# Patient Record
Sex: FEMALE | Race: WHITE | NOT HISPANIC OR LATINO | Employment: OTHER | URBAN - METROPOLITAN AREA
[De-identification: names, ages, dates, MRNs, and addresses within clinical notes are randomized per-mention and may not be internally consistent; named-entity substitution may affect disease eponyms.]

---

## 2017-01-16 ENCOUNTER — HOSPITAL ENCOUNTER (OUTPATIENT)
Dept: MAMMOGRAPHY | Facility: HOSPITAL | Age: 57
Discharge: HOME | End: 2017-01-16
Attending: SURGERY | Admitting: SURGERY

## 2017-01-16 DIAGNOSIS — Z12.31 VISIT FOR SCREENING MAMMOGRAM: ICD-10-CM

## 2017-01-16 PROCEDURE — G0202 SCR MAMMO BI INCL CAD: HCPCS

## 2017-01-16 PROCEDURE — 77067 SCR MAMMO BI INCL CAD: CPT | Performed by: RADIOLOGY

## 2017-01-16 PROCEDURE — 77063 BREAST TOMOSYNTHESIS BI: CPT

## 2017-01-16 PROCEDURE — 77063 BREAST TOMOSYNTHESIS BI: CPT | Performed by: RADIOLOGY

## 2017-11-25 ENCOUNTER — APPOINTMENT (OUTPATIENT)
Dept: CT IMAGING | Facility: HOSPITAL | Age: 57
End: 2017-11-25

## 2017-11-25 ENCOUNTER — HOSPITAL ENCOUNTER (EMERGENCY)
Facility: HOSPITAL | Age: 57
Discharge: HOME | End: 2017-11-25
Attending: EMERGENCY MEDICINE | Admitting: EMERGENCY MEDICINE

## 2017-11-25 VITALS
HEIGHT: 62 IN | OXYGEN SATURATION: 95 % | BODY MASS INDEX: 39.56 KG/M2 | TEMPERATURE: 98.8 F | HEART RATE: 88 BPM | SYSTOLIC BLOOD PRESSURE: 140 MMHG | RESPIRATION RATE: 18 BRPM | DIASTOLIC BLOOD PRESSURE: 89 MMHG | WEIGHT: 215 LBS

## 2017-11-25 DIAGNOSIS — A08.4 VIRAL GASTROENTERITIS: ICD-10-CM

## 2017-11-25 DIAGNOSIS — E86.0 DEHYDRATION: Primary | ICD-10-CM

## 2017-11-25 DIAGNOSIS — R10.84 DIFFUSE ABDOMINAL PAIN: ICD-10-CM

## 2017-11-25 LAB
ALBUMIN SERPL-MCNC: 5.7 G/DL (ref 3.2–4.8)
ALBUMIN/GLOB SERPL: 1.5 G/DL (ref 1.5–2.5)
ALP SERPL-CCNC: 129 U/L (ref 25–100)
ALT SERPL W P-5'-P-CCNC: 191 U/L (ref 7–40)
ANION GAP SERPL CALCULATED.3IONS-SCNC: 12 MMOL/L (ref 3–11)
AST SERPL-CCNC: 185 U/L (ref 0–33)
BACTERIA UR QL AUTO: ABNORMAL /HPF
BASOPHILS # BLD AUTO: 0.01 10*3/MM3 (ref 0–0.2)
BASOPHILS NFR BLD AUTO: 0.1 % (ref 0–1)
BILIRUB SERPL-MCNC: 0.6 MG/DL (ref 0.3–1.2)
BILIRUB UR QL STRIP: ABNORMAL
BUN BLD-MCNC: 31 MG/DL (ref 9–23)
BUN/CREAT SERPL: 25.8 (ref 7–25)
CALCIUM SPEC-SCNC: 11.2 MG/DL (ref 8.7–10.4)
CHLORIDE SERPL-SCNC: 103 MMOL/L (ref 99–109)
CLARITY UR: ABNORMAL
CO2 SERPL-SCNC: 24 MMOL/L (ref 20–31)
COD CRY URNS QL: ABNORMAL /HPF
COLOR UR: ABNORMAL
CREAT BLD-MCNC: 1.2 MG/DL (ref 0.6–1.3)
D-LACTATE SERPL-SCNC: 2.5 MMOL/L (ref 0.5–2)
DEPRECATED RDW RBC AUTO: 40.1 FL (ref 37–54)
EOSINOPHIL # BLD AUTO: 0.1 10*3/MM3 (ref 0–0.3)
EOSINOPHIL NFR BLD AUTO: 0.9 % (ref 0–3)
ERYTHROCYTE [DISTWIDTH] IN BLOOD BY AUTOMATED COUNT: 12.6 % (ref 11.3–14.5)
GFR SERPL CREATININE-BSD FRML MDRD: 47 ML/MIN/1.73
GLOBULIN UR ELPH-MCNC: 3.7 GM/DL
GLUCOSE BLD-MCNC: 96 MG/DL (ref 70–100)
GLUCOSE UR STRIP-MCNC: NEGATIVE MG/DL
HCT VFR BLD AUTO: 47.9 % (ref 34.5–44)
HGB BLD-MCNC: 16.2 G/DL (ref 11.5–15.5)
HGB UR QL STRIP.AUTO: NEGATIVE
HOLD SPECIMEN: NORMAL
HYALINE CASTS UR QL AUTO: ABNORMAL /LPF
IMM GRANULOCYTES # BLD: 0.02 10*3/MM3 (ref 0–0.03)
IMM GRANULOCYTES NFR BLD: 0.2 % (ref 0–0.6)
KETONES UR QL STRIP: ABNORMAL
LEUKOCYTE ESTERASE UR QL STRIP.AUTO: ABNORMAL
LIPASE SERPL-CCNC: 47 U/L (ref 6–51)
LYMPHOCYTES # BLD AUTO: 4.37 10*3/MM3 (ref 0.6–4.8)
LYMPHOCYTES NFR BLD AUTO: 38.1 % (ref 24–44)
MCH RBC QN AUTO: 29.9 PG (ref 27–31)
MCHC RBC AUTO-ENTMCNC: 33.8 G/DL (ref 32–36)
MCV RBC AUTO: 88.4 FL (ref 80–99)
MONOCYTES # BLD AUTO: 0.94 10*3/MM3 (ref 0–1)
MONOCYTES NFR BLD AUTO: 8.2 % (ref 0–12)
MUCOUS THREADS URNS QL MICRO: ABNORMAL /HPF
NEUTROPHILS # BLD AUTO: 6.04 10*3/MM3 (ref 1.5–8.3)
NEUTROPHILS NFR BLD AUTO: 52.5 % (ref 41–71)
NITRITE UR QL STRIP: NEGATIVE
PH UR STRIP.AUTO: 5.5 [PH] (ref 5–8)
PLATELET # BLD AUTO: 295 10*3/MM3 (ref 150–450)
PMV BLD AUTO: 10.1 FL (ref 6–12)
POTASSIUM BLD-SCNC: 3.7 MMOL/L (ref 3.5–5.5)
PROT SERPL-MCNC: 9.4 G/DL (ref 5.7–8.2)
PROT UR QL STRIP: ABNORMAL
RBC # BLD AUTO: 5.42 10*6/MM3 (ref 3.89–5.14)
RBC # UR: ABNORMAL /HPF
REF LAB TEST METHOD: ABNORMAL
SODIUM BLD-SCNC: 139 MMOL/L (ref 132–146)
SP GR UR STRIP: 1.03 (ref 1–1.03)
SQUAMOUS #/AREA URNS HPF: ABNORMAL /HPF
UROBILINOGEN UR QL STRIP: ABNORMAL
WBC NRBC COR # BLD: 11.48 10*3/MM3 (ref 3.5–10.8)
WBC UR QL AUTO: ABNORMAL /HPF
WHOLE BLOOD HOLD SPECIMEN: NORMAL
WHOLE BLOOD HOLD SPECIMEN: NORMAL

## 2017-11-25 PROCEDURE — 83605 ASSAY OF LACTIC ACID: CPT | Performed by: EMERGENCY MEDICINE

## 2017-11-25 PROCEDURE — 80053 COMPREHEN METABOLIC PANEL: CPT | Performed by: EMERGENCY MEDICINE

## 2017-11-25 PROCEDURE — 96361 HYDRATE IV INFUSION ADD-ON: CPT

## 2017-11-25 PROCEDURE — 87086 URINE CULTURE/COLONY COUNT: CPT | Performed by: EMERGENCY MEDICINE

## 2017-11-25 PROCEDURE — 85025 COMPLETE CBC W/AUTO DIFF WBC: CPT | Performed by: EMERGENCY MEDICINE

## 2017-11-25 PROCEDURE — 99283 EMERGENCY DEPT VISIT LOW MDM: CPT

## 2017-11-25 PROCEDURE — 96374 THER/PROPH/DIAG INJ IV PUSH: CPT

## 2017-11-25 PROCEDURE — 25010000002 PROMETHAZINE PER 50 MG: Performed by: EMERGENCY MEDICINE

## 2017-11-25 PROCEDURE — 74176 CT ABD & PELVIS W/O CONTRAST: CPT

## 2017-11-25 PROCEDURE — 81001 URINALYSIS AUTO W/SCOPE: CPT | Performed by: EMERGENCY MEDICINE

## 2017-11-25 PROCEDURE — 83690 ASSAY OF LIPASE: CPT | Performed by: EMERGENCY MEDICINE

## 2017-11-25 RX ORDER — SODIUM CHLORIDE 0.9 % (FLUSH) 0.9 %
10 SYRINGE (ML) INJECTION AS NEEDED
Status: DISCONTINUED | OUTPATIENT
Start: 2017-11-25 | End: 2017-11-25 | Stop reason: HOSPADM

## 2017-11-25 RX ORDER — ONDANSETRON 4 MG/1
4 TABLET, ORALLY DISINTEGRATING ORAL EVERY 6 HOURS PRN
Qty: 15 TABLET | Refills: 0 | Status: SHIPPED | OUTPATIENT
Start: 2017-11-25 | End: 2018-03-20

## 2017-11-25 RX ORDER — DIPHENOXYLATE HYDROCHLORIDE AND ATROPINE SULFATE 2.5; .025 MG/1; MG/1
1 TABLET ORAL 4 TIMES DAILY PRN
Qty: 15 TABLET | Refills: 0 | Status: SHIPPED | OUTPATIENT
Start: 2017-11-25 | End: 2018-03-20

## 2017-11-25 RX ORDER — PROMETHAZINE HYDROCHLORIDE 25 MG/ML
12.5 INJECTION, SOLUTION INTRAMUSCULAR; INTRAVENOUS ONCE
Status: COMPLETED | OUTPATIENT
Start: 2017-11-25 | End: 2017-11-25

## 2017-11-25 RX ADMIN — SODIUM CHLORIDE 1000 ML: 9 INJECTION, SOLUTION INTRAVENOUS at 17:08

## 2017-11-25 RX ADMIN — PROMETHAZINE HYDROCHLORIDE 12.5 MG: 25 INJECTION INTRAMUSCULAR; INTRAVENOUS at 17:08

## 2017-11-25 NOTE — ED PROVIDER NOTES
Subjective   HPI Comments: Yamila Keller is a 56 y.o.female who presents to the ED with c/o abdominal pain. Two days ago the pt developed diarrhea and abdominal cramping. Since that time she has had intermittent episodes of both, which has caused her to stay awake at night. She has taken Imodium, although it has not relieved her symptoms. Her cramping has progressively worsened, prompting her to present to the ED for evaluation. At the ED she denies recent antibiotic use, trips outside of the country or suspicious food intake. She does not feel like eating, but she denies fever, vomiting, blood in her stool or any other acute sx at this time.    She has had no medication changes in the past couple of months.      Patient is a 56 y.o. female presenting with abdominal pain.   History provided by:  Patient  Abdominal Pain   Pain location:  Generalized  Pain quality: cramping    Pain radiates to:  Does not radiate  Onset quality:  Gradual  Duration:  2 days  Timing:  Constant  Progression:  Worsening  Relieved by:  Nothing  Worsened by:  Nothing  Ineffective treatments: Imodium.  Associated symptoms: diarrhea    Associated symptoms: no chills, no fever, no hematochezia, no nausea and no vomiting        Review of Systems   Constitutional: Positive for appetite change. Negative for chills and fever.   Gastrointestinal: Positive for abdominal pain and diarrhea. Negative for blood in stool, hematochezia, nausea and vomiting.   All other systems reviewed and are negative.      Past Medical History:   Diagnosis Date   • Arthritis    • Depression        No Known Allergies    Past Surgical History:   Procedure Laterality Date   • BREAST CYST EXCISION Right     BENIGN, INTRADUCTAL PAPILLOMA (PER PT)   •  SECTION      x 3   • CHOLECYSTECTOMY     • HYSTERECTOMY      complete   • JOINT REPLACEMENT Left    • TUBAL ABDOMINAL LIGATION         Family History   Problem Relation Age of Onset   • Diabetes Mother    •  Lymphoma Mother    • Diabetes Paternal Grandmother    • Heart disease Paternal Grandmother    • Hypertension Brother    • Hypertension Brother    • Breast cancer Neg Hx    • Ovarian cancer Neg Hx        Social History     Social History   • Marital status:      Spouse name: N/A   • Number of children: N/A   • Years of education: N/A     Occupational History   • RN      Social History Main Topics   • Smoking status: Never Smoker   • Smokeless tobacco: Never Used   • Alcohol use No   • Drug use: No   • Sexual activity: Yes     Partners: Male      Comment:      Other Topics Concern   • None     Social History Narrative         Objective   Physical Exam   Constitutional: She is oriented to person, place, and time. She appears well-developed and well-nourished. No distress.   HENT:   Head: Normocephalic and atraumatic.   Mouth/Throat: No oropharyngeal exudate.   Eyes: Conjunctivae are normal. No scleral icterus.   Neck: Normal range of motion. Neck supple. No JVD present.   Cardiovascular: Normal rate, regular rhythm and normal heart sounds.  Exam reveals no gallop and no friction rub.    No murmur heard.  Pulmonary/Chest: Effort normal and breath sounds normal. No respiratory distress. She has no wheezes. She has no rales.   Abdominal: Soft. She exhibits no distension. There is tenderness. There is no rebound and no guarding.   Diminished bowel sounds. Diffuse abdominal TTP.   Musculoskeletal: Normal range of motion. She exhibits no edema.   Neurological: She is alert and oriented to person, place, and time.   Skin: Skin is warm and dry. She is not diaphoretic.   Psychiatric: She has a normal mood and affect. Her behavior is normal.   Nursing note and vitals reviewed.      Procedures         ED Course  ED Course     Pt feels much better after meds and fluids.  Labs and CT are benign other than dehydration.  Patient stable on serial rechecks.  Discussed findings, concerns, plan of care, expected course,  reasons to return and followup.                  MDM  Number of Diagnoses or Management Options  Dehydration:   Diffuse abdominal pain:   Viral gastroenteritis:      Amount and/or Complexity of Data Reviewed  Clinical lab tests: reviewed and ordered  Tests in the radiology section of CPT®: reviewed and ordered  Decide to obtain previous medical records or to obtain history from someone other than the patient: yes  Independent visualization of images, tracings, or specimens: yes        Final diagnoses:   Dehydration   Viral gastroenteritis   Diffuse abdominal pain       Documentation assistance provided by sofia Cooney.  Information recorded by the scribe was done at my direction and has been verified and validated by me.     Charlie Cooney  11/25/17 1708       Fred Turner MD  11/26/17 0004

## 2017-11-27 LAB
BACTERIA SPEC AEROBE CULT: NORMAL
BACTERIA SPEC AEROBE CULT: NORMAL

## 2018-01-10 ENCOUNTER — TRANSCRIBE ORDERS (OUTPATIENT)
Dept: ADMINISTRATIVE | Facility: HOSPITAL | Age: 58
End: 2018-01-10

## 2018-01-10 DIAGNOSIS — Z12.31 VISIT FOR SCREENING MAMMOGRAM: Primary | ICD-10-CM

## 2018-02-02 ENCOUNTER — TRANSCRIBE ORDERS (OUTPATIENT)
Dept: ADMINISTRATIVE | Facility: HOSPITAL | Age: 58
End: 2018-02-02

## 2018-02-02 DIAGNOSIS — R79.89 ELEVATED LFTS: Primary | ICD-10-CM

## 2018-02-02 DIAGNOSIS — B25.9 CYTOMEGALOVIRUS INFECTION, UNSPECIFIED CYTOMEGALOVIRAL INFECTION TYPE (HCC): ICD-10-CM

## 2018-02-08 ENCOUNTER — HOSPITAL ENCOUNTER (OUTPATIENT)
Dept: CT IMAGING | Facility: HOSPITAL | Age: 58
Discharge: HOME | End: 2018-02-08
Admitting: NURSE PRACTITIONER

## 2018-02-08 VITALS
HEIGHT: 63 IN | SYSTOLIC BLOOD PRESSURE: 125 MMHG | OXYGEN SATURATION: 91 % | TEMPERATURE: 98.7 F | BODY MASS INDEX: 38.91 KG/M2 | DIASTOLIC BLOOD PRESSURE: 71 MMHG | WEIGHT: 219.6 LBS | RESPIRATION RATE: 18 BRPM | HEART RATE: 86 BPM

## 2018-02-08 DIAGNOSIS — R79.89 ELEVATED LFTS: ICD-10-CM

## 2018-02-08 DIAGNOSIS — B25.9 CYTOMEGALOVIRUS INFECTION, UNSPECIFIED CYTOMEGALOVIRAL INFECTION TYPE (HCC): ICD-10-CM

## 2018-02-08 LAB
APTT PPP: 26.8 SECONDS (ref 24–31)
INR PPP: 0.98
PLATELET # BLD AUTO: 217 10*3/MM3 (ref 150–450)
PROTHROMBIN TIME: 10.7 SECONDS (ref 9.6–11.5)

## 2018-02-08 PROCEDURE — 25010000002 FENTANYL CITRATE (PF) 100 MCG/2ML SOLUTION

## 2018-02-08 PROCEDURE — 85730 THROMBOPLASTIN TIME PARTIAL: CPT | Performed by: RADIOLOGY

## 2018-02-08 PROCEDURE — 88313 SPECIAL STAINS GROUP 2: CPT | Performed by: NURSE PRACTITIONER

## 2018-02-08 PROCEDURE — 88307 TISSUE EXAM BY PATHOLOGIST: CPT | Performed by: NURSE PRACTITIONER

## 2018-02-08 PROCEDURE — 77012 CT SCAN FOR NEEDLE BIOPSY: CPT

## 2018-02-08 PROCEDURE — 85610 PROTHROMBIN TIME: CPT | Performed by: RADIOLOGY

## 2018-02-08 PROCEDURE — 85049 AUTOMATED PLATELET COUNT: CPT | Performed by: RADIOLOGY

## 2018-02-08 RX ORDER — FENTANYL CITRATE 50 UG/ML
100 INJECTION, SOLUTION INTRAMUSCULAR; INTRAVENOUS ONCE
Status: DISCONTINUED | OUTPATIENT
Start: 2018-02-08 | End: 2018-02-09 | Stop reason: HOSPADM

## 2018-02-08 RX ORDER — ALPRAZOLAM 0.5 MG/1
0.5 TABLET ORAL
Status: COMPLETED | OUTPATIENT
Start: 2018-02-08 | End: 2018-02-08

## 2018-02-08 RX ORDER — FENTANYL CITRATE 50 UG/ML
INJECTION, SOLUTION INTRAMUSCULAR; INTRAVENOUS
Status: COMPLETED
Start: 2018-02-08 | End: 2018-02-08

## 2018-02-08 RX ORDER — LIDOCAINE HYDROCHLORIDE 10 MG/ML
10 INJECTION, SOLUTION EPIDURAL; INFILTRATION; INTRACAUDAL; PERINEURAL ONCE
Status: COMPLETED | OUTPATIENT
Start: 2018-02-08 | End: 2018-02-08

## 2018-02-08 RX ORDER — SODIUM CHLORIDE 0.9 % (FLUSH) 0.9 %
1-10 SYRINGE (ML) INJECTION AS NEEDED
Status: DISCONTINUED | OUTPATIENT
Start: 2018-02-08 | End: 2018-02-09 | Stop reason: HOSPADM

## 2018-02-08 RX ADMIN — FENTANYL CITRATE 50 MCG: 50 INJECTION, SOLUTION INTRAMUSCULAR; INTRAVENOUS at 16:11

## 2018-02-08 RX ADMIN — LIDOCAINE HYDROCHLORIDE 10 ML: 10 INJECTION, SOLUTION INFILTRATION; PERINEURAL at 16:09

## 2018-02-08 RX ADMIN — ALPRAZOLAM 0.5 MG: 0.5 TABLET ORAL at 11:13

## 2018-02-08 NOTE — PROCEDURES
Radiology Procedure    Pre-procedure: liver for random liver biopsy     Procedure Performed: CT-guided random liver biopsy     IV Sedation and/or Anesthesia:  Yes, describe: 50 ucg Fentanyl IV    Complications: None    Preliminary Findings: Liver without focal lesion    Specimen Removed: Core biopsy x 3    Estimated Blood Loss:  0ml    Post-Procedure Diagnosis: Random core liver biopsy    Post-Procedure Plan: Await pathological results final. Resume activities and plan per ordering physician.     Standard Discharge Instructions Given:yes     Trevin Del Toro DO  02/08/18  4:48 PM

## 2018-02-08 NOTE — PLAN OF CARE
Problem: Patient Care Overview (Adult)  Goal: Adult Individualization and Mutuality  Outcome: Ongoing (interventions implemented as appropriate)   02/08/18 0887   Individualization   Patient Specific Goals complete liver biopsy

## 2018-02-08 NOTE — PLAN OF CARE
Problem: Liver Biopsy (Adult,Pediatric)  Goal: Signs and Symptoms of Listed Potential Problems Will be Absent or Manageable (Liver Biopsy)  Outcome: Ongoing (interventions implemented as appropriate)   02/08/18 0930   Liver Biopsy   Problems Assessed (Liver Biopsy) all

## 2018-02-08 NOTE — PLAN OF CARE
Problem: Patient Care Overview (Adult)  Goal: Discharge Needs Assessment  Outcome: Ongoing (interventions implemented as appropriate)   02/08/18 0941   Discharge Needs Assessment   Concerns To Be Addressed no discharge needs identified

## 2018-02-08 NOTE — PLAN OF CARE
Problem: Patient Care Overview (Adult)  Goal: Plan of Care Review  Outcome: Ongoing (interventions implemented as appropriate)   02/08/18 1623   Coping/Psychosocial Response Interventions   Plan Of Care Reviewed With patient;spouse

## 2018-02-09 ENCOUNTER — TELEPHONE (OUTPATIENT)
Dept: INTERVENTIONAL RADIOLOGY/VASCULAR | Facility: HOSPITAL | Age: 58
End: 2018-02-09

## 2018-02-09 NOTE — NURSING NOTE
Pt discharged to home s/p liver biopsy.  Pt tolerated procedure without complications.  Discharge instructions reviewed with patient and spouse.  Both verbalized understanding.  Transported pt to exit via wheelchair per RN.

## 2018-02-09 NOTE — TELEPHONE ENCOUNTER
@[id number])@    Other Comments:

## 2018-02-20 ENCOUNTER — HOSPITAL ENCOUNTER (OUTPATIENT)
Dept: MAMMOGRAPHY | Facility: HOSPITAL | Age: 58
Discharge: HOME | End: 2018-02-20
Attending: SURGERY | Admitting: SURGERY

## 2018-02-20 DIAGNOSIS — Z12.31 VISIT FOR SCREENING MAMMOGRAM: ICD-10-CM

## 2018-02-20 PROCEDURE — 77067 SCR MAMMO BI INCL CAD: CPT | Performed by: RADIOLOGY

## 2018-02-20 PROCEDURE — 77063 BREAST TOMOSYNTHESIS BI: CPT

## 2018-02-20 PROCEDURE — 77067 SCR MAMMO BI INCL CAD: CPT

## 2018-02-20 PROCEDURE — 77063 BREAST TOMOSYNTHESIS BI: CPT | Performed by: RADIOLOGY

## 2018-02-26 LAB
CYTO UR: NORMAL
LAB AP CASE REPORT: NORMAL
LAB AP CLINICAL INFORMATION: NORMAL
LAB AP DIAGNOSIS COMMENT: NORMAL
LAB AP SPECIAL STAINS: NORMAL
Lab: NORMAL
PATH REPORT.ADDENDUM SPEC: NORMAL
PATH REPORT.FINAL DX SPEC: NORMAL
PATH REPORT.GROSS SPEC: NORMAL

## 2018-03-06 ENCOUNTER — TRANSCRIBE ORDERS (OUTPATIENT)
Dept: NUTRITION | Facility: HOSPITAL | Age: 58
End: 2018-03-06

## 2018-03-06 DIAGNOSIS — K75.81 NASH (NONALCOHOLIC STEATOHEPATITIS): Primary | ICD-10-CM

## 2018-03-20 ENCOUNTER — OFFICE VISIT (OUTPATIENT)
Dept: SLEEP MEDICINE | Facility: HOSPITAL | Age: 58
End: 2018-03-20

## 2018-03-20 VITALS
HEIGHT: 63 IN | OXYGEN SATURATION: 96 % | HEART RATE: 73 BPM | WEIGHT: 208 LBS | BODY MASS INDEX: 36.86 KG/M2 | SYSTOLIC BLOOD PRESSURE: 119 MMHG | DIASTOLIC BLOOD PRESSURE: 66 MMHG

## 2018-03-20 DIAGNOSIS — E66.9 OBESITY (BMI 30-39.9): ICD-10-CM

## 2018-03-20 DIAGNOSIS — G47.33 SEVERE OBSTRUCTIVE SLEEP APNEA: Primary | ICD-10-CM

## 2018-03-20 PROCEDURE — 99213 OFFICE O/P EST LOW 20 MIN: CPT | Performed by: INTERNAL MEDICINE

## 2018-03-20 NOTE — PROGRESS NOTES
Subjective:     Chief Complaint:   Chief Complaint   Patient presents with   • Follow-up       HPI:    Yamila Keller is a 57 y.o. female here for follow-up of obstructive sleep apnea.    She remains on auto CPAP therapy at a range of 8-18.  She is utilizing a nasal pillow mask.  Her AHI is normal and her mask leak is acceptable.  Compliance is good.    Further details are as follows:    Since last visit sleep problem has: remained the same  Currently using PAP: yes Hours of usage during the night: 7    Amount of sleep per night : 7 hours  Average length of time it takes to fall asleep : 5 minutes  Number of awakenings per night : 1     She feels fatigue (tiredness, exhaustion, lethargy) in the daytime even when not sleepy? Infrequently  She feels sleepy (or struggles to stay awake) in the daytime? No    Swengel Scale scored as 6/24.    Type of mask: nasal pillow    She (since starting PAP or since last visit) has problems with the following:   Pressure from the mask 0 - No Problem  Skin irritation from the mask 0 - No Problem  Mask coming off face 0 - No Problem  Air leaks from the mask 0 - No Problem  Dry mouth or throat 0 - No Problem  Nasal congestion 2 - Mild Problems    I reviewed her PAP download:  Average pressure: 9  Average AHI:  3  Average minutes in large leak per night: 0      Current medications are:   Current Outpatient Prescriptions:   •  clotrimazole-betamethasone (LOTRISONE) 1-0.05 % cream, Apply topically three times daily as needed to affected areas., Disp: 15 g, Rfl: 2  •  exenatide er (BYDUREON) 2 MG pen-injector injection, Inject 0.65 ml subcutaneously every 7 days in the abdomen, thighs, or outer area of upper arm rotating injection sites, Disp: 4 each, Rfl: 0  •  exenatide er (BYDUREON) 2 MG/0.85ML auto-injector injection, Inject 0.85 mL under the skin in the abdomen, thighs, or outer area of upper arm rotating injection sites Every 7 (Seven) Days, Disp: 3.4 mL, Rfl: 1  •  metFORMIN ER  (GLUCOPHAGE-XR) 500 MG 24 hr tablet, Take 2 tablets by mouth 2 (Two) Times a Day. (Patient taking differently: Take 1,000 mg by mouth 2 (Two) Times a Day.), Disp: 360 tablet, Rfl: 3  •  metoprolol succinate XL (TOPROL-XL) 50 MG 24 hr tablet, Take 1 tablet by mouth Daily., Disp: 90 tablet, Rfl: 1  •  metoprolol succinate XL (TOPROL-XL) 50 MG 24 hr tablet, Take 1 tablet by mouth Daily., Disp: 90 tablet, Rfl: 2  •  pioglitazone (ACTOS) 30 MG tablet, Take 1 tablet by mouth Daily., Disp: 90 tablet, Rfl: 3  •  Probiotic Product (PROBIOTIC DAILY PO), Take  by mouth Daily., Disp: , Rfl:   •  venlafaxine XR (EFFEXOR-XR) 75 MG 24 hr capsule, Take 3 capsules by mouth Every Morning at the same time each day with food (Patient taking differently: Take 225 mg by mouth Daily.), Disp: 270 capsule, Rfl: 3.      The patient's relevant past medical, surgical, family and social history were reviewed and updated in Epic as appropriate.     ROS:    Review of Systems   Constitutional: Negative for fatigue.   Respiratory: Positive for apnea.    Psychiatric/Behavioral: Negative for sleep disturbance.         Objective:    Physical Exam   Constitutional: She is oriented to person, place, and time. She appears well-developed and well-nourished.   HENT:   Head: Normocephalic and atraumatic.   Mouth/Throat: Oropharynx is clear and moist.   Class II airway   Neck: Neck supple. No thyromegaly present.   Cardiovascular: Normal rate and regular rhythm.  Exam reveals no gallop and no friction rub.    No murmur heard.  Pulmonary/Chest: Effort normal. No respiratory distress. She has no wheezes. She has no rales.   Musculoskeletal: She exhibits no edema.   Neurological: She is alert and oriented to person, place, and time.   Skin: Skin is warm and dry.   Psychiatric: She has a normal mood and affect. Her behavior is normal.   Vitals reviewed.      Data:    Patient's PAP download was personally reviewed including raw data and  results.    Assessment:    Problem List Items Addressed This Visit        Pulmonary Problems    Severe obstructive sleep apnea - Primary    Overview     Auto CPAP 8-18         Relevant Orders    PAP Therapy       Other    Obesity (BMI 30-39.9)      Other Visit Diagnoses    None.         Adequate treatment of her obstructive sleep apnea with auto CPAP utilizing a nasal pillow mask.  Download is acceptable as described above.    Plan:     1. No change in auto CPAP settings  2. I renewed her supply prescription for the next year  3. No change in mask  4. Continued long-term weight loss  5. Routine follow-up      Discussed in detail with the patient.  She will call prior to her follow up visit for any new problems.    Signed by  Vidal Sherman MD

## 2018-03-30 ENCOUNTER — HOSPITAL ENCOUNTER (OUTPATIENT)
Dept: NUTRITION | Facility: HOSPITAL | Age: 58
Setting detail: RECURRING SERIES
Discharge: HOME | End: 2018-03-30

## 2018-03-30 VITALS — WEIGHT: 205.2 LBS | HEIGHT: 63 IN | BODY MASS INDEX: 36.36 KG/M2

## 2018-03-30 PROCEDURE — 97802 MEDICAL NUTRITION INDIV IN: CPT | Performed by: DIETITIAN, REGISTERED

## 2018-05-15 ENCOUNTER — TELEPHONE (OUTPATIENT)
Dept: NUTRITION | Facility: HOSPITAL | Age: 58
End: 2018-05-15

## 2018-05-15 NOTE — PROGRESS NOTES
Patient canceled nutrition follow up appointment scheduled for 5/16/2018, per .  Follow up completed over the phone with .  Goal completion is as follows:    -Walk [phone] steps per day, 3x/week: 75%  -Continue tracking food intake and healthy eating habits: 100%  -Lose 0.5-1.0# of body weight per week: 100%    Patient is having a hard time reaching the [phone] steps per day goal.  However, she did join a gym to help with meeting her exercise goals.  Continues to track her food intake daily.  States that her current weight is 86.3 kg (190 lbs), which is a 6.8 kg (15 lb) weight loss from the initial nutrition visit and a 13.6 kg (30 lb) weight loss total.  Patient declined rescheduling the follow up visit at this time.  Thank you again for this referral.

## 2018-07-02 RX ORDER — PRAVASTATIN SODIUM 20 MG
20 TABLET ORAL DAILY
Qty: 90 TABLET | Refills: 3 | OUTPATIENT
Start: 2018-07-02

## 2018-08-10 ENCOUNTER — OFFICE VISIT (OUTPATIENT)
Dept: FAMILY MEDICINE CLINIC | Facility: CLINIC | Age: 58
End: 2018-08-10

## 2018-08-10 VITALS
SYSTOLIC BLOOD PRESSURE: 110 MMHG | OXYGEN SATURATION: 99 % | BODY MASS INDEX: 30.65 KG/M2 | HEIGHT: 63 IN | HEART RATE: 56 BPM | DIASTOLIC BLOOD PRESSURE: 72 MMHG | WEIGHT: 173 LBS

## 2018-08-10 DIAGNOSIS — G47.33 SEVERE OBSTRUCTIVE SLEEP APNEA: ICD-10-CM

## 2018-08-10 DIAGNOSIS — K76.0 HEPATIC STEATOSIS: ICD-10-CM

## 2018-08-10 DIAGNOSIS — F32.A DEPRESSION, UNSPECIFIED DEPRESSION TYPE: ICD-10-CM

## 2018-08-10 DIAGNOSIS — Z00.00 PHYSICAL EXAM, ANNUAL: Primary | ICD-10-CM

## 2018-08-10 DIAGNOSIS — K92.1 BLOOD IN STOOL: ICD-10-CM

## 2018-08-10 DIAGNOSIS — R10.13 EPIGASTRIC PAIN: ICD-10-CM

## 2018-08-10 DIAGNOSIS — I10 ESSENTIAL HYPERTENSION: ICD-10-CM

## 2018-08-10 DIAGNOSIS — Z90.5 SINGLE KIDNEY: ICD-10-CM

## 2018-08-10 PROCEDURE — 99396 PREV VISIT EST AGE 40-64: CPT | Performed by: PHYSICIAN ASSISTANT

## 2018-08-10 RX ORDER — MELATONIN
2000 DAILY
COMMUNITY

## 2018-08-10 RX ORDER — CYANOCOBALAMIN (VITAMIN B-12) 500 MCG
800 LOZENGE ORAL DAILY
COMMUNITY
End: 2023-02-28

## 2018-08-10 RX ORDER — RANITIDINE 150 MG/1
150 TABLET ORAL NIGHTLY
Qty: 90 TABLET | Refills: 1 | Status: SHIPPED | OUTPATIENT
Start: 2018-08-10 | End: 2018-09-14

## 2018-08-10 RX ORDER — PRAVASTATIN SODIUM 20 MG
20 TABLET ORAL DAILY
Qty: 90 TABLET | Refills: 2 | Status: SHIPPED | OUTPATIENT
Start: 2018-08-10 | End: 2018-10-08 | Stop reason: SDUPTHER

## 2018-08-10 RX ORDER — ESTRADIOL 1.53 MG/1
SPRAY TRANSDERMAL
Refills: 11 | COMMUNITY
Start: 2018-06-15 | End: 2020-08-12

## 2018-08-10 NOTE — PROGRESS NOTES
Patient Care Team:  Daly Archibald PA-C as PCP - General (Physician Assistant)     Chief complaint: Patient is in today for a physical     Patient is a 57 y.o. female who presents for her yearly physical exam.     Patient is a pleasant 57-year-old white female who presents for her annual physical exam.  She was previously seen by Dr. Bauman and myself at [facility].  Her past medical history is significant for hepatic steatosis with a stage 3-4 fibrosis, hypertension, obesity, hyperlipidemia, sleep apnea, single kidney and depression with anxiety.  She was diagnosed with hepatic steatosis in February 2018 by liver biopsy.  Since then she has radically changed her diet and began exercising.  She has lost a total of 48 pounds.  Her blood pressure is 110/72 today and she reports similar or lower readings recently.  She is currently on metoprolol 25 mg daily.  She is compliant on her pravastatin for hyperlipidemia.  She reports recent lipid panel completed in May, pending documentation from [facility].  She wears a CPAP nightly and reports significant relief and restless sleep and daytime somnolence.  She is compliant on her venlafaxine and her mood is stable.  She was recently seen by ophthalmology in December and follows up with them yearly.  She is compliant on mammogram and had a colonoscopy about 5 years ago by Dr. Red.    Patient reports today that she has had epigastric pain, nausea with 1 episode of vomiting, episodic bloody stools with constipation and has generally not felt well.  She did run out of her trulicity has been on this for 3 weeks.  She is not currently taking anything for GERD and she denies any symptoms of reflux.         Review of Systems   Constitutional: Positive for fatigue. Negative for activity change, appetite change, chills, diaphoresis, fever, unexpected weight gain and unexpected weight loss.   HENT: Negative for congestion, dental problem, ear pain, hearing loss, nosebleeds, sinus  pressure, sore throat and trouble swallowing.    Eyes: Positive for itching. Negative for blurred vision, pain, redness and visual disturbance.   Respiratory: Negative for apnea, cough, chest tightness, shortness of breath and wheezing.    Cardiovascular: Positive for leg swelling. Negative for chest pain and palpitations.   Gastrointestinal: Positive for abdominal pain, blood in stool, constipation and vomiting. Negative for abdominal distention, anal bleeding, diarrhea, nausea, GERD and indigestion.   Endocrine: Negative for cold intolerance, heat intolerance, polydipsia, polyphagia and polyuria.   Genitourinary: Negative for urinary incontinence, decreased urine volume, difficulty urinating, dysuria, frequency, hematuria and urgency.   Musculoskeletal: Positive for arthralgias. Negative for gait problem, joint swelling and bursitis.   Skin: Negative for dry skin, rash, skin lesions and bruise.   Neurological: Negative for dizziness, tremors, seizures, syncope, speech difficulty, weakness, light-headedness, headache, memory problem and confusion.   Hematological: Bruises/bleeds easily.   Psychiatric/Behavioral: Positive for agitation and depressed mood. Negative for behavioral problems, decreased concentration, hallucinations, sleep disturbance, suicidal ideas and stress. The patient is nervous/anxious.       History  Past Medical History:   Diagnosis Date   • Arthritis    • Depression    • Elevated liver enzymes    • Fatty liver    • Kidney disorder     one kidney   • Polycystic ovaries    • PVC (premature ventricular contraction)     occasional pvc's   • Sleep apnea     cpap at home      Past Surgical History:   Procedure Laterality Date   • BREAST BIOPSY     • BREAST CYST EXCISION Right     BENIGN, INTRADUCTAL PAPILLOMA (PER PT)   •  SECTION      x 3   • CHOLECYSTECTOMY     • HYSTERECTOMY      complete   • JOINT REPLACEMENT Left     left knee   • OOPHORECTOMY     • TUBAL ABDOMINAL LIGATION         No Known Allergies   Family History   Problem Relation Age of Onset   • Diabetes Mother    • Lymphoma Mother    • Diabetes Paternal Grandmother    • Heart disease Paternal Grandmother    • Hypertension Brother    • Hypertension Brother    • Breast cancer Neg Hx    • Ovarian cancer Neg Hx       Social History     Social History   • Marital status:      Spouse name: N/A   • Number of children: N/A   • Years of education: N/A     Occupational History   • RN      Social History Main Topics   • Smoking status: Never Smoker   • Smokeless tobacco: Never Used   • Alcohol use No   • Drug use: No   • Sexual activity: Yes     Partners: Male      Comment:      Other Topics Concern   • Not on file     Social History Narrative   • No narrative on file        Current Outpatient Prescriptions:   •  cholecalciferol (VITAMIN D3) 1000 units tablet, Take 2,000 Units by mouth Daily., Disp: , Rfl:   •  Dulaglutide 0.75 MG/0.5ML solution pen-injector, Inject 0.5 mL under the skin 1 (One) Time Per Week., Disp: 2 mL, Rfl: 1  •  EVAMIST 1.53 MG/SPRAY transdermal spray, APPLY 1 PUMP UTD BID, Disp: , Rfl: 11  •  metFORMIN ER (GLUCOPHAGE-XR) 500 MG 24 hr tablet, Take 2 tablets by mouth 2 (Two) Times a Day. (Patient taking differently: Take 1,000 mg by mouth 2 (Two) Times a Day.), Disp: 360 tablet, Rfl: 3  •  pravastatin (PRAVACHOL) 20 MG tablet, Take 1 tablet by mouth Daily., Disp: 90 tablet, Rfl: 2  •  Probiotic Product (PROBIOTIC DAILY PO), Take  by mouth Daily., Disp: , Rfl:   •  venlafaxine XR (EFFEXOR-XR) 75 MG 24 hr capsule, Take 3 capsules by mouth Every Morning at the same time each day with food (Patient taking differently: Take 225 mg by mouth Daily.), Disp: 270 capsule, Rfl: 3  •  Vitamin E 400 units tablet, Take 800 Units by mouth Daily., Disp: , Rfl:   •  clotrimazole-betamethasone (LOTRISONE) 1-0.05 % cream, Apply topically three times daily as needed to affected areas., Disp: 15 g, Rfl: 2  •  metoprolol tartrate  (LOPRESSOR) 25 MG tablet, Take 1 tablet by mouth Daily., Disp: 90 tablet, Rfl: 0  •  raNITIdine (ZANTAC) 150 MG tablet, Take 1 tablet by mouth Every Night., Disp: 90 tablet, Rfl: 1    Immunizations   N/A   Prescribed/Refused   Date     Td/Tdap  (Booster Q 10 yrs)   []           Prescribed    []     Refused        []      2013     Flu  (Yearly)   []        Prescribed    []     Refused        []      Not available yet     Pneumovax  (1 yr after Prevnar)   [x]        Prescribed    []     Refused        []           Prevnar 13  (1 yr after Pneumo)   [x]        Prescribed    []     Refused        []           Hep B     []        Prescribed    []     Refused        []      Has completed series     Zostavax  (Age 60 and older)   []        Prescribed    []     Refused        []      Out of stock at pharmacy     There is no immunization history on file for this patient.  Health Maintenance   Topic Date Due   • TDAP/TD VACCINES (1 - Tdap) 12/22/1980   • ZOSTER VACCINE (1 of 2) 12/22/2011   • INFLUENZA VACCINE  08/01/2018   • ANNUAL PHYSICAL  08/11/2019   • MAMMOGRAM  02/20/2020   • COLONOSCOPY  07/04/2023   • HEPATITIS C SCREENING  Completed        Diabetes  [] Yes  [x] No   N/A      Date     Eye Exam     []            []   Complete         Date:  Where:       Foot Exam     []         []   Complete          Obesity Counseling     []       []   Complete     No results found for: HGBA1C, MICROALBUR    Additional Testing      Date     Colorectal Screening       []   N/A   [x]   Complete         Date:    Where:       Pap      [x]   N/A   []   Complete   Date:    Where:       Mammogram        []   N/A   [x]   Complete Date:    Where:     PSA  (Over age 50)    [x]   N/A   []   Complete   Date:    Where:     US Aorta  (For male smokers, age 65)     [x]   N/A   []   Complete   Date:    Where:     CT for Smoker  (Age 55-75, 30 pk yr)    [x]   N/A   []   Complete   Date:    Where:     Bone Density/DEXA      [x]   N/A   []   Complete    Date:    Follow-up:     Hep. C  ( 1158-5384)      []   N/A   [x]   Complete   Date:      Where:     Patient's Body mass index is 30.65 kg/m². BMI is above normal parameters. Recommendations include: exercise counseling and nutrition counseling.      Results for orders placed or performed during the hospital encounter of 18   aPTT   Result Value Ref Range    PTT 26.8 24.0 - 31.0 seconds   Protime-INR   Result Value Ref Range    Protime 10.7 9.6 - 11.5 Seconds    INR 0.98    Platelet Count   Result Value Ref Range    Platelets 217 150 - 450 10*3/mm3   Tissue Pathology Exam - Tissue, Liver   Result Value Ref Range    Addendum       See scanned report.       Case Report       Surgical Pathology Report                         Case: [id number]                                  Authorizing Provider:  Adela Ross MD          Collected:           2018 04:41 PM          Ordering Location:     [facility]   Received:            2018 07:02 AM                                 CT                                                                           Pathologist:           Steve Ocasio MD                                                            Specimen:    Liver                                                                                      Clinical Information       The working history is random liver biopsy and liver without focal lesion.       Final Diagnosis        LIVER, NEEDLE CORE BIOPSY:  Severe fatty change, steatohepatitis, and stage 3-4 fibrosis.  (See comment).      DGD/dlb      Comment       This diagnosis was rendered by Dr. Brock at [facility]. See scanned report for full consultative opinion.          Gross Description       Received in formalin labeled as liver biopsy are three needle cores of tan soft tissue averaging 1.0 cm in length by 0.1 cm in diameter. Submitted entirely in one cassette. LED/klb       Special Stains       Trichrome stain shows stage 3 to 4  "fibrosis and the iron stain is negative for stainable iron.       Microscopic Description       The slides are reviewed and demonstrate histopathologic features supporting the above rendered diagnosis.         Embedded Images              Vitals:    08/10/18 0822   BP: 110/72   BP Location: Right arm   Patient Position: Sitting   Cuff Size: Adult   Pulse: 56   SpO2: 99%   Weight: 78.5 kg (173 lb)   Height: 160 cm (62.99\")         Physical Exam   Constitutional: She is oriented to person, place, and time. Vital signs are normal. She appears well-developed and well-nourished. She is active and cooperative. She appears overweight.   HENT:   Head: Normocephalic and atraumatic.   Right Ear: Hearing, tympanic membrane, external ear and ear canal normal.   Left Ear: Hearing, tympanic membrane, external ear and ear canal normal.   Nose: Mucosal edema and rhinorrhea present. Right sinus exhibits no maxillary sinus tenderness and no frontal sinus tenderness. Left sinus exhibits no maxillary sinus tenderness and no frontal sinus tenderness.   Mouth/Throat: Uvula is midline, oropharynx is clear and moist and mucous membranes are normal.   Eyes: Pupils are equal, round, and reactive to light. Conjunctivae, EOM and lids are normal.   Wearing glasses.   Neck: Trachea normal, normal range of motion and phonation normal. No thyroid mass and no thyromegaly present.   Cardiovascular: Normal rate, regular rhythm and normal heart sounds.    Pulmonary/Chest: Effort normal and breath sounds normal.   Abdominal: Soft. Normal appearance and bowel sounds are normal. There is tenderness in the epigastric area. There is no rigidity, no rebound, no guarding and no CVA tenderness.   Musculoskeletal: Normal range of motion.   Lymphadenopathy:     She has no cervical adenopathy.   Neurological: She is alert and oriented to person, place, and time. She has normal strength and normal reflexes. Coordination and gait normal.   CN grossly intact "   Skin: Skin is warm. No rash noted. No cyanosis or erythema. Nails show no clubbing.   Psychiatric: She has a normal mood and affect. Her speech is normal and behavior is normal. Judgment and thought content normal. She is not actively hallucinating. Cognition and memory are normal. She is attentive.   Vitals reviewed.          Counseling provided on diet and nutrition, exercise, weight management and flu prevention.    Diagnoses and all orders for this visit:    Physical exam, annual    Severe obstructive sleep apnea  -wears CPAP nightly    Depression, unspecified depression type  -on venlafaxine  -mood is stable     Hepatic steatosis  -liver biopsy confirming stage 3-4 fibrosis  -patient has changed diet and began exercising, has lost a total of 48 lbs since February 2018    Single kidney  -right kidney only    Essential hypertension  -110/72, was lower yesterday, probably overtreated  -has lost 48 lbs in the last several months  -will decrease metoprolol to 25 mg QD  -monitor at home    Blood in stool  -reports several episodes of bright blood   -     Ambulatory Referral For Screening Colonoscopy    Epigastric pain  -TTP along epigastric region  -most likely due to dyspepsia  -will trial ranitidine 150 mg QD  -fu in 1 month     Daly Archibald PA-C   8/10/2018   12:57 PM

## 2018-08-10 NOTE — PATIENT INSTRUCTIONS
"-monitor blood pressure at home  -notify me if stomach pain persists or worsens  -recommend magnesium 250 mg 1-2 tablets a day    General Health Maintenance:  -Annual dermatology exam  -Annual eye exam (if within the last year, please obtain records for our office and bring in to next visit or mail in)  -Staying current on your required colonoscopy, starts at age 50 unless there are other indications prior  -Self breast and/or testicular exams at least every month (easy to do in the shower)  -Yearly mammogram    Vaccines:  -Talk to pharmacist about shingrix shot  -Obtain flu shot when available  -Health department is recommending Hepatitis A vaccine    The largest impact you can have on your own health is getting the proper nutrition, exercise and maintaining an overall healthy body weight.  Proper nutrition involves eating lots of vegetables, fruit, minimal lean meat and avoiding processed foods and limiting refined sugars, carbohydrates and starches (\"the white stuff\").  Drinking at least 8 cups of water daily.  Walking at least 30 minutes a day and if possible, increasing this to a more cardiovascular aerobic exercise.  Maintaining a healthy body weight.      If you smoke or use tobacco products, quitting is extremely important and I am happy to discuss options with you regarding how to do this and what's available to assist you.    If you consume alcohol, limit your alcohol intake to 2 drinks a day for men and 1 drink a day for woman.    It is also important to make sure to keep regular appointments and have all general health maintenance items completed in a timely manner.      "

## 2018-08-21 RX ORDER — SODIUM, POTASSIUM,MAG SULFATES 17.5-3.13G
1 SOLUTION, RECONSTITUTED, ORAL ORAL TAKE AS DIRECTED
Qty: 2 BOTTLE | Refills: 0 | Status: SHIPPED | OUTPATIENT
Start: 2018-08-21 | End: 2018-09-14

## 2018-08-28 ENCOUNTER — OUTSIDE FACILITY SERVICE (OUTPATIENT)
Dept: GASTROENTEROLOGY | Facility: CLINIC | Age: 58
End: 2018-08-28

## 2018-08-28 PROCEDURE — 45378 DIAGNOSTIC COLONOSCOPY: CPT | Performed by: INTERNAL MEDICINE

## 2018-09-14 ENCOUNTER — OFFICE VISIT (OUTPATIENT)
Dept: FAMILY MEDICINE CLINIC | Facility: CLINIC | Age: 58
End: 2018-09-14

## 2018-09-14 VITALS
HEART RATE: 56 BPM | BODY MASS INDEX: 30.48 KG/M2 | SYSTOLIC BLOOD PRESSURE: 106 MMHG | WEIGHT: 172 LBS | DIASTOLIC BLOOD PRESSURE: 72 MMHG | OXYGEN SATURATION: 99 % | HEIGHT: 63 IN | TEMPERATURE: 98.4 F | RESPIRATION RATE: 16 BRPM

## 2018-09-14 DIAGNOSIS — R10.13 EPIGASTRIC ABDOMINAL PAIN: ICD-10-CM

## 2018-09-14 DIAGNOSIS — I10 ESSENTIAL HYPERTENSION: ICD-10-CM

## 2018-09-14 DIAGNOSIS — K76.0 HEPATIC STEATOSIS: ICD-10-CM

## 2018-09-14 DIAGNOSIS — G43.119 INTRACTABLE MIGRAINE WITH AURA WITHOUT STATUS MIGRAINOSUS: Primary | ICD-10-CM

## 2018-09-14 DIAGNOSIS — G47.33 SEVERE OBSTRUCTIVE SLEEP APNEA: ICD-10-CM

## 2018-09-14 DIAGNOSIS — E66.9 OBESITY (BMI 30-39.9): ICD-10-CM

## 2018-09-14 PROCEDURE — 99214 OFFICE O/P EST MOD 30 MIN: CPT | Performed by: PHYSICIAN ASSISTANT

## 2018-09-14 RX ORDER — MULTIVITAMIN WITH IRON
1 TABLET ORAL DAILY
COMMUNITY

## 2018-09-14 RX ORDER — PROPRANOLOL HYDROCHLORIDE 40 MG/1
40 TABLET ORAL 2 TIMES DAILY
Qty: 180 TABLET | Refills: 1 | Status: SHIPPED | OUTPATIENT
Start: 2018-09-14 | End: 2018-10-15

## 2018-09-14 NOTE — PROGRESS NOTES
Chief Complaint   Patient presents with   • Results     Results from Colonoscopy. Pt is fasting for labs.       HPI     Yamila Keller is a pleasant 57 y.o. female who is here for routine follow-up of previous visit on 08/10/18 for rectal bleeding, epigastric pain, and hypertension.  Patient decreased her metoprolol to 25 mg QD and her blood pressure remains low.  She reports increased migraines with auro since decreasing this medication.  She had colonoscopy which was remarkable for diverticulosis and hemorrhoids.  She reports resolution of epigastric pain and never had to start the ranitidine.  Overall patient is doing well and has no complaints.    Past Medical History:   Diagnosis Date   • Arthritis    • Carpal tunnel syndrome 2012   • Degenerative joint disease    • Depression    • Depression    • Diabetes mellitus, type 2 (CMS/HCC)    • Elevated liver enzymes    • Fatty liver    • Kidney disorder     one kidney   • Palpitations    • Polycystic ovaries    • Psoriasis    • PVC (premature ventricular contraction)     occasional pvc's   • Sleep apnea     cpap at home       Past Surgical History:   Procedure Laterality Date   • BREAST BIOPSY     • BREAST CYST EXCISION Right 2013    BENIGN, INTRADUCTAL PAPILLOMA (PER PT)   •  SECTION      x 3   • CHOLECYSTECTOMY  2013   • COLONOSCOPY     • HYSTERECTOMY  2006    complete   • JOINT REPLACEMENT Left     left knee   • OOPHORECTOMY     • TUBAL ABDOMINAL LIGATION         Family History   Problem Relation Age of Onset   • Diabetes Mother    • Lymphoma Mother    • Diabetes Paternal Grandmother    • Heart disease Paternal Grandmother    • Hypertension Brother    • Hypertension Brother    • Breast cancer Neg Hx    • Ovarian cancer Neg Hx        Social History     Social History   • Marital status:      Spouse name: N/A   • Number of children: N/A   • Years of education: N/A     Occupational History   • RN      Social History Main Topics   • Smoking  "status: Never Smoker   • Smokeless tobacco: Never Used   • Alcohol use No   • Drug use: No   • Sexual activity: Yes     Partners: Male      Comment:      Other Topics Concern   • Not on file     Social History Narrative   • No narrative on file       Allergies   Allergen Reactions   • Celexa [Citalopram] Other (See Comments)     Jittery        ROS    Review of Systems   Constitutional: Negative for chills, diaphoresis and fatigue.   Respiratory: Negative for cough, shortness of breath and wheezing.    Cardiovascular: Negative for chest pain.   Gastrointestinal: Negative for abdominal pain, blood in stool, constipation, diarrhea, nausea and indigestion.   Neurological: Positive for headache. Negative for dizziness and light-headedness.   Psychiatric/Behavioral: Negative for agitation, sleep disturbance, depressed mood and stress. The patient is not nervous/anxious.        Vitals:    09/14/18 0904   BP: 106/72   Pulse: 56   Resp: 16   Temp: 98.4 °F (36.9 °C)   TempSrc: Temporal Artery    SpO2: 99%   Weight: 78 kg (172 lb)   Height: 160 cm (63\")         Current Outpatient Prescriptions:   •  cholecalciferol (VITAMIN D3) 1000 units tablet, Take 2,000 Units by mouth Daily., Disp: , Rfl:   •  clotrimazole-betamethasone (LOTRISONE) 1-0.05 % cream, Apply topically three times daily as needed to affected areas., Disp: 15 g, Rfl: 2  •  Dulaglutide 0.75 MG/0.5ML solution pen-injector, Inject 0.5 mL under the skin 1 (One) Time Per Week., Disp: 2 mL, Rfl: 2  •  EVAMIST 1.53 MG/SPRAY transdermal spray, APPLY 1 PUMP UTD BID, Disp: , Rfl: 11  •  Magnesium 250 MG tablet, Take 1 tablet by mouth Daily., Disp: , Rfl:   •  metFORMIN ER (GLUCOPHAGE-XR) 500 MG 24 hr tablet, Take 2 tablets by mouth 2 (Two) Times a Day. (Patient taking differently: Take 1,000 mg by mouth 2 (Two) Times a Day.), Disp: 360 tablet, Rfl: 3  •  pravastatin (PRAVACHOL) 20 MG tablet, Take 1 tablet by mouth Daily., Disp: 90 tablet, Rfl: 2  •  Probiotic " Product (PROBIOTIC DAILY PO), Take  by mouth Daily., Disp: , Rfl:   •  propranolol (INDERAL) 40 MG tablet, Take 1 tablet by mouth 2 (Two) Times a Day., Disp: 180 tablet, Rfl: 1  •  venlafaxine XR (EFFEXOR-XR) 75 MG 24 hr capsule, Take 3 capsules by mouth Every Morning at the same time each day with food (Patient taking differently: Take 225 mg by mouth Daily.), Disp: 270 capsule, Rfl: 3  •  Vitamin E 400 units tablet, Take 800 Units by mouth Daily., Disp: , Rfl:     PE    Physical Exam   Constitutional: She is oriented to person, place, and time. She appears well-developed and well-nourished. No distress.   HENT:   Head: Normocephalic.   Eyes: Conjunctivae are normal.   Neck: Normal range of motion.   Cardiovascular: Normal rate, regular rhythm and normal heart sounds.    Pulmonary/Chest: Effort normal and breath sounds normal.   Musculoskeletal: Normal range of motion. She exhibits no edema.   Neurological: She is alert and oriented to person, place, and time.   Skin: Skin is warm. No rash noted. She is not diaphoretic. No erythema.   Psychiatric: She has a normal mood and affect. Her behavior is normal. Judgment and thought content normal.   Vitals reviewed.      A/P    Yamila was seen today for results.    Diagnoses and all orders for this visit:    Intractable migraine with aura without status migrainosus  -started with reduction in metoprolol  -history of migraines  -will dc metoprolol and start propranolol BID  -     propranolol (INDERAL) 40 MG tablet; Take 1 tablet by mouth 2 (Two) Times a Day.    Essential hypertension  -BP is 106/72  -on metoprolol 25 mg, which was decreased from 50 mg QD at last visit  -will discontinue medication as her hypertension is over-treated, start propranolol for migraine prophylaxis  -with weight loss, patient's hypertension has resolved    Epigastric abdominal pain  -pain resolved with time and patient never had to start ranitidine    Hepatic steatosis  -staged 3-4 fibrosis with  biopsy  -recent liver enzymes were normal  -continue to monitor    Severe obstructive sleep apnea  -previously diagnosed with sleep apnea  -has CPAP and wears nightly  -lost 48 lbs in the last few months, repeat sleep study  -     Home Sleep Study; Future    Obesity (BMI 30-39.9)  -     Dulaglutide 0.75 MG/0.5ML solution pen-injector; Inject 0.5 mL under the skin 1 (One) Time Per Week.         Plan of care reviewed with patient at the conclusion of today's visit. Education was provided regarding diagnosis, management and any prescribed or recommended OTC medications.  Patient verbalizes understanding of and agreement with management plan.    Return in about 5 months (around 2/14/2019) for Recheck.     Daly Archibald PA-C

## 2018-09-14 NOTE — PROGRESS NOTES
I have reviewed the notes, assessments, and/or procedures performed by SIOMARA El, I concur with her/his documentation of Yamila Keller.

## 2018-09-20 ENCOUNTER — OFFICE VISIT (OUTPATIENT)
Dept: SLEEP MEDICINE | Facility: HOSPITAL | Age: 58
End: 2018-09-20

## 2018-09-20 VITALS
DIASTOLIC BLOOD PRESSURE: 78 MMHG | BODY MASS INDEX: 30.41 KG/M2 | HEIGHT: 63 IN | OXYGEN SATURATION: 98 % | HEART RATE: 64 BPM | WEIGHT: 171.6 LBS | SYSTOLIC BLOOD PRESSURE: 117 MMHG

## 2018-09-20 DIAGNOSIS — G47.33 OSA (OBSTRUCTIVE SLEEP APNEA): Primary | ICD-10-CM

## 2018-09-20 PROCEDURE — 99213 OFFICE O/P EST LOW 20 MIN: CPT | Performed by: NURSE PRACTITIONER

## 2018-09-20 NOTE — PROGRESS NOTES
Subjective: Follow-up        Chief Complaint:   Chief Complaint   Patient presents with   • Follow-up       HPI:    Yamila Keller is a 57 y.o. female here for follow-up of durga.  Patient was last seen 3/20/18 her primary care provider is Daly Archibald PA-C.  Patient was originally diagnosed with severe obstructive sleep apnea, however, she has recently lost 50 pounds and does not know if she still requires her CPAP machine.  Patient strongly wants to continue with her CPAP therapy, stating she is feeling very good she is feeling very rested, and she does not snore.  She does wake up feeling refreshed and does not need to nap.  She sleeping approximately 6-7 hours nightly and her [place] score is 17/24.  If it anytime patient does wish to be retested she will call me.    Current medications are:   Current Outpatient Prescriptions:   •  cholecalciferol (VITAMIN D3) 1000 units tablet, Take 2,000 Units by mouth Daily., Disp: , Rfl:   •  clotrimazole-betamethasone (LOTRISONE) 1-0.05 % cream, Apply topically three times daily as needed to affected areas., Disp: 15 g, Rfl: 2  •  Dulaglutide 0.75 MG/0.5ML solution pen-injector, Inject 0.5 mL under the skin 1 (One) Time Per Week., Disp: 2 mL, Rfl: 2  •  EVAMIST 1.53 MG/SPRAY transdermal spray, APPLY 1 PUMP UTD BID, Disp: , Rfl: 11  •  Magnesium 250 MG tablet, Take 1 tablet by mouth Daily., Disp: , Rfl:   •  metFORMIN ER (GLUCOPHAGE-XR) 500 MG 24 hr tablet, Take 2 tablets by mouth 2 (Two) Times a Day. (Patient taking differently: Take 1,000 mg by mouth 2 (Two) Times a Day.), Disp: 360 tablet, Rfl: 3  •  pravastatin (PRAVACHOL) 20 MG tablet, Take 1 tablet by mouth Daily., Disp: 90 tablet, Rfl: 2  •  Probiotic Product (PROBIOTIC DAILY PO), Take  by mouth Daily., Disp: , Rfl:   •  propranolol (INDERAL) 40 MG tablet, Take 1 tablet by mouth 2 (Two) Times a Day., Disp: 180 tablet, Rfl: 1  •  venlafaxine XR (EFFEXOR-XR) 75 MG 24 hr capsule, Take 3 capsules by mouth Every  Morning at the same time each day with food (Patient taking differently: Take 225 mg by mouth Daily.), Disp: 270 capsule, Rfl: 3  •  Vitamin E 400 units tablet, Take 800 Units by mouth Daily., Disp: , Rfl: .      The patient's relevant past medical, surgical, family and social history were reviewed and updated in Epic as appropriate.       Review of Systems   Constitutional: Negative for diaphoresis and fatigue.   HENT: Positive for congestion.    Eyes: Positive for visual disturbance.   Respiratory: Positive for apnea.    Musculoskeletal: Positive for arthralgias.   Allergic/Immunologic: Positive for environmental allergies.   Psychiatric/Behavioral: Positive for dysphoric mood and sleep disturbance.   All other systems reviewed and are negative.        Objective:    Physical Exam   Constitutional: She is oriented to person, place, and time. She appears well-developed and well-nourished.   HENT:   Mouth/Throat: Oropharynx is clear and moist.   Mallampati 2 airway   Eyes: Conjunctivae are normal.   Neck: Neck supple. No thyromegaly present.   Cardiovascular: Normal rate and regular rhythm.    Pulmonary/Chest: Effort normal and breath sounds normal.   Lymphadenopathy:     She has no cervical adenopathy.   Neurological: She is alert and oriented to person, place, and time.   Skin: Skin is warm and dry.   Psychiatric: She has a normal mood and affect. Her behavior is normal. Judgment and thought content normal.   Nursing note and vitals reviewed.    Download and compliance has been reviewed with patient.  Her greater than forward usage is 93.3.  Her AHI is 3.9.  Her 90% pressure is 10.0 cm H2O.    ASSESSMENT/PLAN    Yamila was seen today for follow-up.    Diagnoses and all orders for this visit:    NGOC (obstructive sleep apnea)  -     CPAP Therapy            1. Counseled patient regarding multimodal approach with healthy nutrition, healthy sleep, regular physical activity, social activities, counseling, and medications.  Encouraged to practice lateral sleep position. Avoid alcohol and sedatives close to bedtime.  2. Refill supplies ×1 year  3. Return to clinic one year or sooner as symptoms warrant.    I have reviewed the results of my evaluation and impression and discussed my recommendations in detail with the patient.      Signed by  KAREN Nielsen    September 20, 2018      CC: Daly Archibald PA-C          No ref. provider found

## 2018-09-20 NOTE — PATIENT INSTRUCTIONS

## 2018-10-08 ENCOUNTER — TELEPHONE (OUTPATIENT)
Dept: FAMILY MEDICINE CLINIC | Facility: CLINIC | Age: 58
End: 2018-10-08

## 2018-10-08 RX ORDER — VENLAFAXINE HYDROCHLORIDE 75 MG/1
225 CAPSULE, EXTENDED RELEASE ORAL EVERY MORNING
Qty: 270 CAPSULE | Refills: 3 | Status: SHIPPED | OUTPATIENT
Start: 2018-10-08 | End: 2019-02-22

## 2018-10-08 RX ORDER — METFORMIN HYDROCHLORIDE 500 MG/1
1000 TABLET, EXTENDED RELEASE ORAL 2 TIMES DAILY
Qty: 360 TABLET | Refills: 1 | Status: SHIPPED | OUTPATIENT
Start: 2018-10-08 | End: 2019-04-18

## 2018-10-08 RX ORDER — PRAVASTATIN SODIUM 20 MG
20 TABLET ORAL DAILY
Qty: 90 TABLET | Refills: 2 | Status: SHIPPED | OUTPATIENT
Start: 2018-10-08 | End: 2019-08-14 | Stop reason: SDUPTHER

## 2018-10-08 NOTE — TELEPHONE ENCOUNTER
PT CALLED AND WOULD LIKE REFILLS OF VENLAFAXINE XR 75 MG, METFORMIN  MG, AND PRAVASTATIN 20 MG TABLET

## 2018-10-15 ENCOUNTER — TELEPHONE (OUTPATIENT)
Dept: FAMILY MEDICINE CLINIC | Facility: CLINIC | Age: 58
End: 2018-10-15

## 2018-10-15 DIAGNOSIS — G43.019 INTRACTABLE MIGRAINE WITHOUT AURA AND WITHOUT STATUS MIGRAINOSUS: Primary | ICD-10-CM

## 2018-10-15 RX ORDER — TOPIRAMATE 25 MG/1
TABLET ORAL
Qty: 60 TABLET | Refills: 11 | Status: SHIPPED | OUTPATIENT
Start: 2018-10-15 | End: 2019-02-22

## 2018-10-15 NOTE — TELEPHONE ENCOUNTER
PER PT CALLED, STATED INDERAL IS NOT HELPING HER MIGRAINES. PLEASE CALL PT BACK TO ADVISE ON WHAT SHE SHOULD DO.

## 2018-10-15 NOTE — TELEPHONE ENCOUNTER
Have patient decrease inderal to 1 tablet daily x7 days then discontinue.      I will trial topamax 25 mg twice a day.  She can start one tablet daily for the first week and then increase to two tablets.  Call if this is not helping.    Have patient check blood pressure at home as well and  Call if it is elevated above 140/90.

## 2018-10-18 NOTE — TELEPHONE ENCOUNTER
Called and informed pt of recommendations. She verbalized understanding and had no further questions.

## 2018-12-26 DIAGNOSIS — E66.9 OBESITY (BMI 30-39.9): ICD-10-CM

## 2019-01-25 ENCOUNTER — OFFICE VISIT (OUTPATIENT)
Dept: ORTHOPEDIC SURGERY | Facility: CLINIC | Age: 59
End: 2019-01-25

## 2019-01-25 VITALS — HEART RATE: 98 BPM | BODY MASS INDEX: 29.77 KG/M2 | OXYGEN SATURATION: 98 % | HEIGHT: 63 IN | WEIGHT: 168 LBS

## 2019-01-25 DIAGNOSIS — M20.21 HALLUX RIGIDUS OF RIGHT FOOT: Primary | ICD-10-CM

## 2019-01-25 PROCEDURE — 99213 OFFICE O/P EST LOW 20 MIN: CPT | Performed by: PHYSICIAN ASSISTANT

## 2019-01-25 NOTE — PROGRESS NOTES
[facility] Note    Subjective     Patient: Yamila Keller  : 1960    Primary Care Provider: Daly Archibald PA-C    Requesting Provider: As above    Pain of the Right Foot      History    Chief Complaint: Right great toe pain    History of Present Illness: This is a very pleasant 58-year-old female presenting today discuss her long-standing right first MTP joint pain.  She complains of pain with walking and weightbearing, no rest pain or night pain.  She rates pain 4-5/10 and aching and sharp in nature.  She denies any radiating pain.  She has seen Dr. pau Badillo in the past and uses custom orthotics with turf toe plate as well as Voltaren gel.  She is here to establish care with Dr. Phoenix should she need any further treatment.    Current Outpatient Medications on File Prior to Visit   Medication Sig Dispense Refill   • cholecalciferol (VITAMIN D3) 1000 units tablet Take 2,000 Units by mouth Daily.     • clobetasol (TEMOVATE) 0.05 % external solution Apply twice daily to itchy, scaly areas on scalp. 25 mL 2   • clotrimazole-betamethasone (LOTRISONE) 1-0.05 % cream Apply topically three times daily as needed to affected areas. 15 g 2   • Dulaglutide 0.75 MG/0.5ML solution pen-injector Inject 0.5 mL under the skin 1 (One) Time Per Week. 2 mL 2   • EVAMIST 1.53 MG/SPRAY transdermal spray APPLY 1 PUMP UTD BID  11   • hydrocortisone 2.5 % ointment Apply daily to area on buttocks twice a day until resolved. 28 g 3   • ketoconazole (NIZORAL) 2 % shampoo Use as a shampoo to scalp and face 3 times a week. Leave in for 5 minutes and rinse. 120 mL 3   • Magnesium 250 MG tablet Take 1 tablet by mouth Daily.     • metFORMIN ER (GLUCOPHAGE-XR) 500 MG 24 hr tablet Take 2 tablets by mouth 2 (Two) Times a Day. 360 tablet 1   • pravastatin (PRAVACHOL) 20 MG tablet Take 1 tablet by mouth Daily. 90 tablet 2   • Probiotic Product (PROBIOTIC DAILY PO) Take  by mouth Daily.     •  topiramate (TOPAMAX) 25 MG tablet Take 1 tab daily for 7 days, then increase to 2 tablets once daily 60 tablet 11   • venlafaxine XR (EFFEXOR-XR) 75 MG 24 hr capsule Take 3 capsules by mouth Every Morning at the same time each day with food 270 capsule 3   • Vitamin E 400 units tablet Take 800 Units by mouth Daily.       No current facility-administered medications on file prior to visit.       Allergies   Allergen Reactions   • Celexa [Citalopram] Other (See Comments)     Jittery       Past Medical History:   Diagnosis Date   • Arthritis    • Carpal tunnel syndrome 2012   • Degenerative joint disease    • Depression    • Depression    • Diabetes mellitus, type 2 (CMS/HCC)    • Elevated liver enzymes    • Fatty liver    • Kidney disorder     one kidney   • Palpitations    • Polycystic ovaries    • Psoriasis    • PVC (premature ventricular contraction)     occasional pvc's   • Sleep apnea     cpap at home     Past Surgical History:   Procedure Laterality Date   • BREAST BIOPSY     • BREAST CYST EXCISION Right     BENIGN, INTRADUCTAL PAPILLOMA (PER PT)   •  SECTION      x 3   • CHOLECYSTECTOMY     • COLONOSCOPY     • HYSTERECTOMY      complete   • JOINT REPLACEMENT Left     left knee   • OOPHORECTOMY     • TUBAL ABDOMINAL LIGATION       Family History   Problem Relation Age of Onset   • Diabetes Mother    • Lymphoma Mother    • Diabetes Paternal Grandmother    • Heart disease Paternal Grandmother    • Hypertension Brother    • Hypertension Brother    • Breast cancer Neg Hx    • Ovarian cancer Neg Hx       Social History     Socioeconomic History   • Marital status:      Spouse name: Not on file   • Number of children: Not on file   • Years of education: Not on file   • Highest education level: Not on file   Social Needs   • Financial resource strain: Not on file   • Food insecurity - worry: Not on file   • Food insecurity - inability: Not on file   • Transportation needs - medical:  "Not on file   • Transportation needs - non-medical: Not on file   Occupational History   • Occupation: RN   Tobacco Use   • Smoking status: Never Smoker   • Smokeless tobacco: Never Used   Substance and Sexual Activity   • Alcohol use: No   • Drug use: No   • Sexual activity: Yes     Partners: Male     Comment:    Other Topics Concern   • Not on file   Social History Narrative   • Not on file        Review of Systems   Constitutional: Positive for activity change.   HENT: Negative.    Eyes: Negative.    Respiratory: Positive for apnea.    Cardiovascular: Negative.    Gastrointestinal: Negative.    Endocrine: Negative.    Genitourinary: Negative.    Musculoskeletal: Positive for gait problem.   Skin: Negative.    Allergic/Immunologic: Negative.    Hematological: Negative.        The following portions of the patient's history were reviewed and updated as appropriate: allergies, current medications, past family history, past medical history, past social history, past surgical history and problem list.      Objective      Physical Exam  Pulse 98   Ht 160 cm (63\")   Wt 76.2 kg (168 lb)   SpO2 98%   BMI 29.76 kg/m²     Body mass index is 29.76 kg/m².    GENERAL: Body habitus: normal weight for height    Lower extremity edema: Left: none; Right: none    Varicose veins:  Left: none; Right: none    Gait: normal     Mental Status:  awake and alert; oriented to person, place, and time    Voice:  clear  SKIN:  Normal    Hair Growth:  Right:normal; Left:  normal  HEENT: Head: Normocephalic, atraumatic,  without obvious abnormality.  eye: normal external eye, no icterus   PULM:  Repiratory effort normal    Ortho Exam  V:  Dorsalis Pedis:  Right: 2+; Left:2+    Posterior Tibial: Right:2+; Left:2+    Capillary Refill:  Brisk  MSK:  Hand:right handed      Tibia:  Right:  non tender; Left:  non tender      Ankle:  Right: non tender, ROM  normal and motor function  normal; Left:  non tender, ROM  normal and motor function  " "normal      Foot:  Right:  tender over the 1st MTPJ with stiffness and motor function  normal; Left:  non tender, ROM  normal and motor function  normal      NEURO: Lower extremity sensation: intact     Calf Atrophy:none    Motor Function: all 5/5          Medical Decision Making    Data Review:   ordered and reviewed x-rays today    Assessment:  1. Hallux rigidus of right foot        Plan:  The patient has right hallux rigidus.   X-rays today show severe hallux rigidus with osteophytes.  I explained this is arthritis of the big toe.  It is commonly genetic, but may also occur after trauma (such as a turf toe injury).  It is often bilateral.  I explained that arthritis by definition is a loss of cartilage.  We do not make cartilage in our bodies as adults, once it begins to wear out it will  continue to wear out.  As this happens, the body puts extra bone around the joint.  We call this osteophyte formation- the common term is \"spur\".  However, this extra bone is not like a thorn sticking into the tissue causing pain, it is merely a marker that indicates the joint has lost cartilage.      I explained that treatment is stepwise.  We do not have any way to put cartilage back into joints.      Conservative treatment: 1. Turf toe orthotics.  2. NSAID (OTC Aleve, Advil, etc) when painful.  3. Glucosamine and chondroitin might help. 4. Cortisone injection- the injection can be done every 6 months if it helps.     Surgery is reserved for failure of conservative treatment.  There are 2 surgeries that I  generally use.  They are both \"salvage\" procedures, as we cannot make new cartilage. For patients who have some cartilage remaining I do a cheilectomy and osteotomy.  This only helps pain about 75% of the time.  For a failure of the cheilectomy and osteotomy or for a patient with very little cartilage in the joint I do a fusion of the 1st MTPJ or the new Cartiva procedure.      Patient has orthotics with a turf toe plate and " I've given her prescription for new ones.  She reports it is not painful enough to warrant injection or surgery at this time.  She'll return to see us as needed.      Mary Ruby PA-C  01/25/19  12:51 PM

## 2019-01-27 ENCOUNTER — HOSPITAL ENCOUNTER (EMERGENCY)
Facility: HOSPITAL | Age: 59
Discharge: HOME | End: 2019-01-27
Attending: EMERGENCY MEDICINE | Admitting: EMERGENCY MEDICINE

## 2019-01-27 ENCOUNTER — APPOINTMENT (OUTPATIENT)
Dept: GENERAL RADIOLOGY | Facility: HOSPITAL | Age: 59
End: 2019-01-27

## 2019-01-27 VITALS
DIASTOLIC BLOOD PRESSURE: 80 MMHG | HEART RATE: 80 BPM | TEMPERATURE: 99 F | WEIGHT: 171 LBS | RESPIRATION RATE: 16 BRPM | BODY MASS INDEX: 30.3 KG/M2 | HEIGHT: 63 IN | SYSTOLIC BLOOD PRESSURE: 120 MMHG | OXYGEN SATURATION: 100 %

## 2019-01-27 DIAGNOSIS — S49.91XA INJURY OF RIGHT SHOULDER, INITIAL ENCOUNTER: Primary | ICD-10-CM

## 2019-01-27 DIAGNOSIS — M79.601 RIGHT ARM PAIN: ICD-10-CM

## 2019-01-27 LAB
ALBUMIN SERPL-MCNC: 4.73 G/DL (ref 3.2–4.8)
ALBUMIN/GLOB SERPL: 1.9 G/DL (ref 1.5–2.5)
ALP SERPL-CCNC: 65 U/L (ref 25–100)
ALT SERPL W P-5'-P-CCNC: 47 U/L (ref 7–40)
ANION GAP SERPL CALCULATED.3IONS-SCNC: 6 MMOL/L (ref 3–11)
AST SERPL-CCNC: 30 U/L (ref 0–33)
BASOPHILS # BLD AUTO: 0.02 10*3/MM3 (ref 0–0.2)
BASOPHILS NFR BLD AUTO: 0.3 % (ref 0–1)
BILIRUB SERPL-MCNC: 0.3 MG/DL (ref 0.3–1.2)
BNP SERPL-MCNC: <2 PG/ML (ref 0–100)
BUN BLD-MCNC: 26 MG/DL (ref 9–23)
BUN/CREAT SERPL: 27.1 (ref 7–25)
CALCIUM SPEC-SCNC: 9.8 MG/DL (ref 8.7–10.4)
CHLORIDE SERPL-SCNC: 102 MMOL/L (ref 99–109)
CO2 SERPL-SCNC: 29 MMOL/L (ref 20–31)
CREAT BLD-MCNC: 0.96 MG/DL (ref 0.6–1.3)
DEPRECATED RDW RBC AUTO: 40 FL (ref 37–54)
EOSINOPHIL # BLD AUTO: 0.21 10*3/MM3 (ref 0–0.3)
EOSINOPHIL NFR BLD AUTO: 3.1 % (ref 0–3)
ERYTHROCYTE [DISTWIDTH] IN BLOOD BY AUTOMATED COUNT: 12.6 % (ref 11.3–14.5)
GFR SERPL CREATININE-BSD FRML MDRD: 60 ML/MIN/1.73
GLOBULIN UR ELPH-MCNC: 2.5 GM/DL
GLUCOSE BLD-MCNC: 87 MG/DL (ref 70–100)
HCT VFR BLD AUTO: 41.2 % (ref 34.5–44)
HGB BLD-MCNC: 13.6 G/DL (ref 11.5–15.5)
HOLD SPECIMEN: NORMAL
HOLD SPECIMEN: NORMAL
IMM GRANULOCYTES # BLD AUTO: 0.02 10*3/MM3 (ref 0–0.03)
IMM GRANULOCYTES NFR BLD AUTO: 0.3 % (ref 0–0.6)
LIPASE SERPL-CCNC: 69 U/L (ref 6–51)
LYMPHOCYTES # BLD AUTO: 2.79 10*3/MM3 (ref 0.6–4.8)
LYMPHOCYTES NFR BLD AUTO: 40.8 % (ref 24–44)
MCH RBC QN AUTO: 28.5 PG (ref 27–31)
MCHC RBC AUTO-ENTMCNC: 33 G/DL (ref 32–36)
MCV RBC AUTO: 86.4 FL (ref 80–99)
MONOCYTES # BLD AUTO: 0.48 10*3/MM3 (ref 0–1)
MONOCYTES NFR BLD AUTO: 7 % (ref 0–12)
NEUTROPHILS # BLD AUTO: 3.33 10*3/MM3 (ref 1.5–8.3)
NEUTROPHILS NFR BLD AUTO: 48.8 % (ref 41–71)
PLATELET # BLD AUTO: 290 10*3/MM3 (ref 150–450)
PMV BLD AUTO: 9.4 FL (ref 6–12)
POTASSIUM BLD-SCNC: 3.7 MMOL/L (ref 3.5–5.5)
PROT SERPL-MCNC: 7.2 G/DL (ref 5.7–8.2)
RBC # BLD AUTO: 4.77 10*6/MM3 (ref 3.89–5.14)
SODIUM BLD-SCNC: 137 MMOL/L (ref 132–146)
TROPONIN I SERPL-MCNC: 0 NG/ML (ref 0–0.07)
TROPONIN I SERPL-MCNC: 0 NG/ML (ref 0–0.07)
WBC NRBC COR # BLD: 6.83 10*3/MM3 (ref 3.5–10.8)
WHOLE BLOOD HOLD SPECIMEN: NORMAL
WHOLE BLOOD HOLD SPECIMEN: NORMAL

## 2019-01-27 PROCEDURE — 84484 ASSAY OF TROPONIN QUANT: CPT

## 2019-01-27 PROCEDURE — 85025 COMPLETE CBC W/AUTO DIFF WBC: CPT

## 2019-01-27 PROCEDURE — 83690 ASSAY OF LIPASE: CPT

## 2019-01-27 PROCEDURE — 93005 ELECTROCARDIOGRAM TRACING: CPT

## 2019-01-27 PROCEDURE — 93005 ELECTROCARDIOGRAM TRACING: CPT | Performed by: EMERGENCY MEDICINE

## 2019-01-27 PROCEDURE — 80053 COMPREHEN METABOLIC PANEL: CPT

## 2019-01-27 PROCEDURE — 71045 X-RAY EXAM CHEST 1 VIEW: CPT

## 2019-01-27 PROCEDURE — 83880 ASSAY OF NATRIURETIC PEPTIDE: CPT

## 2019-01-27 PROCEDURE — 99284 EMERGENCY DEPT VISIT MOD MDM: CPT

## 2019-01-27 RX ORDER — ASPIRIN 81 MG/1
324 TABLET, CHEWABLE ORAL ONCE
Status: DISCONTINUED | OUTPATIENT
Start: 2019-01-27 | End: 2019-01-27 | Stop reason: HOSPADM

## 2019-01-27 RX ORDER — SODIUM CHLORIDE 0.9 % (FLUSH) 0.9 %
10 SYRINGE (ML) INJECTION AS NEEDED
Status: DISCONTINUED | OUTPATIENT
Start: 2019-01-27 | End: 2019-01-27 | Stop reason: HOSPADM

## 2019-02-22 ENCOUNTER — OFFICE VISIT (OUTPATIENT)
Dept: FAMILY MEDICINE CLINIC | Facility: CLINIC | Age: 59
End: 2019-02-22

## 2019-02-22 VITALS
HEART RATE: 83 BPM | SYSTOLIC BLOOD PRESSURE: 116 MMHG | DIASTOLIC BLOOD PRESSURE: 72 MMHG | HEIGHT: 63 IN | WEIGHT: 178.2 LBS | OXYGEN SATURATION: 97 % | BODY MASS INDEX: 31.57 KG/M2

## 2019-02-22 DIAGNOSIS — G43.019 INTRACTABLE MIGRAINE WITHOUT AURA AND WITHOUT STATUS MIGRAINOSUS: ICD-10-CM

## 2019-02-22 DIAGNOSIS — Z90.5 SINGLE KIDNEY: ICD-10-CM

## 2019-02-22 DIAGNOSIS — K76.0 HEPATIC STEATOSIS: ICD-10-CM

## 2019-02-22 DIAGNOSIS — E66.9 OBESITY (BMI 30-39.9): ICD-10-CM

## 2019-02-22 DIAGNOSIS — G47.33 SEVERE OBSTRUCTIVE SLEEP APNEA: ICD-10-CM

## 2019-02-22 DIAGNOSIS — F32.A DEPRESSION, UNSPECIFIED DEPRESSION TYPE: Primary | ICD-10-CM

## 2019-02-22 PROCEDURE — 99214 OFFICE O/P EST MOD 30 MIN: CPT | Performed by: PHYSICIAN ASSISTANT

## 2019-02-22 RX ORDER — AMITRIPTYLINE HYDROCHLORIDE 10 MG/1
10 TABLET, FILM COATED ORAL NIGHTLY
Qty: 30 TABLET | Refills: 1 | Status: SHIPPED | OUTPATIENT
Start: 2019-02-22 | End: 2019-04-22

## 2019-02-22 RX ORDER — VENLAFAXINE HYDROCHLORIDE 75 MG/1
225 CAPSULE, EXTENDED RELEASE ORAL EVERY MORNING
Qty: 270 CAPSULE | Refills: 3 | Status: SHIPPED | OUTPATIENT
Start: 2019-02-22 | End: 2019-03-29

## 2019-02-22 NOTE — PROGRESS NOTES
Chief Complaint   Patient presents with   • Migraine     still having them, not as bad as before.   • Hypertension     was elevated a little, went to ER a few weeks ago.        HPI     Yamila Keller is a pleasant 58 y.o. female who is here for routine follow-up of hypertension, migraine, depression and POTTER, obesity, CPAP and single kidney.  Patient was recently seen at ER for right paracentral cervicothoracic pain that has resolved with time.  Labs completed at ER were reviewed.  She reports that topamax is not helping with migraines.  She has historically been on amitriptyline and this seemed to help.  She also reports issues with insomnia and depression.  She is planning on filing for divorce and this is causing situational stress.  She regularly attends Confucianist with  and they are planning on speaking with the Elders and she will pursue counseling.  She is wearing CPAP nightly.  She continues to struggle with her weight.  She is on low-dose trulicity and requests increasing this.  Tolerating it well.  Would be open to trying Saxenda.  Unable to take contrave due to current depression medication interactions.  Unable to take phentermine due to hypertension.  Hypertension is currently managed well with medication.    Past Medical History:   Diagnosis Date   • Arthritis    • Carpal tunnel syndrome 2012   • Degenerative joint disease    • Depression    • Depression    • Diabetes mellitus, type 2 (CMS/HCC)    • Elevated liver enzymes    • Fatty liver    • Kidney disorder     one kidney   • Palpitations    • Polycystic ovaries    • Psoriasis    • PVC (premature ventricular contraction)     occasional pvc's   • Sleep apnea     cpap at home       Past Surgical History:   Procedure Laterality Date   • BREAST BIOPSY     • BREAST CYST EXCISION Right     BENIGN, INTRADUCTAL PAPILLOMA (PER PT)   •  SECTION      x 3   • CHOLECYSTECTOMY  2013   • COLONOSCOPY     • HYSTERECTOMY  2006    complete   •  JOINT REPLACEMENT Left 2011    left knee   • OOPHORECTOMY     • TUBAL ABDOMINAL LIGATION         Family History   Problem Relation Age of Onset   • Diabetes Mother    • Lymphoma Mother    • Diabetes Paternal Grandmother    • Heart disease Paternal Grandmother    • Hypertension Brother    • Hypertension Brother    • Breast cancer Neg Hx    • Ovarian cancer Neg Hx        Social History     Socioeconomic History   • Marital status:      Spouse name: Not on file   • Number of children: Not on file   • Years of education: Not on file   • Highest education level: Not on file   Social Needs   • Financial resource strain: Not on file   • Food insecurity - worry: Not on file   • Food insecurity - inability: Not on file   • Transportation needs - medical: Not on file   • Transportation needs - non-medical: Not on file   Occupational History   • Occupation: RN   Tobacco Use   • Smoking status: Never Smoker   • Smokeless tobacco: Never Used   Substance and Sexual Activity   • Alcohol use: No   • Drug use: No   • Sexual activity: Yes     Partners: Male     Comment:    Other Topics Concern   • Not on file   Social History Narrative   • Not on file       Allergies   Allergen Reactions   • Celexa [Citalopram] Other (See Comments)     Jittery        ROS    Review of Systems   Constitutional: Positive for fatigue. Negative for chills, diaphoresis and fever.   Respiratory: Negative for cough, shortness of breath and wheezing.    Cardiovascular: Negative for chest pain, palpitations and leg swelling.   Gastrointestinal: Negative for abdominal pain, constipation, diarrhea, nausea and vomiting.   Musculoskeletal: Negative for arthralgias and back pain.   Neurological: Positive for headache. Negative for dizziness and memory problem.   Psychiatric/Behavioral: Positive for sleep disturbance, depressed mood and stress. Negative for suicidal ideas. The patient is not nervous/anxious.        Vitals:    02/22/19 0837   BP: 116/72  "  BP Location: Right arm   Patient Position: Sitting   Cuff Size: Adult   Pulse: 83   SpO2: 97%   Weight: 80.8 kg (178 lb 3.2 oz)   Height: 160 cm (63\")         Current Outpatient Medications:   •  cholecalciferol (VITAMIN D3) 1000 units tablet, Take 2,000 Units by mouth Daily., Disp: , Rfl:   •  clobetasol (TEMOVATE) 0.05 % external solution, Apply twice daily to itchy, scaly areas on scalp., Disp: 25 mL, Rfl: 2  •  clotrimazole-betamethasone (LOTRISONE) 1-0.05 % cream, Apply topically three times daily as needed to affected areas., Disp: 15 g, Rfl: 2  •  EVAMIST 1.53 MG/SPRAY transdermal spray, APPLY 1 PUMP UTD BID, Disp: , Rfl: 11  •  hydrocortisone 2.5 % ointment, Apply daily to area on buttocks twice a day until resolved., Disp: 28 g, Rfl: 3  •  ketoconazole (NIZORAL) 2 % shampoo, Use as a shampoo to scalp and face 3 times a week. Leave in for 5 minutes and rinse., Disp: 120 mL, Rfl: 3  •  Magnesium 250 MG tablet, Take 1 tablet by mouth Daily., Disp: , Rfl:   •  metFORMIN ER (GLUCOPHAGE-XR) 500 MG 24 hr tablet, Take 2 tablets by mouth 2 (Two) Times a Day., Disp: 360 tablet, Rfl: 1  •  pravastatin (PRAVACHOL) 20 MG tablet, Take 1 tablet by mouth Daily., Disp: 90 tablet, Rfl: 2  •  Probiotic Product (PROBIOTIC DAILY PO), Take  by mouth Daily., Disp: , Rfl:   •  venlafaxine XR (EFFEXOR-XR) 75 MG 24 hr capsule, Take 3 capsules by mouth Every Morning at the same time each day with food, Disp: 270 capsule, Rfl: 3  •  Vitamin E 400 units tablet, Take 800 Units by mouth Daily., Disp: , Rfl:   •  amitriptyline (ELAVIL) 10 MG tablet, Take 1 tablet by mouth Every Night., Disp: 30 tablet, Rfl: 1  •  Dulaglutide 1.5 MG/0.5ML solution pen-injector, Inject 1.5 mg under the skin into the appropriate area as directed 1 (One) Time Per Week for 30 days., Disp: 2 mL, Rfl: 0  •  Liraglutide -Weight Management 18 MG/3ML solution pen-injector, Inject 3 mg under the skin into the appropriate area as directed Daily for 90 days., Disp: " 5 pen, Rfl: 2  •  Zoster Vac Recomb Adjuvanted 50 MCG/0.5ML reconstituted suspension, Inject 0.5 mL into the appropriate muscle as directed by prescriber. Repeat in 2-6 months., Disp: 1 each, Rfl: 1    PE    Physical Exam   Constitutional: She appears well-developed and well-nourished. No distress.   HENT:   Head: Normocephalic and atraumatic.   Eyes: EOM are normal.   Neck: Normal range of motion.   Cardiovascular: Normal rate, regular rhythm and normal heart sounds.   Pulmonary/Chest: Effort normal and breath sounds normal.   Musculoskeletal: Normal range of motion. She exhibits no edema.   Neurological: She is alert.   Skin: Skin is warm. She is not diaphoretic. No erythema.   Psychiatric: She has a normal mood and affect. Her speech is normal and behavior is normal. Judgment and thought content normal. She is not actively hallucinating. She is attentive.       A/P    Yamila was seen today for migraine and hypertension.    Diagnoses and all orders for this visit:    Depression, unspecified depression type  -worse recently with situational stress, planning on filing for divorce  -will add amitriptyline  -has done well in past with zoloft and amitriptyline combination, may want to try this again  -denies being on venlafaxine for hot flashes, has been on this for 2+ years  -     venlafaxine XR (EFFEXOR-XR) 75 MG 24 hr capsule; Take 3 capsules by mouth Every Morning at the same time each day with food    Intractable migraine without aura and without status migrainosus  -ongoing headaches  -discontinue topamax  -trial of amitriptyline 10 mg QPM, may need to increase to 25 mg QPM  -will fu in 6 weeks  -     amitriptyline (ELAVIL) 10 MG tablet; Take 1 tablet by mouth Every Night.    Obesity (BMI 30-39.9)  -increase trulicity and try to get saxenda approved  -unable to take contrave due to current medication contraindications with depression medications  -unable to take phentermine due to hypertension  -will discontinue  trulicity once saxenda is approved  -fu in 6 weeks  -     Dulaglutide 1.5 MG/0.5ML solution pen-injector; Inject 1.5 mg under the skin into the appropriate area as directed 1 (One) Time Per Week for 30 days.  -     Liraglutide -Weight Management 18 MG/3ML solution pen-injector; Inject 3 mg under the skin into the appropriate area as directed Daily for 90 days.    Hepatic steatosis  -recent labs stable  -improvement with weight loss, will trial saxenda and discontinue trulicity  -fu in 6 weeks  -     Liraglutide -Weight Management 18 MG/3ML solution pen-injector; Inject 3 mg under the skin into the appropriate area as directed Daily for 90 days.    Single kidney  -recent labs wnl    Severe obstructive sleep apnea  -wearing CPAP nightly       Plan of care reviewed with patient at the conclusion of today's visit. Education was provided regarding diagnosis, management and any prescribed or recommended OTC medications.  Patient verbalizes understanding of and agreement with management plan.    Return in about 6 weeks (around 4/5/2019) for Recheck, depression.     Daly Archibald PA-C

## 2019-03-20 ENCOUNTER — TELEPHONE (OUTPATIENT)
Dept: FAMILY MEDICINE CLINIC | Facility: CLINIC | Age: 59
End: 2019-03-20

## 2019-03-20 DIAGNOSIS — E66.9 OBESITY (BMI 30-39.9): ICD-10-CM

## 2019-03-20 NOTE — TELEPHONE ENCOUNTER
Patient called stating her saxenda was denied by her insurance and is going to require a PA. Please call back at [phone]

## 2019-03-21 NOTE — TELEPHONE ENCOUNTER
Please let patient know that unfortunately insurance has denied saxenda even with a PA completed.  She should continue trulicity at higher dose.  Keep upcoming appointment.

## 2019-03-25 NOTE — TELEPHONE ENCOUNTER
Pt returned call, gave message below. Pt stated that she would continue Trulicity but that she needed a refill. Sent refill into patient pharmacy. Pt has appt coming up in April, will discuss weight loss at that time. Stated that she didn't feel a difference with the increase in Trulicity.

## 2019-03-28 ENCOUNTER — TELEPHONE (OUTPATIENT)
Dept: FAMILY MEDICINE CLINIC | Facility: CLINIC | Age: 59
End: 2019-03-28

## 2019-03-28 NOTE — TELEPHONE ENCOUNTER
Tried calling patient.  Saw that she was feeling unwell and reported a high blood pressure.  Wanted to know what her blood pressure has been running?  I don't see where she is on any BP medications - confirm that she is not taking anything?

## 2019-03-29 ENCOUNTER — OFFICE VISIT (OUTPATIENT)
Dept: FAMILY MEDICINE CLINIC | Facility: CLINIC | Age: 59
End: 2019-03-29

## 2019-03-29 VITALS
WEIGHT: 186.2 LBS | DIASTOLIC BLOOD PRESSURE: 74 MMHG | HEART RATE: 74 BPM | HEIGHT: 63 IN | OXYGEN SATURATION: 97 % | BODY MASS INDEX: 32.99 KG/M2 | SYSTOLIC BLOOD PRESSURE: 112 MMHG

## 2019-03-29 DIAGNOSIS — F32.1 CURRENT MODERATE EPISODE OF MAJOR DEPRESSIVE DISORDER, UNSPECIFIED WHETHER RECURRENT (HCC): Primary | ICD-10-CM

## 2019-03-29 DIAGNOSIS — G47.09 OTHER INSOMNIA: ICD-10-CM

## 2019-03-29 PROCEDURE — 99213 OFFICE O/P EST LOW 20 MIN: CPT | Performed by: PHYSICIAN ASSISTANT

## 2019-03-29 RX ORDER — DESVENLAFAXINE 100 MG/1
100 TABLET, EXTENDED RELEASE ORAL DAILY
Qty: 30 TABLET | Refills: 2 | Status: SHIPPED | OUTPATIENT
Start: 2019-03-29 | End: 2019-06-25 | Stop reason: SDUPTHER

## 2019-03-29 NOTE — PROGRESS NOTES
"Chief Complaint   Patient presents with   • Elevated Blood Pressure     started feeling foggy since 3/27; concerned about it being from b/p, anxiety or blood sugars       HPI      Yamila Keller is a 58 y.o. female who presents for Elevated Blood Pressure (started feeling foggy since 3/27; concerned about it being from b/p, anxiety or blood sugars)    Patient presents with concern regarding \"feeling foggy\" and \"weird\" for the last few days.  She has monitored her blood pressure and blood sugar which are both within the normal range.  She had one episode of elevated blood pressure, 148/89, which improved when she checked it a few hours later.      She reports significant anxiety and episodes of \"crying\" for the last few days worse than normal.  She has 3 daughters:  Oldest has marital issues, middle is pregnant/type 1 diabetic (due April 8) and youngest has financial issues.  She is also planning on filing for divorce.  Lives amicably with  but they don't talk and mostly avoid each other.  He has a female best friend he confides in.  She denies thoughts of suicide or plan.  She has difficulty wanting to get out of bed but still works full-time and hasn't missed work.  Currently taking venlafaxine 225 mg QD, has been on this for awhile.  Tolerated wellbutrin/zoloft combination in past.  Tolerated prozac historically.  Did not tolerate celexa.  Has not tried pristiq.  Taking amitriptyline 10 mg QPM which helps with insomnia.  Not currently seeing a therapist, but is willing to go to one and will schedule appointment.      Past Medical History:   Diagnosis Date   • Arthritis    • Carpal tunnel syndrome 04/09/2012   • Degenerative joint disease    • Depression    • Depression    • Diabetes mellitus, type 2 (CMS/HCC)    • Elevated liver enzymes    • Fatty liver    • Kidney disorder     one kidney   • Palpitations    • Polycystic ovaries    • Psoriasis    • PVC (premature ventricular contraction)     occasional " "pvc's   • Sleep apnea     cpap at home       Past Surgical History:   Procedure Laterality Date   • BREAST BIOPSY     • BREAST CYST EXCISION Right 2013    BENIGN, INTRADUCTAL PAPILLOMA (PER PT)   •  SECTION      x 3   • CHOLECYSTECTOMY     • COLONOSCOPY     • HYSTERECTOMY      complete   • JOINT REPLACEMENT Left     left knee   • OOPHORECTOMY     • TUBAL ABDOMINAL LIGATION         Family History   Problem Relation Age of Onset   • Diabetes Mother    • Lymphoma Mother    • Diabetes Paternal Grandmother    • Heart disease Paternal Grandmother    • Hypertension Brother    • Hypertension Brother    • Breast cancer Neg Hx    • Ovarian cancer Neg Hx        Social History     Socioeconomic History   • Marital status:      Spouse name: Not on file   • Number of children: Not on file   • Years of education: Not on file   • Highest education level: Not on file   Occupational History   • Occupation: RN   Tobacco Use   • Smoking status: Never Smoker   • Smokeless tobacco: Never Used   Substance and Sexual Activity   • Alcohol use: No   • Drug use: No   • Sexual activity: Yes     Partners: Male     Comment:        Allergies   Allergen Reactions   • Celexa [Citalopram] Other (See Comments)     Jittery        ROS    Review of Systems   Constitutional: Positive for fatigue. Negative for chills, diaphoresis and fever.   Neurological: Negative for dizziness, light-headedness, headache and memory problem.   Psychiatric/Behavioral: Positive for depressed mood and stress. Negative for self-injury, sleep disturbance and suicidal ideas. The patient is nervous/anxious.        Vitals:    19 1257   BP: 112/74   BP Location: Right arm   Patient Position: Sitting   Cuff Size: Large Adult   Pulse: 74   SpO2: 97%   Weight: 84.5 kg (186 lb 3.2 oz)   Height: 160 cm (63\")   PainSc: 0-No pain       Current Outpatient Medications on File Prior to Visit   Medication Sig Dispense Refill   • amitriptyline (ELAVIL) " 10 MG tablet Take 1 tablet by mouth Every Night. 30 tablet 1   • cholecalciferol (VITAMIN D3) 1000 units tablet Take 2,000 Units by mouth Daily.     • clobetasol (TEMOVATE) 0.05 % external solution Apply twice daily to itchy, scaly areas on scalp. 25 mL 2   • clotrimazole-betamethasone (LOTRISONE) 1-0.05 % cream Apply topically three times daily as needed to affected areas. 15 g 2   • Dulaglutide 1.5 MG/0.5ML solution pen-injector Inject 1.5 mg under the skin into the appropriate area as directed 1 (One) Time Per Week. 2 mL 0   • EVAMIST 1.53 MG/SPRAY transdermal spray APPLY 1 PUMP UTD BID  11   • ketoconazole (NIZORAL) 2 % shampoo Use as a shampoo to scalp and face 3 times a week. Leave in for 5 minutes and rinse. 120 mL 3   • Magnesium 250 MG tablet Take 1 tablet by mouth Daily.     • metFORMIN ER (GLUCOPHAGE-XR) 500 MG 24 hr tablet Take 2 tablets by mouth 2 (Two) Times a Day. 360 tablet 1   • pravastatin (PRAVACHOL) 20 MG tablet Take 1 tablet by mouth Daily. 90 tablet 2   • Probiotic Product (PROBIOTIC DAILY PO) Take  by mouth Daily.     • Vitamin E 400 units tablet Take 800 Units by mouth Daily.     • [DISCONTINUED] venlafaxine XR (EFFEXOR-XR) 75 MG 24 hr capsule Take 3 capsules by mouth Every Morning at the same time each day with food 270 capsule 3   • hydrocortisone 2.5 % ointment Apply daily to area on buttocks twice a day until resolved. 28 g 3   • [DISCONTINUED] Zoster Vac Recomb Adjuvanted 50 MCG/0.5ML reconstituted suspension Inject 0.5 mL into the appropriate muscle as directed by prescriber. Repeat in 2-6 months. 1 each 1     No current facility-administered medications on file prior to visit.        Results for orders placed or performed during the hospital encounter of 01/27/19   Comprehensive Metabolic Panel   Result Value Ref Range    Glucose 87 70 - 100 mg/dL    BUN 26 (H) 9 - 23 mg/dL    Creatinine 0.96 0.60 - 1.30 mg/dL    Sodium 137 132 - 146 mmol/L    Potassium 3.7 3.5 - 5.5 mmol/L    Chloride  102 99 - 109 mmol/L    CO2 29.0 20.0 - 31.0 mmol/L    Calcium 9.8 8.7 - 10.4 mg/dL    Total Protein 7.2 5.7 - 8.2 g/dL    Albumin 4.73 3.20 - 4.80 g/dL    ALT (SGPT) 47 (H) 7 - 40 U/L    AST (SGOT) 30 0 - 33 U/L    Alkaline Phosphatase 65 25 - 100 U/L    Total Bilirubin 0.3 0.3 - 1.2 mg/dL    eGFR Non African Amer 60 (L) >60 mL/min/1.73    Globulin 2.5 gm/dL    A/G Ratio 1.9 1.5 - 2.5 g/dL    BUN/Creatinine Ratio 27.1 (H) 7.0 - 25.0    Anion Gap 6.0 3.0 - 11.0 mmol/L   Lipase   Result Value Ref Range    Lipase 69 (H) 6 - 51 U/L   BNP   Result Value Ref Range    BNP <2.0 0.0 - 100.0 pg/mL   CBC Auto Differential   Result Value Ref Range    WBC 6.83 3.50 - 10.80 10*3/mm3    RBC 4.77 3.89 - 5.14 10*6/mm3    Hemoglobin 13.6 11.5 - 15.5 g/dL    Hematocrit 41.2 34.5 - 44.0 %    MCV 86.4 80.0 - 99.0 fL    MCH 28.5 27.0 - 31.0 pg    MCHC 33.0 32.0 - 36.0 g/dL    RDW 12.6 11.3 - 14.5 %    RDW-SD 40.0 37.0 - 54.0 fl    MPV 9.4 6.0 - 12.0 fL    Platelets 290 150 - 450 10*3/mm3    Neutrophil % 48.8 41.0 - 71.0 %    Lymphocyte % 40.8 24.0 - 44.0 %    Monocyte % 7.0 0.0 - 12.0 %    Eosinophil % 3.1 (H) 0.0 - 3.0 %    Basophil % 0.3 0.0 - 1.0 %    Immature Grans % 0.3 0.0 - 0.6 %    Neutrophils, Absolute 3.33 1.50 - 8.30 10*3/mm3    Lymphocytes, Absolute 2.79 0.60 - 4.80 10*3/mm3    Monocytes, Absolute 0.48 0.00 - 1.00 10*3/mm3    Eosinophils, Absolute 0.21 0.00 - 0.30 10*3/mm3    Basophils, Absolute 0.02 0.00 - 0.20 10*3/mm3    Immature Grans, Absolute 0.02 0.00 - 0.03 10*3/mm3   POC Troponin, Rapid   Result Value Ref Range    Troponin I 0.00 0.00 - 0.07 ng/mL   POC Troponin, Rapid   Result Value Ref Range    Troponin I 0.00 0.00 - 0.07 ng/mL   Light Blue Top   Result Value Ref Range    Extra Tube hold for add-on    Green Top (Gel)   Result Value Ref Range    Extra Tube Hold for add-ons.    Lavender Top   Result Value Ref Range    Extra Tube hold for add-on    Gold Top - SST   Result Value Ref Range    Extra Tube Hold for  add-ons.        PE    Physical Exam   Constitutional: She appears well-developed and well-nourished. No distress.   HENT:   Head: Normocephalic and atraumatic.   Eyes: EOM are normal.   Neck: Normal range of motion.   Musculoskeletal: Normal range of motion.   Neurological: She is alert.   Skin: Skin is warm. She is not diaphoretic. No erythema.   Psychiatric: She has a normal mood and affect. Her speech is normal and behavior is normal. Judgment and thought content normal. She is not actively hallucinating. Cognition and memory are normal.   Tearful in appointment when talking about family situation. She is attentive.        A/P    Yamila was seen today for elevated blood pressure.    Diagnoses and all orders for this visit:    Current moderate episode of major depressive disorder, unspecified whether recurrent (CMS/HCC)  -with anxiety  -lots of family stress  -denies any thoughts of suicide or plan  -discontinue effexor 225 mg QD  -start pristiq 100 mg QD  -discussed risks of switching anti-depressants and side affects to watch out for  -she will let her mother and daughters know she is making the switch  -will call if she has any concerns prior to appointment in 2 weeks  -gave her number for therapist and patient will schedule appointment  -     desvenlafaxine (PRISTIQ) 100 MG 24 hr tablet; Take 1 tablet by mouth Daily.    Other insomnia  -taking amitriptyline 10 mg QPM which helps significantly with sleep       Plan of care reviewed with patient at the conclusion of today's visit. Education was provided regarding diagnosis, management and any prescribed or recommended OTC medications.  Patient verbalizes understanding of and agreement with management plan.    Return in about 2 weeks (around 4/12/2019) for Recheck, anxiety.     Daly Archibald PA-C

## 2019-03-29 NOTE — PATIENT INSTRUCTIONS
Insomnia  Insomnia is a sleep disorder that makes it difficult to fall asleep or to stay asleep. Insomnia can cause tiredness (fatigue), low energy, difficulty concentrating, mood swings, and poor performance at work or school.  There are three different ways to classify insomnia:  · Difficulty falling asleep.  · Difficulty staying asleep.  · Waking up too early in the morning.    Any type of insomnia can be long-term (chronic) or short-term (acute). Both are common. Short-term insomnia usually lasts for three months or less. Chronic insomnia occurs at least three times a week for longer than three months.  What are the causes?  Insomnia may be caused by another condition, situation, or substance, such as:  · Anxiety.  · Certain medicines.  · Gastroesophageal reflux disease (GERD) or other gastrointestinal conditions.  · Asthma or other breathing conditions.  · Restless legs syndrome, sleep apnea, or other sleep disorders.  · Chronic pain.  · Menopause. This may include hot flashes.  · Stroke.  · Abuse of alcohol, tobacco, or illegal drugs.  · Depression.  · Caffeine.  · Neurological disorders, such as Alzheimer disease.  · An overactive thyroid (hyperthyroidism).    The cause of insomnia may not be known.  What increases the risk?  Risk factors for insomnia include:  · Gender. Women are more commonly affected than men.  · Age. Insomnia is more common as you get older.  · Stress. This may involve your professional or personal life.  · Income. Insomnia is more common in people with lower income.  · Lack of exercise.  · Irregular work schedule or night shifts.  · Traveling between different time zones.    What are the signs or symptoms?  If you have insomnia, trouble falling asleep or trouble staying asleep is the main symptom. This may lead to other symptoms, such as:  · Feeling fatigued.  · Feeling nervous about going to sleep.  · Not feeling rested in the morning.  · Having trouble concentrating.  · Feeling  irritable, anxious, or depressed.    How is this treated?  Treatment for insomnia depends on the cause. If your insomnia is caused by an underlying condition, treatment will focus on addressing the condition. Treatment may also include:  · Medicines to help you sleep.  · Counseling or therapy.  · Lifestyle adjustments.    Follow these instructions at home:  · Take medicines only as directed by your health care provider.  · Keep regular sleeping and waking hours. Avoid naps.  · Keep a sleep diary to help you and your health care provider figure out what could be causing your insomnia. Include:  ? When you sleep.  ? When you wake up during the night.  ? How well you sleep.  ? How rested you feel the next day.  ? Any side effects of medicines you are taking.  ? What you eat and drink.  · Make your bedroom a comfortable place where it is easy to fall asleep:  ? Put up shades or special blackout curtains to block light from outside.  ? Use a white noise machine to block noise.  ? Keep the temperature cool.  · Exercise regularly as directed by your health care provider. Avoid exercising right before bedtime.  · Use relaxation techniques to manage stress. Ask your health care provider to suggest some techniques that may work well for you. These may include:  ? Breathing exercises.  ? Routines to release muscle tension.  ? Visualizing peaceful scenes.  · Cut back on alcohol, caffeinated beverages, and cigarettes, especially close to bedtime. These can disrupt your sleep.  · Do not overeat or eat spicy foods right before bedtime. This can lead to digestive discomfort that can make it hard for you to sleep.  · Limit screen use before bedtime. This includes:  ? Watching TV.  ? Using your smartphone, tablet, and computer.  · Stick to a routine. This can help you fall asleep faster. Try to do a quiet activity, brush your teeth, and go to bed at the same time each night.  · Get out of bed if you are still awake after 15 minutes  of trying to sleep. Keep the lights down, but try reading or doing a quiet activity. When you feel sleepy, go back to bed.  · Make sure that you drive carefully. Avoid driving if you feel very sleepy.  · Keep all follow-up appointments as directed by your health care provider. This is important.  Contact a health care provider if:  · You are tired throughout the day or have trouble in your daily routine due to sleepiness.  · You continue to have sleep problems or your sleep problems get worse.  Get help right away if:  · You have serious thoughts about hurting yourself or someone else.  This information is not intended to replace advice given to you by your health care provider. Make sure you discuss any questions you have with your health care provider.  Document Released: 12/15/2001 Document Revised: 05/19/2017 Document Reviewed: 09/18/2015  [employer] Interactive Patient Education © 2018 [employer]    Living With Depression  Everyone experiences occasional disappointment, sadness, and loss in their lives. When you are feeling down, blue, or sad for at least 2 weeks in a row, it may mean that you have depression. Depression can affect your thoughts and feelings, relationships, daily activities, and physical health. It is caused by changes in the way your brain functions. If you receive a diagnosis of depression, your health care provider will tell you which type of depression you have and what treatment options are available to you.  If you are living with depression, there are ways to help you recover from it and also ways to prevent it from coming back.  How to cope with lifestyle changes  Coping with stress  Stress is your body’s reaction to life changes and events, both good and bad. Stressful situations may include:  · Getting .  · The death of a spouse.  · Losing a job.  · Retiring.  · Having a baby.    Stress can last just a few hours or it can be ongoing. Stress can play a major role in depression,  so it is important to learn both how to cope with stress and how to think about it differently.  Talk with your health care provider or a counselor if you would like to learn more about stress reduction. He or she may suggest some stress reduction techniques, such as:  · Music therapy. This can include creating music or listening to music. Choose music that you enjoy and that inspires you.  · Mindfulness-based meditation. This kind of meditation can be done while sitting or walking. It involves being aware of your normal breaths, rather than trying to control your breathing.  · Centering prayer. This is a kind of meditation that involves focusing on a spiritual word or phrase. Choose a word, phrase, or sacred image that is meaningful to you and that brings you peace.  · Deep breathing. To do this, expand your stomach and inhale slowly through your nose. Hold your breath for 3-5 seconds, then exhale slowly, allowing your stomach muscles to relax.  · Muscle relaxation. This involves intentionally tensing muscles then relaxing them.    Choose a stress reduction technique that fits your lifestyle and personality. Stress reduction techniques take time and practice to develop. Set aside 5-15 minutes a day to do them. Therapists can offer training in these techniques. The training may be covered by some insurance plans. Other things you can do to manage stress include:  · Keeping a stress diary. This can help you learn what triggers your stress and ways to control your response.  · Understanding what your limits are and saying no to requests or events that lead to a schedule that is too full.  · Thinking about how you respond to certain situations. You may not be able to control everything, but you can control how you react.  · Adding humor to your life by watching funny films or TV shows.  · Making time for activities that help you relax and not feeling guilty about spending your time this way.    Medicines  Your health  care provider may suggest certain medicines if he or she feels that they will help improve your condition. Avoid using alcohol and other substances that may prevent your medicines from working properly (may interact). It is also important to:  · Talk with your pharmacist or health care provider about all the medicines that you take, their possible side effects, and what medicines are safe to take together.  · Make it your goal to take part in all treatment decisions (shared decision-making). This includes giving input on the side effects of medicines. It is best if shared decision-making with your health care provider is part of your total treatment plan.    If your health care provider prescribes a medicine, you may not notice the full benefits of it for 4-8 weeks. Most people who are treated for depression need to be on medicine for at least 6-12 months after they feel better. If you are taking medicines as part of your treatment, do not stop taking medicines without first talking to your health care provider. You may need to have the medicine slowly decreased (tapered) over time to decrease the risk of harmful side effects.  Relationships  Your health care provider may suggest family therapy along with individual therapy and drug therapy. While there may not be family problems that are causing you to feel depressed, it is still important to make sure your family learns as much as they can about your mental health. Having your family’s support can help make your treatment successful.  How to recognize changes in your condition  Everyone has a different response to treatment for depression. Recovery from major depression happens when you have not had signs of major depression for two months. This may mean that you will start to:  · Have more interest in doing activities.  · Feel less hopeless than you did 2 months ago.  · Have more energy.  · Overeat less often, or have better or improving appetite.  · Have better  concentration.    Your health care provider will work with you to decide the next steps in your recovery. It is also important to recognize when your condition is getting worse. Watch for these signs:  · Having fatigue or low energy.  · Eating too much or too little.  · Sleeping too much or too little.  · Feeling restless, agitated, or hopeless.  · Having trouble concentrating or making decisions.  · Having unexplained physical complaints.  · Feeling irritable, angry, or aggressive.    Get help as soon as you or your family members notice these symptoms coming back.  How to get support and help from others  How to talk with friends and family members about your condition  Talking to friends and family members about your condition can provide you with one way to get support and guidance. Reach out to trusted friends or family members, explain your symptoms to them, and let them know that you are working with a health care provider to treat your depression.  Financial resources  Not all insurance plans cover mental health care, so it is important to check with your insurance carrier. If paying for co-pays or counseling services is a problem, search for a local or [facility] mental health care center. They may be able to offer public mental health care services at low or no cost when you are not able to see a private health care provider.  If you are taking medicine for depression, you may be able to get the generic form, which may be less expensive. Some makers of prescription medicines also offer help to patients who cannot afford the medicines they need.  Follow these instructions at home:  · Get the right amount and quality of sleep.  · Cut down on using caffeine, tobacco, alcohol, and other potentially harmful substances.  · Try to exercise, such as walking or lifting small weights.  · Take over-the-counter and prescription medicines only as told by your health care provider.  · Eat a healthy diet that includes plenty  of vegetables, fruits, whole grains, low-fat dairy products, and lean protein. Do not eat a lot of foods that are high in solid fats, added sugars, or salt.  · Keep all follow-up visits as told by your health care provider. This is important.  Contact a health care provider if:  · You stop taking your antidepressant medicines, and you have any of these symptoms:  ? Nausea.  ? Headache.  ? Feeling lightheaded.  ? Chills and body aches.  ? Not being able to sleep (insomnia).  · You or your friends and family think your depression is getting worse.  Get help right away if:  · You have thoughts of hurting yourself or others.  If you ever feel like you may hurt yourself or others, or have thoughts about taking your own life, get help right away. You can go to your nearest emergency department or call:  · Your local emergency services (911 in the [place]  · A suicide crisis helpline, such as the National Suicide Prevention Lifeline at 1-880.364.6474. This is open 24-hours a day.    Summary  · If you are living with depression, there are ways to help you recover from it and also ways to prevent it from coming back.  · Work with your health care team to create a management plan that includes counseling, stress management techniques, and healthy lifestyle habits.  This information is not intended to replace advice given to you by your health care provider. Make sure you discuss any questions you have with your health care provider.  Document Released: 11/20/2017 Document Revised: 11/20/2017 Document Reviewed: 11/20/2017  [employer] Interactive Patient Education © 2019 [employer]

## 2019-04-04 ENCOUNTER — TRANSCRIBE ORDERS (OUTPATIENT)
Dept: ADMINISTRATIVE | Facility: HOSPITAL | Age: 59
End: 2019-04-04

## 2019-04-04 DIAGNOSIS — Z12.31 VISIT FOR SCREENING MAMMOGRAM: Primary | ICD-10-CM

## 2019-04-12 ENCOUNTER — OFFICE VISIT (OUTPATIENT)
Dept: FAMILY MEDICINE CLINIC | Facility: CLINIC | Age: 59
End: 2019-04-12

## 2019-04-12 VITALS
WEIGHT: 188 LBS | BODY MASS INDEX: 33.31 KG/M2 | DIASTOLIC BLOOD PRESSURE: 72 MMHG | HEIGHT: 63 IN | SYSTOLIC BLOOD PRESSURE: 124 MMHG | OXYGEN SATURATION: 99 % | HEART RATE: 70 BPM

## 2019-04-12 DIAGNOSIS — F41.8 DEPRESSION WITH ANXIETY: Primary | ICD-10-CM

## 2019-04-12 DIAGNOSIS — E66.9 OBESITY (BMI 30-39.9): ICD-10-CM

## 2019-04-12 DIAGNOSIS — G47.00 INSOMNIA, UNSPECIFIED TYPE: ICD-10-CM

## 2019-04-12 PROCEDURE — 99214 OFFICE O/P EST MOD 30 MIN: CPT | Performed by: PHYSICIAN ASSISTANT

## 2019-04-12 NOTE — PROGRESS NOTES
Chief Complaint   Patient presents with   • Follow-up   • Anxiety       HPI     Yamila Keller is a pleasant 58 y.o. female who is here for routine follow-up of anxiety.  Patient recently switched from Effexor 225 mg QD to Pristiq 100 mg QD.  She tolerated the switch well with no adverse effects.  She has noticed slightly improvement in depression and anxiety.  Not having anymore crying spells.  Slightly more jittery on this medication.  Family situation has improved, daughter gave birth to healthy child.  She plans on working out more now that the weather is better and her other daughter has recovered from a tummy tuck.  Sleeping well at night with amitriptyline 10 mg QD.  Overall doing better.    Past Medical History:   Diagnosis Date   • Arthritis    • Carpal tunnel syndrome 2012   • Degenerative joint disease    • Depression    • Depression    • Diabetes mellitus, type 2 (CMS/HCC)    • Elevated liver enzymes    • Fatty liver    • Kidney disorder     one kidney   • Palpitations    • Polycystic ovaries    • Psoriasis    • PVC (premature ventricular contraction)     occasional pvc's   • Sleep apnea     cpap at home       Past Surgical History:   Procedure Laterality Date   • BREAST BIOPSY     • BREAST CYST EXCISION Right     BENIGN, INTRADUCTAL PAPILLOMA (PER PT)   •  SECTION      x 3   • CHOLECYSTECTOMY  2013   • COLONOSCOPY     • HYSTERECTOMY      complete   • JOINT REPLACEMENT Left     left knee   • OOPHORECTOMY     • TUBAL ABDOMINAL LIGATION         Family History   Problem Relation Age of Onset   • Diabetes Mother    • Lymphoma Mother    • Diabetes Paternal Grandmother    • Heart disease Paternal Grandmother    • Hypertension Brother    • Hypertension Brother    • Breast cancer Neg Hx    • Ovarian cancer Neg Hx        Social History     Socioeconomic History   • Marital status:      Spouse name: Not on file   • Number of children: Not on file   • Years of education: Not on  "file   • Highest education level: Not on file   Occupational History   • Occupation: RN   Tobacco Use   • Smoking status: Never Smoker   • Smokeless tobacco: Never Used   Substance and Sexual Activity   • Alcohol use: No   • Drug use: No   • Sexual activity: Yes     Partners: Male     Comment:        Allergies   Allergen Reactions   • Celexa [Citalopram] Other (See Comments)     Jittery        ROS    Review of Systems   Constitutional: Negative for chills, diaphoresis, fatigue and fever.   HENT: Negative for congestion, postnasal drip and rhinorrhea.    Respiratory: Negative for cough.    Cardiovascular: Negative for chest pain and palpitations.   Neurological: Negative for dizziness, tremors, light-headedness and headache.   Psychiatric/Behavioral: Positive for depressed mood and stress. Negative for sleep disturbance and suicidal ideas. The patient is nervous/anxious.        Vitals:    04/12/19 0740   BP: 124/72   BP Location: Right arm   Patient Position: Sitting   Cuff Size: Adult   Pulse: 70   SpO2: 99%   Weight: 85.3 kg (188 lb)   Height: 160 cm (63\")       Current Outpatient Medications on File Prior to Visit   Medication Sig Dispense Refill   • amitriptyline (ELAVIL) 10 MG tablet Take 1 tablet by mouth Every Night. 30 tablet 1   • cholecalciferol (VITAMIN D3) 1000 units tablet Take 2,000 Units by mouth Daily.     • clobetasol (TEMOVATE) 0.05 % external solution Apply twice daily to itchy, scaly areas on scalp. 25 mL 2   • clotrimazole-betamethasone (LOTRISONE) 1-0.05 % cream Apply topically three times daily as needed to affected areas. 15 g 2   • desvenlafaxine (PRISTIQ) 100 MG 24 hr tablet Take 1 tablet by mouth Daily. 30 tablet 2   • Dulaglutide 1.5 MG/0.5ML solution pen-injector Inject 1.5 mg under the skin into the appropriate area as directed 1 (One) Time Per Week. 2 mL 0   • EVAMIST 1.53 MG/SPRAY transdermal spray APPLY 1 PUMP UTD BID  11   • hydrocortisone 2.5 % ointment Apply daily to area on " buttocks twice a day until resolved. 28 g 3   • ketoconazole (NIZORAL) 2 % shampoo Use as a shampoo to scalp and face 3 times a week. Leave in for 5 minutes and rinse. 120 mL 3   • Magnesium 250 MG tablet Take 1 tablet by mouth Daily.     • metFORMIN ER (GLUCOPHAGE-XR) 500 MG 24 hr tablet Take 2 tablets by mouth 2 (Two) Times a Day. 360 tablet 1   • pravastatin (PRAVACHOL) 20 MG tablet Take 1 tablet by mouth Daily. 90 tablet 2   • Probiotic Product (PROBIOTIC DAILY PO) Take  by mouth Daily.     • Vitamin E 400 units tablet Take 800 Units by mouth Daily.       No current facility-administered medications on file prior to visit.        Results for orders placed or performed during the hospital encounter of 01/27/19   Comprehensive Metabolic Panel   Result Value Ref Range    Glucose 87 70 - 100 mg/dL    BUN 26 (H) 9 - 23 mg/dL    Creatinine 0.96 0.60 - 1.30 mg/dL    Sodium 137 132 - 146 mmol/L    Potassium 3.7 3.5 - 5.5 mmol/L    Chloride 102 99 - 109 mmol/L    CO2 29.0 20.0 - 31.0 mmol/L    Calcium 9.8 8.7 - 10.4 mg/dL    Total Protein 7.2 5.7 - 8.2 g/dL    Albumin 4.73 3.20 - 4.80 g/dL    ALT (SGPT) 47 (H) 7 - 40 U/L    AST (SGOT) 30 0 - 33 U/L    Alkaline Phosphatase 65 25 - 100 U/L    Total Bilirubin 0.3 0.3 - 1.2 mg/dL    eGFR Non African Amer 60 (L) >60 mL/min/1.73    Globulin 2.5 gm/dL    A/G Ratio 1.9 1.5 - 2.5 g/dL    BUN/Creatinine Ratio 27.1 (H) 7.0 - 25.0    Anion Gap 6.0 3.0 - 11.0 mmol/L   Lipase   Result Value Ref Range    Lipase 69 (H) 6 - 51 U/L   BNP   Result Value Ref Range    BNP <2.0 0.0 - 100.0 pg/mL   CBC Auto Differential   Result Value Ref Range    WBC 6.83 3.50 - 10.80 10*3/mm3    RBC 4.77 3.89 - 5.14 10*6/mm3    Hemoglobin 13.6 11.5 - 15.5 g/dL    Hematocrit 41.2 34.5 - 44.0 %    MCV 86.4 80.0 - 99.0 fL    MCH 28.5 27.0 - 31.0 pg    MCHC 33.0 32.0 - 36.0 g/dL    RDW 12.6 11.3 - 14.5 %    RDW-SD 40.0 37.0 - 54.0 fl    MPV 9.4 6.0 - 12.0 fL    Platelets 290 150 - 450 10*3/mm3    Neutrophil %  48.8 41.0 - 71.0 %    Lymphocyte % 40.8 24.0 - 44.0 %    Monocyte % 7.0 0.0 - 12.0 %    Eosinophil % 3.1 (H) 0.0 - 3.0 %    Basophil % 0.3 0.0 - 1.0 %    Immature Grans % 0.3 0.0 - 0.6 %    Neutrophils, Absolute 3.33 1.50 - 8.30 10*3/mm3    Lymphocytes, Absolute 2.79 0.60 - 4.80 10*3/mm3    Monocytes, Absolute 0.48 0.00 - 1.00 10*3/mm3    Eosinophils, Absolute 0.21 0.00 - 0.30 10*3/mm3    Basophils, Absolute 0.02 0.00 - 0.20 10*3/mm3    Immature Grans, Absolute 0.02 0.00 - 0.03 10*3/mm3   POC Troponin, Rapid   Result Value Ref Range    Troponin I 0.00 0.00 - 0.07 ng/mL   POC Troponin, Rapid   Result Value Ref Range    Troponin I 0.00 0.00 - 0.07 ng/mL   Light Blue Top   Result Value Ref Range    Extra Tube hold for add-on    Green Top (Gel)   Result Value Ref Range    Extra Tube Hold for add-ons.    Lavender Top   Result Value Ref Range    Extra Tube hold for add-on    Gold Top - SST   Result Value Ref Range    Extra Tube Hold for add-ons.        PE    Physical Exam   Constitutional: She appears well-developed and well-nourished. No distress.   HENT:   Head: Normocephalic and atraumatic.   Eyes: EOM are normal.   Neck: Normal range of motion.   Cardiovascular: Normal rate, regular rhythm and normal heart sounds.   Pulmonary/Chest: Effort normal and breath sounds normal.   Musculoskeletal: Normal range of motion. She exhibits no edema.   Neurological: She is alert.   Skin: Skin is warm. She is not diaphoretic. No erythema.   Psychiatric: She has a normal mood and affect. Her speech is normal and behavior is normal. Judgment and thought content normal. She is not actively hallucinating. She is attentive.       A/P    Yamila was seen today for follow-up and anxiety.    Diagnoses and all orders for this visit:    Depression with anxiety  -tolerated switch from effexor to pristiq well  -situational family stress has improved  -not having any crying episodes  -tolerating pristiq well, feels it is helping  -she feels  "slightly more \"jittery\" at times, no other complaints  -fu in 4 months for APSAEID, call sooner if she has concerns or issues    Insomnia  -sleeping well with amitriptyline 10 mg QPM    Obesity (BMI 30-39.9)  -has gained a few pounds  -has not noticed weight loss with increase in trulicity  -discussed diet and exercise, patient will work on this       Plan of care reviewed with patient at the conclusion of today's visit. Education was provided regarding diagnosis, management and any prescribed or recommended OTC medications.  Patient verbalizes understanding of and agreement with management plan.    Return in about 4 months (around 8/12/2019) for Annual physical.     Daly Archibald PA-C  "

## 2019-04-18 RX ORDER — METFORMIN HYDROCHLORIDE 500 MG/1
1000 TABLET, EXTENDED RELEASE ORAL 2 TIMES DAILY
Qty: 360 TABLET | Refills: 1 | Status: SHIPPED | OUTPATIENT
Start: 2019-04-18 | End: 2019-10-27 | Stop reason: SDUPTHER

## 2019-04-22 DIAGNOSIS — G43.019 INTRACTABLE MIGRAINE WITHOUT AURA AND WITHOUT STATUS MIGRAINOSUS: ICD-10-CM

## 2019-04-22 RX ORDER — AMITRIPTYLINE HYDROCHLORIDE 10 MG/1
10 TABLET, FILM COATED ORAL NIGHTLY
Qty: 30 TABLET | Refills: 1 | Status: SHIPPED | OUTPATIENT
Start: 2019-04-22 | End: 2019-06-25 | Stop reason: SDUPTHER

## 2019-05-16 ENCOUNTER — HOSPITAL ENCOUNTER (OUTPATIENT)
Dept: MAMMOGRAPHY | Facility: HOSPITAL | Age: 59
Discharge: HOME | End: 2019-05-16
Admitting: SURGERY

## 2019-05-16 DIAGNOSIS — Z12.31 VISIT FOR SCREENING MAMMOGRAM: ICD-10-CM

## 2019-05-16 PROCEDURE — 77067 SCR MAMMO BI INCL CAD: CPT

## 2019-05-16 PROCEDURE — 77063 BREAST TOMOSYNTHESIS BI: CPT | Performed by: RADIOLOGY

## 2019-05-16 PROCEDURE — 77063 BREAST TOMOSYNTHESIS BI: CPT

## 2019-05-16 PROCEDURE — 77067 SCR MAMMO BI INCL CAD: CPT | Performed by: RADIOLOGY

## 2019-06-25 DIAGNOSIS — G43.019 INTRACTABLE MIGRAINE WITHOUT AURA AND WITHOUT STATUS MIGRAINOSUS: ICD-10-CM

## 2019-06-25 DIAGNOSIS — F32.1 CURRENT MODERATE EPISODE OF MAJOR DEPRESSIVE DISORDER, UNSPECIFIED WHETHER RECURRENT (HCC): ICD-10-CM

## 2019-06-25 RX ORDER — DESVENLAFAXINE 100 MG/1
100 TABLET, EXTENDED RELEASE ORAL DAILY
Qty: 90 TABLET | Refills: 0 | Status: SHIPPED | OUTPATIENT
Start: 2019-06-25 | End: 2019-09-29 | Stop reason: SDUPTHER

## 2019-06-25 RX ORDER — AMITRIPTYLINE HYDROCHLORIDE 10 MG/1
10 TABLET, FILM COATED ORAL NIGHTLY
Qty: 90 TABLET | Refills: 0 | Status: SHIPPED | OUTPATIENT
Start: 2019-06-25 | End: 2019-09-29 | Stop reason: SDUPTHER

## 2019-08-14 ENCOUNTER — TELEPHONE (OUTPATIENT)
Dept: FAMILY MEDICINE CLINIC | Facility: CLINIC | Age: 59
End: 2019-08-14

## 2019-08-14 ENCOUNTER — OFFICE VISIT (OUTPATIENT)
Dept: FAMILY MEDICINE CLINIC | Facility: CLINIC | Age: 59
End: 2019-08-14

## 2019-08-14 ENCOUNTER — LAB (OUTPATIENT)
Dept: LAB | Facility: HOSPITAL | Age: 59
End: 2019-08-14

## 2019-08-14 VITALS
HEART RATE: 88 BPM | SYSTOLIC BLOOD PRESSURE: 128 MMHG | OXYGEN SATURATION: 95 % | RESPIRATION RATE: 16 BRPM | WEIGHT: 207 LBS | BODY MASS INDEX: 38.09 KG/M2 | DIASTOLIC BLOOD PRESSURE: 78 MMHG | HEIGHT: 62 IN

## 2019-08-14 DIAGNOSIS — F51.01 PRIMARY INSOMNIA: ICD-10-CM

## 2019-08-14 DIAGNOSIS — E78.2 MIXED HYPERLIPIDEMIA: ICD-10-CM

## 2019-08-14 DIAGNOSIS — E55.9 VITAMIN D DEFICIENCY: ICD-10-CM

## 2019-08-14 DIAGNOSIS — M15.9 OSTEOARTHRITIS OF MULTIPLE JOINTS, UNSPECIFIED OSTEOARTHRITIS TYPE: ICD-10-CM

## 2019-08-14 DIAGNOSIS — F32.A DEPRESSION, UNSPECIFIED DEPRESSION TYPE: ICD-10-CM

## 2019-08-14 DIAGNOSIS — R73.02 IMPAIRED GLUCOSE TOLERANCE: ICD-10-CM

## 2019-08-14 DIAGNOSIS — Z00.00 PHYSICAL EXAM, ANNUAL: ICD-10-CM

## 2019-08-14 DIAGNOSIS — G47.33 SEVERE OBSTRUCTIVE SLEEP APNEA: ICD-10-CM

## 2019-08-14 DIAGNOSIS — K76.0 HEPATIC STEATOSIS: ICD-10-CM

## 2019-08-14 DIAGNOSIS — E61.1 IRON DEFICIENCY: ICD-10-CM

## 2019-08-14 DIAGNOSIS — F41.9 ANXIETY: ICD-10-CM

## 2019-08-14 DIAGNOSIS — Z90.5 SINGLE KIDNEY: ICD-10-CM

## 2019-08-14 DIAGNOSIS — Z00.00 PHYSICAL EXAM, ANNUAL: Primary | ICD-10-CM

## 2019-08-14 DIAGNOSIS — E66.9 OBESITY (BMI 30-39.9): ICD-10-CM

## 2019-08-14 PROBLEM — E11.9 TYPE 2 DIABETES MELLITUS WITHOUT COMPLICATION, WITHOUT LONG-TERM CURRENT USE OF INSULIN: Status: ACTIVE | Noted: 2019-08-14

## 2019-08-14 LAB
25(OH)D3 SERPL-MCNC: 53.1 NG/ML (ref 30–100)
ALBUMIN SERPL-MCNC: 5 G/DL (ref 3.5–5.2)
ALBUMIN/GLOB SERPL: 1.9 G/DL
ALP SERPL-CCNC: 60 U/L (ref 39–117)
ALT SERPL W P-5'-P-CCNC: 32 U/L (ref 1–33)
ANION GAP SERPL CALCULATED.3IONS-SCNC: 13.8 MMOL/L (ref 5–15)
AST SERPL-CCNC: 26 U/L (ref 1–32)
BASOPHILS # BLD AUTO: 0.03 10*3/MM3 (ref 0–0.2)
BASOPHILS NFR BLD AUTO: 0.6 % (ref 0–1.5)
BILIRUB SERPL-MCNC: 0.3 MG/DL (ref 0.2–1.2)
BUN BLD-MCNC: 19 MG/DL (ref 6–20)
BUN/CREAT SERPL: 20.7 (ref 7–25)
CALCIUM SPEC-SCNC: 10 MG/DL (ref 8.6–10.5)
CHLORIDE SERPL-SCNC: 101 MMOL/L (ref 98–107)
CHOLEST SERPL-MCNC: 221 MG/DL (ref 0–200)
CO2 SERPL-SCNC: 26.2 MMOL/L (ref 22–29)
CREAT BLD-MCNC: 0.92 MG/DL (ref 0.57–1)
DEPRECATED RDW RBC AUTO: 40.3 FL (ref 37–54)
EOSINOPHIL # BLD AUTO: 0.13 10*3/MM3 (ref 0–0.4)
EOSINOPHIL NFR BLD AUTO: 2.6 % (ref 0.3–6.2)
ERYTHROCYTE [DISTWIDTH] IN BLOOD BY AUTOMATED COUNT: 12.8 % (ref 12.3–15.4)
GFR SERPL CREATININE-BSD FRML MDRD: 63 ML/MIN/1.73
GLOBULIN UR ELPH-MCNC: 2.6 GM/DL
GLUCOSE BLD-MCNC: 97 MG/DL (ref 65–99)
HBA1C MFR BLD: 5.4 % (ref 4.8–5.6)
HCT VFR BLD AUTO: 44.1 % (ref 34–46.6)
HDLC SERPL-MCNC: 57 MG/DL (ref 40–60)
HGB BLD-MCNC: 13.9 G/DL (ref 12–15.9)
IMM GRANULOCYTES # BLD AUTO: 0.01 10*3/MM3 (ref 0–0.05)
IMM GRANULOCYTES NFR BLD AUTO: 0.2 % (ref 0–0.5)
IRON 24H UR-MRATE: 82 MCG/DL (ref 37–145)
LDLC SERPL CALC-MCNC: 109 MG/DL (ref 0–100)
LDLC/HDLC SERPL: 1.92 {RATIO}
LYMPHOCYTES # BLD AUTO: 2.27 10*3/MM3 (ref 0.7–3.1)
LYMPHOCYTES NFR BLD AUTO: 44.7 % (ref 19.6–45.3)
MCH RBC QN AUTO: 27.6 PG (ref 26.6–33)
MCHC RBC AUTO-ENTMCNC: 31.5 G/DL (ref 31.5–35.7)
MCV RBC AUTO: 87.5 FL (ref 79–97)
MONOCYTES # BLD AUTO: 0.43 10*3/MM3 (ref 0.1–0.9)
MONOCYTES NFR BLD AUTO: 8.5 % (ref 5–12)
NEUTROPHILS # BLD AUTO: 2.21 10*3/MM3 (ref 1.7–7)
NEUTROPHILS NFR BLD AUTO: 43.4 % (ref 42.7–76)
NRBC BLD AUTO-RTO: 0 /100 WBC (ref 0–0.2)
PLATELET # BLD AUTO: 283 10*3/MM3 (ref 140–450)
PMV BLD AUTO: 10.6 FL (ref 6–12)
POTASSIUM BLD-SCNC: 4.5 MMOL/L (ref 3.5–5.2)
PROT SERPL-MCNC: 7.6 G/DL (ref 6–8.5)
RBC # BLD AUTO: 5.04 10*6/MM3 (ref 3.77–5.28)
SODIUM BLD-SCNC: 141 MMOL/L (ref 136–145)
TRIGL SERPL-MCNC: 273 MG/DL (ref 0–150)
TSH SERPL DL<=0.05 MIU/L-ACNC: 1.07 MIU/ML (ref 0.27–4.2)
VLDLC SERPL-MCNC: 54.6 MG/DL (ref 5–40)
WBC NRBC COR # BLD: 5.08 10*3/MM3 (ref 3.4–10.8)

## 2019-08-14 PROCEDURE — 85025 COMPLETE CBC W/AUTO DIFF WBC: CPT

## 2019-08-14 PROCEDURE — 84478 ASSAY OF TRIGLYCERIDES: CPT

## 2019-08-14 PROCEDURE — 82977 ASSAY OF GGT: CPT

## 2019-08-14 PROCEDURE — 82465 ASSAY BLD/SERUM CHOLESTEROL: CPT

## 2019-08-14 PROCEDURE — 83010 ASSAY OF HAPTOGLOBIN QUANT: CPT

## 2019-08-14 PROCEDURE — 36415 COLL VENOUS BLD VENIPUNCTURE: CPT

## 2019-08-14 PROCEDURE — 82306 VITAMIN D 25 HYDROXY: CPT

## 2019-08-14 PROCEDURE — 82172 ASSAY OF APOLIPOPROTEIN: CPT

## 2019-08-14 PROCEDURE — 80061 LIPID PANEL: CPT

## 2019-08-14 PROCEDURE — 83036 HEMOGLOBIN GLYCOSYLATED A1C: CPT

## 2019-08-14 PROCEDURE — 99396 PREV VISIT EST AGE 40-64: CPT | Performed by: PHYSICIAN ASSISTANT

## 2019-08-14 PROCEDURE — 84443 ASSAY THYROID STIM HORMONE: CPT

## 2019-08-14 PROCEDURE — 83540 ASSAY OF IRON: CPT

## 2019-08-14 PROCEDURE — 83883 ASSAY NEPHELOMETRY NOT SPEC: CPT

## 2019-08-14 PROCEDURE — 80053 COMPREHEN METABOLIC PANEL: CPT

## 2019-08-14 RX ORDER — PRAVASTATIN SODIUM 20 MG
20 TABLET ORAL DAILY
Qty: 90 TABLET | Refills: 0 | Status: SHIPPED | OUTPATIENT
Start: 2019-08-14 | End: 2019-08-26 | Stop reason: SDUPTHER

## 2019-08-14 RX ORDER — HYDROXYZINE HYDROCHLORIDE 25 MG/1
25 TABLET, FILM COATED ORAL 3 TIMES DAILY PRN
Qty: 30 TABLET | Refills: 0 | Status: SHIPPED | OUTPATIENT
Start: 2019-08-14 | End: 2020-02-19 | Stop reason: SDUPTHER

## 2019-08-14 NOTE — TELEPHONE ENCOUNTER
PT FORGOT TO MENTION AT APPT NEEDS REFILL PRAVASTATIN 20 MGS 1 DAILY    [facility]

## 2019-08-14 NOTE — PROGRESS NOTES
Patient Care Team:  Daly Archibald PA-C as PCP - General (Physician Assistant)     Chief complaint: Patient is in today for a physical     Yamilaermelinda Keller is a 58 y.o. female who presents for her yearly physical exam.     Patient is a pleasant 58-year-old female who presents for routine physical exam.  Her past medical history is significant for only having the right kidney, depression with anxiety, hepatic steatosis, sleep apnea, osteoarthritis, impaired glucose tolerance, insomnia and hyperlipidemia.  Patient has doing well today overall.  She reports stable mood.  Having difficulty sleeping even with amitriptyline and she attributes this to her anxiety.  She has never tried hydroxyzine.  She is wearing her CPAP every night.  She discontinued her Trulicity.  She has gained 19 pounds since her last visit.  She is still taking the Metformin.  She had a liver biopsy in February 2018 showing fibrosis stage III-IV.  On vitamin E and  pravastatin 20 mg.  She is up-to-date with the ophthalmologist, dentist, dermatologist, colonoscopy and mammogram.  She had a full hysterectomy but is still followed by gynecology and has coming appointment for pelvic and breast exam.         Review of Systems   Constitutional: Positive for fatigue. Negative for chills, diaphoresis and fever.   HENT: Negative for congestion, ear pain, hearing loss, postnasal drip, rhinorrhea and sore throat.    Eyes: Negative for blurred vision and pain.   Respiratory: Negative for cough, shortness of breath and wheezing.    Cardiovascular: Negative for chest pain and leg swelling.   Gastrointestinal: Negative for abdominal pain, blood in stool, constipation, diarrhea, nausea, vomiting and indigestion.   Endocrine: Negative for polyuria.   Genitourinary: Negative for dysuria, flank pain and hematuria.   Musculoskeletal: Positive for arthralgias. Negative for gait problem and myalgias.   Skin: Negative for rash and skin lesions.   Neurological:  Negative for dizziness and headache.   Psychiatric/Behavioral: Positive for sleep disturbance and stress. Negative for self-injury, suicidal ideas and depressed mood. The patient is not nervous/anxious.         History  Past Medical History:   Diagnosis Date   • Arthritis    • Carpal tunnel syndrome 2012   • Degenerative joint disease    • Depression    • Depression    • Diabetes mellitus, type 2 (CMS/HCC)    • Elevated liver enzymes    • Fatty liver    • Kidney disorder     one kidney   • Palpitations    • Polycystic ovaries    • Psoriasis    • PVC (premature ventricular contraction)     occasional pvc's   • Sleep apnea     cpap at home      Past Surgical History:   Procedure Laterality Date   • BREAST BIOPSY     • BREAST CYST EXCISION Right     BENIGN, INTRADUCTAL PAPILLOMA (PER PT)   •  SECTION      x 3   • CHOLECYSTECTOMY     • COLONOSCOPY     • HYSTERECTOMY      complete   • JOINT REPLACEMENT Left     left knee   • OOPHORECTOMY     • TUBAL ABDOMINAL LIGATION        Allergies   Allergen Reactions   • Celexa [Citalopram] Other (See Comments)     Jittery       Family History   Problem Relation Age of Onset   • Diabetes Mother    • Lymphoma Mother    • Diabetes Paternal Grandmother    • Heart disease Paternal Grandmother    • Hypertension Brother    • Hypertension Brother    • Breast cancer Neg Hx    • Ovarian cancer Neg Hx       Social History     Socioeconomic History   • Marital status:      Spouse name: Not on file   • Number of children: Not on file   • Years of education: Not on file   • Highest education level: Not on file   Occupational History   • Occupation: RN   Tobacco Use   • Smoking status: Never Smoker   • Smokeless tobacco: Never Used   Substance and Sexual Activity   • Alcohol use: No   • Drug use: No   • Sexual activity: Yes     Partners: Male     Comment:       Current Outpatient Medications on File Prior to Visit   Medication Sig Dispense Refill   •  amitriptyline (ELAVIL) 10 MG tablet Take 1 tablet by mouth Every Night. 90 tablet 0   • cholecalciferol (VITAMIN D3) 1000 units tablet Take 2,000 Units by mouth Daily.     • clobetasol (TEMOVATE) 0.05 % external solution Apply twice daily to itchy, scaly areas on scalp. 25 mL 2   • clotrimazole-betamethasone (LOTRISONE) 1-0.05 % cream Apply topically three times daily as needed to affected areas. 15 g 2   • desvenlafaxine (PRISTIQ) 100 MG 24 hr tablet Take 1 tablet by mouth Daily. 90 tablet 0   • EVAMIST 1.53 MG/SPRAY transdermal spray APPLY 1 PUMP UTD BID  11   • hydrocortisone 2.5 % ointment Apply daily to area on buttocks twice a day until resolved. 28 g 3   • ketoconazole (NIZORAL) 2 % shampoo Use as a shampoo to scalp and face 3 times a week. Leave in for 5 minutes and rinse. 120 mL 3   • Magnesium 250 MG tablet Take 1 tablet by mouth Daily.     • metFORMIN ER (GLUCOPHAGE-XR) 500 MG 24 hr tablet Take 2 tablets by mouth 2 (Two) Times a Day. 360 tablet 1   • pravastatin (PRAVACHOL) 20 MG tablet Take 1 tablet by mouth Daily. 90 tablet 2   • Probiotic Product (PROBIOTIC DAILY PO) Take  by mouth Daily.     • Vitamin E 400 units tablet Take 800 Units by mouth Daily.     • [DISCONTINUED] Dulaglutide 1.5 MG/0.5ML solution pen-injector Inject 1.5 mg under the skin into the appropriate area as directed 1 (One) Time Per Week. 2 mL 0   • [DISCONTINUED] clobetasol (TEMOVATE) 0.05 % external solution Apply twice daily to itchy, scaly areas on scalp. 25 mL 5   • [DISCONTINUED] hydrocortisone 2.5 % ointment Apply daily to areas under breasts as needed for redness/irritation. 28 g 5   • [DISCONTINUED] ketoconazole (NIZORAL) 2 % shampoo Use as a shampoo to scalp and face 3 times a week. Leave in for 5 minutes and rinse. 120 mL 5     No current facility-administered medications on file prior to visit.        Results for orders placed or performed during the hospital encounter of 01/27/19   Comprehensive Metabolic Panel   Result  Value Ref Range    Glucose 87 70 - 100 mg/dL    BUN 26 (H) 9 - 23 mg/dL    Creatinine 0.96 0.60 - 1.30 mg/dL    Sodium 137 132 - 146 mmol/L    Potassium 3.7 3.5 - 5.5 mmol/L    Chloride 102 99 - 109 mmol/L    CO2 29.0 20.0 - 31.0 mmol/L    Calcium 9.8 8.7 - 10.4 mg/dL    Total Protein 7.2 5.7 - 8.2 g/dL    Albumin 4.73 3.20 - 4.80 g/dL    ALT (SGPT) 47 (H) 7 - 40 U/L    AST (SGOT) 30 0 - 33 U/L    Alkaline Phosphatase 65 25 - 100 U/L    Total Bilirubin 0.3 0.3 - 1.2 mg/dL    eGFR Non African Amer 60 (L) >60 mL/min/1.73    Globulin 2.5 gm/dL    A/G Ratio 1.9 1.5 - 2.5 g/dL    BUN/Creatinine Ratio 27.1 (H) 7.0 - 25.0    Anion Gap 6.0 3.0 - 11.0 mmol/L   Lipase   Result Value Ref Range    Lipase 69 (H) 6 - 51 U/L   BNP   Result Value Ref Range    BNP <2.0 0.0 - 100.0 pg/mL   CBC Auto Differential   Result Value Ref Range    WBC 6.83 3.50 - 10.80 10*3/mm3    RBC 4.77 3.89 - 5.14 10*6/mm3    Hemoglobin 13.6 11.5 - 15.5 g/dL    Hematocrit 41.2 34.5 - 44.0 %    MCV 86.4 80.0 - 99.0 fL    MCH 28.5 27.0 - 31.0 pg    MCHC 33.0 32.0 - 36.0 g/dL    RDW 12.6 11.3 - 14.5 %    RDW-SD 40.0 37.0 - 54.0 fl    MPV 9.4 6.0 - 12.0 fL    Platelets 290 150 - 450 10*3/mm3    Neutrophil % 48.8 41.0 - 71.0 %    Lymphocyte % 40.8 24.0 - 44.0 %    Monocyte % 7.0 0.0 - 12.0 %    Eosinophil % 3.1 (H) 0.0 - 3.0 %    Basophil % 0.3 0.0 - 1.0 %    Immature Grans % 0.3 0.0 - 0.6 %    Neutrophils, Absolute 3.33 1.50 - 8.30 10*3/mm3    Lymphocytes, Absolute 2.79 0.60 - 4.80 10*3/mm3    Monocytes, Absolute 0.48 0.00 - 1.00 10*3/mm3    Eosinophils, Absolute 0.21 0.00 - 0.30 10*3/mm3    Basophils, Absolute 0.02 0.00 - 0.20 10*3/mm3    Immature Grans, Absolute 0.02 0.00 - 0.03 10*3/mm3   POC Troponin, Rapid   Result Value Ref Range    Troponin I 0.00 0.00 - 0.07 ng/mL   POC Troponin, Rapid   Result Value Ref Range    Troponin I 0.00 0.00 - 0.07 ng/mL   Light Blue Top   Result Value Ref Range    Extra Tube hold for add-on    Green Top (Gel)   Result Value  Ref Range    Extra Tube Hold for add-ons.    Lavender Top   Result Value Ref Range    Extra Tube hold for add-on    Gold Top - SST   Result Value Ref Range    Extra Tube Hold for add-ons.        Health Maintenance   Topic Date Due   • ZOSTER VACCINE (2 of 3) 2019   • LIPID PANEL  2019   • INFLUENZA VACCINE  2019   • ANNUAL PHYSICAL  08/15/2020   • MAMMOGRAM  2021   • TDAP/TD VACCINES (2 - Td) 10/07/2023   • COLONOSCOPY  2028   • HEPATITIS C SCREENING  Completed       Immunizations  Td/Tdap(Booster Q 10 yrs):  Completed  Flu (Yearly):  Completed  Pneumovax (1 yr after Prevnar):  N/A  Kndcrud15 (1 yr after Pneumo):  N/A  Hep B:  Completed  Shringrix:  Completed}   Immunization History   Administered Date(s) Administered   • Flu Vaccine Quad PF >36MO 10/18/2012, 10/01/2016   • Tdap 10/07/2013   • Zostavax 2019         Diabetes:  No   Eye Exam: N/A   Foot Exam:  N/A  Obesity Counseling:  N/A  No results found for: HGBA1C, MICROALBUR    Patient's Body mass index is 37.86 kg/m². BMI is above normal parameters. Recommendations include: exercise counseling and nutrition counseling.      Colorectal Screening:  Complete  Pap:  N/A  Mammogram:  Complete  PSA(Over age 50):  N/A  US Aorta (For male smokers, age 65):  N/A  CT for Smoker (Age 55-75, 30pk yr):  N/A  Bone Density/DEXA:  Complete  Hep C ( 4053-8269):  Complete      Results for orders placed or performed during the hospital encounter of 19   Comprehensive Metabolic Panel   Result Value Ref Range    Glucose 87 70 - 100 mg/dL    BUN 26 (H) 9 - 23 mg/dL    Creatinine 0.96 0.60 - 1.30 mg/dL    Sodium 137 132 - 146 mmol/L    Potassium 3.7 3.5 - 5.5 mmol/L    Chloride 102 99 - 109 mmol/L    CO2 29.0 20.0 - 31.0 mmol/L    Calcium 9.8 8.7 - 10.4 mg/dL    Total Protein 7.2 5.7 - 8.2 g/dL    Albumin 4.73 3.20 - 4.80 g/dL    ALT (SGPT) 47 (H) 7 - 40 U/L    AST (SGOT) 30 0 - 33 U/L    Alkaline Phosphatase 65 25 - 100 U/L    Total  "Bilirubin 0.3 0.3 - 1.2 mg/dL    eGFR Non African Amer 60 (L) >60 mL/min/1.73    Globulin 2.5 gm/dL    A/G Ratio 1.9 1.5 - 2.5 g/dL    BUN/Creatinine Ratio 27.1 (H) 7.0 - 25.0    Anion Gap 6.0 3.0 - 11.0 mmol/L   Lipase   Result Value Ref Range    Lipase 69 (H) 6 - 51 U/L   BNP   Result Value Ref Range    BNP <2.0 0.0 - 100.0 pg/mL   CBC Auto Differential   Result Value Ref Range    WBC 6.83 3.50 - 10.80 10*3/mm3    RBC 4.77 3.89 - 5.14 10*6/mm3    Hemoglobin 13.6 11.5 - 15.5 g/dL    Hematocrit 41.2 34.5 - 44.0 %    MCV 86.4 80.0 - 99.0 fL    MCH 28.5 27.0 - 31.0 pg    MCHC 33.0 32.0 - 36.0 g/dL    RDW 12.6 11.3 - 14.5 %    RDW-SD 40.0 37.0 - 54.0 fl    MPV 9.4 6.0 - 12.0 fL    Platelets 290 150 - 450 10*3/mm3    Neutrophil % 48.8 41.0 - 71.0 %    Lymphocyte % 40.8 24.0 - 44.0 %    Monocyte % 7.0 0.0 - 12.0 %    Eosinophil % 3.1 (H) 0.0 - 3.0 %    Basophil % 0.3 0.0 - 1.0 %    Immature Grans % 0.3 0.0 - 0.6 %    Neutrophils, Absolute 3.33 1.50 - 8.30 10*3/mm3    Lymphocytes, Absolute 2.79 0.60 - 4.80 10*3/mm3    Monocytes, Absolute 0.48 0.00 - 1.00 10*3/mm3    Eosinophils, Absolute 0.21 0.00 - 0.30 10*3/mm3    Basophils, Absolute 0.02 0.00 - 0.20 10*3/mm3    Immature Grans, Absolute 0.02 0.00 - 0.03 10*3/mm3   POC Troponin, Rapid   Result Value Ref Range    Troponin I 0.00 0.00 - 0.07 ng/mL   POC Troponin, Rapid   Result Value Ref Range    Troponin I 0.00 0.00 - 0.07 ng/mL   Light Blue Top   Result Value Ref Range    Extra Tube hold for add-on    Green Top (Gel)   Result Value Ref Range    Extra Tube Hold for add-ons.    Lavender Top   Result Value Ref Range    Extra Tube hold for add-on    Gold Top - SST   Result Value Ref Range    Extra Tube Hold for add-ons.             Vitals:    08/14/19 0750   BP: 128/78   Pulse: 88   Resp: 16   SpO2: 95%   Weight: 93.9 kg (207 lb)   Height: 157.5 cm (62\")   PainSc: 0-No pain         Physical Exam   Constitutional: She is oriented to person, place, and time. Vital signs are " normal. She appears well-developed and well-nourished. She is active and cooperative. She does not appear ill. No distress. She is obese.  HENT:   Head: Normocephalic and atraumatic.   Right Ear: Hearing, tympanic membrane, external ear and ear canal normal.   Left Ear: Hearing, tympanic membrane, external ear and ear canal normal.   Nose: Nose normal. Right sinus exhibits no maxillary sinus tenderness and no frontal sinus tenderness. Left sinus exhibits no maxillary sinus tenderness and no frontal sinus tenderness.   Mouth/Throat: Uvula is midline, oropharynx is clear and moist and mucous membranes are normal.   Eyes: Conjunctivae, EOM and lids are normal.   Neck: Trachea normal, normal range of motion and phonation normal. No thyroid mass and no thyromegaly present.   Cardiovascular: Normal rate, regular rhythm and normal heart sounds.   Pulmonary/Chest: Effort normal and breath sounds normal.   Abdominal: Soft. Normal appearance and bowel sounds are normal. She exhibits no distension. There is no tenderness. There is no rigidity, no guarding and no CVA tenderness.   Musculoskeletal: Normal range of motion. She exhibits no edema.   Lymphadenopathy:     She has no cervical adenopathy.        Right cervical: No superficial cervical adenopathy present.       Left cervical: No superficial cervical adenopathy present.   Neurological: She is alert and oriented to person, place, and time. She has normal strength and normal reflexes. Coordination and gait normal.   CN grossly intact   Skin: Skin is warm and intact. No rash noted. She is not diaphoretic. No cyanosis or erythema. Nails show no clubbing.   Psychiatric: She has a normal mood and affect. Her speech is normal and behavior is normal. Judgment and thought content normal. She is not actively hallucinating. Cognition and memory are normal. She is attentive.   Vitals reviewed.          Counseling provided on diet and nutrition, exercise, supplements, insomnia and  hepatic steatosis.    Yamila was seen today for annual exam.    Diagnoses and all orders for this visit:    Physical exam, annual  -     CBC Auto Differential; Future  -     TSH Rfx On Abnormal To Free T4; Future    Single kidney  -right kidney  -born with only one kidney    Depression, unspecified depression type  -stable mood, doing better on pristiq    Anxiety  -episodic, mostly at night  -trial of hydroxyzine  -     hydrOXYzine (ATARAX) 25 MG tablet; Take 1 tablet by mouth 3 (Three) Times a Day As Needed for Anxiety.    Primary insomnia  -takes amitriptyline 10 mg QPM which helps with insomnia  -still has nights where she has difficulty falling asleep    Severe obstructive sleep apnea  -wearing CPAP nightly    Hepatic steatosis  -has recently gained 19 lbs  -previous CT guided biopsy showed fibrosis stage 3-4  -taking vitamin E, metformin and pravastatin 20 mg QD  -repeat labs today  -     Comprehensive Metabolic Panel; Future  -     POTTER Fibrosure; Future    Obesity (BMI 30-39.9)  -has gained 19 lbs since April  -discontinued trulicity    Vitamin D deficiency  -     Vitamin D 25 Hydroxy; Future    Mixed hyperlipidemia  -on pravastatin 20 mg QPM  -     Lipid Panel; Future    Iron deficiency  -     Iron; Future    Osteoarthritis  -worsening pain in back, hands  -unable to take tylenol or ibuprofen, trial of tumeric supplement  -may be due to elevated sugars?    Impaired glucose tolerance  -     Hemoglobin A1c; Future         Daly Archibald PA-C   8/14/2019   8:43 AM

## 2019-08-14 NOTE — PATIENT INSTRUCTIONS
"-ask for shingrix vaccine, second dose    General Health Maintenance:  -Yearly dermatology exam  -Yearly eye exam if you are diabetic, otherwise every 2 years for patients without diabetes  -Dental exams/cleanings at least twice a year  -Staying current on your required colonoscopy, starts at age 50 unless there are other indications prior  -Regular pap smears (at least every 1-3 years until age 65)  -Yearly pelvic and breast exams  -Yearly mammograms starting at age 40    Vaccines:  -Talk to pharmacist about shingrix shot  -Obtain flu shot when available  -Health department is recommending Hepatitis A vaccine    The largest impact you can have on your own health is getting the proper nutrition, exercise and maintaining an overall healthy body weight.  Proper nutrition involves eating lots of vegetables, fruit, minimal lean meat and avoiding processed foods and limiting refined sugars, carbohydrates and starches (\"the white stuff\").  Drinking at least 8 cups of water daily.  Walking at least 30 minutes a day and if possible, increasing this to a more cardiovascular aerobic exercise.  Maintaining a healthy body weight.      If you smoke or use tobacco products, quitting is extremely important and I am happy to discuss options with you regarding how to do this and what's available to assist you.    If you consume alcohol, limit your alcohol intake to 2 drinks a day for men and 1 drink a day for woman.    It is also important to make sure to keep regular appointments and have all general health maintenance items completed in a timely manner.      Thank you for entrusting me with your care.  It is a pleasure and honor to participate in your health.    "

## 2019-08-17 LAB
A2 MACROGLOB SERPL-MCNC: 149 MG/DL (ref 110–276)
ALT SERPL W P-5'-P-CCNC: 37 IU/L (ref 0–40)
APO A-I SERPL-MCNC: 193 MG/DL (ref 116–209)
AST SERPL W P-5'-P-CCNC: 28 IU/L (ref 0–40)
BILIRUB SERPL-MCNC: 0.2 MG/DL (ref 0–1.2)
CHOLEST SERPL-MCNC: 225 MG/DL (ref 100–199)
FIBROSIS SCORING:: ABNORMAL
FIBROSIS STAGE SERPL QL: ABNORMAL
GGT SERPL-CCNC: 45 IU/L (ref 0–60)
GLUCOSE SERPL-MCNC: 96 MG/DL (ref 65–99)
HAPTOGLOB SERPL-MCNC: 105 MG/DL (ref 34–200)
INTERPRETATIONS: (REFERENCE): ABNORMAL
LABORATORY COMMENT REPORT: ABNORMAL
LIMITATIONS: (REFERENCE): ABNORMAL
LIVER FIBR SCORE SERPL CALC.FIBROSURE: 0.05 (ref 0–0.21)
NASH GRADE (REFERENCE): ABNORMAL
NASH SCORE (REFERENCE): 0.75
NASH SCORING (REFERENCE): ABNORMAL
STEATOSIS GRADE (REFERENCE): ABNORMAL
STEATOSIS GRADING (REFERENCE): ABNORMAL
STEATOSIS SCORE (REFERENCE): 0.7 (ref 0–0.3)
TRIGL SERPL-MCNC: 299 MG/DL (ref 0–149)
WEIGHT: (REFERENCE): 207 LBS

## 2019-08-26 DIAGNOSIS — E78.2 MIXED HYPERLIPIDEMIA: Primary | ICD-10-CM

## 2019-08-26 RX ORDER — PRAVASTATIN SODIUM 40 MG
40 TABLET ORAL NIGHTLY
Qty: 90 TABLET | Refills: 1 | Status: SHIPPED | OUTPATIENT
Start: 2019-08-26 | End: 2020-02-19 | Stop reason: SDUPTHER

## 2019-09-20 ENCOUNTER — OFFICE VISIT (OUTPATIENT)
Dept: SLEEP MEDICINE | Facility: HOSPITAL | Age: 59
End: 2019-09-20

## 2019-09-20 VITALS
HEART RATE: 96 BPM | BODY MASS INDEX: 38.94 KG/M2 | WEIGHT: 211.6 LBS | HEIGHT: 62 IN | SYSTOLIC BLOOD PRESSURE: 131 MMHG | OXYGEN SATURATION: 94 % | DIASTOLIC BLOOD PRESSURE: 74 MMHG

## 2019-09-20 DIAGNOSIS — G47.33 OSA (OBSTRUCTIVE SLEEP APNEA): Primary | ICD-10-CM

## 2019-09-20 PROCEDURE — 99212 OFFICE O/P EST SF 10 MIN: CPT | Performed by: NURSE PRACTITIONER

## 2019-09-20 NOTE — PROGRESS NOTES
Chief Complaint:   Chief Complaint   Patient presents with   • Follow-up       HPI:    Yamila Keller is a 58 y.o. female here for follow-up of sleep apnea.  Patient was last seen 9/20/2018.  Patient states she is doing well with CPAP therapy.  Patient sleeps 6 to 8 hours nightly and is rested upon awakening.  Patient is sometimes sleepy during the day but does work full-time and also cares full-time for her granddaughter.  Patient has an Cranston score of 10/24.  Patient is happy with CPAP and wishes to continue.        Current medications are:   Current Outpatient Medications:   •  amitriptyline (ELAVIL) 10 MG tablet, Take 1 tablet by mouth Every Night., Disp: 90 tablet, Rfl: 0  •  cholecalciferol (VITAMIN D3) 1000 units tablet, Take 2,000 Units by mouth Daily., Disp: , Rfl:   •  clobetasol (TEMOVATE) 0.05 % external solution, Apply twice daily to itchy, scaly areas on scalp., Disp: 25 mL, Rfl: 2  •  clotrimazole-betamethasone (LOTRISONE) 1-0.05 % cream, Apply topically three times daily as needed to affected areas., Disp: 15 g, Rfl: 2  •  desvenlafaxine (PRISTIQ) 100 MG 24 hr tablet, Take 1 tablet by mouth Daily., Disp: 90 tablet, Rfl: 0  •  estradiol (EVAMIST) 1.53 MG/SPRAY transdermal spray, Place 1 spray on the skin as directed by provider 2 (Two) Times a Day as directed., Disp: 8.1 mL, Rfl: 12  •  EVAMIST 1.53 MG/SPRAY transdermal spray, APPLY 1 PUMP UTD BID, Disp: , Rfl: 11  •  hydrocortisone 2.5 % ointment, Apply daily to area on buttocks twice a day until resolved., Disp: 28 g, Rfl: 3  •  hydrOXYzine (ATARAX) 25 MG tablet, Take 1 tablet by mouth 3 (Three) Times a Day As Needed for Anxiety., Disp: 30 tablet, Rfl: 0  •  ketoconazole (NIZORAL) 2 % shampoo, Use as a shampoo to scalp and face 3 times a week. Leave in for 5 minutes and rinse., Disp: 120 mL, Rfl: 3  •  Magnesium 250 MG tablet, Take 1 tablet by mouth Daily., Disp: , Rfl:   •  metFORMIN ER (GLUCOPHAGE-XR) 500 MG 24 hr tablet, Take 2 tablets by  mouth 2 (Two) Times a Day., Disp: 360 tablet, Rfl: 1  •  pravastatin (PRAVACHOL) 40 MG tablet, Take 1 tablet by mouth Every Night., Disp: 90 tablet, Rfl: 1  •  Probiotic Product (PROBIOTIC DAILY PO), Take  by mouth Daily., Disp: , Rfl:   •  Vitamin E 400 units tablet, Take 800 Units by mouth Daily., Disp: , Rfl: .      The patient's relevant past medical, surgical, family and social history were reviewed and updated in Epic as appropriate.       Review of Systems   Eyes: Positive for visual disturbance.   Respiratory: Positive for apnea.    Musculoskeletal: Positive for arthralgias.   Allergic/Immunologic: Positive for environmental allergies.   Neurological: Positive for headaches.   Psychiatric/Behavioral: Positive for dysphoric mood and sleep disturbance.   All other systems reviewed and are negative.        Objective:    Physical Exam   Constitutional: She is oriented to person, place, and time. She appears well-developed and well-nourished.   HENT:   Head: Normocephalic and atraumatic.   Mouth/Throat: Oropharynx is clear and moist.   Class 2 airway   Eyes: Conjunctivae are normal.   Neck: Neck supple.   Cardiovascular: Normal rate and regular rhythm.   Pulmonary/Chest: Effort normal and breath sounds normal.   Neurological: She is alert and oriented to person, place, and time.   Skin: Skin is warm and dry.   Psychiatric: She has a normal mood and affect. Her behavior is normal. Judgment and thought content normal.   Nursing note and vitals reviewed.  178/1 80 days of use.  Greater than 4-hour use 95%.  9% pressure 10.  AHI 2.7.  Download reviewed with patient.      ASSESSMENT/PLAN    Yamila was seen today for follow-up.    Diagnoses and all orders for this visit:    NGOC (obstructive sleep apnea)  -     CPAP Therapy            1. Counseled patient regarding multimodal approach with healthy nutrition, healthy sleep, regular physical activity, social activities, counseling, and medications. Encouraged to practice  lateral sleep position. Avoid alcohol and sedatives close to bedtime.  2. Refill supplies x1 year.  Return to clinic 1 year sooner symptoms warrant.    I have reviewed the results of my evaluation and impression and discussed my recommendations in detail with the patient.      Signed by  KAREN Nielsen    September 20, 2019      CC: Daly Archibald PA-C          No ref. provider found

## 2019-09-29 DIAGNOSIS — F32.1 CURRENT MODERATE EPISODE OF MAJOR DEPRESSIVE DISORDER, UNSPECIFIED WHETHER RECURRENT (HCC): ICD-10-CM

## 2019-09-29 DIAGNOSIS — G43.019 INTRACTABLE MIGRAINE WITHOUT AURA AND WITHOUT STATUS MIGRAINOSUS: ICD-10-CM

## 2019-09-30 RX ORDER — DESVENLAFAXINE 100 MG/1
100 TABLET, EXTENDED RELEASE ORAL DAILY
Qty: 90 TABLET | Refills: 1 | Status: SHIPPED | OUTPATIENT
Start: 2019-09-30 | End: 2020-03-30 | Stop reason: SDUPTHER

## 2019-09-30 RX ORDER — AMITRIPTYLINE HYDROCHLORIDE 10 MG/1
10 TABLET, FILM COATED ORAL NIGHTLY
Qty: 90 TABLET | Refills: 1 | Status: SHIPPED | OUTPATIENT
Start: 2019-09-30 | End: 2020-02-19 | Stop reason: SDUPTHER

## 2019-10-28 RX ORDER — METFORMIN HYDROCHLORIDE 500 MG/1
1000 TABLET, EXTENDED RELEASE ORAL 2 TIMES DAILY
Qty: 360 TABLET | Refills: 1 | Status: SHIPPED | OUTPATIENT
Start: 2019-10-28 | End: 2020-02-19 | Stop reason: SDUPTHER

## 2020-02-19 ENCOUNTER — OFFICE VISIT (OUTPATIENT)
Dept: FAMILY MEDICINE CLINIC | Facility: CLINIC | Age: 60
End: 2020-02-19

## 2020-02-19 VITALS
WEIGHT: 220 LBS | OXYGEN SATURATION: 99 % | DIASTOLIC BLOOD PRESSURE: 82 MMHG | SYSTOLIC BLOOD PRESSURE: 118 MMHG | HEIGHT: 62 IN | BODY MASS INDEX: 40.48 KG/M2 | HEART RATE: 80 BPM

## 2020-02-19 DIAGNOSIS — Z90.5 SINGLE KIDNEY: ICD-10-CM

## 2020-02-19 DIAGNOSIS — F41.9 ANXIETY: ICD-10-CM

## 2020-02-19 DIAGNOSIS — G47.33 SEVERE OBSTRUCTIVE SLEEP APNEA: ICD-10-CM

## 2020-02-19 DIAGNOSIS — G43.019 INTRACTABLE MIGRAINE WITHOUT AURA AND WITHOUT STATUS MIGRAINOSUS: ICD-10-CM

## 2020-02-19 DIAGNOSIS — E78.2 MIXED HYPERLIPIDEMIA: ICD-10-CM

## 2020-02-19 DIAGNOSIS — F33.42 RECURRENT MAJOR DEPRESSIVE DISORDER, IN FULL REMISSION (HCC): ICD-10-CM

## 2020-02-19 DIAGNOSIS — E66.01 CLASS 3 SEVERE OBESITY DUE TO EXCESS CALORIES WITH SERIOUS COMORBIDITY AND BODY MASS INDEX (BMI) OF 40.0 TO 44.9 IN ADULT (HCC): Primary | ICD-10-CM

## 2020-02-19 DIAGNOSIS — K76.0 HEPATIC STEATOSIS: ICD-10-CM

## 2020-02-19 DIAGNOSIS — F51.01 PRIMARY INSOMNIA: ICD-10-CM

## 2020-02-19 DIAGNOSIS — R73.02 IMPAIRED GLUCOSE TOLERANCE: ICD-10-CM

## 2020-02-19 DIAGNOSIS — M25.552 LEFT HIP PAIN: ICD-10-CM

## 2020-02-19 PROBLEM — E66.813 CLASS 3 SEVERE OBESITY DUE TO EXCESS CALORIES WITH SERIOUS COMORBIDITY AND BODY MASS INDEX (BMI) OF 40.0 TO 44.9 IN ADULT: Status: ACTIVE | Noted: 2020-02-19

## 2020-02-19 PROCEDURE — 99214 OFFICE O/P EST MOD 30 MIN: CPT | Performed by: PHYSICIAN ASSISTANT

## 2020-02-19 RX ORDER — PROPRANOLOL HYDROCHLORIDE 40 MG/1
40 TABLET ORAL DAILY
Qty: 30 TABLET | Refills: 3 | Status: SHIPPED | OUTPATIENT
Start: 2020-02-19 | End: 2020-06-28 | Stop reason: SDUPTHER

## 2020-02-19 RX ORDER — PRAVASTATIN SODIUM 40 MG
40 TABLET ORAL NIGHTLY
Qty: 90 TABLET | Refills: 1 | Status: SHIPPED | OUTPATIENT
Start: 2020-02-19 | End: 2020-10-18 | Stop reason: SDUPTHER

## 2020-02-19 RX ORDER — METFORMIN HYDROCHLORIDE 500 MG/1
1000 TABLET, EXTENDED RELEASE ORAL 2 TIMES DAILY
Qty: 360 TABLET | Refills: 1 | Status: SHIPPED | OUTPATIENT
Start: 2020-02-19 | End: 2020-09-01 | Stop reason: SDUPTHER

## 2020-02-19 RX ORDER — HYDROXYZINE HYDROCHLORIDE 25 MG/1
25 TABLET, FILM COATED ORAL 3 TIMES DAILY PRN
Qty: 30 TABLET | Refills: 0 | Status: SHIPPED | OUTPATIENT
Start: 2020-02-19 | End: 2021-02-25

## 2020-02-19 RX ORDER — AMITRIPTYLINE HYDROCHLORIDE 10 MG/1
10 TABLET, FILM COATED ORAL NIGHTLY
Qty: 90 TABLET | Refills: 1 | Status: SHIPPED | OUTPATIENT
Start: 2020-02-19 | End: 2020-09-01 | Stop reason: SDUPTHER

## 2020-03-30 DIAGNOSIS — F32.1 CURRENT MODERATE EPISODE OF MAJOR DEPRESSIVE DISORDER, UNSPECIFIED WHETHER RECURRENT (HCC): ICD-10-CM

## 2020-03-30 RX ORDER — DESVENLAFAXINE 100 MG/1
100 TABLET, EXTENDED RELEASE ORAL DAILY
Qty: 90 TABLET | Refills: 1 | Status: SHIPPED | OUTPATIENT
Start: 2020-03-30 | End: 2020-09-01 | Stop reason: SDUPTHER

## 2020-05-28 ENCOUNTER — LAB (OUTPATIENT)
Dept: LAB | Facility: HOSPITAL | Age: 60
End: 2020-05-28

## 2020-05-28 DIAGNOSIS — E78.2 MIXED HYPERLIPIDEMIA: ICD-10-CM

## 2020-05-28 DIAGNOSIS — R73.02 IMPAIRED GLUCOSE TOLERANCE: ICD-10-CM

## 2020-05-28 DIAGNOSIS — K76.0 HEPATIC STEATOSIS: ICD-10-CM

## 2020-05-28 LAB — HBA1C MFR BLD: 5.78 % (ref 4.8–5.6)

## 2020-05-28 PROCEDURE — 83036 HEMOGLOBIN GLYCOSYLATED A1C: CPT

## 2020-05-28 PROCEDURE — 80053 COMPREHEN METABOLIC PANEL: CPT

## 2020-05-29 LAB
ALBUMIN SERPL-MCNC: 4.8 G/DL (ref 3.5–5.2)
ALBUMIN/GLOB SERPL: 1.9 G/DL
ALP SERPL-CCNC: 61 U/L (ref 39–117)
ALT SERPL W P-5'-P-CCNC: 51 U/L (ref 1–33)
ANION GAP SERPL CALCULATED.3IONS-SCNC: 10.9 MMOL/L (ref 5–15)
AST SERPL-CCNC: 42 U/L (ref 1–32)
BILIRUB SERPL-MCNC: 0.3 MG/DL (ref 0.2–1.2)
BUN BLD-MCNC: 23 MG/DL (ref 6–20)
BUN/CREAT SERPL: 23 (ref 7–25)
CALCIUM SPEC-SCNC: 9.8 MG/DL (ref 8.6–10.5)
CHLORIDE SERPL-SCNC: 101 MMOL/L (ref 98–107)
CO2 SERPL-SCNC: 26.1 MMOL/L (ref 22–29)
CREAT BLD-MCNC: 1 MG/DL (ref 0.57–1)
GFR SERPL CREATININE-BSD FRML MDRD: 57 ML/MIN/1.73
GLOBULIN UR ELPH-MCNC: 2.5 GM/DL
GLUCOSE BLD-MCNC: 113 MG/DL (ref 65–99)
POTASSIUM BLD-SCNC: 4.5 MMOL/L (ref 3.5–5.2)
PROT SERPL-MCNC: 7.3 G/DL (ref 6–8.5)
SODIUM BLD-SCNC: 138 MMOL/L (ref 136–145)

## 2020-06-01 ENCOUNTER — TRANSCRIBE ORDERS (OUTPATIENT)
Dept: ADMINISTRATIVE | Facility: HOSPITAL | Age: 60
End: 2020-06-01

## 2020-06-01 DIAGNOSIS — Z12.31 VISIT FOR SCREENING MAMMOGRAM: Primary | ICD-10-CM

## 2020-06-28 DIAGNOSIS — G43.019 INTRACTABLE MIGRAINE WITHOUT AURA AND WITHOUT STATUS MIGRAINOSUS: ICD-10-CM

## 2020-06-29 RX ORDER — PROPRANOLOL HYDROCHLORIDE 40 MG/1
40 TABLET ORAL DAILY
Qty: 30 TABLET | Refills: 2 | Status: SHIPPED | OUTPATIENT
Start: 2020-06-29 | End: 2020-09-01

## 2020-07-14 ENCOUNTER — HOSPITAL ENCOUNTER (OUTPATIENT)
Dept: MAMMOGRAPHY | Facility: HOSPITAL | Age: 60
Discharge: HOME | End: 2020-07-14
Admitting: SURGERY

## 2020-07-14 DIAGNOSIS — Z12.31 VISIT FOR SCREENING MAMMOGRAM: ICD-10-CM

## 2020-07-14 PROCEDURE — 77067 SCR MAMMO BI INCL CAD: CPT | Performed by: RADIOLOGY

## 2020-07-14 PROCEDURE — 77063 BREAST TOMOSYNTHESIS BI: CPT

## 2020-07-14 PROCEDURE — 77063 BREAST TOMOSYNTHESIS BI: CPT | Performed by: RADIOLOGY

## 2020-07-14 PROCEDURE — 77067 SCR MAMMO BI INCL CAD: CPT

## 2020-08-12 ENCOUNTER — OFFICE VISIT (OUTPATIENT)
Dept: ORTHOPEDIC SURGERY | Facility: CLINIC | Age: 60
End: 2020-08-12

## 2020-08-12 VITALS — OXYGEN SATURATION: 97 % | WEIGHT: 213 LBS | HEART RATE: 82 BPM | BODY MASS INDEX: 38.96 KG/M2

## 2020-08-12 DIAGNOSIS — I87.8 VENOUS STASIS: ICD-10-CM

## 2020-08-12 DIAGNOSIS — M20.21 HALLUX RIGIDUS OF RIGHT FOOT: ICD-10-CM

## 2020-08-12 DIAGNOSIS — M79.672 LEFT FOOT PAIN: Primary | ICD-10-CM

## 2020-08-12 DIAGNOSIS — M20.22 ACQUIRED HALLUX RIGIDUS OF LEFT FOOT: ICD-10-CM

## 2020-08-12 PROCEDURE — 99213 OFFICE O/P EST LOW 20 MIN: CPT | Performed by: ORTHOPAEDIC SURGERY

## 2020-08-12 NOTE — PROGRESS NOTES
NEW PATIENT    Patient: Yamila Keller  : 1960    Primary Care Provider: Daly Archibald PA-C    Requesting Provider: As above    Pain of the Left Foot      History    Chief Complaint: Left foot pain    History of Present Illness: Ms. Bennett returns with a new problem.  She has a 2 to 3-month history of left midfoot pain.  It started spontaneously.  It has waxed and waned.  She has not tried any anti-inflammatory medicine because of her single kidney, but she does have some compounded cream that a hand surgeon recently gave her for her thumbs, I advised her to try it on her foot.  She reports the pain is currently much improved.  It occasionally is sharp, it is over the first TMT joint.  She notes some stiffness in the first metatarsal phalangeal joint similar to her right hallux rigidus but does not have pain in the left first MTPJ.  She has a transverse healed scar on the plantar left foot just proximal to the first metatarsal head, from a lawnmower injury as a child.    Current Outpatient Medications on File Prior to Visit   Medication Sig Dispense Refill   • amitriptyline (ELAVIL) 10 MG tablet Take 1 tablet by mouth Every Night. 90 tablet 1   • cholecalciferol (VITAMIN D3) 1000 units tablet Take 2,000 Units by mouth Daily.     • clobetasol (TEMOVATE) 0.05 % external solution Apply twice daily to itchy, scaly areas on scalp. 50 mL 11   • clotrimazole-betamethasone (LOTRISONE) 1-0.05 % cream Apply topically three times daily as needed to affected areas. 15 g 2   • desvenlafaxine (Pristiq) 100 MG 24 hr tablet Take 1 tablet by mouth Daily. 90 tablet 1   • estradiol (EVAMIST) 1.53 MG/SPRAY transdermal spray Place 1 spray on the skin as directed by provider 2 (Two) Times a Day as directed. 8.1 mL 12   • hydrocortisone 2.5 % cream Apply every evening to affected areas on abdomen until resolved. 28.35 g 11   • hydrOXYzine (ATARAX) 25 MG tablet Take 1 tablet by mouth 3 (Three) Times a Day As Needed  for Anxiety. 30 tablet 0   • ketoconazole (NIZORAL) 2 % cream Apply every morning to underarms until resolved. 30 g 11   • ketoconazole (NIZORAL) 2 % shampoo Use as a shampoo to scalp and face 3 times a week. Leave in for 5 minutes and rinse. 120 mL 11   • Magnesium 250 MG tablet Take 1 tablet by mouth Daily.     • metFORMIN ER (GLUCOPHAGE-XR) 500 MG 24 hr tablet Take 2 tablets by mouth 2 (Two) Times a Day. 360 tablet 1   • pravastatin (PRAVACHOL) 40 MG tablet Take 1 tablet by mouth Every Night. 90 tablet 1   • Probiotic Product (PROBIOTIC DAILY PO) Take  by mouth Daily.     • propranolol (INDERAL) 40 MG tablet Take 1 tablet by mouth Daily. 30 tablet 2   • Vitamin E 400 units tablet Take 800 Units by mouth Daily.     • [DISCONTINUED] clobetasol (TEMOVATE) 0.05 % external solution Apply twice daily to itchy, scaly areas on scalp. 25 mL 2   • [DISCONTINUED] EVAMIST 1.53 MG/SPRAY transdermal spray APPLY 1 PUMP UTD BID  11   • [DISCONTINUED] hydrocortisone 2.5 % ointment Apply daily to area on buttocks twice a day until resolved. 28 g 3   • [DISCONTINUED] ketoconazole (NIZORAL) 2 % shampoo Use as a shampoo to scalp and face 3 times a week. Leave in for 5 minutes and rinse. 120 mL 3     No current facility-administered medications on file prior to visit.       Allergies   Allergen Reactions   • Celexa [Citalopram] Other (See Comments)     Jittery       Past Medical History:   Diagnosis Date   • Arthritis    • Carpal tunnel syndrome 04/09/2012   • Degenerative joint disease    • Depression    • Depression    • Diabetes mellitus, type 2 (CMS/HCC)    • Elevated liver enzymes    • Fatty liver    • Kidney disorder     one kidney   • Palpitations    • Polycystic ovaries    • Psoriasis    • PVC (premature ventricular contraction)     occasional pvc's   • Sleep apnea     cpap at home     Past Surgical History:   Procedure Laterality Date   • BREAST BIOPSY     • BREAST CYST EXCISION Right 2013    BENIGN, INTRADUCTAL PAPILLOMA  (PER PT)   •  SECTION      x 3   • CHOLECYSTECTOMY  2013   • COLONOSCOPY     • HYSTERECTOMY  2006    complete   • JOINT REPLACEMENT Left 2011    left knee   • OOPHORECTOMY     • TUBAL ABDOMINAL LIGATION       Family History   Problem Relation Age of Onset   • Diabetes Mother    • Lymphoma Mother    • Diabetes Paternal Grandmother    • Heart disease Paternal Grandmother    • Hypertension Brother    • Hypertension Brother    • Breast cancer Neg Hx    • Ovarian cancer Neg Hx       Social History     Socioeconomic History   • Marital status:      Spouse name: Not on file   • Number of children: Not on file   • Years of education: Not on file   • Highest education level: Not on file   Occupational History   • Occupation: RN   Tobacco Use   • Smoking status: Never Smoker   • Smokeless tobacco: Never Used   Substance and Sexual Activity   • Alcohol use: No   • Drug use: No   • Sexual activity: Yes     Partners: Male     Comment:         Review of Systems   Constitutional: Negative.    HENT: Negative.    Eyes: Negative.    Respiratory: Negative.    Cardiovascular: Negative.    Gastrointestinal: Negative.    Endocrine: Negative.    Genitourinary: Negative.    Musculoskeletal: Positive for arthralgias.   Skin: Negative.    Allergic/Immunologic: Negative.    Neurological: Negative.    Hematological: Negative.    Psychiatric/Behavioral: Negative.        The following portions of the patient's history were reviewed and updated as appropriate: allergies, current medications, past family history, past medical history, past social history, past surgical history and problem list.    Physical Exam:   Pulse 82   Wt 96.6 kg (213 lb)   SpO2 97%   BMI 38.96 kg/m²   GENERAL: Body habitus: morbidly obese    Lower extremity edema: Right: 1+ pitting; Left: 1+ pitting    Varicose veins:  Right: mild; Left: mild    Gait: normal     Mental Status:  awake and alert; oriented to person, place, and time    Voice:   clear  SKIN:  Lower extremity: Normal and warm and dry    Hair Growth(lower extremity):  Right:normal; Left:  normal  NAILS: Toenails: normal  HEENT: Head: Normocephalic, atraumatic,  without obvious abnormality.  eye: normal external eye, no icterus  ears:normal external ears  PULM:  Repiratory effort normal  CV:  Dorsalis Pedis:  Right: 2+; Left:2+    Posterior Tibial: Right:2+; Left:2+    Capillary Refill:  Brisk  MSK:  Hand:sensation intact      Tibia:   Left:  non tender      Ankle:   Left:  non tender      Foot:   Left:  Not really tender to palpation today, moderately stiff in the first MTPJ but nonpainful, she reports that where the symptoms were were over the medial first MTPJ, slight pain here with squeeze of the forefoot      NEURO:      [place] 5.07 monofilament test: normal    Lower extremity sensation: intact       Motor Function: all 5/5         Medical Decision Making    Data Review:   ordered and reviewed x-rays today    Assessment and Plan/ Diagnosis/Treatment options:   1. Left foot pain  I think the pain is some arthritis in the first TMT, is intermittent and that is consistent.  She has good orthotics.  We discussed using topical Voltaren gel which I think is reasonably safe.  There is a tiny old ossicle medial to the cuneiform, and when she had a lawnmower injury she might of had an injury to that joint.  It is slightly narrowed, compared with the x-ray of her right foot.  She has similar hallux rigidus x-ray on the left, but it is not symptomatic.  I will be happy to see her anytime  - XR Foot 2 View Left    2. Hallux rigidus of right foot  As above    3. Acquired hallux rigidus of left foot  As above    4. Venous stasis  I really think symptoms of stiffness and aching in her legs would be benefited by support stockings.  I explained edema and venous stasis to the patient, and the often hereditary nature of the problem, and the contribution of weight, trauma, etc. I explained how they  cause edema (due to gravity, etc) I explained how they can lead to ulceration.  I explained how they can cause pain, stiffness, aching, cramping, etc. I explained that it is a very very common problem, so common that even [employer] markets compression stockings.  I recommend wearing support stockings (compression stockings) daily, we discussed what type and where to find them                  Radiology Ordered []  Radiology Reports Reviewed []      Radiology Images Reviewed []   Labs Reviewed []    Labs Ordered []   PCP Records Reviewed []    Provider Records Reviewed []    ER Records Reviewed []    Hospital Records Reviewed []    History Obtained From Family []    Phone conversation with Provider []    Records Requested []

## 2020-09-01 ENCOUNTER — OFFICE VISIT (OUTPATIENT)
Dept: FAMILY MEDICINE CLINIC | Facility: CLINIC | Age: 60
End: 2020-09-01

## 2020-09-01 ENCOUNTER — LAB (OUTPATIENT)
Dept: LAB | Facility: HOSPITAL | Age: 60
End: 2020-09-01

## 2020-09-01 VITALS
HEIGHT: 62 IN | OXYGEN SATURATION: 98 % | HEART RATE: 98 BPM | SYSTOLIC BLOOD PRESSURE: 124 MMHG | DIASTOLIC BLOOD PRESSURE: 80 MMHG | BODY MASS INDEX: 40.89 KG/M2 | WEIGHT: 222.2 LBS

## 2020-09-01 DIAGNOSIS — G47.33 SEVERE OBSTRUCTIVE SLEEP APNEA: ICD-10-CM

## 2020-09-01 DIAGNOSIS — F41.9 ANXIETY: ICD-10-CM

## 2020-09-01 DIAGNOSIS — Z00.00 PHYSICAL EXAM, ANNUAL: Primary | ICD-10-CM

## 2020-09-01 DIAGNOSIS — Z13.0 SCREENING FOR DEFICIENCY ANEMIA: ICD-10-CM

## 2020-09-01 DIAGNOSIS — F51.01 PRIMARY INSOMNIA: ICD-10-CM

## 2020-09-01 DIAGNOSIS — Z90.5 SINGLE KIDNEY: ICD-10-CM

## 2020-09-01 DIAGNOSIS — M15.9 PRIMARY OSTEOARTHRITIS INVOLVING MULTIPLE JOINTS: ICD-10-CM

## 2020-09-01 DIAGNOSIS — K76.0 HEPATIC STEATOSIS: ICD-10-CM

## 2020-09-01 DIAGNOSIS — R73.02 IMPAIRED GLUCOSE TOLERANCE: ICD-10-CM

## 2020-09-01 DIAGNOSIS — G43.019 INTRACTABLE MIGRAINE WITHOUT AURA AND WITHOUT STATUS MIGRAINOSUS: ICD-10-CM

## 2020-09-01 DIAGNOSIS — E78.2 MIXED HYPERLIPIDEMIA: ICD-10-CM

## 2020-09-01 DIAGNOSIS — E78.1 HYPERTRIGLYCERIDEMIA: Primary | ICD-10-CM

## 2020-09-01 DIAGNOSIS — E66.01 CLASS 3 SEVERE OBESITY DUE TO EXCESS CALORIES WITH SERIOUS COMORBIDITY AND BODY MASS INDEX (BMI) OF 40.0 TO 44.9 IN ADULT (HCC): ICD-10-CM

## 2020-09-01 DIAGNOSIS — Z23 NEED FOR PROPHYLACTIC VACCINATION AGAINST STREPTOCOCCUS PNEUMONIAE (PNEUMOCOCCUS): ICD-10-CM

## 2020-09-01 DIAGNOSIS — F33.42 RECURRENT MAJOR DEPRESSIVE DISORDER, IN FULL REMISSION (HCC): ICD-10-CM

## 2020-09-01 PROBLEM — M15.0 PRIMARY OSTEOARTHRITIS INVOLVING MULTIPLE JOINTS: Status: ACTIVE | Noted: 2020-09-01

## 2020-09-01 LAB
ALBUMIN SERPL-MCNC: 4.8 G/DL (ref 3.5–5.2)
ALBUMIN UR-MCNC: <1.2 MG/DL
ALBUMIN/GLOB SERPL: 2 G/DL
ALP SERPL-CCNC: 61 U/L (ref 39–117)
ALT SERPL W P-5'-P-CCNC: 48 U/L (ref 1–33)
ANION GAP SERPL CALCULATED.3IONS-SCNC: 13.3 MMOL/L (ref 5–15)
AST SERPL-CCNC: 35 U/L (ref 1–32)
BASOPHILS # BLD AUTO: 0.05 10*3/MM3 (ref 0–0.2)
BASOPHILS NFR BLD AUTO: 0.8 % (ref 0–1.5)
BILIRUB SERPL-MCNC: 0.3 MG/DL (ref 0–1.2)
BUN SERPL-MCNC: 20 MG/DL (ref 6–20)
BUN/CREAT SERPL: 20.8 (ref 7–25)
CALCIUM SPEC-SCNC: 10.1 MG/DL (ref 8.6–10.5)
CHLORIDE SERPL-SCNC: 103 MMOL/L (ref 98–107)
CHOLEST SERPL-MCNC: 187 MG/DL (ref 0–200)
CO2 SERPL-SCNC: 23.7 MMOL/L (ref 22–29)
CREAT SERPL-MCNC: 0.96 MG/DL (ref 0.57–1)
CREAT UR-MCNC: 95.4 MG/DL
DEPRECATED RDW RBC AUTO: 38.7 FL (ref 37–54)
EOSINOPHIL # BLD AUTO: 0.11 10*3/MM3 (ref 0–0.4)
EOSINOPHIL NFR BLD AUTO: 1.7 % (ref 0.3–6.2)
ERYTHROCYTE [DISTWIDTH] IN BLOOD BY AUTOMATED COUNT: 12.5 % (ref 12.3–15.4)
GFR SERPL CREATININE-BSD FRML MDRD: 60 ML/MIN/1.73
GLOBULIN UR ELPH-MCNC: 2.4 GM/DL
GLUCOSE SERPL-MCNC: 103 MG/DL (ref 65–99)
HBA1C MFR BLD: 5.68 % (ref 4.8–5.6)
HCT VFR BLD AUTO: 39.3 % (ref 34–46.6)
HDLC SERPL-MCNC: 52 MG/DL (ref 40–60)
HGB BLD-MCNC: 13.3 G/DL (ref 12–15.9)
IMM GRANULOCYTES # BLD AUTO: 0.01 10*3/MM3 (ref 0–0.05)
IMM GRANULOCYTES NFR BLD AUTO: 0.2 % (ref 0–0.5)
LDLC SERPL CALC-MCNC: 76 MG/DL (ref 0–100)
LDLC/HDLC SERPL: 1.46 {RATIO}
LYMPHOCYTES # BLD AUTO: 2.29 10*3/MM3 (ref 0.7–3.1)
LYMPHOCYTES NFR BLD AUTO: 35.9 % (ref 19.6–45.3)
MCH RBC QN AUTO: 29.1 PG (ref 26.6–33)
MCHC RBC AUTO-ENTMCNC: 33.8 G/DL (ref 31.5–35.7)
MCV RBC AUTO: 86 FL (ref 79–97)
MICROALBUMIN/CREAT UR: NORMAL MG/G{CREAT}
MONOCYTES # BLD AUTO: 0.57 10*3/MM3 (ref 0.1–0.9)
MONOCYTES NFR BLD AUTO: 8.9 % (ref 5–12)
NEUTROPHILS NFR BLD AUTO: 3.35 10*3/MM3 (ref 1.7–7)
NEUTROPHILS NFR BLD AUTO: 52.5 % (ref 42.7–76)
NRBC BLD AUTO-RTO: 0 /100 WBC (ref 0–0.2)
PLATELET # BLD AUTO: 299 10*3/MM3 (ref 140–450)
PMV BLD AUTO: 10.1 FL (ref 6–12)
POTASSIUM SERPL-SCNC: 4.3 MMOL/L (ref 3.5–5.2)
PROT SERPL-MCNC: 7.2 G/DL (ref 6–8.5)
RBC # BLD AUTO: 4.57 10*6/MM3 (ref 3.77–5.28)
SODIUM SERPL-SCNC: 140 MMOL/L (ref 136–145)
TRIGL SERPL-MCNC: 295 MG/DL (ref 0–150)
TSH SERPL DL<=0.05 MIU/L-ACNC: 1.34 UIU/ML (ref 0.27–4.2)
VLDLC SERPL-MCNC: 59 MG/DL (ref 5–40)
WBC # BLD AUTO: 6.38 10*3/MM3 (ref 3.4–10.8)

## 2020-09-01 PROCEDURE — 90471 IMMUNIZATION ADMIN: CPT | Performed by: PHYSICIAN ASSISTANT

## 2020-09-01 PROCEDURE — 85025 COMPLETE CBC W/AUTO DIFF WBC: CPT | Performed by: PHYSICIAN ASSISTANT

## 2020-09-01 PROCEDURE — 82043 UR ALBUMIN QUANTITATIVE: CPT | Performed by: PHYSICIAN ASSISTANT

## 2020-09-01 PROCEDURE — 80053 COMPREHEN METABOLIC PANEL: CPT | Performed by: PHYSICIAN ASSISTANT

## 2020-09-01 PROCEDURE — 80061 LIPID PANEL: CPT | Performed by: PHYSICIAN ASSISTANT

## 2020-09-01 PROCEDURE — 99396 PREV VISIT EST AGE 40-64: CPT | Performed by: PHYSICIAN ASSISTANT

## 2020-09-01 PROCEDURE — 90732 PPSV23 VACC 2 YRS+ SUBQ/IM: CPT | Performed by: PHYSICIAN ASSISTANT

## 2020-09-01 PROCEDURE — 82570 ASSAY OF URINE CREATININE: CPT | Performed by: PHYSICIAN ASSISTANT

## 2020-09-01 PROCEDURE — 83036 HEMOGLOBIN GLYCOSYLATED A1C: CPT | Performed by: PHYSICIAN ASSISTANT

## 2020-09-01 PROCEDURE — 84443 ASSAY THYROID STIM HORMONE: CPT | Performed by: PHYSICIAN ASSISTANT

## 2020-09-01 RX ORDER — DESVENLAFAXINE 100 MG/1
100 TABLET, EXTENDED RELEASE ORAL DAILY
Qty: 90 TABLET | Refills: 1 | Status: SHIPPED | OUTPATIENT
Start: 2020-09-01 | End: 2021-03-28 | Stop reason: SDUPTHER

## 2020-09-01 RX ORDER — METFORMIN HYDROCHLORIDE 500 MG/1
1000 TABLET, EXTENDED RELEASE ORAL 2 TIMES DAILY
Qty: 360 TABLET | Refills: 1 | Status: SHIPPED | OUTPATIENT
Start: 2020-09-01 | End: 2021-06-04 | Stop reason: SDUPTHER

## 2020-09-01 RX ORDER — BUSPIRONE HYDROCHLORIDE 5 MG/1
5 TABLET ORAL 3 TIMES DAILY
Qty: 90 TABLET | Refills: 1 | Status: SHIPPED | OUTPATIENT
Start: 2020-09-01 | End: 2020-11-29 | Stop reason: SDUPTHER

## 2020-09-01 RX ORDER — OMEGA-3-ACID ETHYL ESTERS 1 G/1
2 CAPSULE, LIQUID FILLED ORAL 2 TIMES DAILY
Qty: 360 CAPSULE | Refills: 1 | Status: SHIPPED | OUTPATIENT
Start: 2020-09-01 | End: 2021-03-28 | Stop reason: SDUPTHER

## 2020-09-01 RX ORDER — PROPRANOLOL HCL 60 MG
60 CAPSULE, EXTENDED RELEASE 24HR ORAL DAILY
Qty: 90 CAPSULE | Refills: 1 | Status: SHIPPED | OUTPATIENT
Start: 2020-09-01 | End: 2021-02-28 | Stop reason: SDUPTHER

## 2020-09-01 RX ORDER — AMITRIPTYLINE HYDROCHLORIDE 10 MG/1
10 TABLET, FILM COATED ORAL NIGHTLY
Qty: 90 TABLET | Refills: 1 | Status: SHIPPED | OUTPATIENT
Start: 2020-09-01 | End: 2021-04-18 | Stop reason: SDUPTHER

## 2020-09-01 NOTE — PROGRESS NOTES
Patient Care Team:  Daly Archibald PA-C as PCP - General (Physician Assistant)     Chief complaint: Patient is in today for a physical     Yamilaermelinda Keller is a 59 y.o. female who presents for her yearly physical exam.     Patient presents for annual physical exam and preventative care.  She presents for management of obesity,   hyperlipidemia, POTTER, impaired glucose tolerance/diabetes, single kidney, NGOC, depression, anxiety, insomnia, and migraine.  Patient is compliant on all medications.  Aware she needs to work on weight loss and diet.  She wears her CPAP nightly.  Reports stable mood and anxiety with current medication regiment.  No issues with headaches currently.  Mammogram and colonoscopy up-to-date.  Not followed by gynecology, complete hysterectomy.  Due for pneumonia and flu vaccine.  No skin lesions or moles of concern.       Review of Systems   Constitutional: Negative for chills, diaphoresis, fatigue and fever.   HENT: Negative for congestion, ear pain, hearing loss, postnasal drip, rhinorrhea and sore throat.    Eyes: Negative for blurred vision and pain.   Respiratory: Negative for cough, shortness of breath and wheezing.    Cardiovascular: Negative for chest pain and leg swelling.   Gastrointestinal: Negative for abdominal pain, blood in stool, constipation, diarrhea, nausea, vomiting and indigestion.   Endocrine: Negative for polyuria.   Genitourinary: Negative for dysuria, flank pain and hematuria.   Musculoskeletal: Negative for arthralgias, gait problem and myalgias.   Skin: Negative for rash and skin lesions.   Neurological: Negative for dizziness and headache.   Psychiatric/Behavioral: Positive for stress. Negative for self-injury, sleep disturbance, suicidal ideas and depressed mood. The patient is not nervous/anxious.         History  Past Medical History:   Diagnosis Date   • Arthritis    • Carpal tunnel syndrome 04/09/2012   • Degenerative joint disease    • Depression    •  Depression    • Diabetes mellitus, type 2 (CMS/HCC)    • Elevated liver enzymes    • Fatty liver    • Kidney disorder     one kidney   • Palpitations    • Polycystic ovaries    • Psoriasis    • PVC (premature ventricular contraction)     occasional pvc's   • Sleep apnea     cpap at home      Past Surgical History:   Procedure Laterality Date   • BREAST BIOPSY     • BREAST CYST EXCISION Right 2013    BENIGN, INTRADUCTAL PAPILLOMA (PER PT)   •  SECTION      x 3   • CHOLECYSTECTOMY  2013   • COLONOSCOPY     • HYSTERECTOMY      complete   • JOINT REPLACEMENT Left     left knee   • OOPHORECTOMY     • TUBAL ABDOMINAL LIGATION        Allergies   Allergen Reactions   • Celexa [Citalopram] Other (See Comments)     Jittery       Family History   Problem Relation Age of Onset   • Diabetes Mother    • Lymphoma Mother    • Diabetes Paternal Grandmother    • Heart disease Paternal Grandmother    • Hypertension Brother    • Hypertension Brother    • Breast cancer Neg Hx    • Ovarian cancer Neg Hx       Social History     Socioeconomic History   • Marital status:      Spouse name: Not on file   • Number of children: Not on file   • Years of education: Not on file   • Highest education level: Not on file   Occupational History   • Occupation: RN   Tobacco Use   • Smoking status: Never Smoker   • Smokeless tobacco: Never Used   Substance and Sexual Activity   • Alcohol use: No   • Drug use: No   • Sexual activity: Yes     Partners: Male     Comment:       Current Outpatient Medications on File Prior to Visit   Medication Sig Dispense Refill   • cholecalciferol (VITAMIN D3) 1000 units tablet Take 2,000 Units by mouth Daily.     • clobetasol (TEMOVATE) 0.05 % external solution Apply twice daily to itchy, scaly areas on scalp. 50 mL 11   • clotrimazole-betamethasone (LOTRISONE) 1-0.05 % cream Apply topically three times daily as needed to affected areas. 15 g 2   • estradiol (EVAMIST) 1.53 MG/SPRAY  transdermal spray Place 1 spray on the skin as directed by provider 2 (Two) Times a Day as directed. 8.1 mL 12   • hydrocortisone 2.5 % cream Apply every evening to affected areas on abdomen until resolved. 28.35 g 11   • hydrOXYzine (ATARAX) 25 MG tablet Take 1 tablet by mouth 3 (Three) Times a Day As Needed for Anxiety. 30 tablet 0   • ketoconazole (NIZORAL) 2 % cream Apply every morning to underarms until resolved. 30 g 11   • ketoconazole (NIZORAL) 2 % shampoo Use as a shampoo to scalp and face 3 times a week. Leave in for 5 minutes and rinse. 120 mL 11   • Magnesium 250 MG tablet Take 1 tablet by mouth Daily.     • pravastatin (PRAVACHOL) 40 MG tablet Take 1 tablet by mouth Every Night. 90 tablet 1   • Probiotic Product (PROBIOTIC DAILY PO) Take  by mouth Daily.     • Vitamin E 400 units tablet Take 800 Units by mouth Daily.       No current facility-administered medications on file prior to visit.        Results for orders placed or performed in visit on 09/01/20   CBC Auto Differential   Result Value Ref Range    WBC 6.38 3.40 - 10.80 10*3/mm3    RBC 4.57 3.77 - 5.28 10*6/mm3    Hemoglobin 13.3 12.0 - 15.9 g/dL    Hematocrit 39.3 34.0 - 46.6 %    MCV 86.0 79.0 - 97.0 fL    MCH 29.1 26.6 - 33.0 pg    MCHC 33.8 31.5 - 35.7 g/dL    RDW 12.5 12.3 - 15.4 %    RDW-SD 38.7 37.0 - 54.0 fl    MPV 10.1 6.0 - 12.0 fL    Platelets 299 140 - 450 10*3/mm3    Neutrophil % 52.5 42.7 - 76.0 %    Lymphocyte % 35.9 19.6 - 45.3 %    Monocyte % 8.9 5.0 - 12.0 %    Eosinophil % 1.7 0.3 - 6.2 %    Basophil % 0.8 0.0 - 1.5 %    Immature Grans % 0.2 0.0 - 0.5 %    Neutrophils, Absolute 3.35 1.70 - 7.00 10*3/mm3    Lymphocytes, Absolute 2.29 0.70 - 3.10 10*3/mm3    Monocytes, Absolute 0.57 0.10 - 0.90 10*3/mm3    Eosinophils, Absolute 0.11 0.00 - 0.40 10*3/mm3    Basophils, Absolute 0.05 0.00 - 0.20 10*3/mm3    Immature Grans, Absolute 0.01 0.00 - 0.05 10*3/mm3    nRBC 0.0 0.0 - 0.2 /100 WBC   Comprehensive Metabolic Panel   Result  Value Ref Range    Glucose 103 (H) 65 - 99 mg/dL    BUN 20 6 - 20 mg/dL    Creatinine 0.96 0.57 - 1.00 mg/dL    Sodium 140 136 - 145 mmol/L    Potassium 4.3 3.5 - 5.2 mmol/L    Chloride 103 98 - 107 mmol/L    CO2 23.7 22.0 - 29.0 mmol/L    Calcium 10.1 8.6 - 10.5 mg/dL    Total Protein 7.2 6.0 - 8.5 g/dL    Albumin 4.80 3.50 - 5.20 g/dL    ALT (SGPT) 48 (H) 1 - 33 U/L    AST (SGOT) 35 (H) 1 - 32 U/L    Alkaline Phosphatase 61 39 - 117 U/L    Total Bilirubin 0.3 0.0 - 1.2 mg/dL    eGFR Non African Amer 60 (L) >60 mL/min/1.73    Globulin 2.4 gm/dL    A/G Ratio 2.0 g/dL    BUN/Creatinine Ratio 20.8 7.0 - 25.0    Anion Gap 13.3 5.0 - 15.0 mmol/L   TSH Rfx On Abnormal To Free T4   Result Value Ref Range    TSH 1.340 0.270 - 4.200 uIU/mL   Lipid Panel   Result Value Ref Range    Total Cholesterol 187 0 - 200 mg/dL    Triglycerides 295 (H) 0 - 150 mg/dL    HDL Cholesterol 52 40 - 60 mg/dL    LDL Cholesterol  76 0 - 100 mg/dL    VLDL Cholesterol 59 (H) 5 - 40 mg/dL    LDL/HDL Ratio 1.46    Hemoglobin A1c   Result Value Ref Range    Hemoglobin A1C 5.68 (H) 4.80 - 5.60 %   Microalbumin / Creatinine Urine Ratio - Urine, Clean Catch   Result Value Ref Range    Microalbumin/Creatinine Ratio      Creatinine, Urine 95.4 mg/dL    Microalbumin, Urine <1.2 mg/dL       Health Maintenance   Topic Date Due   • DIABETIC FOOT EXAM  03/20/2018   • INFLUENZA VACCINE  08/01/2020   • DXA SCAN  09/01/2020   • DIABETIC EYE EXAM  12/19/2020   • HEMOGLOBIN A1C  03/01/2021   • LIPID PANEL  09/01/2021   • URINE MICROALBUMIN  09/01/2021   • ANNUAL PHYSICAL  09/02/2021   • MAMMOGRAM  07/14/2022   • TDAP/TD VACCINES (2 - Td) 10/07/2023   • COLONOSCOPY  08/28/2028   • PNEUMOCOCCAL VACCINE (19-64 MEDIUM RISK)  Completed   • HEPATITIS C SCREENING  Completed   • ZOSTER VACCINE  Discontinued       Immunizations  Td/Tdap(Booster Q 10 yrs):  Completed  Flu (Yearly):  encouraged when available  Pneumovax (1 yr after Prevnar):  Prescribed  Owfrexm25 (1 yr  after Pneumo):  N/A  Hep B:  Completed  Shringrix:  Discussed Today}   Immunization History   Administered Date(s) Administered   • Flu Vaccine Quad PF >36MO 10/18/2012, 10/01/2016   • Pneumococcal Polysaccharide (PPSV23) 2020   • Shingrix 2019, 2019   • Tdap 10/07/2013   • Zostavax 2019         Diabetes:  Yes   Eye Exam: Complete   Foot Exam:  N/A  Obesity Counseling:  Completed Today  Hemoglobin A1C   Date Value Ref Range Status   2020 5.68 (H) 4.80 - 5.60 % Final     Microalbumin, Urine   Date Value Ref Range Status   2020 <1.2 mg/dL Final       Patient's Body mass index is 40.64 kg/m². BMI is above normal parameters. Recommendations include: exercise counseling and nutrition counseling.      Colorectal Screening:  Complete  Pap:  N/A  Mammogram:  Complete  PSA(Over age 50):  N/A  US Aorta (For male smokers, age 65):  N/A  CT for Smoker (Age 55-75, 30pk yr):  N/A  Bone Density/DEXA:  Complete  Hep C ( 1328-8795):  Complete      Results for orders placed or performed in visit on 20   CBC Auto Differential   Result Value Ref Range    WBC 6.38 3.40 - 10.80 10*3/mm3    RBC 4.57 3.77 - 5.28 10*6/mm3    Hemoglobin 13.3 12.0 - 15.9 g/dL    Hematocrit 39.3 34.0 - 46.6 %    MCV 86.0 79.0 - 97.0 fL    MCH 29.1 26.6 - 33.0 pg    MCHC 33.8 31.5 - 35.7 g/dL    RDW 12.5 12.3 - 15.4 %    RDW-SD 38.7 37.0 - 54.0 fl    MPV 10.1 6.0 - 12.0 fL    Platelets 299 140 - 450 10*3/mm3    Neutrophil % 52.5 42.7 - 76.0 %    Lymphocyte % 35.9 19.6 - 45.3 %    Monocyte % 8.9 5.0 - 12.0 %    Eosinophil % 1.7 0.3 - 6.2 %    Basophil % 0.8 0.0 - 1.5 %    Immature Grans % 0.2 0.0 - 0.5 %    Neutrophils, Absolute 3.35 1.70 - 7.00 10*3/mm3    Lymphocytes, Absolute 2.29 0.70 - 3.10 10*3/mm3    Monocytes, Absolute 0.57 0.10 - 0.90 10*3/mm3    Eosinophils, Absolute 0.11 0.00 - 0.40 10*3/mm3    Basophils, Absolute 0.05 0.00 - 0.20 10*3/mm3    Immature Grans, Absolute 0.01 0.00 - 0.05 10*3/mm3    nRBC 0.0  "0.0 - 0.2 /100 WBC   Comprehensive Metabolic Panel   Result Value Ref Range    Glucose 103 (H) 65 - 99 mg/dL    BUN 20 6 - 20 mg/dL    Creatinine 0.96 0.57 - 1.00 mg/dL    Sodium 140 136 - 145 mmol/L    Potassium 4.3 3.5 - 5.2 mmol/L    Chloride 103 98 - 107 mmol/L    CO2 23.7 22.0 - 29.0 mmol/L    Calcium 10.1 8.6 - 10.5 mg/dL    Total Protein 7.2 6.0 - 8.5 g/dL    Albumin 4.80 3.50 - 5.20 g/dL    ALT (SGPT) 48 (H) 1 - 33 U/L    AST (SGOT) 35 (H) 1 - 32 U/L    Alkaline Phosphatase 61 39 - 117 U/L    Total Bilirubin 0.3 0.0 - 1.2 mg/dL    eGFR Non African Amer 60 (L) >60 mL/min/1.73    Globulin 2.4 gm/dL    A/G Ratio 2.0 g/dL    BUN/Creatinine Ratio 20.8 7.0 - 25.0    Anion Gap 13.3 5.0 - 15.0 mmol/L   TSH Rfx On Abnormal To Free T4   Result Value Ref Range    TSH 1.340 0.270 - 4.200 uIU/mL   Lipid Panel   Result Value Ref Range    Total Cholesterol 187 0 - 200 mg/dL    Triglycerides 295 (H) 0 - 150 mg/dL    HDL Cholesterol 52 40 - 60 mg/dL    LDL Cholesterol  76 0 - 100 mg/dL    VLDL Cholesterol 59 (H) 5 - 40 mg/dL    LDL/HDL Ratio 1.46    Hemoglobin A1c   Result Value Ref Range    Hemoglobin A1C 5.68 (H) 4.80 - 5.60 %   Microalbumin / Creatinine Urine Ratio - Urine, Clean Catch   Result Value Ref Range    Microalbumin/Creatinine Ratio      Creatinine, Urine 95.4 mg/dL    Microalbumin, Urine <1.2 mg/dL            Vitals:    09/01/20 0800   BP: 124/80   Pulse: 98   SpO2: 98%   Weight: 101 kg (222 lb 3.2 oz)   Height: 157.5 cm (62\")       Body mass index is 40.64 kg/m².    Physical Exam   Constitutional: She is oriented to person, place, and time. Vital signs are normal. She appears well-developed and well-nourished. She is active and cooperative. She does not appear ill. No distress. She is morbidly obese.  HENT:   Head: Normocephalic and atraumatic.   Right Ear: Hearing, tympanic membrane, external ear and ear canal normal.   Left Ear: Hearing, tympanic membrane, external ear and ear canal normal.   Nose: Nose " normal. Right sinus exhibits no maxillary sinus tenderness and no frontal sinus tenderness. Left sinus exhibits no maxillary sinus tenderness and no frontal sinus tenderness.   Mouth/Throat: Uvula is midline, oropharynx is clear and moist and mucous membranes are normal.   Eyes: Conjunctivae, EOM and lids are normal.   Neck: Trachea normal, normal range of motion and phonation normal. No thyroid mass and no thyromegaly present.   Cardiovascular: Normal rate, regular rhythm and normal heart sounds.   Pulmonary/Chest: Effort normal and breath sounds normal.   Abdominal: Soft. Normal appearance and bowel sounds are normal. She exhibits no distension. There is no tenderness. There is no rigidity, no guarding and no CVA tenderness.   Musculoskeletal: Normal range of motion. She exhibits no edema.   Lymphadenopathy:     She has no cervical adenopathy.        Right cervical: No superficial cervical adenopathy present.       Left cervical: No superficial cervical adenopathy present.   Neurological: She is alert and oriented to person, place, and time. She has normal strength and normal reflexes. Coordination and gait normal.   CN grossly intact   Skin: Skin is warm and intact. No rash noted. She is not diaphoretic. No cyanosis or erythema. Nails show no clubbing.   Psychiatric: She has a normal mood and affect. Her speech is normal and behavior is normal. Judgment and thought content normal. She is not actively hallucinating. Cognition and memory are normal. She is attentive.   Vitals reviewed.          Counseling provided on diet and nutrition, exercise, weight management, supplements and flu prevention.    Yamila was seen today for annual exam.    Diagnoses and all orders for this visit:    Physical exam, annual  PE is unremarkable  Preventative labs ordered  Colonoscopy is up-to-date  Mammogram is up-to-date  Dentist - goes regularly  Ophthalmologist - goes regularly  Dermatologist - no skin lesions or moles  Flu vaccine  encouraged, pneumonia 23 today    Class 3 severe obesity due to excess calories with serious comorbidity and body mass index (BMI) of 40.0 to 44.9 in adult (CMS/[facility])  Discussed need for weight loss with diet and exercise.  Encouraged weight loss for overall health.    Mixed hyperlipidemia  -     Comprehensive Metabolic Panel  -     Lipid Panel  On pravastatin 40 mg daily.    Hepatic steatosis  -     Comprehensive Metabolic Panel  On metformin, statin and vitamin E.  Consider adding Lovaza.  Avoids alcohol and tylenol.    Impaired glucose tolerance  -     metFORMIN ER (GLUCOPHAGE-XR) 500 MG 24 hr tablet; Take 2 tablets by mouth 2 (Two) Times a Day.  -     Hemoglobin A1c  -     Microalbumin / Creatinine Urine Ratio - Urine, Clean Catch    Severe obstructive sleep apnea  Wearing CPAP nightly.    Primary insomnia  -     amitriptyline (ELAVIL) 10 MG tablet; Take 1 tablet by mouth Every Night.  Sleeping well.    Recurrent major depressive disorder, in full remission (CMS/[facility])  -     desvenlafaxine (Pristiq) 100 MG 24 hr tablet; Take 1 tablet by mouth Daily.  Denies any issues with depression, stable mood with current medication.    Anxiety  -     busPIRone (BUSPAR) 5 MG tablet; Take 1 tablet by mouth 3 (Three) Times a Day.  -     TSH Rfx On Abnormal To Free T4    Single kidney    Intractable migraine without aura and without status migrainosus  -     amitriptyline (ELAVIL) 10 MG tablet; Take 1 tablet by mouth Every Night.  -     propranolol LA (INDERAL LA) 60 MG 24 hr capsule; Take 1 capsule by mouth Daily.    Primary osteoarthritis involving multiple joints    Screening for deficiency anemia  -     CBC Auto Differential    Need for prophylactic vaccination against Streptococcus pneumoniae (pneumococcus)  -     Pneumococcal Polysaccharide Vaccine 23-Valent Greater Than or Equal To 1yo Subcutaneous / IM       Daly Archibald PA-C   9/2/2020   11:57

## 2020-09-14 ENCOUNTER — OFFICE VISIT (OUTPATIENT)
Dept: ORTHOPEDIC SURGERY | Facility: CLINIC | Age: 60
End: 2020-09-14

## 2020-09-14 VITALS — HEART RATE: 96 BPM | WEIGHT: 222.66 LBS | BODY MASS INDEX: 40.98 KG/M2 | OXYGEN SATURATION: 97 % | HEIGHT: 62 IN

## 2020-09-14 DIAGNOSIS — M17.11 PRIMARY OSTEOARTHRITIS OF RIGHT KNEE: ICD-10-CM

## 2020-09-14 DIAGNOSIS — M25.561 RIGHT KNEE PAIN, UNSPECIFIED CHRONICITY: Primary | ICD-10-CM

## 2020-09-14 PROCEDURE — 99214 OFFICE O/P EST MOD 30 MIN: CPT | Performed by: PHYSICIAN ASSISTANT

## 2020-09-14 PROCEDURE — 20610 DRAIN/INJ JOINT/BURSA W/O US: CPT | Performed by: PHYSICIAN ASSISTANT

## 2020-09-14 RX ADMIN — TRIAMCINOLONE ACETONIDE 40 MG: 40 INJECTION, SUSPENSION INTRA-ARTICULAR; INTRAMUSCULAR at 16:06

## 2020-09-14 RX ADMIN — LIDOCAINE HYDROCHLORIDE 4 ML: 10 INJECTION, SOLUTION EPIDURAL; INFILTRATION; INTRACAUDAL; PERINEURAL at 16:06

## 2020-09-14 NOTE — PROGRESS NOTES
Procedure   Large Joint Arthrocentesis: R knee  Date/Time: 9/14/2020 4:06 PM  Consent given by: patient  Site marked: site marked  Timeout: Immediately prior to procedure a time out was called to verify the correct patient, procedure, equipment, support staff and site/side marked as required   Supporting Documentation  Indications: pain   Procedure Details  Location: knee - R knee  Preparation: Patient was prepped and draped in the usual sterile fashion  Needle size: 22 G  Approach: anterolateral  Medications administered: 4 mL lidocaine PF 1% 1 %; 40 mg triamcinolone acetonide 40 MG/ML  Patient tolerance: patient tolerated the procedure well with no immediate complications

## 2020-09-14 NOTE — PROGRESS NOTES
[facility]opa[facility] Clinic Note    Subjective     Chief Complaint   Patient presents with   • Right Knee - Pain        HPI  Yamila Keller is a 59 y.o. female.  Patient presents for evaluation of her right knee.  5 days ago she said she stood to walk pivoted and felt pain especially to the medial aspect of her right knee.  She tried a compound/cream to the medial aspect of the knee which provided no benefit.  Due to other medical issues she is unable to take Tylenol or anti-inflammatories.  Patient is currently using turmeric.    Currently she endorses a pain scale of 7/10.  Severity the pain moderate.  Quality pain stabbing.  Associated symptoms swelling, stiffness, giving way.  Symptoms are worse with walking, standing, stair climbing.  No reported numbness or tingling into the extremity.  She denies any mechanical symptoms such as locking or catching.    History of left knee TKA  Dr. Clements.    Patient currently denies any fever, chills, night sweats or other constitutional symptoms.    Past Medical History:   Diagnosis Date   • Arthritis    • Carpal tunnel syndrome 2012   • Degenerative joint disease    • Depression    • Depression    • Diabetes mellitus, type 2 (CMS/HCC)    • Elevated liver enzymes    • Fatty liver    • Kidney disorder     one kidney   • Palpitations    • Polycystic ovaries    • Psoriasis    • PVC (premature ventricular contraction)     occasional pvc's   • Sleep apnea     cpap at home      Past Surgical History:   Procedure Laterality Date   • BREAST BIOPSY     • BREAST CYST EXCISION Right     BENIGN, INTRADUCTAL PAPILLOMA (PER PT)   •  SECTION      x 3   • CHOLECYSTECTOMY  2013   • COLONOSCOPY     • HYSTERECTOMY  2006    complete   • JOINT REPLACEMENT Left     left knee   • OOPHORECTOMY     • TUBAL ABDOMINAL LIGATION        Family History   Problem Relation Age of Onset   • Diabetes Mother    • Lymphoma Mother    • Diabetes Paternal Grandmother    • Heart  disease Paternal Grandmother    • Hypertension Brother    • Hypertension Brother    • Breast cancer Neg Hx    • Ovarian cancer Neg Hx      Social History     Socioeconomic History   • Marital status:      Spouse name: Not on file   • Number of children: Not on file   • Years of education: Not on file   • Highest education level: Not on file   Occupational History   • Occupation: RN   Tobacco Use   • Smoking status: Never Smoker   • Smokeless tobacco: Never Used   Substance and Sexual Activity   • Alcohol use: No   • Drug use: No   • Sexual activity: Yes     Partners: Male     Comment:       Current Outpatient Medications on File Prior to Visit   Medication Sig Dispense Refill   • amitriptyline (ELAVIL) 10 MG tablet Take 1 tablet by mouth Every Night. 90 tablet 1   • busPIRone (BUSPAR) 5 MG tablet Take 1 tablet by mouth 3 (Three) Times a Day. 90 tablet 1   • cholecalciferol (VITAMIN D3) 1000 units tablet Take 2,000 Units by mouth Daily.     • clobetasol (TEMOVATE) 0.05 % external solution Apply twice daily to itchy, scaly areas on scalp. 50 mL 11   • clotrimazole-betamethasone (LOTRISONE) 1-0.05 % cream Apply topically three times daily as needed to affected areas. 15 g 2   • desvenlafaxine (Pristiq) 100 MG 24 hr tablet Take 1 tablet by mouth Daily. 90 tablet 1   • estradiol (EVAMIST) 1.53 MG/SPRAY transdermal spray Place 1 spray on the skin as directed by provider 2 (Two) Times a Day as directed. 8.1 mL 12   • hydrocortisone 2.5 % cream Apply every evening to affected areas on abdomen until resolved. 28.35 g 11   • hydrOXYzine (ATARAX) 25 MG tablet Take 1 tablet by mouth 3 (Three) Times a Day As Needed for Anxiety. 30 tablet 0   • ketoconazole (NIZORAL) 2 % cream Apply every morning to underarms until resolved. 30 g 11   • ketoconazole (NIZORAL) 2 % shampoo Use as a shampoo to scalp and face 3 times a week. Leave in for 5 minutes and rinse. 120 mL 11   • Magnesium 250 MG tablet Take 1 tablet by mouth  "Daily.     • metFORMIN ER (GLUCOPHAGE-XR) 500 MG 24 hr tablet Take 2 tablets by mouth 2 (Two) Times a Day. 360 tablet 1   • omega-3 acid ethyl esters (LOVAZA) 1 g capsule Take 2 capsules by mouth 2 (Two) Times a Day. 360 capsule 1   • pravastatin (PRAVACHOL) 40 MG tablet Take 1 tablet by mouth Every Night. 90 tablet 1   • Probiotic Product (PROBIOTIC DAILY PO) Take  by mouth Daily.     • propranolol LA (INDERAL LA) 60 MG 24 hr capsule Take 1 capsule by mouth Daily. 90 capsule 1   • Vitamin E 400 units tablet Take 800 Units by mouth Daily.       No current facility-administered medications on file prior to visit.       Allergies   Allergen Reactions   • Celexa [Citalopram] Other (See Comments)     Jitttj         The following portions of the patient's history were reviewed and updated as appropriate: allergies, current medications, past family history, past medical history, past social history, past surgical history and problem list.    Review of Systems   Constitutional: Negative.    HENT: Negative.    Eyes: Negative.    Respiratory: Negative.    Cardiovascular: Negative.    Gastrointestinal: Negative.    Endocrine: Negative.    Genitourinary: Negative.    Musculoskeletal: Positive for arthralgias and joint swelling.   Skin: Negative.    Allergic/Immunologic: Negative.    Neurological: Negative.    Hematological: Negative.    Psychiatric/Behavioral: Negative.         Objective      Physical Exam  Pulse 96   Ht 157.5 cm (62.01\")   Wt 101 kg (222 lb 10.6 oz)   SpO2 97%   BMI 40.72 kg/m²     Body mass index is 40.72 kg/m².    GENERAL APPEARANCE: awake, alert & oriented x 3, in no acute distress and well developed, well nourished  PSYCH: normal mood and affect  LUNGS:  breathing nonlabored, no wheezing  EYES: sclera anicteric, pupils equal  CARDIOVASCULAR: palpable pulses. Capillary refill less than 2 seconds  INTEGUMENTARY: skin intact, no clubbing, cyanosis  NEUROLOGIC:  Normal Sensation        Ortho " Exam  Peripheral Vascular:    Upper Extremity:   Inspection:  Left--no cyanotic nail beds Right--no cyanotic nail beds   Bilateral:  Pink nail beds with brisk capillary refill   Palpation:  Bilateral radial pulse normal    Musculoskeletal:  Global Assessment:  Overall assessment of Lower Extremity Muscle Strength and Tone:  Right quadriceps--5/5   Right hamstrings--5/5       Right tibialis anterior--5/5  Right gastroc-soleus--5/5  Right EHL --5/5    Lower Extremity:  Knee/Patella:  No digital clubbing or cyanosis.    Examination of right knee reveals:  Normal deep tendon reflexes, coordination, strength, tone, sensation.  No known fractures or deformities.    Inspection and Palpation:  Right knee:  Tenderness:  Over the medial joint line and moderate severity  Effusion:  1+  Crepitus:  Positive  Pulses:  2+  Ecchymosis:  None  Warmth:  None     ROM:  Right:  Extension: 0    Flexion:120    Instability:    Right:  Lachman Test:  Negative, Varus stress test negative, Valgus stress test negative    Deformities/Malalignments/Discrepancies:    Left:  No deformities   Right:  Genu Varum    Functional Testing:  Katerin's test:  Negative  Patella grind test:  Positive      Imaging/Studies  Ordered right knee plain films.  Imaging read by Dr. Keith.    Imaging Results (Last 7 Days)     Procedure Component Value Units Date/Time    XR Knee 4+ View Right [[id number]] Resulted: 09/16/20 1703     Updated: 09/16/20 1704    Narrative:      Right Knee Radiographs  Indication: right knee pain  Views: Standing AP's and skiers of both knees, with lateral and sunrise   views of the right knee    Comparison: no prior studies available    Findings:   Medial joint space narrowing, subchondral sclerosis on the medial tibial   plateau, tricompartmental osteophytes, no acute bony abnormalities, with   no unusual bony features.          Assessment/Plan        ICD-10-CM ICD-9-CM   1. Right knee pain, unspecified chronicity  M25.561 719.46   2.  Primary osteoarthritis of right knee  M17.11 715.16       Orders Placed This Encounter   Procedures   • Large Joint Arthrocentesis: R knee   • XR Knee 4+ View Right        -Right knee pain due to primary osteoarthritis.  -Offered and accepted corticosteroid injection to the right knee.  Injections given today.  -Patient is unable to take oral anti-inflammatories or Tylenol.  Prescribed Voltaren gel.  -Provided hinged knee sleeve.  -Follow-up in 4 weeks for repeat evaluation if no improvement following topical anti-inflammatory, corticosteroid injection, bracing then consider MRI for further evaluation.  -Questions and concerns answered.    After discussing the risks, benefits, indications of injection, the patient gave consent to proceed.  Her right knee was confirmed as the correct joint to be injected with a timeout.  It was then prepped using Hibiclens and injected with a mixture of 4 cc of 1% plain lidocaine and 1 cc of Kenalog (40 mg per mL), without any resistance through the anterior lateral approach, patient in seated position.  Area was cleaned, hemostasis was achieved and a Band-Aid was applied over the injection site.  The patient tolerated procedure well.  I instructed the patient on signs and symptoms of infection.  They should report to the emergency department or return to clinic if any of these develop, for further evaluation and treatment.  Recommended modifying activity for the next 48 hours to include rest, ice, elevation and oral pain medication as needed.        Medical Decision Making  Management Options : prescription/IM medicine  Data/Risk: radiology tests       Roxanne Beck PA-C  09/17/20  08:06 EDT         EMR Dragon/Transcription disclaimer:  Much of this encounter note is an electronic transcription of spoken language to printed text. Electronic transcription of spoken language may permit erroneous, or at times, nonsensical words or phrases to be inadvertently transcribed.  Although I have reviewed the note for such errors, some may still exist.

## 2020-09-17 RX ORDER — LIDOCAINE HYDROCHLORIDE 10 MG/ML
4 INJECTION, SOLUTION EPIDURAL; INFILTRATION; INTRACAUDAL; PERINEURAL
Status: COMPLETED | OUTPATIENT
Start: 2020-09-14 | End: 2020-09-14

## 2020-09-17 RX ORDER — TRIAMCINOLONE ACETONIDE 40 MG/ML
40 INJECTION, SUSPENSION INTRA-ARTICULAR; INTRAMUSCULAR
Status: COMPLETED | OUTPATIENT
Start: 2020-09-14 | End: 2020-09-14

## 2020-09-21 ENCOUNTER — TELEMEDICINE (OUTPATIENT)
Dept: SLEEP MEDICINE | Facility: HOSPITAL | Age: 60
End: 2020-09-21

## 2020-09-21 VITALS — WEIGHT: 220 LBS | BODY MASS INDEX: 40.48 KG/M2 | HEIGHT: 62 IN

## 2020-09-21 DIAGNOSIS — G47.33 OSA (OBSTRUCTIVE SLEEP APNEA): Primary | ICD-10-CM

## 2020-09-21 PROCEDURE — 99212 OFFICE O/P EST SF 10 MIN: CPT | Performed by: NURSE PRACTITIONER

## 2020-09-21 NOTE — PROGRESS NOTES
Chief Complaint:   Chief Complaint   Patient presents with   • Follow-up       HPI:    Yamila Keller is a 59 y.o. female here for follow-up of sleep apnea.  Patient was last seen 9/20/2019.  Patient states she is doing very well with CPAP therapy.  Patient is sleeping 7 hours nightly and does feel rested upon awakening.  Patient will go to sleep within 10 minutes and does not get up during the night.  Patient has an Rickman score of 8/24.  Patient has no concerns or complaints regarding CPAP and does wish to continue.        Current medications are:   Current Outpatient Medications:   •  amitriptyline (ELAVIL) 10 MG tablet, Take 1 tablet by mouth Every Night., Disp: 90 tablet, Rfl: 1  •  busPIRone (BUSPAR) 5 MG tablet, Take 1 tablet by mouth 3 (Three) Times a Day., Disp: 90 tablet, Rfl: 1  •  cholecalciferol (VITAMIN D3) 1000 units tablet, Take 2,000 Units by mouth Daily., Disp: , Rfl:   •  clobetasol (TEMOVATE) 0.05 % external solution, Apply twice daily to itchy, scaly areas on scalp., Disp: 50 mL, Rfl: 11  •  clotrimazole-betamethasone (LOTRISONE) 1-0.05 % cream, Apply topically three times daily as needed to affected areas., Disp: 15 g, Rfl: 2  •  desvenlafaxine (Pristiq) 100 MG 24 hr tablet, Take 1 tablet by mouth Daily., Disp: 90 tablet, Rfl: 1  •  diclofenac (VOLTAREN) 1 % gel gel, Apply 4 g topically to the appropriate area as directed 2 (Two) Times a Day., Disp: 100 g, Rfl: 5  •  estradiol (EVAMIST) 1.53 MG/SPRAY transdermal spray, Place 1 spray on the skin as directed by provider 2 (Two) Times a Day as directed., Disp: 8.1 mL, Rfl: 12  •  hydrocortisone 2.5 % cream, Apply every evening to affected areas on abdomen until resolved., Disp: 28.35 g, Rfl: 11  •  hydrOXYzine (ATARAX) 25 MG tablet, Take 1 tablet by mouth 3 (Three) Times a Day As Needed for Anxiety., Disp: 30 tablet, Rfl: 0  •  ketoconazole (NIZORAL) 2 % cream, Apply every morning to underarms until resolved., Disp: 30 g, Rfl: 11  •   ketoconazole (NIZORAL) 2 % shampoo, Use as a shampoo to scalp and face 3 times a week. Leave in for 5 minutes and rinse., Disp: 120 mL, Rfl: 11  •  Magnesium 250 MG tablet, Take 1 tablet by mouth Daily., Disp: , Rfl:   •  metFORMIN ER (GLUCOPHAGE-XR) 500 MG 24 hr tablet, Take 2 tablets by mouth 2 (Two) Times a Day., Disp: 360 tablet, Rfl: 1  •  omega-3 acid ethyl esters (LOVAZA) 1 g capsule, Take 2 capsules by mouth 2 (Two) Times a Day., Disp: 360 capsule, Rfl: 1  •  pravastatin (PRAVACHOL) 40 MG tablet, Take 1 tablet by mouth Every Night., Disp: 90 tablet, Rfl: 1  •  Probiotic Product (PROBIOTIC DAILY PO), Take  by mouth Daily., Disp: , Rfl:   •  propranolol LA (INDERAL LA) 60 MG 24 hr capsule, Take 1 capsule by mouth Daily., Disp: 90 capsule, Rfl: 1  •  Vitamin E 400 units tablet, Take 800 Units by mouth Daily., Disp: , Rfl: .      The patient's relevant past medical, surgical, family and social history were reviewed and updated in Epic as appropriate.       Review of Systems   Eyes: Positive for visual disturbance.   Respiratory: Positive for apnea.    Musculoskeletal: Positive for arthralgias.   Allergic/Immunologic: Positive for environmental allergies.   Neurological: Positive for headaches.   Psychiatric/Behavioral: Positive for dysphoric mood and sleep disturbance.   All other systems reviewed and are negative.        Objective:    Physical Exam  Constitutional:       Appearance: Normal appearance.   HENT:      Head: Normocephalic and atraumatic.      Mouth/Throat:      Comments: Class 2 airway  Pulmonary:      Effort: Pulmonary effort is normal.   Skin:     Coloration: Skin is not jaundiced or pale.      Findings: No erythema.   Neurological:      Mental Status: She is alert.   Psychiatric:         Mood and Affect: Mood normal.         Behavior: Behavior normal.         Thought Content: Thought content normal.         Judgment: Judgment normal.     90/90 days of use.  Greater than 4-hour use 97.8  90%  pressure 10.4  AHI of 3.3      ASSESSMENT/PLAN    Yamila was seen today for follow-up.    Diagnoses and all orders for this visit:    NGOC (obstructive sleep apnea)  -     CPAP Therapy            1. Counseled patient regarding multimodal approach with healthy nutrition, healthy sleep, regular physical activity, social activities, counseling, and medications. Encouraged to practice lateral  sleep position. Avoid alcohol and sedatives close to bedtime.  2. Refill supplies x1 year.  Return to clinic 1 year or sooner symptoms warrant.  Patient gave verbal consent for video visit.    I have reviewed the results of my evaluation and impression and discussed my recommendations in detail with the patient.      Signed by  Halima Anna, KAREN    September 21, 2020      CC: Daly Archibald PA-C          No ref. provider found

## 2020-10-14 ENCOUNTER — OFFICE VISIT (OUTPATIENT)
Dept: ORTHOPEDIC SURGERY | Facility: CLINIC | Age: 60
End: 2020-10-14

## 2020-10-14 VITALS — HEART RATE: 81 BPM | HEIGHT: 62 IN | BODY MASS INDEX: 40.74 KG/M2 | WEIGHT: 221.4 LBS | OXYGEN SATURATION: 96 %

## 2020-10-14 DIAGNOSIS — M17.11 PRIMARY OSTEOARTHRITIS OF RIGHT KNEE: Primary | ICD-10-CM

## 2020-10-14 PROCEDURE — 99213 OFFICE O/P EST LOW 20 MIN: CPT | Performed by: ORTHOPAEDIC SURGERY

## 2020-10-14 NOTE — PROGRESS NOTES
[facility] Note    Subjective     Chief Complaint   Patient presents with   • Follow-up     4 week f/u; Primary osteoarthritis of right knee (cortisone injection 20)        HPI    It has been 4  week(s) since Ms. Keller's last visit. She returns to clinic today for follow-up of right knee pain. She rates her pain a 7/10 on the pain scale. Previous/current treatments: bracing and steroid injection (last injection 20). Current symptoms: same as prior visit. The pain is worse with walking, standing, climbing stairs, working and leisure; resting improve the pain. Overall, she is doing the same.  Steroid injection did not help.    I have reviewed the following portions of the patient's history and agree with: History of Present Illness and Review of Systems    Patient Active Problem List   Diagnosis   • Severe obstructive sleep apnea   • Degenerative joint disease   • Depression   • Psoriasis   • Single kidney   • Hepatic steatosis   • Intractable migraine without aura and without status migrainosus   • Mixed hyperlipidemia   • Anxiety   • Primary insomnia   • Impaired glucose tolerance   • Iron deficiency   • Class 3 severe obesity due to excess calories with serious comorbidity and body mass index (BMI) of 40.0 to 44.9 in adult (CMS/HCC)   • Primary osteoarthritis involving multiple joints     Past Medical History:   Diagnosis Date   • Arthritis    • Carpal tunnel syndrome 2012   • Degenerative joint disease    • Depression    • Depression    • Diabetes mellitus, type 2 (CMS/HCC)    • Elevated liver enzymes    • Fatty liver    • Kidney disorder     one kidney   • Palpitations    • Polycystic ovaries    • Psoriasis    • PVC (premature ventricular contraction)     occasional pvc's   • Sleep apnea     cpap at home      Past Surgical History:   Procedure Laterality Date   • BREAST BIOPSY     • BREAST CYST EXCISION Right     BENIGN, INTRADUCTAL PAPILLOMA (PER PT)   •   SECTION      x 3   • CHOLECYSTECTOMY  2013   • COLONOSCOPY     • HYSTERECTOMY  2006    complete   • JOINT REPLACEMENT Left 2011    left knee   • OOPHORECTOMY     • TUBAL ABDOMINAL LIGATION        Family History   Problem Relation Age of Onset   • Diabetes Mother    • Lymphoma Mother    • Diabetes Paternal Grandmother    • Heart disease Paternal Grandmother    • Hypertension Brother    • Hypertension Brother    • Breast cancer Neg Hx    • Ovarian cancer Neg Hx      Social History     Socioeconomic History   • Marital status:      Spouse name: Not on file   • Number of children: Not on file   • Years of education: Not on file   • Highest education level: Not on file   Occupational History   • Occupation: RN   Tobacco Use   • Smoking status: Never Smoker   • Smokeless tobacco: Never Used   Substance and Sexual Activity   • Alcohol use: No   • Drug use: No   • Sexual activity: Yes     Partners: Male     Comment:       Current Outpatient Medications on File Prior to Visit   Medication Sig Dispense Refill   • amitriptyline (ELAVIL) 10 MG tablet Take 1 tablet by mouth Every Night. 90 tablet 1   • busPIRone (BUSPAR) 5 MG tablet Take 1 tablet by mouth 3 (Three) Times a Day. 90 tablet 1   • cholecalciferol (VITAMIN D3) 1000 units tablet Take 2,000 Units by mouth Daily.     • clobetasol (TEMOVATE) 0.05 % external solution Apply twice daily to itchy, scaly areas on scalp. 50 mL 11   • clotrimazole-betamethasone (LOTRISONE) 1-0.05 % cream Apply topically three times daily as needed to affected areas. 15 g 2   • desvenlafaxine (Pristiq) 100 MG 24 hr tablet Take 1 tablet by mouth Daily. 90 tablet 1   • diclofenac (VOLTAREN) 1 % gel gel Apply 4 g topically to the appropriate area as directed 2 (Two) Times a Day. 100 g 5   • hydrocortisone 2.5 % cream Apply every evening to affected areas on abdomen until resolved. 28.35 g 11   • hydrOXYzine (ATARAX) 25 MG tablet Take 1 tablet by mouth 3 (Three) Times a Day As Needed  for Anxiety. 30 tablet 0   • ketoconazole (NIZORAL) 2 % cream Apply every morning to underarms until resolved. 30 g 11   • ketoconazole (NIZORAL) 2 % shampoo Use as a shampoo to scalp and face 3 times a week. Leave in for 5 minutes and rinse. 120 mL 11   • Magnesium 250 MG tablet Take 1 tablet by mouth Daily.     • meloxicam (MOBIC) 7.5 MG tablet Take 1 tablet by mouth daily with food regardless of pain level for 1 week, then take 1 tablet by mouth daily only as needed for pain relief thereafter. 14 tablet 0   • metFORMIN ER (GLUCOPHAGE-XR) 500 MG 24 hr tablet Take 2 tablets by mouth 2 (Two) Times a Day. 360 tablet 1   • omega-3 acid ethyl esters (LOVAZA) 1 g capsule Take 2 capsules by mouth 2 (Two) Times a Day. 360 capsule 1   • pravastatin (PRAVACHOL) 40 MG tablet Take 1 tablet by mouth Every Night. 90 tablet 1   • Probiotic Product (PROBIOTIC DAILY PO) Take  by mouth Daily.     • propranolol LA (INDERAL LA) 60 MG 24 hr capsule Take 1 capsule by mouth Daily. 90 capsule 1   • traMADol (ULTRAM) 50 MG tablet Take 1 tablet by mouth Every 4-6 Hours As Needed for uncontrolled pain. 10 tablet 0   • Vitamin E 400 units tablet Take 800 Units by mouth Daily.     • gabapentin (NEURONTIN) 100 MG capsule Take 1 capsule by mouth every night at bedtime for 7 days. 7 capsule 0   • [DISCONTINUED] estradiol (EVAMIST) 1.53 MG/SPRAY transdermal spray Place 1 spray on the skin as directed by provider 2 (Two) Times a Day as directed. 8.1 mL 12     No current facility-administered medications on file prior to visit.       Allergies   Allergen Reactions   • Celexa [Citalopram] Other (See Comments)     Jittery         Review of Systems   Constitutional: Negative for activity change, appetite change, chills, diaphoresis, fatigue, fever and unexpected weight change.   HENT: Negative for congestion, dental problem, drooling, ear discharge, ear pain, facial swelling, hearing loss, mouth sores, nosebleeds, postnasal drip, rhinorrhea, sinus  "pressure, sneezing, sore throat, tinnitus, trouble swallowing and voice change.    Eyes: Negative for photophobia, pain, discharge, redness, itching and visual disturbance.   Respiratory: Negative for apnea, cough, choking, chest tightness, shortness of breath, wheezing and stridor.    Cardiovascular: Negative for chest pain, palpitations and leg swelling.   Gastrointestinal: Negative for abdominal distention, abdominal pain, anal bleeding, blood in stool, constipation, diarrhea, nausea, rectal pain and vomiting.   Endocrine: Negative for cold intolerance, heat intolerance, polydipsia, polyphagia and polyuria.   Genitourinary: Negative for decreased urine volume, difficulty urinating, dysuria, enuresis, flank pain, frequency, genital sores, hematuria and urgency.   Musculoskeletal: Positive for arthralgias. Negative for back pain, gait problem, joint swelling, myalgias, neck pain and neck stiffness.   Skin: Negative for color change, pallor, rash and wound.   Allergic/Immunologic: Negative for environmental allergies, food allergies and immunocompromised state.   Neurological: Negative for dizziness, tremors, seizures, syncope, facial asymmetry, speech difficulty, weakness, light-headedness, numbness and headaches.   Hematological: Negative for adenopathy. Does not bruise/bleed easily.   Psychiatric/Behavioral: Negative for agitation, behavioral problems, confusion, decreased concentration, dysphoric mood, hallucinations, self-injury, sleep disturbance and suicidal ideas. The patient is not nervous/anxious and is not hyperactive.         Objective      Physical Exam  Pulse 81   Ht 157.5 cm (62.01\")   Wt 100 kg (221 lb 6.4 oz)   SpO2 96%   BMI 40.48 kg/m²     Body mass index is 40.48 kg/m².    General:   Mental Status:  Alert   Appearance: Cooperative, in no acute distress   Build and Nutrition: Obese female   Orientation: Alert and oriented to person, place and time   Posture: Normal   Gait: Mild limp on the " right    Integument:   Right knee: No skin lesions, no rash, no ecchymosis    Lower Extremities:   Right Knee:    Tenderness:  None    Effusion:  None    Swelling: None    Crepitus:  Positive    Range of motion:  Extension: 0°       Flexion: 120°  Instability:  No varus laxity, no valgus laxity, negative anterior drawer  Deformities:  None      Assessment and Plan     Diagnoses and all orders for this visit:    1. Primary osteoarthritis of right knee (Primary)  -     Large Joint Arthrocentesis        1. Primary osteoarthritis of right knee        I reviewed my findings with patient today.  Right knee continues to bother her.  She is a candidate for Visco supplementation injections and we will submit for approval.  I will see her back once they are ready for administration.    Return for After approval of the visco injection.    Medical Decision Making  Management Options : prescription/IM medicine      Mateo Keith MD  10/14/20  16:13 EDT    Dragon disclaimer:  Much of this encounter note is an electronic transcription/translation of spoken language to printed text. The electronic translation of spoken language may permit erroneous, or at times, nonsensical words or phrases to be inadvertently transcribed; Although I have reviewed the note for such errors, some may still exist.

## 2020-10-18 DIAGNOSIS — E78.2 MIXED HYPERLIPIDEMIA: ICD-10-CM

## 2020-10-19 RX ORDER — PRAVASTATIN SODIUM 40 MG
40 TABLET ORAL NIGHTLY
Qty: 90 TABLET | Refills: 1 | Status: SHIPPED | OUTPATIENT
Start: 2020-10-19 | End: 2021-04-18 | Stop reason: SDUPTHER

## 2020-10-26 ENCOUNTER — CLINICAL SUPPORT (OUTPATIENT)
Dept: ORTHOPEDIC SURGERY | Facility: CLINIC | Age: 60
End: 2020-10-26

## 2020-10-26 DIAGNOSIS — M17.11 PRIMARY OSTEOARTHRITIS OF RIGHT KNEE: Primary | ICD-10-CM

## 2020-10-26 PROCEDURE — 20610 DRAIN/INJ JOINT/BURSA W/O US: CPT | Performed by: ORTHOPAEDIC SURGERY

## 2020-10-26 NOTE — PROGRESS NOTES
Procedure   Large Joint Arthrocentesis: R knee  Date/Time: 10/26/2020 1:35 PM  Consent given by: patient  Site marked: site marked  Timeout: Immediately prior to procedure a time out was called to verify the correct patient, procedure, equipment, support staff and site/side marked as required   Supporting Documentation  Indications: pain   Procedure Details  Location: knee - R knee  Preparation: Patient was prepped and draped in the usual sterile fashion  Needle size: 22 G  Approach: anterolateral  Medications administered: 30 mg Hyaluronan 30 MG/2ML  Patient tolerance: patient tolerated the procedure well with no immediate complications

## 2020-10-26 NOTE — PROGRESS NOTES
[facility]    Subjective     Chief Complaint   Patient presents with   • Injections     Right Knee Orthovisc injection #1        HPI    Yamila Keller is a 59 y.o. female who presents for the first Orthovisc injection into the right knee.    Patient Active Problem List   Diagnosis   • Severe obstructive sleep apnea   • Degenerative joint disease   • Depression   • Psoriasis   • Single kidney   • Hepatic steatosis   • Intractable migraine without aura and without status migrainosus   • Mixed hyperlipidemia   • Anxiety   • Primary insomnia   • Impaired glucose tolerance   • Iron deficiency   • Class 3 severe obesity due to excess calories with serious comorbidity and body mass index (BMI) of 40.0 to 44.9 in adult (CMS/HCC)   • Primary osteoarthritis involving multiple joints     Past Medical History:   Diagnosis Date   • Arthritis    • Carpal tunnel syndrome 2012   • Degenerative joint disease    • Depression    • Depression    • Diabetes mellitus, type 2 (CMS/HCC)    • Elevated liver enzymes    • Fatty liver    • Kidney disorder     one kidney   • Palpitations    • Polycystic ovaries    • Psoriasis    • PVC (premature ventricular contraction)     occasional pvc's   • Sleep apnea     cpap at home      Past Surgical History:   Procedure Laterality Date   • BREAST BIOPSY     • BREAST CYST EXCISION Right     BENIGN, INTRADUCTAL PAPILLOMA (PER PT)   •  SECTION      x 3   • CHOLECYSTECTOMY  2013   • COLONOSCOPY     • HYSTERECTOMY  2006    complete   • JOINT REPLACEMENT Left     left knee   • OOPHORECTOMY     • TUBAL ABDOMINAL LIGATION        Family History   Problem Relation Age of Onset   • Diabetes Mother    • Lymphoma Mother    • Diabetes Paternal Grandmother    • Heart disease Paternal Grandmother    • Hypertension Brother    • Hypertension Brother    • Breast cancer Neg Hx    • Ovarian cancer Neg Hx      Social History     Socioeconomic History   • Marital status:       Spouse name: Not on file   • Number of children: Not on file   • Years of education: Not on file   • Highest education level: Not on file   Occupational History   • Occupation: RN   Tobacco Use   • Smoking status: Never Smoker   • Smokeless tobacco: Never Used   Substance and Sexual Activity   • Alcohol use: No   • Drug use: No   • Sexual activity: Yes     Partners: Male     Comment:       Current Outpatient Medications on File Prior to Visit   Medication Sig Dispense Refill   • amitriptyline (ELAVIL) 10 MG tablet Take 1 tablet by mouth Every Night. 90 tablet 1   • busPIRone (BUSPAR) 5 MG tablet Take 1 tablet by mouth 3 (Three) Times a Day. 90 tablet 1   • cholecalciferol (VITAMIN D3) 1000 units tablet Take 2,000 Units by mouth Daily.     • clobetasol (TEMOVATE) 0.05 % external solution Apply twice daily to itchy, scaly areas on scalp. 50 mL 11   • clotrimazole-betamethasone (LOTRISONE) 1-0.05 % cream Apply topically three times daily as needed to affected areas. 15 g 2   • desvenlafaxine (Pristiq) 100 MG 24 hr tablet Take 1 tablet by mouth Daily. 90 tablet 1   • diclofenac (VOLTAREN) 1 % gel gel Apply 4 g topically to the appropriate area as directed 2 (Two) Times a Day. 100 g 5   • estradiol (Evamist) 1.53 MG/SPRAY transdermal spray Place 1 spray topically to the arm as directed by provider 2 (two) times a day. 24.3 mL 3   • hydrocortisone 2.5 % cream Apply every evening to affected areas on abdomen until resolved. 28.35 g 11   • hydrOXYzine (ATARAX) 25 MG tablet Take 1 tablet by mouth 3 (Three) Times a Day As Needed for Anxiety. 30 tablet 0   • ketoconazole (NIZORAL) 2 % cream Apply every morning to underarms until resolved. 30 g 11   • ketoconazole (NIZORAL) 2 % shampoo Use as a shampoo to scalp and face 3 times a week. Leave in for 5 minutes and rinse. 120 mL 11   • Magnesium 250 MG tablet Take 1 tablet by mouth Daily.     • meloxicam (MOBIC) 7.5 MG tablet Take 1 tablet by mouth daily with  food regardless of pain level for 1 week, then take 1 tablet by mouth daily only as needed for pain relief thereafter. 14 tablet 0   • metFORMIN ER (GLUCOPHAGE-XR) 500 MG 24 hr tablet Take 2 tablets by mouth 2 (Two) Times a Day. 360 tablet 1   • nystatin-triamcinolone (MYCOLOG II) 068968-1.1 UNIT/GM-% cream Apply 1 application topically to the appropriate area sparingly as directed 3 (Three) Times a Day As Needed. 15 g 5   • omega-3 acid ethyl esters (LOVAZA) 1 g capsule Take 2 capsules by mouth 2 (Two) Times a Day. 360 capsule 1   • pravastatin (PRAVACHOL) 40 MG tablet Take 1 tablet by mouth Every Night. 90 tablet 1   • Probiotic Product (PROBIOTIC DAILY PO) Take  by mouth Daily.     • propranolol LA (INDERAL LA) 60 MG 24 hr capsule Take 1 capsule by mouth Daily. 90 capsule 1   • traMADol (ULTRAM) 50 MG tablet Take 1 tablet by mouth Every 4-6 Hours As Needed for uncontrolled pain. 10 tablet 0   • Vitamin E 400 units tablet Take 800 Units by mouth Daily.     • gabapentin (NEURONTIN) 100 MG capsule Take 1 capsule by mouth every night at bedtime for 7 days. 7 capsule 0     No current facility-administered medications on file prior to visit.       Allergies   Allergen Reactions   • Celexa [Citalopram] Other (See Comments)     Jittery         Review of Systems     Objective      Physical Exam  There were no vitals taken for this visit.    There is no height or weight on file to calculate BMI.    General:   Mental Status:  Alert   Appearance: Cooperative, in no acute distress   Posture: Normal    Assessment and Plan     Diagnoses and all orders for this visit:    1. Primary osteoarthritis of right knee (Primary)    Other orders  -     Cancel: Large Joint Arthrocentesis        1. Primary osteoarthritis of right knee        Procedure Note:  The potential benefits of performing a therapeutic knee joint visco supplementation injection, as well as potential risks (including, but not limited to infection, swelling, pain,  bleeding, bruising, nerve/blood vessel damage, and pseudoseptic reaction) have been discussed with the patient.  After informed consent, timeout procedure was performed, and the skin on the right knee was prepped with chlorhexidine soap and alcohol, after which ethyl chloride was applied to the skin at the injection site. Via the anterolateral approach, Orthovisc was injected into the knee joint.  The patient tolerated the procedure well. There were no complications.  Band-Aid was applied to the injection site. Post-procedural instructions discussed with the patient and/or their caregiver.    Return in about 1 week (around 11/2/2020) for Injection.    Mateo Keith MD  10/26/20  13:50 EDT    Dragon disclaimer:  Much of this encounter note is an electronic transcription/translation of spoken language to printed text. The electronic translation of spoken language may permit erroneous, or at times, nonsensical words or phrases to be inadvertently transcribed; Although I have reviewed the note for such errors, some may still exist.

## 2020-10-28 ENCOUNTER — TELEPHONE (OUTPATIENT)
Dept: ORTHOPEDIC SURGERY | Facility: CLINIC | Age: 60
End: 2020-10-28

## 2020-10-28 NOTE — TELEPHONE ENCOUNTER
Dr. 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 She would also like to know if she can have a handicap parking pass? Please advise thank you!  Isa

## 2020-10-28 NOTE — TELEPHONE ENCOUNTER
PATIENT CALLED AND WOULD LIKE TO KNOW IF SHE CAN HAVE A INTERMITTENT LEAVE FROM WORK? ALSO SHE  WANTS TO KNOW IF SHE CAN GET A HANDICAP TAG? PATIENT CAN BE REACHED @ [phone]

## 2020-10-28 NOTE — TELEPHONE ENCOUNTER
Yes, she can take off intermittently as needed for knee pain, and we can provide a handicap parking pass.  Please put one in my box, and I can sign it for her.

## 2020-10-29 NOTE — TELEPHONE ENCOUNTER
I called her to let her know what Dr. Keith said. I have placed a handicap parking pass in Dr. Colin box to sign and I will send to the patient when he returns it to me.  Isa

## 2020-11-02 ENCOUNTER — CLINICAL SUPPORT (OUTPATIENT)
Dept: ORTHOPEDIC SURGERY | Facility: CLINIC | Age: 60
End: 2020-11-02

## 2020-11-02 DIAGNOSIS — M17.11 PRIMARY OSTEOARTHRITIS OF RIGHT KNEE: Primary | ICD-10-CM

## 2020-11-02 PROCEDURE — 20610 DRAIN/INJ JOINT/BURSA W/O US: CPT | Performed by: ORTHOPAEDIC SURGERY

## 2020-11-02 NOTE — PROGRESS NOTES
Procedure   Large Joint Arthrocentesis: R knee  Date/Time: 11/2/2020 1:59 PM  Consent given by: patient  Site marked: site marked  Timeout: Immediately prior to procedure a time out was called to verify the correct patient, procedure, equipment, support staff and site/side marked as required   Supporting Documentation  Indications: pain   Procedure Details  Location: knee - R knee  Preparation: Patient was prepped and draped in the usual sterile fashion  Needle size: 22 G  Approach: anterolateral  Medications administered: 30 mg Hyaluronan 30 MG/2ML  Patient tolerance: patient tolerated the procedure well with no immediate complications

## 2020-11-02 NOTE — PROGRESS NOTES
[facility] Note    Subjective     Chief Complaint   Patient presents with   • Injections     Right knee Orthovisc injection # 2        HPI    Yamila PAREDES is a 59 y.o. female who presents for the second Orthovisc injection into the right knee.  Of note, she has seen slight relief to this point.    Patient Active Problem List   Diagnosis   • Severe obstructive sleep apnea   • Degenerative joint disease   • Depression   • Psoriasis   • Single kidney   • Hepatic steatosis   • Intractable migraine without aura and without status migrainosus   • Mixed hyperlipidemia   • Anxiety   • Primary insomnia   • Impaired glucose tolerance   • Iron deficiency   • Class 3 severe obesity due to excess calories with serious comorbidity and body mass index (BMI) of 40.0 to 44.9 in adult (CMS/HCC)   • Primary osteoarthritis involving multiple joints     Past Medical History:   Diagnosis Date   • Arthritis    • Carpal tunnel syndrome 2012   • Degenerative joint disease    • Depression    • Depression    • Diabetes mellitus, type 2 (CMS/HCC)    • Elevated liver enzymes    • Fatty liver    • Kidney disorder     one kidney   • Palpitations    • Polycystic ovaries    • Psoriasis    • PVC (premature ventricular contraction)     occasional pvc's   • Sleep apnea     cpap at home      Past Surgical History:   Procedure Laterality Date   • BREAST BIOPSY     • BREAST CYST EXCISION Right 2013    BENIGN, INTRADUCTAL PAPILLOMA (PER PT)   •  SECTION      x 3   • CHOLECYSTECTOMY  2013   • COLONOSCOPY     • HYSTERECTOMY  2006    complete   • JOINT REPLACEMENT Left     left knee   • OOPHORECTOMY     • TUBAL ABDOMINAL LIGATION        Family History   Problem Relation Age of Onset   • Diabetes Mother    • Lymphoma Mother    • Diabetes Paternal Grandmother    • Heart disease Paternal Grandmother    • Hypertension Brother    • Hypertension Brother    • Breast cancer Neg Hx    • Ovarian cancer Neg Hx           Social History     Socioeconomic History   • Marital status:      Spouse name: Not on file   • Number of children: Not on file   • Years of education: Not on file   • Highest education level: Not on file   Occupational History   • Occupation: RN   Tobacco Use   • Smoking status: Never Smoker   • Smokeless tobacco: Never Used   Substance and Sexual Activity   • Alcohol use: No   • Drug use: No   • Sexual activity: Yes     Partners: Male     Comment:       Current Outpatient Medications on File Prior to Visit   Medication Sig Dispense Refill   • amitriptyline (ELAVIL) 10 MG tablet Take 1 tablet by mouth Every Night. 90 tablet 1   • busPIRone (BUSPAR) 5 MG tablet Take 1 tablet by mouth 3 (Three) Times a Day. 90 tablet 1   • cholecalciferol (VITAMIN D3) 1000 units tablet Take 2,000 Units by mouth Daily.     • clobetasol (TEMOVATE) 0.05 % external solution Apply twice daily to itchy, scaly areas on scalp. 50 mL 11   • clotrimazole-betamethasone (LOTRISONE) 1-0.05 % cream Apply topically three times daily as needed to affected areas. 15 g 2   • desvenlafaxine (Pristiq) 100 MG 24 hr tablet Take 1 tablet by mouth Daily. 90 tablet 1   • diclofenac (VOLTAREN) 1 % gel gel Apply 4 g topically to the appropriate area as directed 2 (Two) Times a Day. 100 g 5   • estradiol (Evamist) 1.53 MG/SPRAY transdermal spray Place 1 spray topically to the arm as directed by provider 2 (two) times a day. 24.3 mL 3   • hydrocortisone 2.5 % cream Apply every evening to affected areas on abdomen until resolved. 28.35 g 11   • hydrOXYzine (ATARAX) 25 MG tablet Take 1 tablet by mouth 3 (Three) Times a Day As Needed for Anxiety. 30 tablet 0   • ketoconazole (NIZORAL) 2 % cream Apply every morning to underarms until resolved. 30 g 11   • ketoconazole (NIZORAL) 2 % shampoo Use as a shampoo to scalp and face 3 times a week. Leave in for 5 minutes and rinse. 120 mL 11   • Magnesium 250 MG tablet Take 1 tablet by mouth Daily.     •  meloxicam (MOBIC) 7.5 MG tablet Take 1 tablet by mouth daily with food regardless of pain level for 1 week, then take 1 tablet by mouth daily only as needed for pain relief thereafter. 14 tablet 0   • metFORMIN ER (GLUCOPHAGE-XR) 500 MG 24 hr tablet Take 2 tablets by mouth 2 (Two) Times a Day. 360 tablet 1   • nystatin-triamcinolone (MYCOLOG II) 605226-7.1 UNIT/GM-% cream Apply 1 application topically to the appropriate area sparingly as directed 3 (Three) Times a Day As Needed. 15 g 5   • omega-3 acid ethyl esters (LOVAZA) 1 g capsule Take 2 capsules by mouth 2 (Two) Times a Day. 360 capsule 1   • pravastatin (PRAVACHOL) 40 MG tablet Take 1 tablet by mouth Every Night. 90 tablet 1   • Probiotic Product (PROBIOTIC DAILY PO) Take  by mouth Daily.     • propranolol LA (INDERAL LA) 60 MG 24 hr capsule Take 1 capsule by mouth Daily. 90 capsule 1   • traMADol (ULTRAM) 50 MG tablet Take 1 tablet by mouth Every 4-6 Hours As Needed for uncontrolled pain. 10 tablet 0   • Vitamin E 400 units tablet Take 800 Units by mouth Daily.     • gabapentin (NEURONTIN) 100 MG capsule Take 1 capsule by mouth every night at bedtime for 7 days. 7 capsule 0     No current facility-administered medications on file prior to visit.       Allergies   Allergen Reactions   • Celexa [Citalopram] Other (See Comments)     Jittery         Review of Systems   Constitutional: Negative for activity change, appetite change, chills, diaphoresis, fatigue, fever and unexpected weight change.   HENT: Negative for congestion, dental problem, drooling, ear discharge, ear pain, facial swelling, hearing loss, mouth sores, nosebleeds, postnasal drip, rhinorrhea, sinus pressure, sneezing, sore throat, tinnitus, trouble swallowing and voice change.    Eyes: Negative for photophobia, pain, discharge, redness, itching and visual disturbance.   Respiratory: Negative for apnea, cough, choking, chest tightness, shortness of breath, wheezing and stridor.       Cardiovascular: Negative for chest pain, palpitations and leg swelling.   Gastrointestinal: Negative for abdominal distention, abdominal pain, anal bleeding, blood in stool, constipation, diarrhea, nausea, rectal pain and vomiting.   Endocrine: Negative for cold intolerance, heat intolerance, polydipsia, polyphagia and polyuria.   Genitourinary: Negative for decreased urine volume, difficulty urinating, dysuria, enuresis, flank pain, frequency, genital sores, hematuria and urgency.   Musculoskeletal: Positive for arthralgias. Negative for back pain, gait problem, joint swelling, myalgias, neck pain and neck stiffness.   Skin: Negative for color change, pallor, rash and wound.   Allergic/Immunologic: Negative for environmental allergies, food allergies and immunocompromised state.   Neurological: Negative for dizziness, tremors, seizures, syncope, facial asymmetry, speech difficulty, weakness, light-headedness, numbness and headaches.   Hematological: Negative for adenopathy. Does not bruise/bleed easily.   Psychiatric/Behavioral: Negative for agitation, behavioral problems, confusion, decreased concentration, dysphoric mood, hallucinations, self-injury, sleep disturbance and suicidal ideas. The patient is not nervous/anxious and is not hyperactive.         Objective      Physical Exam  There were no vitals taken for this visit.    There is no height or weight on file to calculate BMI.    General:   Mental Status:  Alert   Appearance: Cooperative, in no acute distress   Posture: Normal    Assessment and Plan     Diagnoses and all orders for this visit:    1. Primary osteoarthritis of right knee (Primary)  -     Large Joint Arthrocentesis: R knee        1. Primary osteoarthritis of right knee        Procedure Note:  The potential benefits of performing a therapeutic knee joint visco supplementation injection, as well as potential risks (including, but not limited to infection, swelling, pain, bleeding, bruising,  nerve/blood vessel damage, and pseudoseptic reaction) have been discussed with the patient.  After informed consent, timeout procedure was performed, and the skin on the right knee was prepped with chlorhexidine soap and alcohol, after which ethyl chloride was applied to the skin at the injection site. Via the anterolateral approach, Orthovisc was injected into the knee joint.  The patient tolerated the procedure well. There were no complications.  Band-Aid was applied to the injection site. Post-procedural instructions discussed with the patient and/or their caregiver.    Return in about 1 week (around 11/9/2020) for Injection.    Then follow-up with me in 6 months.    Mateo Keith MD  11/02/20  14:46 ANAND Camargo disclaimer:  Much of this encounter note is an electronic transcription/translation of spoken language to printed text. The electronic translation of spoken language may permit erroneous, or at times, nonsensical words or phrases to be inadvertently transcribed; Although I have reviewed the note for such errors, some may still exist.

## 2020-11-06 ENCOUNTER — TELEPHONE (OUTPATIENT)
Dept: ORTHOPEDIC SURGERY | Facility: CLINIC | Age: 60
End: 2020-11-06

## 2020-11-09 ENCOUNTER — CLINICAL SUPPORT (OUTPATIENT)
Dept: ORTHOPEDIC SURGERY | Facility: CLINIC | Age: 60
End: 2020-11-09

## 2020-11-09 DIAGNOSIS — M17.11 PRIMARY OSTEOARTHRITIS OF RIGHT KNEE: Primary | ICD-10-CM

## 2020-11-09 PROCEDURE — 20610 DRAIN/INJ JOINT/BURSA W/O US: CPT | Performed by: PHYSICIAN ASSISTANT

## 2020-11-09 NOTE — PROGRESS NOTES
CC: Follow-up right knee osteoarthritis, 3/3 Orthovisc injection today    History of present illness: Patient presents for her third Orthovisc injection to the right knee today.  At this time she denies any numbness or tingling into the distal extremity.  No fever, chills, night sweats or other constitutional symptoms.  Patient states she feels some improvement with the Visco injections.    See chart for PMH, PSH, Meds, All - reviewed.    Ortho exam:  Right knee  Skin is intact without redness, warmth or swelling/effusion.  No lesions or evidence of infection noted.  Motor/sensory: Grossly intact L2-S1.    Assessment/plan:  Right knee osteoarthritis    Proceed today with 3/3 of Orthovisc injection.  Patient will follow-up in 3 months.      After discussing risks of injection the patient gave consent to proceed.  Right knee was confirmed as the correct joint to be injected with a timeout.  The knee was then prepped with Hibiclens and injected with a prefilled syringe of Orthovisc without any resistance using anterior lateral approach, patient in seated position.  The patient tolerated procedure well.  Hemostasis was achieved and a Band-Aid was applied over the injection site.  I instructed the patient on signs and symptoms of infection.  They should report to the ED if any of these develop.  Recommended modifying activity to include rest, ice, elevation and/or heat along with oral pain medication as needed.  Patient observed ambulating normally after the injection.

## 2020-11-09 NOTE — PROGRESS NOTES
Procedure   Large Joint Arthrocentesis: R knee  Date/Time: 11/9/2020 8:28 AM  Consent given by: patient  Site marked: site marked  Timeout: Immediately prior to procedure a time out was called to verify the correct patient, procedure, equipment, support staff and site/side marked as required   Supporting Documentation  Indications: pain   Procedure Details  Location: knee - R knee  Preparation: Patient was prepped and draped in the usual sterile fashion  Needle size: 22 G  Approach: anterolateral  Medications administered: 30 mg Hyaluronan 30 MG/2ML  Patient tolerance: patient tolerated the procedure well with no immediate complications

## 2020-11-29 DIAGNOSIS — F41.9 ANXIETY: ICD-10-CM

## 2020-11-30 RX ORDER — BUSPIRONE HYDROCHLORIDE 5 MG/1
5 TABLET ORAL 3 TIMES DAILY
Qty: 270 TABLET | Refills: 3 | Status: SHIPPED | OUTPATIENT
Start: 2020-11-30 | End: 2021-02-25

## 2020-12-01 ENCOUNTER — TELEPHONE (OUTPATIENT)
Dept: FAMILY MEDICINE CLINIC | Facility: CLINIC | Age: 60
End: 2020-12-01

## 2020-12-01 DIAGNOSIS — K57.92 DIVERTICULITIS: Primary | ICD-10-CM

## 2020-12-01 RX ORDER — AMOXICILLIN AND CLAVULANATE POTASSIUM 875; 125 MG/1; MG/1
1 TABLET, FILM COATED ORAL 2 TIMES DAILY
Qty: 20 TABLET | Refills: 0 | Status: SHIPPED | OUTPATIENT
Start: 2020-12-01 | End: 2020-12-11

## 2020-12-01 NOTE — TELEPHONE ENCOUNTER
PT CALLED STATING SHE IS HAVING A DIVERTICULITIS FLARE UP    PT STATED SHE IS EXPERIENCING NAUSEA AND DIARRHEA    PT IS REQUESTING AN ANTIBIOTIC BE CALLED IN FOR HER    CALL BACK NUMBER [phone]     [id number] - [address] AT [facility] - [phone] [facility] [phone] FX

## 2020-12-01 NOTE — TELEPHONE ENCOUNTER
Pt states that she is having cramping pain and it was so bad this morning that she almost cried. Pt states she is also having the sulfur burps with it and that's how she knows it is diverticulitis

## 2020-12-17 ENCOUNTER — IMMUNIZATION (OUTPATIENT)
Dept: VACCINE CLINIC | Facility: HOSPITAL | Age: 60
End: 2020-12-17

## 2020-12-17 PROCEDURE — 0001A: CPT | Performed by: INTERNAL MEDICINE

## 2020-12-17 PROCEDURE — 91300 HC SARSCOV02 VAC 30MCG/0.3ML IM: CPT | Performed by: INTERNAL MEDICINE

## 2021-01-07 ENCOUNTER — IMMUNIZATION (OUTPATIENT)
Dept: VACCINE CLINIC | Facility: HOSPITAL | Age: 61
End: 2021-01-07

## 2021-01-07 PROCEDURE — 0002A: CPT

## 2021-01-07 PROCEDURE — 91300 HC SARSCOV02 VAC 30MCG/0.3ML IM: CPT

## 2021-01-07 PROCEDURE — 0001A: CPT

## 2021-01-15 DIAGNOSIS — I10 ESSENTIAL HYPERTENSION: Primary | ICD-10-CM

## 2021-01-15 RX ORDER — LISINOPRIL 10 MG/1
10 TABLET ORAL DAILY
Qty: 30 TABLET | Refills: 3 | Status: SHIPPED | OUTPATIENT
Start: 2021-01-15 | End: 2021-03-03

## 2021-02-10 ENCOUNTER — OFFICE VISIT (OUTPATIENT)
Dept: ORTHOPEDIC SURGERY | Facility: CLINIC | Age: 61
End: 2021-02-10

## 2021-02-10 VITALS — HEART RATE: 67 BPM | OXYGEN SATURATION: 98 % | WEIGHT: 230 LBS | BODY MASS INDEX: 42.06 KG/M2

## 2021-02-10 DIAGNOSIS — M17.11 PRIMARY OSTEOARTHRITIS OF RIGHT KNEE: Primary | ICD-10-CM

## 2021-02-10 PROCEDURE — 99214 OFFICE O/P EST MOD 30 MIN: CPT | Performed by: ORTHOPAEDIC SURGERY

## 2021-02-10 RX ORDER — ACETAMINOPHEN 325 MG/1
1000 TABLET ORAL ONCE
Status: CANCELLED | OUTPATIENT
Start: 2021-02-10 | End: 2021-02-10

## 2021-02-10 RX ORDER — MELOXICAM 7.5 MG/1
15 TABLET ORAL ONCE
Status: CANCELLED | OUTPATIENT
Start: 2021-02-10 | End: 2021-02-10

## 2021-02-10 RX ORDER — PREGABALIN 150 MG/1
150 CAPSULE ORAL ONCE
Status: CANCELLED | OUTPATIENT
Start: 2021-02-10 | End: 2021-02-10

## 2021-02-10 NOTE — PROGRESS NOTES
[facility] Note    Subjective     Chief Complaint   Patient presents with   • Right Knee - Follow-up     3 month f/u,Primary osteoarthritis of right knee, last Orthovisc injection 11/09/20        HPI    Ms. Yamila Neff is a 60-year-old female who presents for follow-up of her right knee pain. She rates her current pain as a 4 to 5 out of 10 on the pain scale. She affirms that this is her average level of pain; however, it can become significantly worse. At her most severe instances of pain, she rates her pain as reaching an 8 out of 10. She has tried bracing, anti-inflammatories, steroid injections, and viscosupplementation injections. Her pain is worse with ambulation, standing, sitting, climbing stairs, and working. Yamila describes swelling, stiffness and giving-way of the knee. She states that her pain improves with rest. She would like to discuss knee arthroplasty at this time.     The patient has no history of clotting problems, and she is not on any anticoagulants. Her body mass index is currently 42. She reports that she has been trying to lose weight; however, she has also been struggling with depression this winter. She affirms that she is willing to try to lose weight prior to surgery. 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    Of note, she underwent a left total knee arthroplasty in 2011 by Dr. Clements.    I have reviewed the following portions of the patient's history and agree with: History of Present Illness and Review of Systems    Patient Active Problem List   Diagnosis   • Severe obstructive sleep apnea   • Degenerative joint disease   • Depression   • Psoriasis   • Single kidney   • Hepatic steatosis   • Intractable migraine without aura and without status migrainosus   • Mixed hyperlipidemia   • Anxiety   • Primary insomnia   • Impaired glucose tolerance   • Iron deficiency   • Class 3 severe obesity due to excess calories with serious comorbidity and  body mass index (BMI) of 40.0 to 44.9 in adult (CMS/[facility])   • Primary osteoarthritis involving multiple joints     Past Medical History:   Diagnosis Date   • Arthritis    • Carpal tunnel syndrome 2012   • Degenerative joint disease    • Depression    • Depression    • Diabetes mellitus, type 2 (CMS/HCC)    • Elevated liver enzymes    • Fatty liver    • Hypertension    • Kidney disorder     one kidney   • Palpitations    • Polycystic ovaries    • Psoriasis    • PVC (premature ventricular contraction)     occasional pvc's   • Sleep apnea     cpap at home      Past Surgical History:   Procedure Laterality Date   • BREAST BIOPSY     • BREAST CYST EXCISION Right 2013    BENIGN, INTRADUCTAL PAPILLOMA (PER PT)   •  SECTION      x 3   • CHOLECYSTECTOMY  2013   • COLONOSCOPY     • HYSTERECTOMY      complete   • JOINT REPLACEMENT Left     left knee   • OOPHORECTOMY     • TUBAL ABDOMINAL LIGATION        Family History   Problem Relation Age of Onset   • Diabetes Mother    • Lymphoma Mother    • Diabetes Paternal Grandmother    • Heart disease Paternal Grandmother    • Hypertension Brother    • Hypertension Brother    • Breast cancer Neg Hx    • Ovarian cancer Neg Hx      Social History     Socioeconomic History   • Marital status:      Spouse name: Not on file   • Number of children: Not on file   • Years of education: Not on file   • Highest education level: Not on file   Occupational History   • Occupation: RN   Tobacco Use   • Smoking status: Never Smoker   • Smokeless tobacco: Never Used   Substance and Sexual Activity   • Alcohol use: No   • Drug use: No   • Sexual activity: Yes     Partners: Male     Comment:       Current Outpatient Medications on File Prior to Visit   Medication Sig Dispense Refill   • amitriptyline (ELAVIL) 10 MG tablet Take 1 tablet by mouth Every Night. 90 tablet 1   • busPIRone (BUSPAR) 5 MG tablet Take 1 tablet by mouth 3 (Three) Times a Day. 270 tablet 3   •  cholecalciferol (VITAMIN D3) 1000 units tablet Take 2,000 Units by mouth Daily.     • clobetasol (TEMOVATE) 0.05 % external solution Apply twice daily to itchy, scaly areas on scalp. 50 mL 11   • clotrimazole-betamethasone (LOTRISONE) 1-0.05 % cream Apply topically three times daily as needed to affected areas. 15 g 2   • desvenlafaxine (Pristiq) 100 MG 24 hr tablet Take 1 tablet by mouth Daily. 90 tablet 1   • diclofenac (VOLTAREN) 1 % gel gel Apply 4 g topically to the appropriate area as directed 2 (Two) Times a Day. 100 g 5   • estradiol (Evamist) 1.53 MG/SPRAY transdermal spray Place 1 spray topically to the arm as directed by provider 2 (two) times a day. 24.3 mL 3   • hydrocortisone 2.5 % cream Apply every evening to affected areas on abdomen until resolved. 28.35 g 11   • hydrOXYzine (ATARAX) 25 MG tablet Take 1 tablet by mouth 3 (Three) Times a Day As Needed for Anxiety. 30 tablet 0   • ketoconazole (NIZORAL) 2 % cream Apply every morning to underarms until resolved. 30 g 11   • ketoconazole (NIZORAL) 2 % shampoo Use as a shampoo to scalp and face 3 times a week. Leave in for 5 minutes and rinse. 120 mL 11   • lisinopril (PRINIVIL,ZESTRIL) 10 MG tablet Take 1 tablet by mouth Daily. 30 tablet 3   • Magnesium 250 MG tablet Take 1 tablet by mouth Daily.     • meloxicam (MOBIC) 7.5 MG tablet Take 1 tablet by mouth daily with food regardless of pain level for 1 week, then take 1 tablet by mouth daily only as needed for pain relief thereafter. 14 tablet 0   • metFORMIN ER (GLUCOPHAGE-XR) 500 MG 24 hr tablet Take 2 tablets by mouth 2 (Two) Times a Day. 360 tablet 1   • nystatin-triamcinolone (MYCOLOG II) 570386-5.1 UNIT/GM-% cream Apply 1 application topically to the appropriate area sparingly as directed 3 (Three) Times a Day As Needed. 15 g 5   • omega-3 acid ethyl esters (LOVAZA) 1 g capsule Take 2 capsules by mouth 2 (Two) Times a Day. 360 capsule 1   • pravastatin (PRAVACHOL) 40 MG tablet Take 1 tablet by  mouth Every Night. 90 tablet 1   • Probiotic Product (PROBIOTIC DAILY PO) Take  by mouth Daily.     • propranolol LA (INDERAL LA) 60 MG 24 hr capsule Take 1 capsule by mouth Daily. 90 capsule 1   • traMADol (ULTRAM) 50 MG tablet Take 1 tablet by mouth Every 4-6 Hours As Needed for uncontrolled pain. 10 tablet 0   • Vitamin E 400 units tablet Take 800 Units by mouth Daily.     • gabapentin (NEURONTIN) 100 MG capsule Take 1 capsule by mouth every night at bedtime for 7 days. 7 capsule 0     No current facility-administered medications on file prior to visit.       Allergies   Allergen Reactions   • Celexa [Citalopram] Other (See Comments)     Jittery         Review of Systems   Constitutional: Negative.  Negative for activity change, appetite change, chills, diaphoresis, fatigue, fever and unexpected weight change.   HENT: Negative.  Negative for congestion, dental problem, drooling, ear discharge, ear pain, facial swelling, hearing loss, mouth sores, nosebleeds, postnasal drip, rhinorrhea, sinus pressure, sneezing, sore throat, tinnitus, trouble swallowing and voice change.    Eyes: Negative.  Negative for photophobia, pain, discharge, redness, itching and visual disturbance.   Respiratory: Negative.  Negative for apnea, cough, choking, chest tightness, shortness of breath, wheezing and stridor.    Cardiovascular: Negative.  Negative for chest pain, palpitations and leg swelling.   Gastrointestinal: Negative.  Negative for abdominal distention, abdominal pain, anal bleeding, blood in stool, constipation, diarrhea, nausea, rectal pain and vomiting.   Endocrine: Negative.  Negative for cold intolerance, heat intolerance, polydipsia, polyphagia and polyuria.   Genitourinary: Negative.  Negative for decreased urine volume, difficulty urinating, dysuria, enuresis, flank pain, frequency, genital sores, hematuria and urgency.   Musculoskeletal: Positive for arthralgias. Negative for back pain, gait problem, joint swelling,  myalgias, neck pain and neck stiffness.   Skin: Negative.  Negative for color change, pallor, rash and wound.   Allergic/Immunologic: Negative.  Negative for environmental allergies, food allergies and immunocompromised state.   Neurological: Negative.  Negative for dizziness, tremors, seizures, syncope, facial asymmetry, speech difficulty, weakness, light-headedness, numbness and headaches.   Hematological: Negative.  Negative for adenopathy. Does not bruise/bleed easily.   Psychiatric/Behavioral: Negative.  Negative for agitation, behavioral problems, confusion, decreased concentration, dysphoric mood, hallucinations, self-injury, sleep disturbance and suicidal ideas. The patient is not nervous/anxious and is not hyperactive.         Objective      Physical Exam  Pulse 67   Wt 104 kg (230 lb)   SpO2 98%   BMI 42.06 kg/m²     Body mass index is 42.06 kg/m².      General:   • Mental Status:  Alert   • Appearance: Cooperative, in no acute distress   • Build and Nutrition: Well-nourished and well-developed female  • Orientation: Alert and oriented to person, place and time   • Posture: Normal   • Gait: Antalgic on the right.      Integument  • Right knee: No skin lesions, rash, or ecchymosis.    Lower Extremities  • Right Knee:       • Tenderness: Medial joint line tenderness.      • Swelling: None       • Effusion: 1+      • Crepitus: Positive      • Atrophy: None      • Range of motion:        • Extension: 0°        • Flexion: 120°       • Instability: No varus or valgus laxity. Negative anterior drawer.      • Deformities: None    Imaging/Studies  Imaging Results (Last 24 Hours)     Procedure Component Value Units Date/Time    XR Knee 4+ View Right [[id number]] Resulted: 02/10/21 0917     Updated: 02/10/21 0918    Narrative:      Right Knee Radiographs  Indication: right knee pain  Views: Standing AP's and skiers of both knees, with lateral and sunrise   views of the right knee    Comparison:  9/14/2020    Findings:   Bone-on-bone contact medial compartment, tricompartmental osteophytes,   varus alignment, patellofemoral degeneration.  Worsening compared to the   previous films.  No acute bony abnormalities.        The patient's previous right knee radiographs, from 09/14/2020, were again reviewed today. These demonstrated medial joint space narrowing and patellofemoral degeneration as well as tri-compartmental osteophytes.      Assessment and Plan     Diagnoses and all orders for this visit:    1. Primary osteoarthritis of right knee (Primary)  -     XR Knee 4+ View Right  -     Case Request; Standing  -     Instructions on coughing, deep breathing, and incentive spirometry.; Future  -     CBC and Differential; Future  -     Basic metabolic panel; Future  -     Protime-INR; Future  -     APTT; Future  -     Hemoglobin A1c; Future  -     Sedimentation rate; Future  -     C-reactive protein; Future  -     Tranexamic Acid 1,000 mg in sodium chloride 0.9 % 100 mL  -     Tranexamic Acid 1,000 mg in sodium chloride 0.9 % 100 mL  -     ceFAZolin (ANCEF) 2 g in sodium chloride 0.9 % 100 mL IVPB  -     acetaminophen (TYLENOL) tablet 975 mg  -     meloxicam (MOBIC) tablet 15 mg  -     pregabalin (LYRICA) capsule 150 mg  -     mupirocin (BACTROBAN) 2 % nasal ointment 1 application  -     Case Request    Other orders  -     Outpatient In A Bed; Standing  -     Follow Anesthesia Guidelines / Standing Orders; Future  -     Obtain informed consent  -     Provide instructions to patient regarding NPO status  -     Chlorhexidine Skin Prep - Educate and Review With Patient; Future  -     Provide Patient With ERAS Hydration Instructions  -     Provide Patient With Enhanced Recovery Booklet(s) or Handout  -     Provide Instructions/Handout For Benzoyl Peroxide 5% Wash If Having Shoulder/Arm Surgery (If Prescribed)  -     Provide Instructions/Handout For Bactroban And Chlorhexidine Shower (If Prescribed)  -     Perform A  Memory Screening On All Hip/Knee Replacement Patients >Or Equal To 65 Years Or Older  -     Complete A PROMIS And HOOS Or KOOS Survey If Having Hip Or Knee Replacement  -     Follow Anesthesia Guidelines / Standing Orders; Standing  -     Nerve Block; Standing  -     Verify NPO Status; Standing  -     Verify The Time Patient Completed ERAS Hydration Drink; Standing  -     SCD (sequential compression device)- to be placed on patient in Pre-op; Standing  -     POC Glucose Once; Standing  -     Clip operative site; Standing  -     Obtain informed consent (if not collected inpatient or PAT); Standing  -     Notify Physician - Standard; Standing  -     Provide Patient With Carbo Loading Instructions  -     Provide Patient With ERAS Booklet(s)/Handout  -     mupirocin (Bactroban Nasal) 2 % nasal ointment; into the nostril(s) as directed by provider 2 (Two) Times a Day.  Dispense: 22 g; Refill: 0  -     chlorhexidine (HIBICLENS) 4 % external liquid; Apply  topically to the appropriate area as directed Daily. Shower with hibiclens solution as directed for 5 days prior to surgery  Dispense: 236 mL; Refill: 0        1. Primary osteoarthritis of right knee        The patient has expressed her interest in proceeding with a right knee arthroplasty. Her current body mass index is 42. We discussed the significantly increased risks associated with surgery on patients who have a BMI over 40. I have advised her that her goal weight to achieve before surgery would be approximately 220 pounds. I advised her that it will probably be 6-8 weeks before she can return to working as a nurse. I discussed with her that it would be particularly helpful if she were to lose some weight prior to the surgery. The patient asked about pain control, and I advised her that she would go home with a pain catheter, which would last for 2-3 days. She would also be sent home with pain medication.    Surgical Counseling     I have informed the patient of the  diagnosis and the prognosis.  Exhaustive conservative treatment modalities have not resulted in long term pain relief.  The symptoms have progressed to the point of daily pain and inability to perform activities of daily living without significant pain. The patient has reached the point of desiring to proceed with total knee arthroplasty after discussing the risks, benefits and alternatives to the procedure.  The surgical procedure itself was discussed in detail. Risks of the procedure were discussed, which included but are not limited to, bleeding, infection, damage to blood vessels and nerves, incomplete pain relief, loosening of the prosthesis (early or late), deep infection (early or late), need for further surgery, loss of limb, deep venous thrombosis, pulmonary embolus, death, heart attack, stroke, kidney failure, liver failure, and anesthetic complications.  In addition, the potential for deep infection developing in the future was discussed, which could require further surgery.  The knee would have to be re-opened, debrided, and potentially remove the prosthesis, which may or may not be replaced in the future.  Also, the possibility for loosening of the prosthesis has been mentioned.  If the prosthesis loosened, a revision arthroplasty could be performed, with results that are not as predictable compared to the original procedure.  The typical rehabilitative course has also been discussed, and full recovery may take up to a year to see the maximum benefit.  The importance of patient cooperation in the rehabilitative efforts has also been discussed.  No guarantees were given.  The patient understands the potential risks versus the benefits and desires to proceed with total knee arthroplasty at a mutually convenient time.    Return for surgery.    Medical Decision Making  Management Options : major surgery with risk factors  Data/Risk: radiology tests and independent visualization of imaging, lab tests, or  EMG/NCV     Management options: 1 chronic illnesses with exacerbation and progression, with decision regarding elective major surgery    Scribed for Mateo Keith MD by MELVI PHELPS.  2/10/2021  11:12 EST    I Mateo Keith MD have personally performed the services described in this document as scribed by the above individual, and it is both accurate and complete.     Mateo Keith MD  2/10/2021  12:10 EST

## 2021-02-15 PROBLEM — M17.11 PRIMARY OSTEOARTHRITIS OF RIGHT KNEE: Status: ACTIVE | Noted: 2021-02-15

## 2021-02-25 ENCOUNTER — APPOINTMENT (OUTPATIENT)
Dept: PREADMISSION TESTING | Facility: HOSPITAL | Age: 61
End: 2021-02-25

## 2021-02-25 VITALS — WEIGHT: 220 LBS | BODY MASS INDEX: 38.98 KG/M2 | HEIGHT: 63 IN

## 2021-02-25 DIAGNOSIS — M17.11 PRIMARY OSTEOARTHRITIS OF RIGHT KNEE: ICD-10-CM

## 2021-02-25 LAB
ANION GAP SERPL CALCULATED.3IONS-SCNC: 13 MMOL/L (ref 5–15)
APTT PPP: 31.1 SECONDS (ref 24–37)
BASOPHILS # BLD AUTO: 0.05 10*3/MM3 (ref 0–0.2)
BASOPHILS NFR BLD AUTO: 0.8 % (ref 0–1.5)
BUN SERPL-MCNC: 20 MG/DL (ref 6–20)
BUN/CREAT SERPL: 21.1 (ref 7–25)
CALCIUM SPEC-SCNC: 10.2 MG/DL (ref 8.6–10.5)
CHLORIDE SERPL-SCNC: 100 MMOL/L (ref 98–107)
CO2 SERPL-SCNC: 25 MMOL/L (ref 22–29)
CREAT SERPL-MCNC: 0.95 MG/DL (ref 0.57–1)
CRP SERPL-MCNC: <0.3 MG/DL (ref 0–0.5)
DEPRECATED RDW RBC AUTO: 38.5 FL (ref 37–54)
EOSINOPHIL # BLD AUTO: 0.1 10*3/MM3 (ref 0–0.4)
EOSINOPHIL NFR BLD AUTO: 1.6 % (ref 0.3–6.2)
ERYTHROCYTE [DISTWIDTH] IN BLOOD BY AUTOMATED COUNT: 11.9 % (ref 12.3–15.4)
ERYTHROCYTE [SEDIMENTATION RATE] IN BLOOD: 3 MM/HR (ref 0–30)
GFR SERPL CREATININE-BSD FRML MDRD: 60 ML/MIN/1.73
GLUCOSE SERPL-MCNC: 85 MG/DL (ref 65–99)
HBA1C MFR BLD: 5.8 % (ref 4.8–5.6)
HCT VFR BLD AUTO: 41.3 % (ref 34–46.6)
HGB BLD-MCNC: 13.7 G/DL (ref 12–15.9)
IMM GRANULOCYTES # BLD AUTO: 0.01 10*3/MM3 (ref 0–0.05)
IMM GRANULOCYTES NFR BLD AUTO: 0.2 % (ref 0–0.5)
INR PPP: 1 (ref 0.85–1.16)
LYMPHOCYTES # BLD AUTO: 2.69 10*3/MM3 (ref 0.7–3.1)
LYMPHOCYTES NFR BLD AUTO: 44.2 % (ref 19.6–45.3)
MCH RBC QN AUTO: 29.1 PG (ref 26.6–33)
MCHC RBC AUTO-ENTMCNC: 33.2 G/DL (ref 31.5–35.7)
MCV RBC AUTO: 87.9 FL (ref 79–97)
MONOCYTES # BLD AUTO: 0.5 10*3/MM3 (ref 0.1–0.9)
MONOCYTES NFR BLD AUTO: 8.2 % (ref 5–12)
NEUTROPHILS NFR BLD AUTO: 2.74 10*3/MM3 (ref 1.7–7)
NEUTROPHILS NFR BLD AUTO: 45 % (ref 42.7–76)
NRBC BLD AUTO-RTO: 0 /100 WBC (ref 0–0.2)
PLATELET # BLD AUTO: 306 10*3/MM3 (ref 140–450)
PMV BLD AUTO: 9.7 FL (ref 6–12)
POTASSIUM SERPL-SCNC: 4.5 MMOL/L (ref 3.5–5.2)
PROTHROMBIN TIME: 12.9 SECONDS (ref 11.5–14)
QT INTERVAL: 422 MS
QTC INTERVAL: 395 MS
RBC # BLD AUTO: 4.7 10*6/MM3 (ref 3.77–5.28)
SODIUM SERPL-SCNC: 138 MMOL/L (ref 136–145)
WBC # BLD AUTO: 6.09 10*3/MM3 (ref 3.4–10.8)

## 2021-02-25 PROCEDURE — 93010 ELECTROCARDIOGRAM REPORT: CPT | Performed by: INTERNAL MEDICINE

## 2021-02-25 PROCEDURE — 85025 COMPLETE CBC W/AUTO DIFF WBC: CPT

## 2021-02-25 PROCEDURE — 93005 ELECTROCARDIOGRAM TRACING: CPT

## 2021-02-25 PROCEDURE — 86140 C-REACTIVE PROTEIN: CPT

## 2021-02-25 PROCEDURE — 80048 BASIC METABOLIC PNL TOTAL CA: CPT

## 2021-02-25 PROCEDURE — 85652 RBC SED RATE AUTOMATED: CPT

## 2021-02-25 PROCEDURE — 85730 THROMBOPLASTIN TIME PARTIAL: CPT

## 2021-02-25 PROCEDURE — 83036 HEMOGLOBIN GLYCOSYLATED A1C: CPT

## 2021-02-25 PROCEDURE — 36415 COLL VENOUS BLD VENIPUNCTURE: CPT

## 2021-02-25 PROCEDURE — 85610 PROTHROMBIN TIME: CPT

## 2021-02-25 ASSESSMENT — KOOS JR
KOOS JR SCORE: 22
KOOS JR SCORE: 31.307

## 2021-02-28 DIAGNOSIS — G43.019 INTRACTABLE MIGRAINE WITHOUT AURA AND WITHOUT STATUS MIGRAINOSUS: ICD-10-CM

## 2021-03-01 RX ORDER — PROPRANOLOL HCL 60 MG
60 CAPSULE, EXTENDED RELEASE 24HR ORAL DAILY
Qty: 90 CAPSULE | Refills: 1 | Status: SHIPPED | OUTPATIENT
Start: 2021-03-01 | End: 2021-09-01 | Stop reason: SDUPTHER

## 2021-03-03 ENCOUNTER — OFFICE VISIT (OUTPATIENT)
Dept: FAMILY MEDICINE CLINIC | Facility: CLINIC | Age: 61
End: 2021-03-03

## 2021-03-03 VITALS
OXYGEN SATURATION: 98 % | HEART RATE: 60 BPM | WEIGHT: 222.8 LBS | SYSTOLIC BLOOD PRESSURE: 112 MMHG | HEIGHT: 63 IN | BODY MASS INDEX: 39.48 KG/M2 | DIASTOLIC BLOOD PRESSURE: 72 MMHG

## 2021-03-03 DIAGNOSIS — I10 ESSENTIAL HYPERTENSION: Primary | ICD-10-CM

## 2021-03-03 DIAGNOSIS — F41.9 ANXIETY: ICD-10-CM

## 2021-03-03 DIAGNOSIS — E78.2 MIXED HYPERLIPIDEMIA: ICD-10-CM

## 2021-03-03 DIAGNOSIS — F33.42 RECURRENT MAJOR DEPRESSIVE DISORDER, IN FULL REMISSION (HCC): ICD-10-CM

## 2021-03-03 PROCEDURE — 99214 OFFICE O/P EST MOD 30 MIN: CPT | Performed by: PHYSICIAN ASSISTANT

## 2021-03-03 RX ORDER — LOSARTAN POTASSIUM 50 MG/1
50 TABLET ORAL DAILY
Qty: 90 TABLET | Refills: 1 | Status: SHIPPED | OUTPATIENT
Start: 2021-03-03 | End: 2021-07-27 | Stop reason: SDUPTHER

## 2021-03-07 ENCOUNTER — APPOINTMENT (OUTPATIENT)
Dept: PREADMISSION TESTING | Facility: HOSPITAL | Age: 61
End: 2021-03-07

## 2021-03-07 PROCEDURE — U0004 COV-19 TEST NON-CDC HGH THRU: HCPCS

## 2021-03-07 PROCEDURE — C9803 HOPD COVID-19 SPEC COLLECT: HCPCS

## 2021-03-08 ENCOUNTER — ANESTHESIA EVENT (OUTPATIENT)
Dept: PERIOP | Facility: HOSPITAL | Age: 61
End: 2021-03-08

## 2021-03-08 LAB — SARS-COV-2 RNA RESP QL NAA+PROBE: NOT DETECTED

## 2021-03-09 ENCOUNTER — ANESTHESIA (OUTPATIENT)
Dept: PERIOP | Facility: HOSPITAL | Age: 61
End: 2021-03-09

## 2021-03-09 ENCOUNTER — HOSPITAL ENCOUNTER (OUTPATIENT)
Facility: HOSPITAL | Age: 61
Discharge: HOME | End: 2021-03-09
Attending: ORTHOPAEDIC SURGERY | Admitting: ORTHOPAEDIC SURGERY

## 2021-03-09 ENCOUNTER — APPOINTMENT (OUTPATIENT)
Dept: GENERAL RADIOLOGY | Facility: HOSPITAL | Age: 61
End: 2021-03-09

## 2021-03-09 VITALS
TEMPERATURE: 98 F | HEART RATE: 64 BPM | RESPIRATION RATE: 16 BRPM | OXYGEN SATURATION: 94 % | SYSTOLIC BLOOD PRESSURE: 115 MMHG | DIASTOLIC BLOOD PRESSURE: 62 MMHG

## 2021-03-09 DIAGNOSIS — Z96.651 S/P TOTAL KNEE ARTHROPLASTY, RIGHT: Primary | ICD-10-CM

## 2021-03-09 DIAGNOSIS — M17.11 PRIMARY OSTEOARTHRITIS OF RIGHT KNEE: ICD-10-CM

## 2021-03-09 PROBLEM — Z99.89 OSA ON CPAP: Status: ACTIVE | Noted: 2021-03-09

## 2021-03-09 PROBLEM — G47.33 OSA ON CPAP: Status: ACTIVE | Noted: 2021-03-09

## 2021-03-09 PROBLEM — R73.03 PREDIABETES: Status: ACTIVE | Noted: 2021-03-09

## 2021-03-09 PROBLEM — E66.9 OBESITY: Status: ACTIVE | Noted: 2021-03-09

## 2021-03-09 LAB
GLUCOSE BLDC GLUCOMTR-MCNC: 131 MG/DL (ref 70–130)
GLUCOSE BLDC GLUCOMTR-MCNC: 96 MG/DL (ref 70–130)

## 2021-03-09 PROCEDURE — 25010000002 CEFAZOLIN PER 500 MG: Performed by: ORTHOPAEDIC SURGERY

## 2021-03-09 PROCEDURE — 25010000002 PROPOFOL 10 MG/ML EMULSION: Performed by: NURSE ANESTHETIST, CERTIFIED REGISTERED

## 2021-03-09 PROCEDURE — C1755 CATHETER, INTRASPINAL: HCPCS | Performed by: ORTHOPAEDIC SURGERY

## 2021-03-09 PROCEDURE — 25010000002 ONDANSETRON PER 1 MG: Performed by: NURSE ANESTHETIST, CERTIFIED REGISTERED

## 2021-03-09 PROCEDURE — 82962 GLUCOSE BLOOD TEST: CPT

## 2021-03-09 PROCEDURE — C1713 ANCHOR/SCREW BN/BN,TIS/BN: HCPCS | Performed by: ORTHOPAEDIC SURGERY

## 2021-03-09 PROCEDURE — C1889 IMPLANT/INSERT DEVICE, NOC: HCPCS | Performed by: ORTHOPAEDIC SURGERY

## 2021-03-09 PROCEDURE — 27447 TOTAL KNEE ARTHROPLASTY: CPT | Performed by: ORTHOPAEDIC SURGERY

## 2021-03-09 PROCEDURE — 25010000002 ROPIVACAINE PER 1 MG: Performed by: NURSE ANESTHETIST, CERTIFIED REGISTERED

## 2021-03-09 PROCEDURE — 25010000002 DEXAMETHASONE PER 1 MG: Performed by: NURSE ANESTHETIST, CERTIFIED REGISTERED

## 2021-03-09 PROCEDURE — 97161 PT EVAL LOW COMPLEX 20 MIN: CPT

## 2021-03-09 PROCEDURE — C1776 JOINT DEVICE (IMPLANTABLE): HCPCS | Performed by: ORTHOPAEDIC SURGERY

## 2021-03-09 PROCEDURE — 27447 TOTAL KNEE ARTHROPLASTY: CPT | Performed by: PHYSICIAN ASSISTANT

## 2021-03-09 PROCEDURE — 97116 GAIT TRAINING THERAPY: CPT

## 2021-03-09 PROCEDURE — 25010000002 ROPIVACAINE PER 1 MG: Performed by: ORTHOPAEDIC SURGERY

## 2021-03-09 PROCEDURE — 73560 X-RAY EXAM OF KNEE 1 OR 2: CPT

## 2021-03-09 PROCEDURE — 25010000003 CEFAZOLIN IN DEXTROSE 2-4 GM/100ML-% SOLUTION: Performed by: ORTHOPAEDIC SURGERY

## 2021-03-09 DEVICE — LEGION CR HIGH FLEX XLPE SZ 3-4 10MM
Type: IMPLANTABLE DEVICE | Site: KNEE | Status: FUNCTIONAL
Brand: LEGION

## 2021-03-09 DEVICE — DEV CONTRL TISS STRATAFIX SYMM PDS PLUS VIL CT-1 45CM: Type: IMPLANTABLE DEVICE | Site: KNEE | Status: FUNCTIONAL

## 2021-03-09 DEVICE — GENESIS II NON-POROUS TIBIAL                                    BASEPLATE SIZE 3 RIGHT
Type: IMPLANTABLE DEVICE | Site: KNEE | Status: FUNCTIONAL
Brand: GENESIS II

## 2021-03-09 DEVICE — GEN II 7.5MM RESUR PAT 32MM
Type: IMPLANTABLE DEVICE | Site: KNEE | Status: FUNCTIONAL
Brand: GENESIS II

## 2021-03-09 DEVICE — CMT BONE SIMPLEX/P FULL DOSE 10/PK: Type: IMPLANTABLE DEVICE | Site: KNEE | Status: FUNCTIONAL

## 2021-03-09 DEVICE — DEV CONTRL TISS STRATAFIX SPIRAL MNCRYL UD 3/0 PLS 60CM: Type: IMPLANTABLE DEVICE | Site: KNEE | Status: FUNCTIONAL

## 2021-03-09 DEVICE — IMPLANTABLE DEVICE: Type: IMPLANTABLE DEVICE | Site: KNEE | Status: FUNCTIONAL

## 2021-03-09 DEVICE — LEGION NARROW CRUCIATE RETAINING                                    OXINIUM SIZE 5N RIGHT
Type: IMPLANTABLE DEVICE | Site: KNEE | Status: FUNCTIONAL
Brand: LEGION

## 2021-03-09 RX ORDER — ONDANSETRON 2 MG/ML
INJECTION INTRAMUSCULAR; INTRAVENOUS AS NEEDED
Status: DISCONTINUED | OUTPATIENT
Start: 2021-03-09 | End: 2021-03-09 | Stop reason: SURG

## 2021-03-09 RX ORDER — MIDAZOLAM HYDROCHLORIDE 1 MG/ML
1 INJECTION INTRAMUSCULAR; INTRAVENOUS
Status: DISCONTINUED | OUTPATIENT
Start: 2021-03-09 | End: 2021-03-09 | Stop reason: HOSPADM

## 2021-03-09 RX ORDER — ACETAMINOPHEN 500 MG
1000 TABLET ORAL EVERY 8 HOURS
Qty: 42 TABLET | Refills: 0
Start: 2021-03-09 | End: 2021-03-16

## 2021-03-09 RX ORDER — EPHEDRINE SULFATE 50 MG/ML
5 INJECTION, SOLUTION INTRAVENOUS ONCE AS NEEDED
Status: DISCONTINUED | OUTPATIENT
Start: 2021-03-09 | End: 2021-03-09 | Stop reason: HOSPADM

## 2021-03-09 RX ORDER — OXYCODONE HYDROCHLORIDE 5 MG/1
5 TABLET ORAL EVERY 4 HOURS PRN
Status: DISCONTINUED | OUTPATIENT
Start: 2021-03-09 | End: 2021-03-09 | Stop reason: HOSPADM

## 2021-03-09 RX ORDER — ACETAMINOPHEN 500 MG
1000 TABLET ORAL ONCE
Status: COMPLETED | OUTPATIENT
Start: 2021-03-09 | End: 2021-03-09

## 2021-03-09 RX ORDER — MIDAZOLAM HYDROCHLORIDE 1 MG/ML
2 INJECTION INTRAMUSCULAR; INTRAVENOUS
Status: DISCONTINUED | OUTPATIENT
Start: 2021-03-09 | End: 2021-03-09 | Stop reason: HOSPADM

## 2021-03-09 RX ORDER — LABETALOL HYDROCHLORIDE 5 MG/ML
10 INJECTION, SOLUTION INTRAVENOUS EVERY 4 HOURS PRN
Status: DISCONTINUED | OUTPATIENT
Start: 2021-03-09 | End: 2021-03-09

## 2021-03-09 RX ORDER — OXYCODONE HYDROCHLORIDE 5 MG/1
5 TABLET ORAL EVERY 4 HOURS PRN
Qty: 40 TABLET | Refills: 0 | Status: SHIPPED | OUTPATIENT
Start: 2021-03-09 | End: 2021-03-14 | Stop reason: SDUPTHER

## 2021-03-09 RX ORDER — ONDANSETRON 4 MG/1
4 TABLET, FILM COATED ORAL EVERY 6 HOURS PRN
Status: DISCONTINUED | OUTPATIENT
Start: 2021-03-09 | End: 2021-03-09 | Stop reason: HOSPADM

## 2021-03-09 RX ORDER — FAMOTIDINE 10 MG/ML
20 INJECTION, SOLUTION INTRAVENOUS ONCE
Status: DISCONTINUED | OUTPATIENT
Start: 2021-03-09 | End: 2021-03-09 | Stop reason: HOSPADM

## 2021-03-09 RX ORDER — SODIUM CHLORIDE 0.9 % (FLUSH) 0.9 %
1-10 SYRINGE (ML) INJECTION AS NEEDED
Status: DISCONTINUED | OUTPATIENT
Start: 2021-03-09 | End: 2021-03-09 | Stop reason: HOSPADM

## 2021-03-09 RX ORDER — PREGABALIN 150 MG/1
150 CAPSULE ORAL ONCE
Status: COMPLETED | OUTPATIENT
Start: 2021-03-09 | End: 2021-03-09

## 2021-03-09 RX ORDER — LIDOCAINE HYDROCHLORIDE 10 MG/ML
0.5 INJECTION, SOLUTION EPIDURAL; INFILTRATION; INTRACAUDAL; PERINEURAL ONCE AS NEEDED
Status: COMPLETED | OUTPATIENT
Start: 2021-03-09 | End: 2021-03-09

## 2021-03-09 RX ORDER — MORPHINE SULFATE 4 MG/ML
4 INJECTION, SOLUTION INTRAMUSCULAR; INTRAVENOUS
Status: DISCONTINUED | OUTPATIENT
Start: 2021-03-09 | End: 2021-03-09 | Stop reason: HOSPADM

## 2021-03-09 RX ORDER — MAGNESIUM HYDROXIDE 1200 MG/15ML
LIQUID ORAL AS NEEDED
Status: DISCONTINUED | OUTPATIENT
Start: 2021-03-09 | End: 2021-03-09 | Stop reason: HOSPADM

## 2021-03-09 RX ORDER — LABETALOL HYDROCHLORIDE 5 MG/ML
10 INJECTION, SOLUTION INTRAVENOUS EVERY 4 HOURS PRN
Status: DISCONTINUED | OUTPATIENT
Start: 2021-03-09 | End: 2021-03-09 | Stop reason: HOSPADM

## 2021-03-09 RX ORDER — SODIUM CHLORIDE 9 MG/ML
120 INJECTION, SOLUTION INTRAVENOUS CONTINUOUS
Status: DISCONTINUED | OUTPATIENT
Start: 2021-03-09 | End: 2021-03-09 | Stop reason: HOSPADM

## 2021-03-09 RX ORDER — CEFAZOLIN SODIUM 2 G/100ML
2 INJECTION, SOLUTION INTRAVENOUS ONCE
Status: COMPLETED | OUTPATIENT
Start: 2021-03-09 | End: 2021-03-09

## 2021-03-09 RX ORDER — HYDROMORPHONE HYDROCHLORIDE 1 MG/ML
0.5 INJECTION, SOLUTION INTRAMUSCULAR; INTRAVENOUS; SUBCUTANEOUS
Status: DISCONTINUED | OUTPATIENT
Start: 2021-03-09 | End: 2021-03-09 | Stop reason: HOSPADM

## 2021-03-09 RX ORDER — ACETAMINOPHEN 500 MG
1000 TABLET ORAL EVERY 8 HOURS PRN
Status: DISCONTINUED | OUTPATIENT
Start: 2021-03-09 | End: 2021-03-09 | Stop reason: HOSPADM

## 2021-03-09 RX ORDER — ROPIVACAINE HYDROCHLORIDE 5 MG/ML
INJECTION, SOLUTION EPIDURAL; INFILTRATION; PERINEURAL AS NEEDED
Status: DISCONTINUED | OUTPATIENT
Start: 2021-03-09 | End: 2021-03-09 | Stop reason: HOSPADM

## 2021-03-09 RX ORDER — MELOXICAM 15 MG/1
15 TABLET ORAL ONCE
Status: COMPLETED | OUTPATIENT
Start: 2021-03-09 | End: 2021-03-09

## 2021-03-09 RX ORDER — NALOXONE HCL 0.4 MG/ML
0.1 VIAL (ML) INJECTION
Status: DISCONTINUED | OUTPATIENT
Start: 2021-03-09 | End: 2021-03-09 | Stop reason: SDUPTHER

## 2021-03-09 RX ORDER — NICOTINE POLACRILEX 4 MG
15 LOZENGE BUCCAL
Status: DISCONTINUED | OUTPATIENT
Start: 2021-03-09 | End: 2021-03-09 | Stop reason: HOSPADM

## 2021-03-09 RX ORDER — FAMOTIDINE 20 MG/1
20 TABLET, FILM COATED ORAL ONCE
Status: COMPLETED | OUTPATIENT
Start: 2021-03-09 | End: 2021-03-09

## 2021-03-09 RX ORDER — PROMETHAZINE HYDROCHLORIDE 25 MG/1
25 TABLET ORAL ONCE AS NEEDED
Status: DISCONTINUED | OUTPATIENT
Start: 2021-03-09 | End: 2021-03-09 | Stop reason: HOSPADM

## 2021-03-09 RX ORDER — DESVENLAFAXINE SUCCINATE 50 MG/1
100 TABLET, EXTENDED RELEASE ORAL NIGHTLY
Status: DISCONTINUED | OUTPATIENT
Start: 2021-03-09 | End: 2021-03-09 | Stop reason: HOSPADM

## 2021-03-09 RX ORDER — CEFAZOLIN SODIUM 2 G/100ML
2 INJECTION, SOLUTION INTRAVENOUS EVERY 8 HOURS
Status: DISCONTINUED | OUTPATIENT
Start: 2021-03-09 | End: 2021-03-09 | Stop reason: HOSPADM

## 2021-03-09 RX ORDER — MELOXICAM 7.5 MG/1
15 TABLET ORAL DAILY
Status: DISCONTINUED | OUTPATIENT
Start: 2021-03-10 | End: 2021-03-09 | Stop reason: HOSPADM

## 2021-03-09 RX ORDER — BUPIVACAINE HYDROCHLORIDE 5 MG/ML
INJECTION, SOLUTION PERINEURAL
Status: COMPLETED | OUTPATIENT
Start: 2021-03-09 | End: 2021-03-09

## 2021-03-09 RX ORDER — PROPRANOLOL HCL 60 MG
60 CAPSULE, EXTENDED RELEASE 24HR ORAL DAILY
Status: DISCONTINUED | OUTPATIENT
Start: 2021-03-09 | End: 2021-03-09

## 2021-03-09 RX ORDER — BUPIVACAINE HYDROCHLORIDE 2.5 MG/ML
INJECTION, SOLUTION EPIDURAL; INFILTRATION; INTRACAUDAL
Status: DISCONTINUED | OUTPATIENT
Start: 2021-03-09 | End: 2021-03-09 | Stop reason: SURG

## 2021-03-09 RX ORDER — PRAVASTATIN SODIUM 40 MG
40 TABLET ORAL NIGHTLY
Status: DISCONTINUED | OUTPATIENT
Start: 2021-03-09 | End: 2021-03-09 | Stop reason: HOSPADM

## 2021-03-09 RX ORDER — EPHEDRINE SULFATE 50 MG/ML
INJECTION, SOLUTION INTRAVENOUS AS NEEDED
Status: DISCONTINUED | OUTPATIENT
Start: 2021-03-09 | End: 2021-03-09 | Stop reason: SURG

## 2021-03-09 RX ORDER — MELOXICAM 15 MG/1
15 TABLET ORAL DAILY
Qty: 15 TABLET | Refills: 0 | Status: SHIPPED | OUTPATIENT
Start: 2021-03-09 | End: 2021-03-24

## 2021-03-09 RX ORDER — PROPRANOLOL HCL 60 MG
60 CAPSULE, EXTENDED RELEASE 24HR ORAL NIGHTLY
Status: DISCONTINUED | OUTPATIENT
Start: 2021-03-09 | End: 2021-03-09 | Stop reason: HOSPADM

## 2021-03-09 RX ORDER — SODIUM CHLORIDE 0.9 % (FLUSH) 0.9 %
3 SYRINGE (ML) INJECTION EVERY 12 HOURS SCHEDULED
Status: DISCONTINUED | OUTPATIENT
Start: 2021-03-09 | End: 2021-03-09 | Stop reason: HOSPADM

## 2021-03-09 RX ORDER — NALOXONE HCL 0.4 MG/ML
0.4 VIAL (ML) INJECTION
Status: DISCONTINUED | OUTPATIENT
Start: 2021-03-09 | End: 2021-03-09 | Stop reason: HOSPADM

## 2021-03-09 RX ORDER — ONDANSETRON 2 MG/ML
4 INJECTION INTRAMUSCULAR; INTRAVENOUS EVERY 6 HOURS PRN
Status: DISCONTINUED | OUTPATIENT
Start: 2021-03-09 | End: 2021-03-09 | Stop reason: HOSPADM

## 2021-03-09 RX ORDER — ASPIRIN 325 MG
325 TABLET, DELAYED RELEASE (ENTERIC COATED) ORAL DAILY
Status: DISCONTINUED | OUTPATIENT
Start: 2021-03-10 | End: 2021-03-09 | Stop reason: HOSPADM

## 2021-03-09 RX ORDER — SODIUM CHLORIDE, SODIUM LACTATE, POTASSIUM CHLORIDE, CALCIUM CHLORIDE 600; 310; 30; 20 MG/100ML; MG/100ML; MG/100ML; MG/100ML
9 INJECTION, SOLUTION INTRAVENOUS CONTINUOUS
Status: DISCONTINUED | OUTPATIENT
Start: 2021-03-09 | End: 2021-03-09

## 2021-03-09 RX ORDER — ASPIRIN 325 MG
325 TABLET, DELAYED RELEASE (ENTERIC COATED) ORAL DAILY
Qty: 30 TABLET | Refills: 0 | Status: SHIPPED | OUTPATIENT
Start: 2021-03-09 | End: 2021-04-08

## 2021-03-09 RX ORDER — FENTANYL CITRATE 50 UG/ML
50 INJECTION, SOLUTION INTRAMUSCULAR; INTRAVENOUS
Status: DISCONTINUED | OUTPATIENT
Start: 2021-03-09 | End: 2021-03-09 | Stop reason: HOSPADM

## 2021-03-09 RX ORDER — DOCUSATE SODIUM 100 MG/1
100 CAPSULE, LIQUID FILLED ORAL 2 TIMES DAILY
Qty: 30 CAPSULE | Refills: 0 | Status: SHIPPED | OUTPATIENT
Start: 2021-03-09 | End: 2021-03-24

## 2021-03-09 RX ORDER — DEXAMETHASONE SODIUM PHOSPHATE 4 MG/ML
INJECTION, SOLUTION INTRA-ARTICULAR; INTRALESIONAL; INTRAMUSCULAR; INTRAVENOUS; SOFT TISSUE AS NEEDED
Status: DISCONTINUED | OUTPATIENT
Start: 2021-03-09 | End: 2021-03-09 | Stop reason: SURG

## 2021-03-09 RX ORDER — SODIUM CHLORIDE 0.9 % (FLUSH) 0.9 %
10 SYRINGE (ML) INJECTION AS NEEDED
Status: DISCONTINUED | OUTPATIENT
Start: 2021-03-09 | End: 2021-03-09 | Stop reason: HOSPADM

## 2021-03-09 RX ORDER — SODIUM CHLORIDE 0.9 % (FLUSH) 0.9 %
10 SYRINGE (ML) INJECTION EVERY 12 HOURS SCHEDULED
Status: DISCONTINUED | OUTPATIENT
Start: 2021-03-09 | End: 2021-03-09 | Stop reason: HOSPADM

## 2021-03-09 RX ORDER — DESVENLAFAXINE SUCCINATE 50 MG/1
100 TABLET, EXTENDED RELEASE ORAL DAILY
Status: DISCONTINUED | OUTPATIENT
Start: 2021-03-09 | End: 2021-03-09

## 2021-03-09 RX ORDER — LOSARTAN POTASSIUM 50 MG/1
50 TABLET ORAL DAILY
Status: DISCONTINUED | OUTPATIENT
Start: 2021-03-09 | End: 2021-03-09 | Stop reason: HOSPADM

## 2021-03-09 RX ORDER — DEXTROSE MONOHYDRATE 25 G/50ML
25 INJECTION, SOLUTION INTRAVENOUS
Status: DISCONTINUED | OUTPATIENT
Start: 2021-03-09 | End: 2021-03-09 | Stop reason: HOSPADM

## 2021-03-09 RX ADMIN — ROPIVACAINE HYDROCHLORIDE 10 ML/HR: 5 INJECTION, SOLUTION EPIDURAL; INFILTRATION; PERINEURAL at 12:09

## 2021-03-09 RX ADMIN — ACETAMINOPHEN 1000 MG: 500 TABLET ORAL at 08:51

## 2021-03-09 RX ADMIN — ACETAMINOPHEN 1000 MG: 500 TABLET ORAL at 17:00

## 2021-03-09 RX ADMIN — FAMOTIDINE 20 MG: 20 TABLET ORAL at 08:50

## 2021-03-09 RX ADMIN — BUPIVACAINE HYDROCHLORIDE 20 ML: 2.5 INJECTION, SOLUTION EPIDURAL; INFILTRATION; INTRACAUDAL at 12:15

## 2021-03-09 RX ADMIN — Medication 1000 MG: at 11:35

## 2021-03-09 RX ADMIN — Medication 1000 MG: at 10:04

## 2021-03-09 RX ADMIN — PROPOFOL 60 MCG/KG/MIN: 10 INJECTION, EMULSION INTRAVENOUS at 10:15

## 2021-03-09 RX ADMIN — EPHEDRINE SULFATE 10 MG: 50 INJECTION, SOLUTION INTRAVENOUS at 11:03

## 2021-03-09 RX ADMIN — PREGABALIN 150 MG: 150 CAPSULE ORAL at 08:51

## 2021-03-09 RX ADMIN — CEFAZOLIN 2 G: 10 INJECTION, POWDER, FOR SOLUTION INTRAVENOUS at 17:32

## 2021-03-09 RX ADMIN — CEFAZOLIN SODIUM 2 G: 2 INJECTION, SOLUTION INTRAVENOUS at 10:04

## 2021-03-09 RX ADMIN — EPHEDRINE SULFATE 10 MG: 50 INJECTION, SOLUTION INTRAVENOUS at 10:50

## 2021-03-09 RX ADMIN — LIDOCAINE HYDROCHLORIDE 0.5 ML: 10 INJECTION, SOLUTION EPIDURAL; INFILTRATION; INTRACAUDAL; PERINEURAL at 08:50

## 2021-03-09 RX ADMIN — DEXAMETHASONE SODIUM PHOSPHATE 8 MG: 4 INJECTION, SOLUTION INTRA-ARTICULAR; INTRALESIONAL; INTRAMUSCULAR; INTRAVENOUS; SOFT TISSUE at 10:15

## 2021-03-09 RX ADMIN — BUPIVACAINE HYDROCHLORIDE 1.8 ML: 5 INJECTION, SOLUTION PERINEURAL at 10:10

## 2021-03-09 RX ADMIN — SODIUM CHLORIDE, POTASSIUM CHLORIDE, SODIUM LACTATE AND CALCIUM CHLORIDE 9 ML/HR: 600; 310; 30; 20 INJECTION, SOLUTION INTRAVENOUS at 08:50

## 2021-03-09 RX ADMIN — EPHEDRINE SULFATE 10 MG: 50 INJECTION, SOLUTION INTRAVENOUS at 10:58

## 2021-03-09 RX ADMIN — OXYCODONE 5 MG: 5 TABLET ORAL at 18:06

## 2021-03-09 RX ADMIN — ONDANSETRON 4 MG: 2 INJECTION INTRAMUSCULAR; INTRAVENOUS at 11:41

## 2021-03-09 RX ADMIN — MELOXICAM 15 MG: 15 TABLET ORAL at 08:51

## 2021-03-09 NOTE — ANESTHESIA POSTPROCEDURE EVALUATION
Patient: Yamila Neff    Procedure Summary     Date: 03/09/21 [facility]    Anesthesia Start: 1004 Anesthesia Stop: 1213    Procedure: RIGHT TOTAL KNEE ARTHROPLASTY (Right Knee) Diagnosis:       Primary osteoarthritis of right knee      (Primary osteoarthritis of right knee [M17.11])    Surgeons: Mateo Keith MD Provider: Micah Higginbotham MD    Anesthesia Type: MAC, regional, spinal ASA Status: 3          Anesthesia Type: MAC, regional, spinal    Vitals  Vitals Value Taken Time   /70 03/09/21 1210   Temp 97.8 °F (36.6 °C) 03/09/21 1209   Pulse 58 03/09/21 1212   Resp 16 03/09/21 1209   SpO2 92 % 03/09/21 1212   Vitals shown include unvalidated device data.        Post Anesthesia Care and Evaluation    Patient location during evaluation: PACU  Patient participation: complete - patient participated  Level of consciousness: awake and alert  Pain management: adequate  Airway patency: patent  Anesthetic complications: No anesthetic complications  PONV Status: none  Cardiovascular status: hemodynamically stable and acceptable  Respiratory status: nonlabored ventilation, acceptable and nasal cannula  Hydration status: acceptable

## 2021-03-09 NOTE — ANESTHESIA PREPROCEDURE EVALUATION
Anesthesia Evaluation     Patient summary reviewed and Nursing notes reviewed   no history of anesthetic complications:  NPO Solid Status: > 8 hours  NPO Liquid Status: > 2 hours           Airway   Mallampati: II  TM distance: >3 FB  Neck ROM: full  No difficulty expected  Dental - normal exam     Pulmonary - normal exam    breath sounds clear to auscultation  (+) sleep apnea on CPAP,   Cardiovascular - normal exam    ECG reviewed  Rhythm: regular  Rate: normal    (+) hypertension,     ROS comment: 2016 Echo:  · Left ventricular function is normal. Estimated EF = 50%.  · Left atrial cavity size is mildly dilated.    Neuro/Psych- negative ROS  GI/Hepatic/Renal/Endo    (+) morbid obesity,  liver disease fatty liver disease, renal disease (Congenital single kidney),     Musculoskeletal     Abdominal    Substance History      OB/GYN          Other   arthritis,                      Anesthesia Plan    ASA 3     MAC, regional and spinal   (Right adductor canal block/catheter with arrow pump for post-operative analgesia per request of Dr. Keith)  intravenous induction     Anesthetic plan, all risks, benefits, and alternatives have been provided, discussed and informed consent has been obtained with: patient.    Plan discussed with CRNA.

## 2021-03-09 NOTE — H&P
Patient Name: aYmila Neff  MRN: [id number]  : 1960  DOS: 3/9/2021    Attending: Mateo Keith MD    Primary Care Provider: Daly Archibald PA-C      Chief complaint: Right knee Pain.    Subjective   Patient is a pleasant 60 y.o. female presented for scheduled surgery by Dr. Keith.  Per his note (The patient is a 60 y.o. female with debilitating right knee pain secondary to osteoarthritis that failed to improve in spite of conservative treatment .  Options have been discussed at length with the patient and the patient has had an extended course of conservative treatment without long-term benefit. The patient has reached the point where the patient desires total knee arthroplasty surgery and understands the risks, benefits, and alternatives. Consent was obtained. Please see my office notes for details with regard to preoperative counseling and operative rationale.  (.    Patient underwent right total knee arthroplasty under spinal anesthesia, tolerated surgery well.  Adductor canal nerve block catheter was placed by acute pain service.  She is admitted for the management.    Seen in PACU and subsequently in her room she is doing very well.  Spinal anesthesia effects are subsiding.  No complains of nausea, vomiting, or shortness of breath.     He has no history of DVT or PE.    She lives alone but has 3 daughters who live in the same County.  Patient works as an RN at [employer].     Allergies   Allergen Reactions   • Celexa [Citalopram] Other (See Comments)     Jittery    • Lisinopril Cough       Meds:  Medications Prior to Admission   Medication Sig Dispense Refill Last Dose   • amitriptyline (ELAVIL) 10 MG tablet Take 1 tablet by mouth Every Night. 90 tablet 1 3/8/2021   • Chlorhexidine Gluconate 4 % solution Shower with solution as directed for 5 days prior to surgery 237 mL 0 3/9/2021   • cholecalciferol (VITAMIN D3) 1000 units tablet Take 2,000 Units by mouth Daily.    3/8/2021 at 2   • clobetasol (TEMOVATE) 0.05 % external solution Apply twice daily to itchy, scaly areas on scalp. 50 mL 11 Past Month   • desvenlafaxine (Pristiq) 100 MG 24 hr tablet Take 1 tablet by mouth Daily. 90 tablet 1 3/8/2021   • estradiol (Evamist) 1.53 MG/SPRAY transdermal spray Place 1 spray topically to the arm as directed by provider 2 (two) times a day. 24.3 mL 3 3/8/2021   • hydrocortisone 2.5 % cream Apply every evening to affected areas on abdomen until resolved. 28.35 g 11 Past Week   • ketoconazole (NIZORAL) 2 % shampoo Use as a shampoo to scalp and face 3 times a week. Leave in for 5 minutes and rinse. 120 mL 11 3/9/2021   • losartan (Cozaar) 50 MG tablet Take 1 tablet by mouth Daily. 90 tablet 1 3/8/2021   • Magnesium 250 MG tablet Take 1 tablet by mouth Daily.   3/8/2021   • metFORMIN ER (GLUCOPHAGE-XR) 500 MG 24 hr tablet Take 2 tablets by mouth 2 (Two) Times a Day. 360 tablet 1 3/9/2021   • mupirocin (BACTROBAN) 2 % ointment Apply to the inside of each nostril  as directed by provider 2 (Two) Times a Day. 22 g 0 3/9/2021   • nystatin-triamcinolone (MYCOLOG II) 195479-5.1 UNIT/GM-% cream Apply 1 application topically to the appropriate area sparingly as directed 3 (Three) Times a Day As Needed. 15 g 5 Past Month   • omega-3 acid ethyl esters (LOVAZA) 1 g capsule Take 2 capsules by mouth 2 (Two) Times a Day. 360 capsule 1 Past Month   • pravastatin (PRAVACHOL) 40 MG tablet Take 1 tablet by mouth Every Night. 90 tablet 1 3/8/2021   • Probiotic Product (PROBIOTIC DAILY PO) Take  by mouth Daily.   3/8/2021   • propranolol LA (INDERAL LA) 60 MG 24 hr capsule Take 1 capsule by mouth Daily. 90 capsule 1 3/8/2021 at 2100   • Vitamin E 400 units tablet Take 800 Units by mouth Daily.   3/8/2021   • diclofenac (VOLTAREN) 1 % gel gel Apply 4 g topically to the appropriate area as directed 2 (Two) Times a Day. 100 g 5 More than a month   • ketoconazole (NIZORAL) 2 % cream Apply every morning to underarms  until resolved. 30 g 11        Past Medical History:   Diagnosis Date   • Anemia    • Arthritis    • Carpal tunnel syndrome 2012   • Degenerative joint disease    • Depression    • Depression    • Elevated cholesterol    • Elevated liver enzymes    • Fatty liver    • Hypertension    • Kidney disorder     one kidney   • Palpitations    • Polycystic ovaries    • Psoriasis    • PVC (premature ventricular contraction)     occasional pvc's   • Sleep apnea     cpap at home   • Wears glasses      Past Surgical History:   Procedure Laterality Date   • BREAST BIOPSY     • BREAST CYST EXCISION Right 2013    BENIGN, INTRADUCTAL PAPILLOMA (PER PT)   •  SECTION      x 3   • CHOLECYSTECTOMY     • COLONOSCOPY     • HYSTERECTOMY      complete   • JOINT REPLACEMENT Left     left knee   • OOPHORECTOMY     • TUBAL ABDOMINAL LIGATION       Family History   Problem Relation Age of Onset   • Diabetes Mother    • Lymphoma Mother    • Hypertension Mother    • No Known Problems Father    • Hypertension Brother    • Hypertension Brother    • Heart disease Maternal Grandmother    • Heart disease Maternal Grandfather    • Heart attack Maternal Grandfather    • Breast cancer Neg Hx    • Ovarian cancer Neg Hx      Social History     Tobacco Use   • Smoking status: Never Smoker   • Smokeless tobacco: Never Used   Substance Use Topics   • Alcohol use: No   • Drug use: No       Review of Systems  Pertinent items are noted in HPI, all other systems reviewed and negative    Vital Signs  /70   Pulse 58   Temp 97.8 °F (36.6 °C) (Temporal)   Resp 16   SpO2 97%     Physical Exam:    General Appearance:    Alert, cooperative, in no acute distress   Head:    Normocephalic, without obvious abnormality, atraumatic   Eyes:            Lids and lashes normal, conjunctivae and sclerae normal, no   icterus, no pallor, corneas clear    Ears:    Ears appear intact with no abnormalities noted   Throat:   No oral lesions, no  thrush, oral mucosa moist   Neck:   No adenopathy, supple, trachea midline, no thyromegaly         Lungs:     Clear to auscultation,respirations regular, even and                   unlabored    Heart:    Regular rhythm and normal rate, normal S1 and S2, no       murmur, no gallop   Abdomen:     Normal bowel sounds, no masses, no organomegaly, soft        non-tender, non-distended, no guarding, no rebound                 tenderness   Genitalia:    Deferred   Extremities:  Right LE, CDI dressing on knee, PNB cath present.    Pulses:   Pulses palpable and equal bilaterally   Skin:   No bleeding, bruising or rash   Neurologic:   Cranial nerves 2 - 12 grossly intact, starting to move her lower extremities with subsiding effects of spinal anesthesia when I saw her      I reviewed the patient's new clinical results.             Invalid input(s): NEUTOPHILPCT,  EOSPCT        Invalid input(s): LABALBU, PROT  Lab Results   Component Value Date    HGBA1C 5.80 (H) 02/25/2021     Results for JESSICA PAREDES (MRN [id number]) as of 3/9/2021 12:26   Ref. Range 2/25/2021 14:07   Glucose Latest Ref Range: 65 - 99 mg/dL 85   Sodium Latest Ref Range: 136 - 145 mmol/L 138   Potassium Latest Ref Range: 3.5 - 5.2 mmol/L 4.5   CO2 Latest Ref Range: 22.0 - 29.0 mmol/L 25.0   Chloride Latest Ref Range: 98 - 107 mmol/L 100   Anion Gap Latest Ref Range: 5.0 - 15.0 mmol/L 13.0   Creatinine Latest Ref Range: 0.57 - 1.00 mg/dL 0.95   BUN Latest Ref Range: 6 - 20 mg/dL 20   BUN/Creatinine Ratio Latest Ref Range: 7.0 - 25.0  21.1   Calcium Latest Ref Range: 8.6 - 10.5 mg/dL 10.2   eGFR Non  Am Latest Ref Range: >60 mL/min/1.73 60 (L)   Hemoglobin A1C Latest Ref Range: 4.80 - 5.60 % 5.80 (H)   C-Reactive Protein Latest Ref Range: 0.00 - 0.50 mg/dL <0.30   Protime Latest Ref Range: 11.5 - 14.0 Seconds 12.9   INR Latest Ref Range: 0.85 - 1.16  1.00   PTT Latest Ref Range: 24.0 - 37.0 seconds 31.1   WBC Latest Ref Range: 3.40 - 10.80  10*3/mm3 6.09   RBC Latest Ref Range: 3.77 - 5.28 10*6/mm3 4.70   Hemoglobin Latest Ref Range: 12.0 - 15.9 g/dL 13.7   Hematocrit Latest Ref Range: 34.0 - 46.6 % 41.3   RDW Latest Ref Range: 12.3 - 15.4 % 11.9 (L)   MCV Latest Ref Range: 79.0 - 97.0 fL 87.9   MCH Latest Ref Range: 26.6 - 33.0 pg 29.1   MCHC Latest Ref Range: 31.5 - 35.7 g/dL 33.2   MPV Latest Ref Range: 6.0 - 12.0 fL 9.7   Platelets Latest Ref Range: 140 - 450 10*3/mm3 306         Assessment and Plan:       S/P total knee arthroplasty, right    Primary osteoarthritis of right knee    Severe obstructive sleep apnea    Depression    Anxiety    Obesity    Prediabetes    NGOC on CPAP      Plan  1. PT/OT,  Weight bearing as tolerated right LE  2. Pain control-prns, ACB cath with ropivacaine infusion.  3. IS-encourage  4. DVT proph- Mechanicals and aspirin  5. Bowel regimen  6. Resume home medications as appropriate.  Depending on whether patient goes home today or stays overnight we will resume medications during her stay.  7. Monitor post-op labs  8. DC planning for home    Patient is very motivated to work with physical therapy, achieve good pain control, plantar nerve block catheter, and if all goals achieved to go home today.    I reviewed with her medications for pain control and DVT prophylaxis and bowel regimen following discharge.    (Record review indicates patient ambulated 360 feet with a rolling walker and contact-guard assist.  Ascend and descend 1 step.  Was felt appropriate for home discharge.  Outpatient PT has been arranged as well.)      Dragon disclaimer:  Part of this encounter note is an electronic transcription/translation of spoken language to printed text. The electronic translation of spoken language may permit erroneous, or at times, nonsensical words or phrases to be inadvertently transcribed; Although I have reviewed the note for such errors, some may still exist.    Mel Pandya MD  03/09/21  12:26 EST

## 2021-03-09 NOTE — PLAN OF CARE
Problem: Adult Inpatient Plan of Care  Goal: Plan of Care Review  Flowsheets (Taken 3/9/2021 1520)  Progress: improving  Plan of Care Reviewed With:   patient   daughter  Outcome Summary: PT eval complete. Pt ambulated 360 feet using RW, CGA, and one person to manage equipment. Pt ascended/descended 1 step using RW, backwards technique, and CGA for safety. Gait/stair training limited by fatigue. Bed mobility performed with supervision and STS with CGA. No knee buckling noted with ambulation. Pt IND with SLR. Will assess R knee AROM POD#1 if pt does not d/c tonight. Reviewed HEP and knee precautions via handout. Educated on safe car transfers. PADD score = 9. ADLs assessed, pt does not require OT eval prior to d/c tonight. Functionally, pt safe to d/c home with assist today from a PT perspective. Recommend OPPT.   Goal Outcome Evaluation:  Plan of Care Reviewed With: patient, daughter  Progress: improving  Outcome Summary: PT eval complete. Pt ambulated 360 feet using RW, CGA, and one person to manage equipment. Pt ascended/descended 1 step using RW, backwards technique, and CGA for safety. Gait/stair training limited by fatigue. Bed mobility performed with supervision and STS with CGA. No knee buckling noted with ambulation. Pt IND with SLR. Will assess R knee AROM POD#1 if pt does not d/c tonight. Reviewed HEP and knee precautions via handout. Educated on safe car transfers. PADD score = 9. ADLs assessed, pt does not require OT eval prior to d/c tonight. Functionally, pt safe to d/c home with assist today from a PT perspective. Recommend OPPT.

## 2021-03-09 NOTE — OP NOTE
DATE OF PROCEDURE: 21    PREOPERATIVE DIAGNOSIS: right knee arthritis      POSTOPERATIVE DIAGNOSIS:  right knee arthritis    PROCEDURES PERFORMED:   right total knee arthroplasty with [employer] components (# 5 narrow cruciate retaining femur, # 3 tibia, 10 mm polyethylene, with 32 mm three peg patella).     SURGEON: Mateo Keith MD    ASSISTANT: Vane Sagastume PA-C  (Vane Sagastume PA-C was present and necessary for positioning, draping, retraction, instrumentation and closure.)    SPECIMENS: None    IMPLANTS:   Implants     Implant    Knee Replacement - Implanted      As of 2018     Status: Implanted                  Cmt Bone Simplex/P Full Dose 10/Pk - [id number] - Implanted   (Right) Knee    Inventory item: CMT BONE SIMPLEX/P FULL DOSE 10/PK Model/Cat number: [id number]    : [employer] Lot number: [id number]    As of 3/9/2021     Status: Implanted                  Sut Contrl Tiss Stratafix Symm Pds Plus Viktor Ct-1 45cm - [id number] - Implanted   (Right) Knee    Inventory item: SUT CONTRL TISS STRATAFIX SYMM PDS PLUS VIKTOR CT-1 45CM Model/Cat number: [id number]    : [employer]  DIV OF J AND J      As of 3/9/2021     Status: Implanted                  Sut Contrl Tiss Stratafix Spiral Mncryl Ud 3/0 Pls 60cm - [id number] - Implanted   (Right) Knee    Inventory item: SUT CONTRL TISS STRATAFIX SPIRAL MNCRYL UD 3/0 PLS 60CM Model/Cat number: [id number]    : [employer] ENDO SURGERY  DIV OF J AND J      As of 3/9/2021     Status: Implanted                  Base Tib/Kn Gen2 Nonpor Ti Sz3 Rt - [id number] - Implanted   (Right) Knee    Inventory item: BASE TIB/KN GEN2 NONPOR TI SZ3 RT Model/Cat number: [id number]    : [employer] Lot number: [id number]    As of 3/9/2021     Status: Implanted                  Comp Fem Legion Oxinium Cr Nrw Sz5n Rt - [id number] - Implanted   (Right) Knee    Inventory item: COMP FEM LEGION OXINIUM CR NRW SZ5N RT Model/Cat number:  [id number]    : [employer] Lot number: [id number]    As of 3/9/2021     Status: Implanted                  Patella Resrf Gen2 7.5x32mm - [id number] - Implanted   (Right) Knee    Inventory item: PATELLA RESRF GEN2 7.5X32MM Model/Cat number: [id number]    : [employer] Lot number: [id number]    As of 3/9/2021     Status: Implanted                  Insrt Art Legion Cr Hf Xlpe Sz3to4 10mm - [id number] - Implanted   (Right) Knee    Inventory item: INSRT ART LEGION CR HF XLPE SZ3TO4 10MM Model/Cat number: [id number]    : [employer] Lot number: [id number]    As of 3/9/2021     Status: Implanted                         ANESTHESIA:  Spinal    STAFF:  Circulator: Gibson Drake RN; Erin eRdding RN  Physician Assistant: Vane Sagastume PA-C  Scrub Person: Dorene Nichols; Keke Thibodeaux  Vendor Representative: Ralf Grover  Nursing Assistant: Diana Velasquez PCT; Mondragon, Arturo    TOURNIQUET TIME: 20 minutes     ESTIMATED BLOOD LOSS: 100ml     COMPLICATIONS: None    PREOPERATIVE ANTIBIOTICS: Ancef 2G    INDICATIONS: The patient is a 60 y.o. female with debilitating right knee pain secondary to osteoarthritis that failed to improve in spite of conservative treatment .  Options have been discussed at length with the patient and the patient has had an extended course of conservative treatment without long-term benefit. The patient has reached the point where the patient desires total knee arthroplasty surgery and understands the risks, benefits, and alternatives. Consent was obtained. Please see my office notes for details with regard to preoperative counseling and operative rationale.     DESCRIPTION OF PROCEDURE: The patient was positively identified in the preoperative holding area and brought to the operating suite and placed in a supine position. After adequate spinal anesthetic had been achieved, the right lower extremity was prepped and draped in the usual  sterile fashion.  After application of a tourniquet to the right upper thigh, which was used during the procedure for a total 20 minutes during the cementation process only. Landmarks of the knee were identified and timeout procedure was performed to confirm the operative site, as well as other parameters. Following the sterile prep and drape, a skin incision was made just off the medial aspect of midline for a medial parapatellar approach. Following a sharp skin incision, dissection was carried down to the level of the extensor mechanism and a medial parapatellar arthrotomy was made and the patella was tucked into the lateral gutter. Description of arthritis: Bone-on-bone contact of the apartment, tricompartmental osteophytes, advanced patellofemoral degeneration, with varus alignment.  The knee was adequately exposed and a distal femoral cut was made with an intramedullary guide. This was subsequently sized for a # 5 narrow implant and anterior, posterior chamfer cuts made. The menisci removed both medially and laterally.  The ACL was transected and the tibia was subluxed anteriorly. Proximal tibia cut was made with the external alignment guide. The cut was noted to be perpendicular to the tibial axis, with symmetric flexion and extension gaps. Therefore, final tibial preparations to accommodate a # 3 tibia were made, followed by final femoral preparations. With a trial 10 mm insert in place, full flexion and extension was noted with no instability. The patella was then prepared for a 32 mm three peg patella which had excellent tracking. All trial components were removed and final components were cemented in place with namely a # 3 tibia, # 5 narrow cruciate retaining femur, and a 32 mm three peg patella with a trial 11 mm insert for cement compression. All the excess cement was removed from the bone implant interface and allowed to harden. Tourniquet was deflated. Hemostasis was obtained with electrocautery.  There was no brisk bleeding noted in the popliteal fossa in particular. Therefore, the knee was copiously irrigated as it was between major steps, and the final 10 mm insert was placed as this was deemed appropriate for the patient's anatomy with full flexion and extension and no instability and attention was then directed towards closure. The medial parapatellar arthrotomy was closed with #1 Vicryl in an interrupted figure-of-eight fashion in 4 strategic locations followed by oversewing this from proximal to distal with a #1 StrataFix symmetric, which nicely sealed the joint, followed by closure of the deep fascial layer with #1 Vicryl in a buried interrupted fashion, followed by closure of the subcutaneous layer with 2-0 Vicryl and the skin with 3-0 Stratafix in a running subcuticular fashion.  Adhesive wound closure dressing was applied followed by a sterile dressing with 4 x 4's, abdominal pad, soft roll and Ace wrap. The patient tolerated the procedure well and was brought to the recovery room in good condition.     PLAN:  1.  The patient will begin early range of motion and weight-bearing per the post total knee arthroplasty protocol.   2.  I anticipate brief hospitalization for initial rehabilitation and pain control followed by continued rehabilitation in a home health or outpatient physical therapy setting.  Patient may be a candidate for same-day discharge if she is cleared medically, by physical therapy, and pain is controlled.  Follow-up with me in 3 weeks as planned.   3.  Postoperative medical management with Dr. Pandya.  4.  Aspirin will be utilized for DVT prophylaxis.       Mateo Keith MD  03/09/21  11:43 EST

## 2021-03-09 NOTE — ANESTHESIA PROCEDURE NOTES
Spinal Block      Patient reassessed immediately prior to procedure    Patient location during procedure: OR  Indication:at surgeon's request  Performed By  CRNA: Ezekiel Bloom CRNA  Preanesthetic Checklist  Completed: patient identified, IV checked, site marked, risks and benefits discussed, surgical consent, monitors and equipment checked, pre-op evaluation and timeout performed  Spinal Block Prep:  Patient Position:sitting  Sterile Tech:cap, gloves, sterile barriers and mask  Prep:Chloraprep  Patient Monitoring:blood pressure monitoring, continuous pulse oximetry and EKG  Spinal Block Procedure  Approach:midline  Guidance:landmark technique and palpation technique  Location:L4-L5  Needle Type:Sprotte  Needle Gauge:25 G  Placement of Spinal needle event:cerebrospinal fluid aspirated  Paresthesia: no  Fluid Appearance:clear  Medications: bupivacaine (MARCAINE) 0.5 % injection, 1.8 mL   Post Assessment  Patient Tolerance:patient tolerated the procedure well with no apparent complications  Complications no  Additional Notes  Procedure:  Pt assisted to sitting position, with legs in position of comfort over side of bed.  Pt. instructed in optimal spine presentation, the spine was prepped/ Draped and the skin at insertion site was anesthetized with 1% Lidocaine 2 ml.  The spinal needle was then advanced until CSF flow was obtained and LA was injected:

## 2021-03-09 NOTE — PROGRESS NOTES
Case Management Discharge Note      Final Note: Per Mary with [facility], there should be a [facility] PT who can see patient on Thursday, 3/11/21. Also made referral to [facility] PT in [place]. They will call patient at home to schedule the first appointment and are aware that patient will hopefully first do PT at home. Updated patient in the room. Orders for both HH and outpatient PT have been placed in [facility]. Patient will DC home today.         Selected Continued Care - Admitted Since 3/9/2021     Destination    No services have been selected for the patient.              Durable Medical Equipment    No services have been selected for the patient.              Dialysis/Infusion    No services have been selected for the patient.              Home Medical Care Coordination complete    Service Provider Selected Services Address Phone Fax Patient Preferred    [facility]  Home Health Services [address] [facility] [id number] [phone] --          Therapy Coordination complete    Service Provider Selected Services Address Phone Fax Patient Preferred    [facility] [place]  Outpatient Physical Therapy [address] [place] [id number] [phone] --          Community Resources    No services have been selected for the patient.                       Final Discharge Disposition Code: 06 - home with home health care

## 2021-03-09 NOTE — THERAPY EVALUATION
Patient Name: Yamila Neff  : 1960    MRN: [id number]                              Today's Date: 3/9/2021       Admit Date: 3/9/2021    Visit Dx:     ICD-10-CM ICD-9-CM   1. S/P total knee arthroplasty, right  Z96.651 V43.65   2. Primary osteoarthritis of right knee  M17.11 715.16     Patient Active Problem List   Diagnosis   • Severe obstructive sleep apnea   • Degenerative joint disease   • Depression   • Psoriasis   • Single kidney   • Hepatic steatosis   • Intractable migraine without aura and without status migrainosus   • Mixed hyperlipidemia   • Anxiety   • Primary insomnia   • Impaired glucose tolerance   • Iron deficiency   • Class 3 severe obesity due to excess calories with serious comorbidity and body mass index (BMI) of 40.0 to 44.9 in adult (CMS/HCC)   • Primary osteoarthritis involving multiple joints   • Primary osteoarthritis of right knee   • Obesity   • S/P total knee arthroplasty, right   • Prediabetes   • NGOC on CPAP     Past Medical History:   Diagnosis Date   • Anemia    • Arthritis    • Carpal tunnel syndrome 2012   • Degenerative joint disease    • Depression    • Depression    • Elevated cholesterol    • Elevated liver enzymes    • Fatty liver    • Hypertension    • Kidney disorder     one kidney   • Palpitations    • Polycystic ovaries    • Psoriasis    • PVC (premature ventricular contraction)     occasional pvc's   • Sleep apnea     cpap at home   • Wears glasses      Past Surgical History:   Procedure Laterality Date   • BREAST BIOPSY     • BREAST CYST EXCISION Right     BENIGN, INTRADUCTAL PAPILLOMA (PER PT)   •  SECTION      x 3   • CHOLECYSTECTOMY  2013   • COLONOSCOPY     • HYSTERECTOMY      complete   • JOINT REPLACEMENT Left     left knee   • OOPHORECTOMY     • TUBAL ABDOMINAL LIGATION       General Information     Row Name 21 1520          Physical Therapy Time and Intention    Document Type  evaluation  -JIM     Mode of Treatment   physical therapy;individual therapy  -     Row Name 03/09/21 1520          General Information    Patient Profile Reviewed  yes  -JIM     Prior Level of Function  min assist:;all household mobility;transfer;bed mobility;ADL's  -JIM     Existing Precautions/Restrictions  fall;other (see comments) R adductor canal nerve block  -     Row Name 03/09/21 1520          Living Environment    Lives With  alone;other (see comments) daughter will be with her initially  -JIM     Row Name 03/09/21 1520          Home Main Entrance    Number of Stairs, Main Entrance  one  -JIM     Stair Railings, Main Entrance  none  -JIM     Row Name 03/09/21 1520          Stairs Within Home, Primary    Stairs, Within Home, Primary  0  -JIM     Number of Stairs, Within Home, Primary  none  -JIM     Row Name 03/09/21 1520          Cognition    Orientation Status (Cognition)  oriented x 4  -JIM     Row Name 03/09/21 1520          Safety Issues, Functional Mobility    Safety Issues Affecting Function (Mobility)  safety precaution awareness;safety precautions follow-through/compliance  -     Impairments Affecting Function (Mobility)  endurance/activity tolerance;strength;range of motion (ROM);pain  -JIM       User Key  (r) = Recorded By, (t) = Taken By, (c) = Cosigned By    Initials Name Provider Type    JIM Suraj De La Garza, ARA Physical Therapist        Mobility     Row Name 03/09/21 1520          Bed Mobility    Bed Mobility  scooting/bridging;supine-sit  -JIM     Scooting/Bridging [place] (Bed Mobility)  supervision;verbal cues  -JIM     Supine-Sit [place] (Bed Mobility)  verbal cues;supervision  -JIM     Assistive Device (Bed Mobility)  bed rails;head of bed elevated  -     Comment (Bed Mobility)  Verbal cues for LE sequencing off of EOB and trunk control into sitting  -     Row Name 03/09/21 1520          Transfers    Comment (Transfers)  Verbal cues for safe hand placement during standing/sitting and moving R LE out for comfort prior to  sitting  -JIM     Row Name 03/09/21 1520          Sit-Stand Transfer    Sit-Stand [place] (Transfers)  verbal cues;contact guard  -JIM     Assistive Device (Sit-Stand Transfers)  walker, front-wheeled  -JIM     Row Name 03/09/21 1520          Gait/Stairs (Locomotion)    [place] Level (Gait)  verbal cues;contact guard;1 person to manage equipment  -JIM     Assistive Device (Gait)  walker, front-wheeled  -JIM     Distance in Feet (Gait)  360 feet  -JIM     Deviations/Abnormal Patterns (Gait)  bilateral deviations;hugh decreased;gait speed decreased;stride length decreased  -JIM     Bilateral Gait Deviations  forward flexed posture  -JIM     Right Sided Gait Deviations  heel strike decreased;weight shift ability decreased  -JIM     [place] Level (Stairs)  verbal cues;contact guard  -JIM     Assistive Device (Stairs)  walker, front-wheeled  -JIM     Handrail Location (Stairs)  none  -JIM     Number of Steps (Stairs)  1 backwards technique  -JIM     Ascending Technique (Stairs)  step-to-step  -JIM     Descending Technique (Stairs)  step-to-step  -JIM     Comment (Gait/Stairs)  Pt ambulated with step through pattern and decreased speed. Verbal cues for maintaining uprigh posture, body within walker, increase step length, and WB through LEs. Pt ascended/descended 1 step using RW, backwards technique, and CGA for safety. Verbal cues for LE sequencing. Gait/stair training limited by fatigue. No knee buckling noted.  -     Row Name 03/09/21 1520          Mobility    Extremity Weight-bearing Status  right lower extremity  -JIM     Right Lower Extremity (Weight-bearing Status)  weight-bearing as tolerated (WBAT)  -       User Key  (r) = Recorded By, (t) = Taken By, (c) = Cosigned By    Initials Name Provider Type    Suraj Salas, PT Physical Therapist        Obj/Interventions     Row Name 03/09/21 1520          Range of Motion Comprehensive    General Range of Motion  lower extremity range of motion deficits identified   -     Comment, General Range of Motion  R LE AROM impaired 25%; L LE AROM WFL; able to actively DF/PF  -     Row Name 03/09/21 1520          Strength Comprehensive (MMT)    General Manual Muscle Testing (MMT) Assessment  lower extremity strength deficits identified  -     Comment, General Manual Muscle Testing (MMT) Assessment  R LE functionally 4-/5; L LE functionally 4+/5; IND with SLR  -[facility] Name 03/09/21 1520          Motor Skills    Therapeutic Exercise  hip;knee;ankle  -[facility] Name 03/09/21 1520          Hip (Therapeutic Exercise)    Hip (Therapeutic Exercise)  isometric exercises  -     Hip Isometrics (Therapeutic Exercise)  gluteal sets;10 repetitions  -[facility] Name 03/09/21 1520          Knee (Therapeutic Exercise)    Knee (Therapeutic Exercise)  isometric exercises  -     Knee Isometrics (Therapeutic Exercise)  quad sets;10 repetitions  -[facility] Name 03/09/21 1520          Ankle (Therapeutic Exercise)    Ankle (Therapeutic Exercise)  AROM (active range of motion)  -     Ankle AROM (Therapeutic Exercise)  bilateral;dorsiflexion;plantarflexion;10 repetitions  -[facility] Name 03/09/21 1520          Sensory Assessment (Somatosensory)    Sensory Assessment (Somatosensory)  LE sensation intact  -       User Key  (r) = Recorded By, (t) = Taken By, (c) = Cosigned By    Initials Name Provider Type    Suraj Salas, PT Physical Therapist        Goals/Plan     Row Name 03/09/21 1520          Bed Mobility Goal 1 (PT)    Activity/Assistive Device (Bed Mobility Goal 1, PT)  sit to supine/supine to sit  -     [place] Level/Cues Needed (Bed Mobility Goal 1, PT)  modified independence  -     Time Frame (Bed Mobility Goal 1, PT)  long term goal (LTG);3 days  -     Row Name 03/09/21 1520          Transfer Goal 1 (PT)    Activity/Assistive Device (Transfer Goal 1, PT)  sit-to-stand/stand-to-sit;walker, rolling  -     [place] Level/Cues Needed (Transfer Goal 1, PT)  modified  independence  -JIM     Time Frame (Transfer Goal 1, PT)  long term goal (LTG);3 days  -JIM     Row Name 03/09/21 1520          Gait Training Goal 1 (PT)    Activity/Assistive Device (Gait Training Goal 1, PT)  gait (walking locomotion);walker, rolling  -JIM     [place] Level (Gait Training Goal 1, PT)  modified independence  -JIM     Distance (Gait Training Goal 1, PT)  500 feet  -JIM     Time Frame (Gait Training Goal 1, PT)  long term goal (LTG);3 days  -JIM     Row Name 03/09/21 1520          ROM Goal 1 (PT)    ROM Goal 1 (PT)  R knee AROM 0-90 degrees  -JIM     Time Frame (ROM Goal 1, PT)  long-term goal (LTG);3 days  -JIM     Row Name 03/09/21 1520          Stairs Goal 1 (PT)    Activity/Assistive Device (Stairs Goal 1, PT)  stairs, all skills;walker, rolling  -JIM     [place] Level/Cues Needed (Stairs Goal 1, PT)  modified independence  -JIM     Number of Stairs (Stairs Goal 1, PT)  1 backwards technique  -JIM     Time Frame (Stairs Goal 1, PT)  long term goal (LTG);3 days  -JIM       User Key  (r) = Recorded By, (t) = Taken By, (c) = Cosigned By    Initials Name Provider Type    JIM Suraj De La Garza, PT Physical Therapist        Clinical Impression     Row Name 03/09/21 1520          Pain    Additional Documentation  Pain Scale: Numbers Pre/Post-Treatment (Group)  -JIM     Row Name 03/09/21 1520          Pain Scale: Numbers Pre/Post-Treatment    Pretreatment Pain Rating  2/10  -JIM     Posttreatment Pain Rating  4/10  -JIM     Pain Location - Side  Right  -JIM     Pain Location - Orientation  anterior  -JIM     Pain Location  knee  -JIM     Pain Intervention(s)  Repositioned;Cold applied;Ambulation/increased activity  -JIM     Row Name 03/09/21 1520          Therapy Assessment/Plan (PT)    Patient/Family Therapy Goals Statement (PT)  To return home  -JIM     Rehab Potential (PT)  good, to achieve stated therapy goals  -JIM     Criteria for Skilled Interventions Met (PT)  yes;meets criteria;skilled treatment is necessary  -JIM      Row Name 03/09/21 1520          Positioning and Restraints    Pre-Treatment Position  in bed  -JIM     Post Treatment Position  chair  -JIM     In Chair  notified nsg;reclined;call light within reach;encouraged to call for assist;exit alarm on;with family/caregiver;legs elevated;compression device  -       User Key  (r) = Recorded By, (t) = Taken By, (c) = Cosigned By    Initials Name Provider Type    Suraj Salas PT Physical Therapist        Outcome Measures     Row Name 03/09/21 1520          How much help from another person do you currently need...    Turning from your back to your side while in flat bed without using bedrails?  4  -JIM     Moving from lying on back to sitting on the side of a flat bed without bedrails?  4  -JIM     Moving to and from a bed to a chair (including a wheelchair)?  3  -JIM     Standing up from a chair using your arms (e.g., wheelchair, bedside chair)?  3  -JIM     Climbing 3-5 steps with a railing?  3  -JIM     To walk in hospital room?  3  -JIM     AM-PAC 6 Clicks Score (PT)  20  -     Row Name 03/09/21 1520          PADD    Diagnosis  1  -JIM     Gender  1  -IJM     Age Group  2  -JIM     Gait Distance  1  -JIM     Assist Level  1  -JIM     Home Support  3  -JIM     PADD Score  9  -JIM     Patient Preference  home with outpatient rehab  -     Prediction by PADD Score  directly home (with home health or out-patient rehab)  -     Row Name 03/09/21 1520          Functional Assessment    Outcome Measure Options  AM-PAC 6 Clicks Basic Mobility (PT);PADD  -       User Key  (r) = Recorded By, (t) = Taken By, (c) = Cosigned By    Initials Name Provider Type    Suraj Salas, ARA Physical Therapist        Physical Therapy Education                 Title: PT OT SLP Therapies (Done)     Topic: Physical Therapy (Done)     Point: Mobility training (Done)     Learning Progress Summary           Patient Acceptance, E,D,H, VU by JIM at 3/9/2021 1520    Comment: Educated on safe sequencing with bed  mobility, ambulatory/car transfers, gait, and stair training. Reviewed HEP and knee precautions via handout.   Family Acceptance, E,D,H, VU by JIM at 3/9/2021 1520    Comment: Educated on safe sequencing with bed mobility, ambulatory/car transfers, gait, and stair training. Reviewed HEP and knee precautions via handout.                   Point: Home exercise program (Done)     Learning Progress Summary           Patient Acceptance, E,D,H, VU by JIM at 3/9/2021 1520    Comment: Educated on safe sequencing with bed mobility, ambulatory/car transfers, gait, and stair training. Reviewed HEP and knee precautions via handout.   Family Acceptance, E,D,H, VU by JIM at 3/9/2021 1520    Comment: Educated on safe sequencing with bed mobility, ambulatory/car transfers, gait, and stair training. Reviewed HEP and knee precautions via handout.                   Point: Body mechanics (Done)     Learning Progress Summary           Patient Acceptance, E,D,H, VU by JIM at 3/9/2021 1520    Comment: Educated on safe sequencing with bed mobility, ambulatory/car transfers, gait, and stair training. Reviewed HEP and knee precautions via handout.   Family Acceptance, E,D,H, VU by JIM at 3/9/2021 1520    Comment: Educated on safe sequencing with bed mobility, ambulatory/car transfers, gait, and stair training. Reviewed HEP and knee precautions via handout.                   Point: Precautions (Done)     Learning Progress Summary           Patient Acceptance, E,D,H, VU by JIM at 3/9/2021 1520    Comment: Educated on safe sequencing with bed mobility, ambulatory/car transfers, gait, and stair training. Reviewed HEP and knee precautions via handout.   Family Acceptance, E,D,H, VU by JIM at 3/9/2021 1520    Comment: Educated on safe sequencing with bed mobility, ambulatory/car transfers, gait, and stair training. Reviewed HEP and knee precautions via handout.                               User Key     Initials Effective Dates Name Provider Type  Discipline     09/10/19 -  Suraj De La Garza, PT Physical Therapist PT              PT Recommendation and Plan  Planned Therapy Interventions (PT): balance training, bed mobility training, gait training, home exercise program, patient/family education, transfer training, ROM (range of motion), stair training, strengthening  Plan of Care Reviewed With: patient, daughter  Progress: improving  Outcome Summary: PT eval complete. Pt ambulated 360 feet using RW, CGA, and one person to manage equipment. Pt ascended/descended 1 step using RW, backwards technique, and CGA for safety. Gait/stair training limited by fatigue. Bed mobility performed with supervision and STS with CGA. No knee buckling noted with ambulation. Pt IND with SLR. Will assess R knee AROM POD#1 if pt does not d/c tonight. Reviewed HEP and knee precautions via handout. Educated on safe car transfers. PADD score = 9. ADLs assessed, pt does not require OT eval prior to d/c tonight. Functionally, pt safe to d/c home with assist today from a PT perspective. Recommend OPPT.     Time Calculation:   PT Charges     Row Name 03/09/21 1520             Time Calculation    Start Time  1520  -      PT Received On  03/09/21  -      PT Goal Re-Cert Due Date  03/19/21  -         Time Calculation- PT    Total Timed Code Minutes- PT  10 minute(s)  -         Timed Charges    88309 - PT Therapeutic Exercise Minutes  2  -JIM      22287 - Gait Training Minutes   8  -JIM        User Key  (r) = Recorded By, (t) = Taken By, (c) = Cosigned By    Initials Name Provider Type     Suraj De La Garza, PT Physical Therapist        Therapy Charges for Today     Code Description Service Date Service Provider Modifiers Qty    59598963254 HC GAIT TRAINING EA 15 MIN 3/9/2021 Suraj De La Garza, PT GP 1    26273937574 HC PT EVAL LOW COMPLEXITY 3 3/9/2021 Suraj De La Garza, PT GP 1    03148020175 HC PT THER SUPP EA 15 MIN 3/9/2021 Suraj De La Garza, PT GP 2          PT G-Codes  Outcome Measure Options: AM-PAC 6 Clicks  Basic Mobility (PT), PADD  AM-PAC 6 Clicks Score (PT): 20    Suraj De La Garza, PT  3/9/2021

## 2021-03-09 NOTE — INTERVAL H&P NOTE
[facility] Pre-op    Full history and physical note from office is attached.    /82 (BP Location: Right arm, Patient Position: Lying)   Pulse 58   Temp 97.6 °F (36.4 °C) (Temporal)   Resp 18   SpO2 97%     Immunizations:  Influenza:  2020  Pneumococcal:  UTD  Tetanus:  UTD  Covid x2: 2021    LAB Results:  Lab Results   Component Value Date    WBC 6.09 02/25/2021    HGB 13.7 02/25/2021    HCT 41.3 02/25/2021    MCV 87.9 02/25/2021     02/25/2021    NEUTROABS 2.74 02/25/2021    GLUCOSE 85 02/25/2021    BUN 20 02/25/2021    CREATININE 0.95 02/25/2021    EGFRIFNONA 60 (L) 02/25/2021     02/25/2021    K 4.5 02/25/2021     02/25/2021    CO2 25.0 02/25/2021    MG 1.8 12/20/2016    CALCIUM 10.2 02/25/2021    ALBUMIN 4.80 09/01/2020    AST 35 (H) 09/01/2020    ALT 48 (H) 09/01/2020    BILITOT 0.3 09/01/2020    PTT 31.1 02/25/2021    INR 1.00 02/25/2021     2/10/21 Xray right knee:  Findings:   Bone-on-bone contact medial compartment, tricompartmental osteophytes, varus alignment, patellofemoral degeneration.  Worsening compared to the previous films.  No acute bony abnormalities.    Cancer Staging (if applicable)  Cancer Patient: __ yes __no __unknown__N/A; If yes, clinical stage T:__ N:__M:__, stage group or __N/A      Impression: Primary osteoarthritis right knee      Plan: RIGHT TOTAL KNEE ARTHROPLASTY      KAREN Dozier   3/9/2021   09:09 EST     Agree with above.  Plan for right total knee arthroplasty.    Mateo Keith MD  03/09/21  09:16 EST

## 2021-03-09 NOTE — PROGRESS NOTES
Discharge Planning Assessment  [facility]     Patient Name: Yamila Neff  MRN: [id number]  Today's Date: 3/9/2021    Admit Date: 3/9/2021    Discharge Needs Assessment     Row Name 03/09/21 1518       Living Environment    Lives With  alone    Current Living Arrangements  home/apartment/condo    Primary Care Provided by  self    Provides Primary Care For  no one    Family Caregiver if Needed  child(jody), adult    Quality of Family Relationships  helpful;involved;supportive    Able to Return to Prior Arrangements  yes       Resource/Environmental Concerns    Resource/Environmental Concerns  none       Transition Planning    Patient/Family Anticipates Transition to  home with family    Patient/Family Anticipated Services at Transition  outpatient care;home health care TBD    Transportation Anticipated  family or friend will provide       Discharge Needs Assessment    Readmission Within the Last 30 Days  no previous admission in last 30 days    Equipment Currently Used at Home  none Has a RW from previous knee surgery    Concerns to be Addressed  discharge planning        Discharge Plan     Row Name 03/09/21 1519       Plan    Plan  Home vs home with home health    Patient/Family in Agreement with Plan  yes    Plan Comments  Met with patient in the room to discuss the discharge plan. Patient lives alone in a single-story home in [place]. She is independent with ADLs and mobility. Patient's daughter will be able to assist patient at discharge and will provide her ride. Patient states she has a RW from a previous knee surgery. She has declined the need for a BSC. Patient would like to have [facility] for home PT. Referral called to [facility], and she will check to see if they have enough staff to accept her. If not, patient states she will just do outpatient PT at [facility] [place], operated by [facility] in [place]. Patient is a [facility] [place] by [facility] employee and wants to stay in-network d/t insurance guidelines. CM will place orders once final PT  plan is know. CM will continue to follow.    Final Discharge Disposition Code  30 - still a patient        Continued Care and Services - Admitted Since 3/9/2021    Coordination has not been started for this encounter.         Demographic Summary     Row Name 03/09/21 1512       General Information    Admission Type  same day    Referral Source  admission list    Reason for Consult  discharge planning    General Information Comments  Confirmed with patient that her PCP is Daly Archibald and that she has medical and rx coverage through [facility].        Functional Status     Row Name 03/09/21 1512       Functional Status    Usual Activity Tolerance  good       Functional Status, IADL    Medications  independent    Meal Preparation  independent    Housekeeping  independent    Laundry  independent    Shopping  independent        Psychosocial    No documentation.       Abuse/Neglect    No documentation.       Legal    No documentation.       Substance Abuse    No documentation.       Patient Forms    No documentation.           Frida Lemus RN

## 2021-03-09 NOTE — ANESTHESIA PROCEDURE NOTES
Peripheral Block      Patient reassessed immediately prior to procedure    Patient location during procedure: post-op  Start time: 3/9/2021 12:12 PM  Stop time: 3/9/2021 12:18 PM  Reason for block: at surgeon's request and post-op pain management  Performed by  CRNA: Ezekiel Bloom, AMOS  Assisted by: Gala Burr RN  Preanesthetic Checklist  Completed: patient identified, IV checked, site marked, risks and benefits discussed, surgical consent, monitors and equipment checked, pre-op evaluation and timeout performed  Prep:  Pt Position: supine  Sterile barriers:cap, gloves, mask and sterile barriers  Prep: ChloraPrep  Patient monitoring: blood pressure monitoring, continuous pulse oximetry and EKG  Procedure  Sedation:no  Performed under: spinal  Guidance:ultrasound guided  Images:still images obtained, printed/placed on chart    Laterality:right  Block Type:adductor canal block  Injection Technique:catheter  Needle Type:Tuohy and echogenic  Needle Gauge:18 G  Resistance on Injection: none  Catheter Size:20 G (20g)  Cath Depth at skin: 13 cm    Medications Used: bupivacaine PF (MARCAINE) 0.25 % injection, 20 mL  Med admintered at 3/9/2021 12:15 PM      Medications  Preservative Free Saline:5ml    Post Assessment  Injection Assessment: negative aspiration for heme, incremental injection and no paresthesia on injection  Patient Tolerance:comfortable throughout block  Complications:no  Additional Notes  Procedure:             The pt was placed in the Supine position.  The Insertion site was  prepped and Draped in sterile fashion.  The pt was anesthetized with  IV Sedation( see meds).  Skin and cutaneous tissue was infiltrated and anesthetized with 1% Lidocaine 3 mls via a 25g needle.  A BBraun 4 inch 18g echogenic needle was then  inserted approximately midline, mid-thigh and advanced In-plane with Ultrasound guidance.  Normal Saline PSF was utilized for hydrodissection of tissue.  The Vastus medialis and Sartorius  muscle where visualized and the needle tip was placed in the adductor canal,  lateral to the femoral artery.  LA injection spread was visualized, injection was incremental 1-5ml, injection pressure was normal or little, no intraneural injection, no vascular injection.  LA dose was injected thru the needle(see dose above).  A BBraun 20g wire stylet catheter was placed via the needle with ultrasound visualization and confirmation with NS fluid bolus. The catheter insertion site was sealed with exofin tissue adhesive. The labeled catheter was then coiled and secured to skin with benzoin,  steristrips and CHG transparent dressing.  Appropriate labels were applied.  Thank you.

## 2021-03-10 NOTE — PROGRESS NOTES
SESAR Quezada    Nerve Cath Post Op Call    Patient Name: Yamila Neff  :  1960  MRN:  [id number]  Date of Discharge: 3/9/2021    Nerve Cath Post Op Call:    Analgesia:Good  Pain Score:3/10  Side Effects:None  Catheter Site:clean  Patient Controlled ON Q pump infusion rate: 10ml/hr  Catheter Plan:Will continue with plan at home without changes and The patient was instructed to call ON CALL Anesthesia provider for any questions or problems  Patient/Family instructed to call ON CALL anesthesia provider for any questions or problems.  Patient Follow Up:

## 2021-03-11 ENCOUNTER — TELEMEDICINE (OUTPATIENT)
Dept: SLEEP MEDICINE | Facility: HOSPITAL | Age: 61
End: 2021-03-11

## 2021-03-11 ENCOUNTER — TELEPHONE (OUTPATIENT)
Dept: ORTHOPEDIC SURGERY | Facility: CLINIC | Age: 61
End: 2021-03-11

## 2021-03-11 VITALS — BODY MASS INDEX: 38.98 KG/M2 | HEIGHT: 63 IN | WEIGHT: 220 LBS

## 2021-03-11 DIAGNOSIS — G47.33 OSA (OBSTRUCTIVE SLEEP APNEA): Primary | ICD-10-CM

## 2021-03-11 PROCEDURE — 99212 OFFICE O/P EST SF 10 MIN: CPT | Performed by: NURSE PRACTITIONER

## 2021-03-11 NOTE — TELEPHONE ENCOUNTER
From Dr Keith:  I agree with that pain management strategy.  However, if it is not working for her, she will need to come in to have it evaluated.        I spoke with the patient and went over elevating and icing with her and stressed how important elevating is to relieve pain.  She states that her pain pump has run out.  I told her to continue the pain regimen that she has been taking.     Veronica Ruby

## 2021-03-11 NOTE — TELEPHONE ENCOUNTER
----- Message from Yamila Neff sent at 3/11/2021  6:39 AM EST -----  Regarding: Visit Follow-Up Question  Contact: [phone]  You did my total knee replacement on Tuesday. My post op pain has been manageable until 0200. I'm taking the mobic daily, the Tylenol 1000mg q 6h. And the oxycodone 5mg  which has not been enough so I have taken 2 of those but it only helps about 2 1/2 hours. The pain pump is still going but is almost empty and it is maxed out. The right side is extremely swollen and the left is flat with a bruise. What can I do?

## 2021-03-11 NOTE — PROGRESS NOTES
SESAR Quezada    Nerve Cath Post Op Call    Patient Name: Yamila Neff  :  1960  MRN:  [id number]  Date of Discharge: 3/9/2021    Nerve Cath Post Op Call:    Analgesia:Fair  Pain Score:4/10  Side Effects:None  Catheter Plan:The patient was instructed to call ON CALL Anesthesia provider for any questions or problems, Patient/Family member report nerve catheter previously discontinued, tip intact and Patient/Family member instructed to remove the catheter during telephone contact  Patient/Family instructed to call ON CALL anesthesia provider for any questions or problems.  Patient Follow Up:

## 2021-03-11 NOTE — PROGRESS NOTES
Chief Complaint:   Chief Complaint   Patient presents with   • Follow-up       HPI:    Yamila Neff is a 60 y.o. female here for follow-up of sleep apnea.  Patient was last seen 9/21/2020.  Patient states she continues to do well with CPAP therapy.  Patient is sleeping 7 hours nightly and feels rested upon awakening.  Patient will go to sleep within 10 minutes and does not get up during the night.  Patient has an [place] score of 8/24.  Patient states her machine is 5+ years old and is now making a buzzing noise that worsens upon expiration.  She is already talked to DME and they did tell her she qualifies for a new machine.  We will get that order sent to them today.        Current medications are:   Current Outpatient Medications:   •  acetaminophen (TYLENOL) 500 MG tablet, Take 2 tablets by mouth Every 8 (Eight) Hours for 7 days. Take every 8 hours  as needed after 1 week, Disp: 42 tablet, Rfl: 0  •  amitriptyline (ELAVIL) 10 MG tablet, Take 1 tablet by mouth Every Night., Disp: 90 tablet, Rfl: 1  •  aspirin EC (aspirin) 325 MG tablet, Take 1 tablet by mouth Daily for 30 days., Disp: 30 tablet, Rfl: 0  •  cholecalciferol (VITAMIN D3) 1000 units tablet, Take 2,000 Units by mouth Daily., Disp: , Rfl:   •  clobetasol (TEMOVATE) 0.05 % external solution, Apply twice daily to itchy, scaly areas on scalp., Disp: 50 mL, Rfl: 11  •  desvenlafaxine (Pristiq) 100 MG 24 hr tablet, Take 1 tablet by mouth Daily., Disp: 90 tablet, Rfl: 1  •  docusate sodium (COLACE) 100 MG capsule, Take 1 capsule by mouth 2 (Two) Times a Day for 15 days., Disp: 30 capsule, Rfl: 0  •  estradiol (Evamist) 1.53 MG/SPRAY transdermal spray, Place 1 spray topically to the arm as directed by provider 2 (two) times a day., Disp: 24.3 mL, Rfl: 3  •  hydrocortisone 2.5 % cream, Apply every evening to affected areas on abdomen until resolved., Disp: 28.35 g, Rfl: 11  •  ketoconazole (NIZORAL) 2 % cream, Apply every morning to underarms until  resolved., Disp: 30 g, Rfl: 11  •  ketoconazole (NIZORAL) 2 % shampoo, Use as a shampoo to scalp and face 3 times a week. Leave in for 5 minutes and rinse., Disp: 120 mL, Rfl: 11  •  losartan (Cozaar) 50 MG tablet, Take 1 tablet by mouth Daily., Disp: 90 tablet, Rfl: 1  •  Magnesium 250 MG tablet, Take 1 tablet by mouth Daily., Disp: , Rfl:   •  meloxicam (MOBIC) 15 MG tablet, Take 1 tablet by mouth Daily for 15 days., Disp: 15 tablet, Rfl: 0  •  metFORMIN ER (GLUCOPHAGE-XR) 500 MG 24 hr tablet, Take 2 tablets by mouth 2 (Two) Times a Day., Disp: 360 tablet, Rfl: 1  •  nystatin-triamcinolone (MYCOLOG II) 091444-0.1 UNIT/GM-% cream, Apply 1 application topically to the appropriate area sparingly as directed 3 (Three) Times a Day As Needed., Disp: 15 g, Rfl: 5  •  omega-3 acid ethyl esters (LOVAZA) 1 g capsule, Take 2 capsules by mouth 2 (Two) Times a Day., Disp: 360 capsule, Rfl: 1  •  oxyCODONE (Roxicodone) 5 MG immediate release tablet, Take 1 tablet by mouth Every 4 (Four) Hours As Needed for Moderate Pain ., Disp: 40 tablet, Rfl: 0  •  pravastatin (PRAVACHOL) 40 MG tablet, Take 1 tablet by mouth Every Night., Disp: 90 tablet, Rfl: 1  •  Probiotic Product (PROBIOTIC DAILY PO), Take  by mouth Daily., Disp: , Rfl:   •  propranolol LA (INDERAL LA) 60 MG 24 hr capsule, Take 1 capsule by mouth Daily., Disp: 90 capsule, Rfl: 1  •  Ropivacine HCl-NaCl (NAROPIN), 20 mg/hr by Peripheral Nerve route Continuous. Indications: Acute Pain, Disp: , Rfl:   •  Vitamin E 400 units tablet, Take 800 Units by mouth Daily., Disp: , Rfl: .      The patient's relevant past medical, surgical, family and social history were reviewed and updated in Epic as appropriate.       Review of Systems   Eyes: Positive for visual disturbance.   Respiratory: Positive for apnea.    Gastrointestinal: Positive for constipation.   Musculoskeletal: Positive for arthralgias and joint swelling.   Psychiatric/Behavioral: Positive for sleep disturbance.   All  other systems reviewed and are negative.        Objective:    Physical Exam  Constitutional:       Appearance: Normal appearance. She is obese.   HENT:      Head: Normocephalic and atraumatic.   Pulmonary:      Effort: Pulmonary effort is normal. No respiratory distress.   Skin:     General: Skin is dry.      Coloration: Skin is not pale.   Neurological:      Mental Status: She is alert and oriented to person, place, and time.   Psychiatric:         Mood and Affect: Mood normal.         Behavior: Behavior normal.         Thought Content: Thought content normal.         Judgment: Judgment normal.           ASSESSMENT/PLAN    Diagnoses and all orders for this visit:    1. NGOC (obstructive sleep apnea) (Primary)  -     CPAP Therapy            1. Counseled patient regarding multimodal approach with healthy nutrition, healthy sleep, regular physical activity, social activities, counseling, and medications. Encouraged to practice lateral sleep position. Avoid alcohol and sedatives close to bedtime.  2. We will send order to DME today for settings 8-18 for a new machine.  I will see patient back in 31 to 90 days.  Patient did give verbal consent today for video visit.    I have reviewed the results of my evaluation and impression and discussed my recommendations in detail with the patient.      Signed by  KAREN Nielsen    March 11, 2021      CC: Daly Archibald PA-C          No ref. provider found

## 2021-03-14 DIAGNOSIS — Z96.651 S/P TOTAL KNEE ARTHROPLASTY, RIGHT: ICD-10-CM

## 2021-03-14 RX ORDER — OXYCODONE HYDROCHLORIDE 5 MG/1
5 TABLET ORAL EVERY 4 HOURS PRN
Qty: 40 TABLET | Refills: 0 | Status: SHIPPED | OUTPATIENT
Start: 2021-03-14 | End: 2021-03-19 | Stop reason: SDUPTHER

## 2021-03-17 ENCOUNTER — OFFICE VISIT (OUTPATIENT)
Dept: ORTHOPEDIC SURGERY | Facility: CLINIC | Age: 61
End: 2021-03-17

## 2021-03-17 ENCOUNTER — TELEPHONE (OUTPATIENT)
Dept: ORTHOPEDIC SURGERY | Facility: CLINIC | Age: 61
End: 2021-03-17

## 2021-03-17 VITALS — TEMPERATURE: 96.9 F

## 2021-03-17 DIAGNOSIS — Z96.651 S/P TOTAL KNEE ARTHROPLASTY, RIGHT: Primary | ICD-10-CM

## 2021-03-17 PROCEDURE — 99024 POSTOP FOLLOW-UP VISIT: CPT | Performed by: PHYSICIAN ASSISTANT

## 2021-03-17 NOTE — TELEPHONE ENCOUNTER
From Dr. Keith  If she is concerned, she can come in this afternoon and see either me or Mary.         Patient is coming at 3:00 today.    Veronica Ruby

## 2021-03-17 NOTE — TELEPHONE ENCOUNTER
----- Message from Yamila Neff sent at 3/17/2021 11:23 AM EDT -----  Regarding: Non-Urgent Medical Question  Contact: [phone]  I am still having a lot of pain and swelling despite elevating my leg constantly higher than my heart and icing it. It's weird to me that it's still mainly swollen on the outer side of my knee. Is this normal?  I am continuing to take the daily Mobic and every 6 hrs tylenol along with oxycodone as needed. I am sending a photo for reference.  Thank you

## 2021-03-19 ENCOUNTER — TELEPHONE (OUTPATIENT)
Dept: ORTHOPEDIC SURGERY | Facility: CLINIC | Age: 61
End: 2021-03-19

## 2021-03-19 DIAGNOSIS — Z96.651 S/P TOTAL KNEE ARTHROPLASTY, RIGHT: ICD-10-CM

## 2021-03-19 RX ORDER — OXYCODONE HYDROCHLORIDE 5 MG/1
5 TABLET ORAL EVERY 8 HOURS PRN
Qty: 25 TABLET | Refills: 0 | Status: SHIPPED | OUTPATIENT
Start: 2021-03-19 | End: 2021-03-24 | Stop reason: SDUPTHER

## 2021-03-19 NOTE — TELEPHONE ENCOUNTER
Mateo Keith MD  You 3 minutes ago (10:35 AM)   MK  Prescription sent to the pharmacy.    Routing comment      I called and let her know.  Denice

## 2021-03-19 NOTE — TELEPHONE ENCOUNTER
PATIENT CALLED REQUESTING A REFILL OF oxyCODONE (Roxicodone) 5 MG immediate release tablet. PATIENT USES PHARMACY ON FILE. PATIENT CAN BE REACHED [phone].

## 2021-03-22 ENCOUNTER — TREATMENT (OUTPATIENT)
Dept: PHYSICAL THERAPY | Facility: CLINIC | Age: 61
End: 2021-03-22

## 2021-03-22 DIAGNOSIS — Z96.651 S/P TOTAL KNEE ARTHROPLASTY, RIGHT: ICD-10-CM

## 2021-03-22 PROCEDURE — 97110 THERAPEUTIC EXERCISES: CPT | Performed by: PHYSICAL THERAPIST

## 2021-03-22 PROCEDURE — 97162 PT EVAL MOD COMPLEX 30 MIN: CPT | Performed by: PHYSICAL THERAPIST

## 2021-03-22 PROCEDURE — 97140 MANUAL THERAPY 1/> REGIONS: CPT | Performed by: PHYSICAL THERAPIST

## 2021-03-22 NOTE — PROGRESS NOTES
Physical Therapy Initial Evaluation and Plan of Care    TOTAL TIME: 60 MINUTES    Subjective Evaluation    History of Present Illness  Date of surgery: 3/9/2021  Mechanism of injury: First few days were really good with pain pump; pain got worse and was concerned about swelling on lateral side; went back in to see the doctor to make sure everything was ok- it was    Had left knee done 10 years by Philippe Clements    Pain is pretty high at times    Bending is going well, trouble with extension (had the same problem with the left knee TKA 10 years ago)    Works as a nurse on [employer]    Was having difficulty working prior to the surgery due to the pain; did not have any injury, pain just started to worsen a few months ago    Did some home health, no manual therapy for ROM    Having some buttocks/sciatica type symptoms when sitting too long    Quality of life: good    Pain  Current pain ratin ('anxious')  Quality: discomfort and tight  Relieving factors: medications, ice, change in position and support (oxycodone 5 mg q 4-6 hours, Tylenol q 6-8 hours)  Aggravating factors: movement, lifting, stairs, standing, ambulation, prolonged positioning, sleeping and squatting  Progression: improved    Patient Goals  Patient goals for therapy: decreased pain, decreased edema, improved balance, increased motion, return to work, return to sport/leisure activities, independence with ADLs/IADLs and increased strength             Objective          Observations     Right Knee   Positive for edema and incision. Negative for drainage.     Additional Knee Observation Details  3 cm of edema at mid-patella (43.5 vs 46.5)    Good knee flexion, pain with knee extension, unable to tolerate heel on bolster, lacks 15-20 degrees of extension initially    Incision is clean and dry, no drainage, no covering    Tenderness     Right Knee   Tenderness in the medial joint line. No tenderness in the popliteal fossa.     Neurological Testing      Sensation     Knee   Left Knee   Intact: light touch    Right Knee   Intact: light touch     Active Range of Motion     Right Knee   Flexion: 113 degrees   Extensor la degrees     Passive Range of Motion   Left Knee   Flexion: 137 degrees     Right Knee   Flexion: 122 degrees     Strength/Myotome Testing     Left Knee   Flexion: 5  Extension: 5  Quadriceps contraction: good    Right Knee   Flexion: 4-  Extension: 4-  Quadriceps contraction: poor    Tests     Additional Tests Details  - Homans          Assessment & Plan     Assessment  Impairments: abnormal gait, abnormal muscle firing, abnormal muscle tone, abnormal or restricted ROM, activity intolerance, impaired balance, impaired physical strength, lacks appropriate home exercise program, pain with function and weight-bearing intolerance  Assessment details: Patient presents s/p right TKA on 3/9/21; she had home health PT prior to reporting to outpatient; she is having some moderate pain; ROM is very good with flexion, having more difficulty with extension; she has a hx of left TKA ten years ago and states she had some difficulty with extension compared to flexion then as well; 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 IND with HEP  2. Increase LEFS to > 40   3. Patient to display full AROM of right knee (0-135)  4. Patient to ambulate on even/uneven surfaces without pain or antalgia    6 weeks:  1. Patient to perform work simulated tasks without pain   2. Patient to score > 50 on LEFS  3. Patient to display 5/5 MMT strength of right LE    Plan  Therapy options: will be seen for skilled physical therapy services  Planned modality interventions: TENS, thermotherapy (hydrocollator  packs), ultrasound, high voltage pulsed current (pain management), electrical stimulation/Russian stimulation and cryotherapy  Planned therapy interventions: manual therapy, neuromuscular re-education, soft tissue mobilization, strengthening, spinal/joint mobilization, stretching, therapeutic activities, joint mobilization, home exercise program, gait training, functional ROM exercises, flexibility, balance/weight-bearing training and body mechanics training  Frequency: 3x week  Duration in visits: 18  Treatment plan discussed with: patient        Manual Therapy:    15     mins  91977;  Therapeutic Exercise:    30     mins  00521;     Neuromuscular [place]:        mins  30916;    Therapeutic Activity:          mins  98196;     Gait Training:           mins  07721;     Ultrasound:          mins  94698;    Electrical Stimulation:         mins  11546 ( );  Dry Needling          mins self-pay    Timed Treatment:   45   mins   Total Treatment:     60   mins    PT SIGNATURE: NGUYEN Bhardwaj, PT   DATE TREATMENT INITIATED: 3/22/2021    Initial Certification  Certification Period: 6/20/2021  I certify that the therapy services are furnished while this patient is under my care.  The services outlined above are required by this patient, and will be reviewed every 90 days.     PHYSICIAN: Mateo Keith MD      DATE:     Please sign and return via fax to  .. Thank you, [facility].

## 2021-03-24 ENCOUNTER — TELEPHONE (OUTPATIENT)
Dept: ORTHOPEDIC SURGERY | Facility: CLINIC | Age: 61
End: 2021-03-24

## 2021-03-24 ENCOUNTER — TREATMENT (OUTPATIENT)
Dept: PHYSICAL THERAPY | Facility: CLINIC | Age: 61
End: 2021-03-24

## 2021-03-24 DIAGNOSIS — Z96.651 S/P TOTAL KNEE ARTHROPLASTY, RIGHT: ICD-10-CM

## 2021-03-24 DIAGNOSIS — Z96.651 S/P TOTAL KNEE ARTHROPLASTY, RIGHT: Primary | ICD-10-CM

## 2021-03-24 PROCEDURE — 97110 THERAPEUTIC EXERCISES: CPT | Performed by: PHYSICAL THERAPIST

## 2021-03-24 PROCEDURE — 97140 MANUAL THERAPY 1/> REGIONS: CPT | Performed by: PHYSICAL THERAPIST

## 2021-03-24 PROCEDURE — 97112 NEUROMUSCULAR REEDUCATION: CPT | Performed by: PHYSICAL THERAPIST

## 2021-03-24 RX ORDER — OXYCODONE HYDROCHLORIDE 5 MG/1
5 TABLET ORAL EVERY 8 HOURS PRN
Qty: 25 TABLET | Refills: 0 | Status: SHIPPED | OUTPATIENT
Start: 2021-03-24 | End: 2021-04-29

## 2021-03-24 NOTE — TELEPHONE ENCOUNTER
Called patient and let her know that Dr Keith sent refill of oxycodone to pharmacy, and that it would be ok to use Voltaren Gel if the wound was healed. She understood.     Reina

## 2021-03-24 NOTE — TELEPHONE ENCOUNTER
----- Message from Yamila Neff sent at 3/24/2021  3:28 PM EDT -----  Regarding: Non-Urgent Medical Question  Contact: [phone]  Because the dose or frequency wasn't changed on the new Rx, the pharmacy will not fill the new Rx for 2 more days. Can you make adjustments to new Rx and or call the [facility] so that I can get the Rx today? Thank you!

## 2021-03-24 NOTE — PROGRESS NOTES
Physical Therapy Daily Progress Note    TOTAL TIME: 65 MINUTES    Yamila Neff reports: ordered cane, was able to go get hair done yesterday and walk in subdivision, was worn out at end of day; performed knee ext prone hang x 2, still a lot of pain with heel prop      Objective   See Exercise, Manual, and Modality Logs for complete treatment.     THERAPEUTIC EXERCISES/ACTIVITIES ADDED TODAY: nu step, heel prop, prone hang, supine heel slides with intermittent quad sets , nu step for functional ROM and strength    Manual: knee flexion/ extension mobilizations x 10 minutes    Knee extension to 10 deg lag, 125 deg flexion    Assessment/Plan  Much improved gait, excellent knee flexion, pain is significant with knee extension but making progress; needs continued PT to improve    Ice x 10 minutes after therapy    Progress per Plan of Care           Manual Therapy:    10     mins  07864;  Therapeutic Exercise:    30     mins  29229;     Neuromuscular [place]:    15    mins  20857;    Therapeutic Activity:          mins  40937;     Gait Training:           mins  00642;     Ultrasound:          mins  46130;    Electrical Stimulation:         mins  81895 ( );  Dry Needling          mins self-pay    Timed Treatment:   55   mins   Total Treatment:     65   mins    NGUYEN Bhardwaj, PT  Physical Therapist

## 2021-03-24 NOTE — TELEPHONE ENCOUNTER
----- Message from Reina Carter CMA sent at 3/24/2021 11:24 AM EDT -----  Regarding: FW: Non-Urgent Medical Question  Contact: [phone]  Please advise- will you refill Oxydcodone?    Reina  ----- Message -----  From: Yamila Neff  Sent: 3/24/2021  11:13 AM EDT  To: [facility]  Subject: Non-Urgent Medical Question                      I'm doing somewhat better with my pain control. Still taking tyleol 1000mg q 6h, Mobic daily and now taking oxycodone 5mg q 6-7 hours. I am down to just 3 oxycodone tablets remaining. Could I have another refill please?  Also, is it OK to use voltaren gel locally? I have some already if it's OK to use it.  Thank you!

## 2021-03-25 ENCOUNTER — TELEPHONE (OUTPATIENT)
Dept: ORTHOPEDIC SURGERY | Facility: CLINIC | Age: 61
End: 2021-03-25

## 2021-03-25 NOTE — TELEPHONE ENCOUNTER
I spoke with the patient and advised her of Dr. Keith's message below and she understood.    Janelle

## 2021-03-25 NOTE — TELEPHONE ENCOUNTER
Dr. Keith,    I have already sent a message to you in regards to the pain medication for the patient. She is currently out of the pain medication. Please advise.    Janelle

## 2021-03-25 NOTE — TELEPHONE ENCOUNTER
PATIENT CALLED STATING THAT PHARMACY COULDN'T REFILL oxyCODONE (Roxicodone) 5 MG immediate release tablet BECAUSE THE PHARMACY SAID IT WASN'T TIME TO REFILL. PATIENT WOULD LIKE A CALL BACK [phone].

## 2021-03-26 ENCOUNTER — TREATMENT (OUTPATIENT)
Dept: PHYSICAL THERAPY | Facility: CLINIC | Age: 61
End: 2021-03-26

## 2021-03-26 DIAGNOSIS — Z96.651 S/P TOTAL KNEE ARTHROPLASTY, RIGHT: Primary | ICD-10-CM

## 2021-03-26 PROCEDURE — 97112 NEUROMUSCULAR REEDUCATION: CPT | Performed by: PHYSICAL THERAPIST

## 2021-03-26 PROCEDURE — 97110 THERAPEUTIC EXERCISES: CPT | Performed by: PHYSICAL THERAPIST

## 2021-03-26 PROCEDURE — 97140 MANUAL THERAPY 1/> REGIONS: CPT | Performed by: PHYSICAL THERAPIST

## 2021-03-26 NOTE — PROGRESS NOTES
Physical Therapy Daily Progress Note    TOTAL TIME: 60 MINUTES    Yamila Neff reports: patient is slightly concerned about her knee extension; is only performing prone hangs ~ 2 x per day; has been using voltaren gel on knee and taking epsom salt baths      Objective   See Exercise, Manual, and Modality Logs for complete treatment.     Extension ROM of right knee +15 degrees upon arrival    ROM after therapy: (0,8,123)     THERAPEUTIC EXERCISES/ACTIVITIES ADDED TODAY: SLR, prone hangs with 2#, seated hamstring stretch with towel, nu step for ROM and warm-up, QS, GS     Manual: knee flexion/extension mobilizations, PFJ mobilizations x 10 minutes    Assessment/Plan  Recommended increasing dosing of heel props and prone hangs up to 6 x per day in order to improve knee extension; instructed patient on ice pack to purchase for home; she has a cane ordered and will bring to next visit for gait training ; patient has excellent knee flexion and knee extension is improving; edema still present but improving    Progress per Plan of Care and Progress strengthening /stabilization /functional activity           Manual Therapy:    10     mins  12543;  Therapeutic Exercise:    30     mins  24017;     Neuromuscular [place]:    20    mins  77482;    Therapeutic Activity:          mins  63149;     Gait Training:           mins  51358;     Ultrasound:          mins  32881;    Electrical Stimulation:        mins  02926 ( );  Dry Needling          mins self-pay    Timed Treatment:   60   mins   Total Treatment:     60   mins    NGUYEN Bhardwaj, PT  Physical Therapist

## 2021-03-28 DIAGNOSIS — F33.42 RECURRENT MAJOR DEPRESSIVE DISORDER, IN FULL REMISSION (HCC): ICD-10-CM

## 2021-03-28 DIAGNOSIS — E78.1 HYPERTRIGLYCERIDEMIA: ICD-10-CM

## 2021-03-29 RX ORDER — DESVENLAFAXINE 100 MG/1
100 TABLET, EXTENDED RELEASE ORAL DAILY
Qty: 90 TABLET | Refills: 1 | Status: SHIPPED | OUTPATIENT
Start: 2021-03-29 | End: 2021-09-24 | Stop reason: SDUPTHER

## 2021-03-29 RX ORDER — OMEGA-3-ACID ETHYL ESTERS 1 G/1
2 CAPSULE, LIQUID FILLED ORAL 2 TIMES DAILY
Qty: 360 CAPSULE | Refills: 1 | Status: SHIPPED | OUTPATIENT
Start: 2021-03-29 | End: 2021-10-10 | Stop reason: SDUPTHER

## 2021-03-30 ENCOUNTER — TREATMENT (OUTPATIENT)
Dept: PHYSICAL THERAPY | Facility: CLINIC | Age: 61
End: 2021-03-30

## 2021-03-30 DIAGNOSIS — Z96.651 S/P TOTAL KNEE ARTHROPLASTY, RIGHT: Primary | ICD-10-CM

## 2021-03-30 PROCEDURE — 97140 MANUAL THERAPY 1/> REGIONS: CPT | Performed by: PHYSICAL THERAPIST

## 2021-03-30 PROCEDURE — 97112 NEUROMUSCULAR REEDUCATION: CPT | Performed by: PHYSICAL THERAPIST

## 2021-03-30 PROCEDURE — 97110 THERAPEUTIC EXERCISES: CPT | Performed by: PHYSICAL THERAPIST

## 2021-03-30 NOTE — PROGRESS NOTES
Physical Therapy Daily Progress Note    TOTAL TIME: 70 MINUTES    Yamila Neff reports: upon arrival patient stated that her knee was feeling really good, she felt that her extension ROM was much better than last week due to increased frequency of extension exercises; she received her single point cane in the mail on Friday and has been using it some around the house; uses walker for walking through the grass      Objective   See Exercise, Manual, and Modality Logs for complete treatment.     Patient performed 6 minutes on the NU Step (recumbent elliptical) for warm-up and ROM, had no discomfort while performing; upon getting up to ambulate back to her therapy table, after ~ 30 steps, she had a sharp pain in her lateral right knee; she was able to bear weight on it, but with discomfort; she was assisted to the table; she was bearing very little weight on the cane, bearing almost all of her weight on the right knee; she was ambulating with no antalgia and was doing quite well, prior to the jolt of pain    STM to the lateral aspect of knee, knee extension mobilizations x 15 minutes     Initial ROM measurements: (0, 10, 127) ; knee extension improved to +5 by the end of therapy session     Ice x 10 minutes after therapy    THERAPEUTIC EXERCISES/ACTIVITIES ADDED TODAY: see flow sheets     Assessment/Plan  Patient is making very good progress with ROM of right knee, initially was stiff with knee extension but with increased frequency of extension exercises it has improved; her knee flexion is excellent; had a pop / pain on lateral aspect of knee prior to starting therapy today, was able to complete session with primarily ROM and light isometrics; she may need to use walker for WB assistance and gradually wean to SPC; overall she is making excellent progress     Progress per Plan of Care and Progress strengthening /stabilization /functional activity    Note sent to surgeon        Manual Therapy:    15     mins   14539;  Therapeutic Exercise:    30     mins  01820;     Neuromuscular [place]:    15    mins  37782;    Therapeutic Activity:          mins  82963;     Gait Training:           mins  00897;     Ultrasound:          mins  15173;    Electrical Stimulation:         mins  96991 ( );  Dry Needling          mins self-pay    Timed Treatment:   60   mins   Total Treatment:     70   mins    NGUYEN Bhardwaj PT  Physical Therapist

## 2021-03-31 ENCOUNTER — OFFICE VISIT (OUTPATIENT)
Dept: ORTHOPEDIC SURGERY | Facility: CLINIC | Age: 61
End: 2021-03-31

## 2021-03-31 VITALS — TEMPERATURE: 97.3 F

## 2021-03-31 DIAGNOSIS — Z96.651 S/P TOTAL KNEE ARTHROPLASTY, RIGHT: Primary | ICD-10-CM

## 2021-03-31 DIAGNOSIS — Z47.89 ORTHOPEDIC AFTERCARE: ICD-10-CM

## 2021-03-31 PROCEDURE — 99024 POSTOP FOLLOW-UP VISIT: CPT | Performed by: ORTHOPAEDIC SURGERY

## 2021-03-31 NOTE — PROGRESS NOTES
[facility] [place] [facility] Note    Subjective     Chief Complaint   Patient presents with   • Post-op     3 weeks S/P total knee arthroplasty, right DOS 03.09.2021        HPI    Yamila Neff is a 60 y.o. female who follows up for right total knee arthroplasty done on 03/09/2021. She states she has been doing a lot better, but had some problems yesterday. She was ambulating with her cane and felt something pop on the lateral aspect of her knee and then had severe pain. It is too early to say if she feels improved as compared to before the surgery.      I have reviewed the following portions of the patient's history and agree with: History of Present Illness and Review of Systems    Patient Active Problem List   Diagnosis   • Severe obstructive sleep apnea   • Degenerative joint disease   • Depression   • Psoriasis   • Single kidney   • Hepatic steatosis   • Intractable migraine without aura and without status migrainosus   • Mixed hyperlipidemia   • Anxiety   • Primary insomnia   • Impaired glucose tolerance   • Iron deficiency   • Class 3 severe obesity due to excess calories with serious comorbidity and body mass index (BMI) of 40.0 to 44.9 in adult (CMS/HCC)   • Primary osteoarthritis involving multiple joints   • Primary osteoarthritis of right knee   • Obesity   • S/P total knee arthroplasty, right   • Prediabetes   • NGOC on CPAP     Past Medical History:   Diagnosis Date   • Anemia    • Arthritis    • Carpal tunnel syndrome 04/09/2012   • Degenerative joint disease    • Depression    • Depression    • Elevated cholesterol    • Elevated liver enzymes    • Fatty liver    • Hypertension    • Kidney disorder     one kidney   • Palpitations    • Polycystic ovaries    • Psoriasis    • PVC (premature ventricular contraction)     occasional pvc's   • Sleep apnea     cpap at home   • Wears glasses       Past Surgical History:   Procedure Laterality Date   • BREAST BIOPSY     • BREAST CYST EXCISION Right  2013    BENIGN, INTRADUCTAL PAPILLOMA (PER PT)   •  SECTION      x 3   • CHOLECYSTECTOMY  2013   • COLONOSCOPY     • HYSTERECTOMY  2006    complete   • JOINT REPLACEMENT Left 2011    left knee   • OOPHORECTOMY     • TOTAL KNEE ARTHROPLASTY Right 3/9/2021    Procedure: TOTAL KNEE ARTHROPLASTY RIGHT;  Surgeon: Mateo Keith MD;  Location: [facility];  Service: Orthopedics;  Laterality: Right;   • TUBAL ABDOMINAL LIGATION        Family History   Problem Relation Age of Onset   • Diabetes Mother    • Lymphoma Mother    • Hypertension Mother    • No Known Problems Father    • Hypertension Brother    • Hypertension Brother    • Heart disease Maternal Grandmother    • Heart disease Maternal Grandfather    • Heart attack Maternal Grandfather    • Breast cancer Neg Hx    • Ovarian cancer Neg Hx      Social History     Socioeconomic History   • Marital status:      Spouse name: Not on file   • Number of children: Not on file   • Years of education: Not on file   • Highest education level: Not on file   Tobacco Use   • Smoking status: Never Smoker   • Smokeless tobacco: Never Used   Substance and Sexual Activity   • Alcohol use: No   • Drug use: No   • Sexual activity: Yes     Partners: Male     Comment:       Current Outpatient Medications on File Prior to Visit   Medication Sig Dispense Refill   • amitriptyline (ELAVIL) 10 MG tablet Take 1 tablet by mouth Every Night. 90 tablet 1   • aspirin EC (aspirin) 325 MG tablet Take 1 tablet by mouth Daily for 30 days. 30 tablet 0   • cholecalciferol (VITAMIN D3) 1000 units tablet Take 2,000 Units by mouth Daily.     • clobetasol (TEMOVATE) 0.05 % external solution Apply twice daily to itchy, scaly areas on scalp. 50 mL 11   • desvenlafaxine (Pristiq) 100 MG 24 hr tablet Take 1 tablet by mouth Daily. 90 tablet 1   • estradiol (Evamist) 1.53 MG/SPRAY transdermal spray Place 1 spray topically to the arm as directed by provider 2 (two) times a day. 24.3 mL 3   •  hydrocortisone 2.5 % cream Apply every evening to affected areas on abdomen until resolved. 28.35 g 11   • ketoconazole (NIZORAL) 2 % cream Apply every morning to underarms until resolved. 30 g 11   • ketoconazole (NIZORAL) 2 % shampoo Use as a shampoo to scalp and face 3 times a week. Leave in for 5 minutes and rinse. 120 mL 11   • losartan (Cozaar) 50 MG tablet Take 1 tablet by mouth Daily. 90 tablet 1   • Magnesium 250 MG tablet Take 1 tablet by mouth Daily.     • metFORMIN ER (GLUCOPHAGE-XR) 500 MG 24 hr tablet Take 2 tablets by mouth 2 (Two) Times a Day. 360 tablet 1   • nystatin-triamcinolone (MYCOLOG II) 368896-2.1 UNIT/GM-% cream Apply 1 application topically to the appropriate area sparingly as directed 3 (Three) Times a Day As Needed. 15 g 5   • omega-3 acid ethyl esters (LOVAZA) 1 g capsule Take 2 capsules by mouth 2 (Two) Times a Day. 360 capsule 1   • oxyCODONE (Roxicodone) 5 MG immediate release tablet Take 1 tablet by mouth Every 8 (Eight) Hours As Needed for Moderate Pain . 25 tablet 0   • pravastatin (PRAVACHOL) 40 MG tablet Take 1 tablet by mouth Every Night. 90 tablet 1   • Probiotic Product (PROBIOTIC DAILY PO) Take  by mouth Daily.     • propranolol LA (INDERAL LA) 60 MG 24 hr capsule Take 1 capsule by mouth Daily. 90 capsule 1   • Vitamin E 400 units tablet Take 800 Units by mouth Daily.     • [DISCONTINUED] Ropivacine HCl-NaCl (NAROPIN) 20 mg/hr by Peripheral Nerve route Continuous. Indications: Acute Pain       No current facility-administered medications on file prior to visit.      Allergies   Allergen Reactions   • Celexa [Citalopram] Other (See Comments)     Jittery    • Lisinopril Cough        Review of Systems   Constitutional: Negative.    HENT: Negative.    Eyes: Negative.    Respiratory: Negative.    Cardiovascular: Negative.    Gastrointestinal: Negative.    Endocrine: Negative.    Genitourinary: Negative.    Musculoskeletal: Positive for arthralgias.   Skin: Negative.     Allergic/Immunologic: Negative.    Neurological: Negative.    Hematological: Negative.    Psychiatric/Behavioral: Negative.         Objective      Physical Exam  Temp 97.3 °F (36.3 °C)     There is no height or weight on file to calculate BMI.    General:   Mental Status:  Alert   Appearance: Cooperative, in no acute distress   Build and Nutrition: Obese female   Orientation: Alert and oriented to person, place and time   Posture: Normal   Gait: With a walker, antalgic on the right    Integument       • Right knee: Wound is healing well with no signs of infection      Lower Extremities  • Right Knee:        • Tenderness: No medial or lateral joint line tenderness.       • Swelling: None        • Effusion: 1+       • Crepitus: None       • Atrophy: None       • Range of motion:             • Extension: 3°             • Flexion: 130°        • Instability: No varus or valgus laxity. Negative anterior drawer.       • Deformities: None      Imaging/Studies  Imaging Results (Last 24 Hours)     Procedure Component Value Units Date/Time    XR Knee 3+ View With [place] Right [[id number]] Resulted: 03/31/21 1037     Updated: 03/31/21 1038    Narrative:      Right Knee Radiographs  Indication: status-post right total knee arthroplasty  Views: AP, lateral, and sunrise views of the right knee    Comparison: no change compared to prior study, 3/17/2021    Findings:   The components are well aligned, with no signs of loosening or failure.            Assessment and Plan     Diagnoses and all orders for this visit:    1. S/P total knee arthroplasty, right (Primary)  -     XR Knee 3+ View With [place] Right    2. Orthopedic aftercare        1. S/P total knee arthroplasty, right    2. Orthopedic aftercare        I assured the patient that she is healing well from her right total knee arthroplasty as evidenced by her x-rays. We discussed her x-rays, which looked great with everything aligned well. The pop she felt was likely due to  some scar tissue. I advised she continue her therapy, but take it easy for 1 or 2 days.     Return in about 6 weeks (around 5/12/2021).       Scribed for Mateo Keith MD by Madelyn Stevens.  03/31/21   11:22 EDT    I have personally performed the services described in this document as scribed by the above individual, and it is both accurate and complete.  Mateo Keith MD  3/31/2021  18:43 EDT

## 2021-04-01 ENCOUNTER — TREATMENT (OUTPATIENT)
Dept: PHYSICAL THERAPY | Facility: CLINIC | Age: 61
End: 2021-04-01

## 2021-04-01 DIAGNOSIS — Z96.651 S/P TOTAL KNEE ARTHROPLASTY, RIGHT: Primary | ICD-10-CM

## 2021-04-01 PROCEDURE — 97112 NEUROMUSCULAR REEDUCATION: CPT | Performed by: PHYSICAL THERAPIST

## 2021-04-01 PROCEDURE — 97140 MANUAL THERAPY 1/> REGIONS: CPT | Performed by: PHYSICAL THERAPIST

## 2021-04-01 PROCEDURE — 97110 THERAPEUTIC EXERCISES: CPT | Performed by: PHYSICAL THERAPIST

## 2021-04-05 NOTE — PROGRESS NOTES
Physical Therapy Daily Progress Note    TOTAL TIME: 60 MINUTES    Yamila Neff reports: knee is feeling much better, still a little sore from the pop that occurred the other day; saw surgeon, he said things look great, x ray is normal; likely scar tissue that broke loose      Objective   See Exercise, Manual, and Modality Logs for complete treatment.     THERAPEUTIC EXERCISES/ACTIVITIES ADDED TODAY: ROM exercises, hold nu step today, SLR, supine heel slides on wall board, prone hang    Manual: knee flexion/extension light mobilizations x 10 minutes     Ice x 5 minutes at end of session    ROM: (0,5,128)    Assessment/Plan  Patient is doing very well s/p TKA, extension is improving nicely, very good flexion ROM; use RW for a couple of more days due to recent popping incident, progress to cane as tolerated and as quad control improves    Progress per Plan of Care and Progress strengthening /stabilization /functional activity           Manual Therapy:    10     mins  81575;  Therapeutic Exercise:    30     mins  81003;     Neuromuscular [place]:    15    mins  62711;    Therapeutic Activity:          mins  41297;     Gait Training:           mins  38175;     Ultrasound:          mins  32569;    Electrical Stimulation:         mins  24020 ( );  Dry Needling          mins self-pay    Timed Treatment:   55   mins   Total Treatment:     60   mins    NGUYEN Bhardwaj PT  Physical Therapist

## 2021-04-06 ENCOUNTER — TREATMENT (OUTPATIENT)
Dept: PHYSICAL THERAPY | Facility: CLINIC | Age: 61
End: 2021-04-06

## 2021-04-06 DIAGNOSIS — Z96.651 S/P TOTAL KNEE ARTHROPLASTY, RIGHT: Primary | ICD-10-CM

## 2021-04-06 PROCEDURE — 97110 THERAPEUTIC EXERCISES: CPT | Performed by: PHYSICAL THERAPIST

## 2021-04-06 PROCEDURE — 97140 MANUAL THERAPY 1/> REGIONS: CPT | Performed by: PHYSICAL THERAPIST

## 2021-04-06 PROCEDURE — 97112 NEUROMUSCULAR REEDUCATION: CPT | Performed by: PHYSICAL THERAPIST

## 2021-04-06 NOTE — PROGRESS NOTES
Physical Therapy Daily Progress Note    TOTAL TIME: 60 MINUTES    Yamila Neff reports: improving, brought quad cane and SPC; f/u with MD on May 12th      Objective   See Exercise, Manual, and Modality Logs for complete treatment.     THERAPEUTIC EXERCISES/ACTIVITIES ADDED TODAY: see flow sheets    Manual: knee flexion/ extension mobilizations x 10 minutes    ROM: (0,5, 128)    Assessment/Plan  Patient is working very hard in therapy and making excellent progress    Progress per Plan of Care           Manual Therapy:    10     mins  37839;  Therapeutic Exercise:    35     mins  94102;     Neuromuscular [place]:    15    mins  47476;    Therapeutic Activity:          mins  25950;     Gait Training:           mins  33423;     Ultrasound:          mins  16501;    Electrical Stimulation:         mins  24851 ( );  Dry Needling          mins self-pay    Timed Treatment:   60   mins   Total Treatment:     60   mins    NGUYEN Bhardwaj PT  Physical Therapist

## 2021-04-07 DIAGNOSIS — F41.9 ANXIETY: Primary | ICD-10-CM

## 2021-04-07 RX ORDER — HYDROXYZINE 50 MG/1
50 TABLET, FILM COATED ORAL NIGHTLY PRN
Qty: 90 TABLET | Refills: 1 | Status: SHIPPED | OUTPATIENT
Start: 2021-04-07 | End: 2021-11-11 | Stop reason: SDUPTHER

## 2021-04-08 ENCOUNTER — TELEPHONE (OUTPATIENT)
Dept: ORTHOPEDIC SURGERY | Facility: CLINIC | Age: 61
End: 2021-04-08

## 2021-04-08 ENCOUNTER — TREATMENT (OUTPATIENT)
Dept: PHYSICAL THERAPY | Facility: CLINIC | Age: 61
End: 2021-04-08

## 2021-04-08 DIAGNOSIS — Z96.651 S/P TOTAL KNEE ARTHROPLASTY, RIGHT: Primary | ICD-10-CM

## 2021-04-08 PROCEDURE — 97110 THERAPEUTIC EXERCISES: CPT | Performed by: PHYSICAL THERAPIST

## 2021-04-08 PROCEDURE — 97112 NEUROMUSCULAR REEDUCATION: CPT | Performed by: PHYSICAL THERAPIST

## 2021-04-08 PROCEDURE — 97140 MANUAL THERAPY 1/> REGIONS: CPT | Performed by: PHYSICAL THERAPIST

## 2021-04-08 NOTE — PROGRESS NOTES
Physical Therapy Daily Progress Note    TOTAL TIME: 70 MINUTES    Yamila Neff reports: getting better; ambulates into clinic today with SPC      Objective   See Exercise, Manual, and Modality Logs for complete treatment.     THERAPEUTIC EXERCISES/ACTIVITIES ADDED TODAY: terminal knee extension actively, SLR with tke, prone hang with 2 #    Manual: terminal knee extension mobilizations, PFJ mobilizations x 10 minutes    Assessment/Plan  Educated on necessity of active terminal knee extension and to focus on that with quad sets and SLR; ROM improved today with extension; c/o tightness, pain in distal hamstrings with extension stretch; making good progress; ambulates with SPC with min to no antalgia     Progress per Plan of Care           Manual Therapy:    10     mins  49719;  Therapeutic Exercise:    30     mins  11671;     Neuromuscular [place]:    20    mins  30724;    Therapeutic Activity:          mins  07332;     Gait Training:           mins  81309;     Ultrasound:          mins  04344;    Electrical Stimulation:         mins  65718 ( );  Dry Needling          mins self-pay    Timed Treatment:   60   mins   Total Treatment:     70   mins    NGUYEN Bhardwaj PT  Physical Therapist

## 2021-04-08 NOTE — TELEPHONE ENCOUNTER
Caller: JESSICA PAREDES    Relationship: SELF    Best call back number: [phone]    What form or medical record are you requesting: EXTENSION OF TIME OFF WORK- HAS BEEN APPROVED FOR 6 WEEKS- PATIENT THINKS SHE IS GOING TO NEED THE ADDITIONAL 2 WEEKS SHE DISCUSSED WITH DR MTZ- IS CURRENTLY APPROVED UNTIL 4/22/21    Who is requesting this form or medical record from you: EMPLOYER    How would you like to receive the form or medical records (pick-up, mail, fax): FSX  If fax, what is the fax number: [phone] ZEUS-DIXIE JOSHUA      Timeframe paperwork needed: ASAP- NO LATER THAN 4/22/21    Additional notes: PATIENT WANTED TO EXTEND HER TIME OFF WORK FOR 2 MORE WEEKS- AS DISCUSSED WITH DR MTZ MAY NEED 6-8 WEEKS- PATIENT JUST NEEDS DOCUMENT STATING SHE WILL NEED ADDITIONAL TIME OFF SO HER FMLA CAN BE APPROVED- PLEASE CONTACT WITH ANY QUESTION    CALL BACK ANYTIME- MAY LEAVE MESSAGE

## 2021-04-09 NOTE — TELEPHONE ENCOUNTER
I called Yamila and she wants to go back on 5/5/21. I adjusted the paperwork and faxed it back to [employer] Moss

## 2021-04-12 ENCOUNTER — TREATMENT (OUTPATIENT)
Dept: PHYSICAL THERAPY | Facility: CLINIC | Age: 61
End: 2021-04-12

## 2021-04-12 DIAGNOSIS — Z96.651 S/P TOTAL KNEE ARTHROPLASTY, RIGHT: Primary | ICD-10-CM

## 2021-04-12 PROCEDURE — 97110 THERAPEUTIC EXERCISES: CPT | Performed by: PHYSICAL THERAPIST

## 2021-04-12 PROCEDURE — 97112 NEUROMUSCULAR REEDUCATION: CPT | Performed by: PHYSICAL THERAPIST

## 2021-04-12 NOTE — PROGRESS NOTES
Physical Therapy Daily Progress Note    TOTAL TIME: 60 MINUTES    Yamila Neff reports: knee is feeling much better, able to drive here on my own today; feel like I can walk without the cane now, for the most part ; sometimes my knee feels stiff and I feel like I need to use the cane       Objective   See Exercise, Manual, and Modality Logs for complete treatment.     THERAPEUTIC EXERCISES/ACTIVITIES ADDED TODAY: body weight squats, standing weight shifts, march in place, toe raises; gait activities to encourage active walking and good push off    Assessment/Plan  Progressing very well, needs continued PT for functional strengthening; gait is safe and functional without cane at the end of therapy session once 'warmed up' ; educated to use cane prn especially when stiff early in the am    Progress per Plan of Care           Manual Therapy:         mins  13055;  Therapeutic Exercise:    35     mins  85677;     Neuromuscular [place]:    25    mins  53720;    Therapeutic Activity:          mins  74555;     Gait Training:           mins  55203;     Ultrasound:          mins  29471;    Electrical Stimulation:         mins  50639 ( );  Dry Needling          mins self-pay    Timed Treatment:   60   mins   Total Treatment:     60   mins    NGUYEN Bhardwaj, PT  Physical Therapist

## 2021-04-15 ENCOUNTER — TREATMENT (OUTPATIENT)
Dept: PHYSICAL THERAPY | Facility: CLINIC | Age: 61
End: 2021-04-15

## 2021-04-15 DIAGNOSIS — Z96.651 S/P TOTAL KNEE ARTHROPLASTY, RIGHT: Primary | ICD-10-CM

## 2021-04-15 PROCEDURE — 97112 NEUROMUSCULAR REEDUCATION: CPT | Performed by: PHYSICAL THERAPIST

## 2021-04-15 PROCEDURE — 97110 THERAPEUTIC EXERCISES: CPT | Performed by: PHYSICAL THERAPIST

## 2021-04-15 NOTE — PROGRESS NOTES
Physical Therapy Daily Progress Note    TOTAL TIME: 60 MINUTES    Yamila Neff reports: knee feeling better, driving myself now; feel like my extension ROM is improving; walking around the house mostly without my cane now      Objective   See Exercise, Manual, and Modality Logs for complete treatment.     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

## 2021-04-18 DIAGNOSIS — F51.01 PRIMARY INSOMNIA: ICD-10-CM

## 2021-04-18 DIAGNOSIS — G43.019 INTRACTABLE MIGRAINE WITHOUT AURA AND WITHOUT STATUS MIGRAINOSUS: ICD-10-CM

## 2021-04-18 DIAGNOSIS — E78.2 MIXED HYPERLIPIDEMIA: ICD-10-CM

## 2021-04-19 ENCOUNTER — TREATMENT (OUTPATIENT)
Dept: PHYSICAL THERAPY | Facility: CLINIC | Age: 61
End: 2021-04-19

## 2021-04-19 DIAGNOSIS — Z96.651 S/P TOTAL KNEE ARTHROPLASTY, RIGHT: Primary | ICD-10-CM

## 2021-04-19 PROCEDURE — 97110 THERAPEUTIC EXERCISES: CPT | Performed by: PHYSICAL THERAPIST

## 2021-04-19 PROCEDURE — 97140 MANUAL THERAPY 1/> REGIONS: CPT | Performed by: PHYSICAL THERAPIST

## 2021-04-19 PROCEDURE — 97112 NEUROMUSCULAR REEDUCATION: CPT | Performed by: PHYSICAL THERAPIST

## 2021-04-19 PROCEDURE — 97530 THERAPEUTIC ACTIVITIES: CPT | Performed by: PHYSICAL THERAPIST

## 2021-04-19 RX ORDER — AMITRIPTYLINE HYDROCHLORIDE 10 MG/1
10 TABLET, FILM COATED ORAL NIGHTLY
Qty: 90 TABLET | Refills: 0 | Status: SHIPPED | OUTPATIENT
Start: 2021-04-19 | End: 2021-07-23 | Stop reason: SDUPTHER

## 2021-04-19 RX ORDER — PRAVASTATIN SODIUM 40 MG
40 TABLET ORAL NIGHTLY
Qty: 90 TABLET | Refills: 0 | Status: SHIPPED | OUTPATIENT
Start: 2021-04-19 | End: 2021-07-23 | Stop reason: SDUPTHER

## 2021-04-19 NOTE — PROGRESS NOTES
"Physical Therapy Daily Progress Note    TOTAL TIME: 70 MINUTES    Yamila Neff reports: feeling better, stronger; was active over the weekend; walked around the block and went well      Objective   See Exercise, Manual, and Modality Logs for complete treatment.     THERAPEUTIC EXERCISES/ACTIVITIES ADDED TODAY: work simulated walk/carries with 10# weight x 10 laps of 50 feet; BW squats, 6\" step ups with focus on quadriceps eccentric lowering ; added 2# for SLR x 3, 3# for prone hangs    Ice x 10 minutes to knee after therapy    Manual knee extension mobilizations x 10 minutes     Assessment/Plan  Patient is working hard and making excellent progress with her ROM, strength and function    Progress per Plan of Care and Progress strengthening /stabilization /functional activity           Manual Therapy:    10     mins  29816;  Therapeutic Exercise:    20     mins  48739;     Neuromuscular [place]:    15    mins  06122;    Therapeutic Activity:     15     mins  37319;     Gait Training:           mins  00255;     Ultrasound:          mins  45704;    Electrical Stimulation:         mins  83126 ( );  Dry Needling          mins self-pay    Timed Treatment:   60   mins   Total Treatment:     70   mins    NGUYEN Bhardwaj, PT  Physical Therapist  "

## 2021-04-19 NOTE — TELEPHONE ENCOUNTER
LOV:03/03/2021  NOV:09/03/2021  Last fill:10/19/2020 (pravastatin)  Last fill: 09/01/2020 (amitriptyline)

## 2021-04-22 ENCOUNTER — TREATMENT (OUTPATIENT)
Dept: PHYSICAL THERAPY | Facility: CLINIC | Age: 61
End: 2021-04-22

## 2021-04-22 DIAGNOSIS — Z96.651 S/P TOTAL KNEE ARTHROPLASTY, RIGHT: Primary | ICD-10-CM

## 2021-04-22 PROCEDURE — 97140 MANUAL THERAPY 1/> REGIONS: CPT | Performed by: PHYSICAL THERAPIST

## 2021-04-22 PROCEDURE — 97112 NEUROMUSCULAR REEDUCATION: CPT | Performed by: PHYSICAL THERAPIST

## 2021-04-22 PROCEDURE — 97530 THERAPEUTIC ACTIVITIES: CPT | Performed by: PHYSICAL THERAPIST

## 2021-04-22 PROCEDURE — 97110 THERAPEUTIC EXERCISES: CPT | Performed by: PHYSICAL THERAPIST

## 2021-04-23 NOTE — PROGRESS NOTES
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     THERAPEUTIC EXERCISES/ACTIVITIES ADDED TODAY: see flow sheets; 6\" step ups, BW squats; 2# SLR x 3, supine heel slides on wall, prone hang    Manual: knee extension mobilizations x 10 minutes     Assessment/Plan  Patient is making excellent progress; her knee extension has improved and is WFL; patient is making excellent progress towards the goals below; she lacks just 1-2 degrees of full knee extension, but it has improved from a 20 degree lag at initial evaluation; she has functional ROM and has good mobility without compensation; she should be able to obtain full terminal knee extension in the next couple of weeks; her functional strength is good; she needs to be able to tolerate prolonged walking and standing in order to RTW; she will transition to increased work simulation strengthening and endurance over the next couple of weeks     Goals (from Initial Evaluation)  Plan Goals: 4 weeks:  1. IND with HEP (met)  2. Increase LEFS to > 40   3. Patient to display full AROM of right knee (0-135) (not met, very close, met for flexion)  4. Patient to ambulate on even/uneven surfaces without pain or antalgia (met)    Progress per Plan of Care and Progress strengthening /stabilization /functional activity           Manual Therapy:    10     mins  27185;  Therapeutic Exercise:    20     mins  26332;     Neuromuscular [place]:    20    mins  10921;    Therapeutic Activity:     15     mins  69995;     Gait Training:           mins  16647;     Ultrasound:          mins  33122;    Electrical Stimulation:         mins  36504 ( );  Dry " Needling          mins self-pay    Timed Treatment:   65   mins   Total Treatment:     65   mins    NGUYEN Bhardwaj, PT  Physical Therapist

## 2021-04-28 ENCOUNTER — TREATMENT (OUTPATIENT)
Dept: PHYSICAL THERAPY | Facility: CLINIC | Age: 61
End: 2021-04-28

## 2021-04-28 DIAGNOSIS — Z96.651 S/P TOTAL KNEE ARTHROPLASTY, RIGHT: Primary | ICD-10-CM

## 2021-04-28 PROCEDURE — 97530 THERAPEUTIC ACTIVITIES: CPT | Performed by: PHYSICAL THERAPIST

## 2021-04-28 PROCEDURE — 97112 NEUROMUSCULAR REEDUCATION: CPT | Performed by: PHYSICAL THERAPIST

## 2021-04-28 PROCEDURE — 97110 THERAPEUTIC EXERCISES: CPT | Performed by: PHYSICAL THERAPIST

## 2021-04-28 NOTE — PROGRESS NOTES
Physical Therapy Daily Progress Note    TOTAL TIME: 65 MINUTES    Yamila Neff reports: knee is feeling good; was able to drive a couple of hours to visit her mother; fatigued with lots of walking, but overall did very well      Objective   See Exercise, Manual, and Modality Logs for complete treatment.     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

## 2021-04-29 ENCOUNTER — TELEMEDICINE (OUTPATIENT)
Dept: SLEEP MEDICINE | Facility: HOSPITAL | Age: 61
End: 2021-04-29

## 2021-04-29 VITALS — HEIGHT: 62 IN | BODY MASS INDEX: 39.56 KG/M2 | WEIGHT: 215 LBS

## 2021-04-29 DIAGNOSIS — G47.33 OSA ON CPAP: Primary | ICD-10-CM

## 2021-04-29 DIAGNOSIS — Z99.89 OSA ON CPAP: Primary | ICD-10-CM

## 2021-04-29 PROCEDURE — 99212 OFFICE O/P EST SF 10 MIN: CPT | Performed by: NURSE PRACTITIONER

## 2021-04-29 NOTE — PROGRESS NOTES
Chief Complaint:   Chief Complaint   Patient presents with   • Follow-up       HPI:    Yamila Neff is a 60 y.o. female here for follow-up of sleep apnea.  Last saw patient 3/11/2021 and she did need a new machine.  Patient states she is doing very well with new machine sleeping 7 hours nightly.  Patient does feel refreshed upon awakening.  Patient will go to sleep within 10 minutes and does not get up during the night.  Patient has an [place] score of 8/24.  Patient has no concerns or complaints regarding CPAP and wishes to continue.    Past Medical History:   Diagnosis Date   • Anemia    • Arthritis    • Carpal tunnel syndrome 04/09/2012   • Degenerative joint disease    • Depression    • Depression    • Elevated cholesterol    • Elevated liver enzymes    • Fatty liver    • Hypertension    • Kidney disorder     one kidney   • Palpitations    • Polycystic ovaries    • Psoriasis    • PVC (premature ventricular contraction)     occasional pvc's   • Sleep apnea     cpap at home   • Wears glasses          Current medications are:   Current Outpatient Medications:   •  amitriptyline (ELAVIL) 10 MG tablet, Take 1 tablet by mouth Every Night., Disp: 90 tablet, Rfl: 0  •  cholecalciferol (VITAMIN D3) 1000 units tablet, Take 2,000 Units by mouth Daily., Disp: , Rfl:   •  clobetasol (TEMOVATE) 0.05 % external solution, Apply twice daily to itchy, scaly areas on scalp., Disp: 50 mL, Rfl: 11  •  desvenlafaxine (Pristiq) 100 MG 24 hr tablet, Take 1 tablet by mouth Daily., Disp: 90 tablet, Rfl: 1  •  estradiol (Evamist) 1.53 MG/SPRAY transdermal spray, Place 1 spray topically to the arm as directed by provider 2 (two) times a day., Disp: 24.3 mL, Rfl: 3  •  hydrocortisone 2.5 % cream, Apply every evening to affected areas on abdomen until resolved., Disp: 28.35 g, Rfl: 11  •  hydrOXYzine (ATARAX) 50 MG tablet, Take 1 tablet by mouth At Night As Needed for Anxiety., Disp: 90 tablet, Rfl: 1  •  ketoconazole (NIZORAL) 2  % cream, Apply every morning to underarms until resolved., Disp: 30 g, Rfl: 11  •  ketoconazole (NIZORAL) 2 % shampoo, Use as a shampoo to scalp and face 3 times a week. Leave in for 5 minutes and rinse., Disp: 120 mL, Rfl: 11  •  losartan (Cozaar) 50 MG tablet, Take 1 tablet by mouth Daily., Disp: 90 tablet, Rfl: 1  •  Magnesium 250 MG tablet, Take 1 tablet by mouth Daily., Disp: , Rfl:   •  metFORMIN ER (GLUCOPHAGE-XR) 500 MG 24 hr tablet, Take 2 tablets by mouth 2 (Two) Times a Day., Disp: 360 tablet, Rfl: 1  •  nystatin-triamcinolone (MYCOLOG II) 818945-3.1 UNIT/GM-% cream, Apply 1 application topically to the appropriate area sparingly as directed 3 (Three) Times a Day As Needed., Disp: 15 g, Rfl: 5  •  omega-3 acid ethyl esters (LOVAZA) 1 g capsule, Take 2 capsules by mouth 2 (Two) Times a Day., Disp: 360 capsule, Rfl: 1  •  pravastatin (PRAVACHOL) 40 MG tablet, Take 1 tablet by mouth Every Night., Disp: 90 tablet, Rfl: 0  •  Probiotic Product (PROBIOTIC DAILY PO), Take  by mouth Daily., Disp: , Rfl:   •  propranolol LA (INDERAL LA) 60 MG 24 hr capsule, Take 1 capsule by mouth Daily., Disp: 90 capsule, Rfl: 1  •  Vitamin E 400 units tablet, Take 800 Units by mouth Daily., Disp: , Rfl: .      The patient's relevant past medical, surgical, family and social history were reviewed and updated in Epic as appropriate.       Review of Systems   Eyes: Positive for visual disturbance.   Respiratory: Positive for apnea.    Gastrointestinal: Positive for constipation.   Musculoskeletal: Positive for arthralgias and joint swelling.   Psychiatric/Behavioral: Positive for sleep disturbance.   All other systems reviewed and are negative.        Objective:    Physical Exam  Constitutional:       Appearance: Normal appearance. She is obese.   HENT:      Head: Normocephalic and atraumatic.   Pulmonary:      Effort: Pulmonary effort is normal. No respiratory distress.   Skin:     General: Skin is dry.      Coloration: Skin is  not pale.   Neurological:      Mental Status: She is alert and oriented to person, place, and time.   Psychiatric:         Mood and Affect: Mood normal.         Behavior: Behavior normal.         Thought Content: Thought content normal.         Judgment: Judgment normal.     34/34 days of use  Greater than 4-hour use 97.1  90% pressure 9.5  AHI of 3.3  Settings 8-18      ASSESSMENT/PLAN    Diagnoses and all orders for this visit:    1. NGOC on CPAP (Primary)  -     CPAP Therapy            1. Counseled patient regarding multimodal approach with healthy nutrition, healthy sleep, regular physical activity, social activities, counseling, and medications. Encouraged to practice lateral sleep position. Avoid alcohol and sedatives close to bedtime.  2.   Refill supplies x1 year.  Return to clinic 1 year or sooner symptoms warrant.  I have reviewed the results of my evaluation and impression and discussed my recommendations in detail with the patient.      Signed by  KAREN Nielsen    April 29, 2021      CC: Daly Archibald PA-C          No ref. provider found

## 2021-04-30 ENCOUNTER — TREATMENT (OUTPATIENT)
Dept: PHYSICAL THERAPY | Facility: CLINIC | Age: 61
End: 2021-04-30

## 2021-04-30 DIAGNOSIS — Z96.651 S/P TOTAL KNEE ARTHROPLASTY, RIGHT: Primary | ICD-10-CM

## 2021-04-30 PROCEDURE — 97112 NEUROMUSCULAR REEDUCATION: CPT | Performed by: PHYSICAL THERAPIST

## 2021-04-30 PROCEDURE — 97530 THERAPEUTIC ACTIVITIES: CPT | Performed by: PHYSICAL THERAPIST

## 2021-04-30 PROCEDURE — 97110 THERAPEUTIC EXERCISES: CPT | Performed by: PHYSICAL THERAPIST

## 2021-04-30 NOTE — PROGRESS NOTES
"Physical Therapy Daily Progress Note    TOTAL TIME: 65 MINUTES    Yamila Neff reports: knee is feeling really good; will resume work on Wednesday/Friday next week ; ROM feels good      Objective   See Exercise, Manual, and Modality Logs for complete treatment.     THERAPEUTIC EXERCISES/ACTIVITIES ADDED TODAY: see flow sheets; BW squats, level 10 TG squats bilateral, 7\" step ups, supine / SL clams with black tband, bridges     Manual: knee extension mobilizations x 5 minutes    Assessment/Plan  Patient is making excellent progress with ROM and functional strength; should do well in her RTW but will likely take a few weeks to return to full,  pre-morbid work endurance for a 12 hour work shift     Progress per Plan of Care and Progress strengthening /stabilization /functional activity           Manual Therapy:    5     mins  95764;  Therapeutic Exercise:    30     mins  24622;     Neuromuscular [place]:    15    mins  16505;    Therapeutic Activity:     15     mins  85693;     Gait Training:           mins  69183;     Ultrasound:          mins  08816;    Electrical Stimulation:         mins  31391 ( );  Dry Needling          mins self-pay    Timed Treatment:   65   mins   Total Treatment:     65   mins    NGUYEN Bhardwaj, PT  Physical Therapist  "

## 2021-05-03 ENCOUNTER — TREATMENT (OUTPATIENT)
Dept: PHYSICAL THERAPY | Facility: CLINIC | Age: 61
End: 2021-05-03

## 2021-05-03 DIAGNOSIS — Z96.651 S/P TOTAL KNEE ARTHROPLASTY, RIGHT: Primary | ICD-10-CM

## 2021-05-03 PROCEDURE — 97110 THERAPEUTIC EXERCISES: CPT | Performed by: PHYSICAL THERAPIST

## 2021-05-03 PROCEDURE — 97530 THERAPEUTIC ACTIVITIES: CPT | Performed by: PHYSICAL THERAPIST

## 2021-05-03 PROCEDURE — 97140 MANUAL THERAPY 1/> REGIONS: CPT | Performed by: PHYSICAL THERAPIST

## 2021-05-03 PROCEDURE — 97112 NEUROMUSCULAR REEDUCATION: CPT | Performed by: PHYSICAL THERAPIST

## 2021-05-06 ENCOUNTER — TREATMENT (OUTPATIENT)
Dept: PHYSICAL THERAPY | Facility: CLINIC | Age: 61
End: 2021-05-06

## 2021-05-06 DIAGNOSIS — Z96.651 S/P TOTAL KNEE ARTHROPLASTY, RIGHT: Primary | ICD-10-CM

## 2021-05-06 PROCEDURE — 97110 THERAPEUTIC EXERCISES: CPT | Performed by: PHYSICAL THERAPIST

## 2021-05-06 PROCEDURE — 97140 MANUAL THERAPY 1/> REGIONS: CPT | Performed by: PHYSICAL THERAPIST

## 2021-05-06 PROCEDURE — 97530 THERAPEUTIC ACTIVITIES: CPT | Performed by: PHYSICAL THERAPIST

## 2021-05-06 PROCEDURE — 97112 NEUROMUSCULAR REEDUCATION: CPT | Performed by: PHYSICAL THERAPIST

## 2021-05-06 NOTE — PROGRESS NOTES
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     THERAPEUTIC EXERCISES/ACTIVITIES ADDED TODAY: air ex balance    ROM: 0-135    MMT: 5/5     Ice x 10 minutes after therapy    Assessment/Plan  Patient has worked very hard in therapy and has made wonderful progress; she is ready for discharge at this time; she should continue with hep for continued endurance; her strength should continue to improve as she continues working and building endurance with walking and standing; she was instructed to call back with questions/concerns/setbacks; all goals met    Goals (from Initial Evaluation)  Plan Goals: 4 weeks:  1. IND with HEP  2. Increase LEFS to > 40   3. Patient to display full AROM of right knee (0-135)  4. Patient to ambulate on even/uneven surfaces without pain or antalgia    6 weeks:  1. Patient to perform work simulated tasks without pain   2. Patient to score > 50 on LEFS  3. Patient to display 5/5 MMT strength of right LE     Other  Discharge          Manual Therapy:    10     mins  60506;  Therapeutic Exercise:    15     mins  14276;     Neuromuscular [place]:    15    mins  16625;    Therapeutic Activity:     15     mins  13716;     Gait Training:           mins  07852;     Ultrasound:          mins  55472;    Electrical Stimulation:         mins  08400 ( );  Dry Needling          mins self-pay    Timed Treatment:   55   mins   Total Treatment:     65   mins    NGUYEN Bhardwaj PT  Physical Therapist

## 2021-05-12 ENCOUNTER — OFFICE VISIT (OUTPATIENT)
Dept: ORTHOPEDIC SURGERY | Facility: CLINIC | Age: 61
End: 2021-05-12

## 2021-05-12 DIAGNOSIS — Z47.89 ORTHOPEDIC AFTERCARE: ICD-10-CM

## 2021-05-12 DIAGNOSIS — Z96.651 S/P TOTAL KNEE ARTHROPLASTY, RIGHT: Primary | ICD-10-CM

## 2021-05-12 PROCEDURE — 99024 POSTOP FOLLOW-UP VISIT: CPT | Performed by: ORTHOPAEDIC SURGERY

## 2021-05-12 NOTE — PROGRESS NOTES
[facility] Note    Subjective     Chief Complaint   Patient presents with   • Post-op Follow-up     6 week follow up - 9 weeks S/P total knee arthroplasty, right DOS 2021        HPI    Yamila Neff is a 60 y.o. female who follows up for her right total knee arthroplasty done on 2021. She is here today for routine follow-up.     She rates her right knee as 99 percent improved as compared to before surgery. She notes it is still just mildly tender.     I have reviewed the following portions of the patient's history and agree with: History of Present Illness and Review of Systems    Patient Active Problem List   Diagnosis   • Severe obstructive sleep apnea   • Degenerative joint disease   • Depression   • Psoriasis   • Single kidney   • Hepatic steatosis   • Intractable migraine without aura and without status migrainosus   • Mixed hyperlipidemia   • Anxiety   • Primary insomnia   • Impaired glucose tolerance   • Iron deficiency   • Class 3 severe obesity due to excess calories with serious comorbidity and body mass index (BMI) of 40.0 to 44.9 in adult (CMS/[facility])   • Primary osteoarthritis involving multiple joints   • Primary osteoarthritis of right knee   • Obesity   • S/P total knee arthroplasty, right   • Prediabetes   • NGOC on CPAP     Past Medical History:   Diagnosis Date   • Anemia    • Arthritis    • Carpal tunnel syndrome 2012   • Degenerative joint disease    • Depression    • Depression    • Elevated cholesterol    • Elevated liver enzymes    • Fatty liver    • Hypertension    • Kidney disorder     one kidney   • Palpitations    • Polycystic ovaries    • Psoriasis    • PVC (premature ventricular contraction)     occasional pvc's   • Sleep apnea     cpap at home   • Wears glasses       Past Surgical History:   Procedure Laterality Date   • BREAST BIOPSY     • BREAST CYST EXCISION Right     BENIGN, INTRADUCTAL PAPILLOMA (PER PT)   •  SECTION      x  3   • CHOLECYSTECTOMY  2013   • COLONOSCOPY     • HYSTERECTOMY  2006    complete   • JOINT REPLACEMENT Left 2011    left knee   • OOPHORECTOMY     • TOTAL KNEE ARTHROPLASTY Right 3/9/2021    Procedure: TOTAL KNEE ARTHROPLASTY RIGHT;  Surgeon: Mateo Keith MD;  Location: [facility];  Service: Orthopedics;  Laterality: Right;   • TUBAL ABDOMINAL LIGATION        Family History   Problem Relation Age of Onset   • Diabetes Mother    • Lymphoma Mother    • Hypertension Mother    • No Known Problems Father    • Hypertension Brother    • Hypertension Brother    • Heart disease Maternal Grandmother    • Heart disease Maternal Grandfather    • Heart attack Maternal Grandfather    • Breast cancer Neg Hx    • Ovarian cancer Neg Hx      Social History     Socioeconomic History   • Marital status:      Spouse name: Not on file   • Number of children: Not on file   • Years of education: Not on file   • Highest education level: Not on file   Tobacco Use   • Smoking status: Never Smoker   • Smokeless tobacco: Never Used   Substance and Sexual Activity   • Alcohol use: No   • Drug use: No   • Sexual activity: Yes     Partners: Male     Comment:       Current Outpatient Medications on File Prior to Visit   Medication Sig Dispense Refill   • amitriptyline (ELAVIL) 10 MG tablet Take 1 tablet by mouth Every Night. 90 tablet 0   • cholecalciferol (VITAMIN D3) 1000 units tablet Take 2,000 Units by mouth Daily.     • clobetasol (TEMOVATE) 0.05 % external solution Apply twice daily to itchy, scaly areas on scalp. 50 mL 11   • desvenlafaxine (Pristiq) 100 MG 24 hr tablet Take 1 tablet by mouth Daily. 90 tablet 1   • estradiol (Evamist) 1.53 MG/SPRAY transdermal spray Place 1 spray topically to the arm as directed by provider 2 (two) times a day. 24.3 mL 3   • hydrocortisone 2.5 % cream Apply every evening to affected areas on abdomen until resolved. 28.35 g 11   • hydrOXYzine (ATARAX) 50 MG tablet Take 1 tablet by mouth At  Night As Needed for Anxiety. 90 tablet 1   • ketoconazole (NIZORAL) 2 % cream Apply every morning to underarms until resolved. 30 g 11   • ketoconazole (NIZORAL) 2 % shampoo Use as a shampoo to scalp and face 3 times a week. Leave in for 5 minutes and rinse. 120 mL 11   • losartan (Cozaar) 50 MG tablet Take 1 tablet by mouth Daily. 90 tablet 1   • Magnesium 250 MG tablet Take 1 tablet by mouth Daily.     • metFORMIN ER (GLUCOPHAGE-XR) 500 MG 24 hr tablet Take 2 tablets by mouth 2 (Two) Times a Day. 360 tablet 1   • nystatin-triamcinolone (MYCOLOG II) 857317-7.1 UNIT/GM-% cream Apply 1 application topically to the appropriate area sparingly as directed 3 (Three) Times a Day As Needed. 15 g 5   • omega-3 acid ethyl esters (LOVAZA) 1 g capsule Take 2 capsules by mouth 2 (Two) Times a Day. 360 capsule 1   • pravastatin (PRAVACHOL) 40 MG tablet Take 1 tablet by mouth Every Night. 90 tablet 0   • Probiotic Product (PROBIOTIC DAILY PO) Take  by mouth Daily.     • propranolol LA (INDERAL LA) 60 MG 24 hr capsule Take 1 capsule by mouth Daily. 90 capsule 1   • Vitamin E 400 units tablet Take 800 Units by mouth Daily.       No current facility-administered medications on file prior to visit.      Allergies   Allergen Reactions   • Celexa [Citalopram] Other (See Comments)     Jittery    • Lisinopril Cough        Review of Systems     Objective      Physical Exam  There were no vitals taken for this visit.    There is no height or weight on file to calculate BMI.    General:   Mental Status:  Alert   Appearance: Cooperative, in no acute distress   Build and Nutrition: Obese by BMI female   Orientation: Alert and oriented to person, place and time   Posture: Normal   Gait: Nonantalgic    Integument       • Right knee: Wound is well-healed with no signs of infection      Lower Extremities  • Right Knee:        • Tenderness: No medial or lateral joint line tenderness.       • Swelling: None        • Effusion: None       • Crepitus:  None       • Atrophy: None       • Range of motion:             • Extension: 0°             • Flexion: 130°        • Instability: No varus or valgus laxity. Negative anterior drawer.       • Deformities: None    Imaging/Studies  No new radiographs.    Assessment and Plan     Diagnoses and all orders for this visit:    1. S/P total knee arthroplasty, right (Primary)    2. Orthopedic aftercare        1. S/P total knee arthroplasty, right    2. Orthopedic aftercare        I assured her that her right total knee arthroplasty is healing well and all questions were answered.  She is doing well, and is pleased with the results.    Return in about 10 months (around 3/12/2022).      Scribed for Mateo Keith MD by Madelyn Stevens.  05/12/21   11:22 EDT    I have personally performed the services described in this document as scribed by the above individual, and it is both accurate and complete.  Mateo Keith MD  5/12/2021  19:51 EDT

## 2021-06-04 DIAGNOSIS — R73.02 IMPAIRED GLUCOSE TOLERANCE: ICD-10-CM

## 2021-06-07 RX ORDER — METFORMIN HYDROCHLORIDE 500 MG/1
1000 TABLET, EXTENDED RELEASE ORAL 2 TIMES DAILY
Qty: 360 TABLET | Refills: 1 | Status: SHIPPED | OUTPATIENT
Start: 2021-06-07 | End: 2021-11-11 | Stop reason: SDUPTHER

## 2021-06-07 NOTE — TELEPHONE ENCOUNTER
Last seen 3/3/21 Next appointment 9/3/21    Last labs 2/25/21   [facility]  Heart Transplant  Progress Note    Patient Name: Saba Lott  MRN: [id number]  Admission Date: 1/15/2020  Hospital Length of Stay: 4 days  Attending Physician: Lian Daniel MD  Primary Care Provider: Primary Doctor No  Principal Problem:Cardiogenic shock    Subjective:     **Interval History: Net -ve > 3.7L past 24 hours on Lasix at 30 mg/hr, but CVP remains 19. Does have a little less BETO. Creatinine has trended down to 1.6. No complaints or overnight events.    Continuous Infusions:   DOBUTamine 5 mcg/kg/min (01/19/20 0705)    furosemide (LASIX) 2 mg/mL infusion (non-titrating) 30 mg/hr (01/19/20 0705)    heparin (porcine) in D5W 15.944 Units/kg/hr (01/19/20 0705)    nitric oxide gas       Scheduled Meds:   acetaminophen  1,000 mg Oral Q8H    atorvastatin  40 mg Oral Daily    cetirizine  10 mg Oral Daily    famotidine  20 mg Oral Daily    hydrALAZINE  25 mg Oral Q8H    isosorbide dinitrate  10 mg Oral Q8H    magnesium sulfate IVPB  1 g Intravenous Once    melatonin  6 mg Oral Nightly    polyethylene glycol  17 g Oral Daily    senna-docusate 8.6-50 mg  2 tablet Oral BID    triamcinolone acetonide 0.5%   Topical (Top) TID     PRN Meds:albuterol, bisacodyl, heparin (PORCINE), heparin (PORCINE), hydrOXYzine HCl, sodium chloride 0.9%, traMADol    Review of patient's allergies indicates:   Allergen Reactions    Iodine and iodide containing products      Objective:     Vital Signs (Most Recent):  Temp: 98.5 °F (36.9 °C) (01/19/20 0300)  Pulse: 95 (01/19/20 0705)  Resp: (!) 31 (01/19/20 0705)  BP: (!) 141/79 (01/18/20 2130)  SpO2: 99 % (01/19/20 0705) Vital Signs (24h Range):  Temp:  [97.8 °F (36.6 °C)-98.8 °F (37.1 °C)] 98.5 °F (36.9 °C)  Pulse:  [] 95  Resp:  [15-42] 31  SpO2:  [93 %-100 %] 99 %  BP: (111-141)/(76-82) 141/79     Patient Vitals for the past 72 hrs (Last 3 readings):   Weight   01/19/20 0300 118.3 kg (260 lb 12.9 oz)   01/18/20 0632 118.3 kg (260 lb  12.9 oz)     Body mass index is 40.85 kg/m².      Intake/Output Summary (Last 24 hours) at 1/19/2020 0748  Last data filed at 1/19/2020 0715  Gross per 24 hour   Intake 2561.81 ml   Output 6295 ml   Net -3733.19 ml       Hemodynamic Parameters:       Telemetry: ST with PVC's    Physical Exam   Constitutional: He is oriented to person, place, and time. He appears well-developed and well-nourished.   HENT:   Head: Normocephalic and atraumatic.   Eyes: Pupils are equal, round, and reactive to light. Conjunctivae and EOM are normal.   Neck: Normal range of motion. Neck supple. No thyromegaly present.   RIJ line   Cardiovascular: Normal rate and regular rhythm.   + S3   Pulmonary/Chest: Effort normal and breath sounds normal.   Abdominal: Soft. Bowel sounds are normal.   Musculoskeletal:   3+ edema from top of thighs down, but no further scrotal edema   Neurological: He is alert and oriented to person, place, and time.   Skin: Skin is warm and dry. Capillary refill takes 2 to 3 seconds.   Psychiatric: He has a normal mood and affect. His behavior is normal. Judgment and thought content normal.       Significant Labs:  CBC:  Recent Labs   Lab 01/17/20  0424 01/18/20  0334 01/19/20  0300   WBC 9.15 8.94 9.54   RBC 4.29* 4.20* 4.23*   HGB 11.7* 11.2* 11.3*   HCT 36.8* 35.9* 35.8*    164 138*   MCV 86 86 85   MCH 27.3 26.7* 26.7*   MCHC 31.8* 31.2* 31.6*     BNP:  Recent Labs   Lab 01/15/20  2001 01/16/20  1106 01/17/20  0424   BNP 3,525* 2,534* 1,851*     CMP:  Recent Labs   Lab 01/18/20  0334 01/18/20  1714 01/19/20  0300   GLU 88  88 104 92  92  92   CALCIUM 8.6*  8.6* 8.4* 8.7  8.7  8.7   ALBUMIN 2.9* 2.9* 3.1*  3.1*   PROT 6.5 6.4 6.8  6.8     139 139 140  140  140   K 3.7  3.7 3.6 3.5  3.5  3.5   CO2 32*  32* 34* 33*  33*  33*   CL 97  97 97 95  95  95   BUN 25*  25* 21* 20  20  20   CREATININE 1.9*  1.9* 1.8* 1.6*  1.6*  1.6*   ALKPHOS 127 122 121  121   ALT 16 15 15  15   AST  27 26 27  27   BILITOT 1.3* 1.1* 1.3*  1.3*      Coagulation:   Recent Labs   Lab 01/15/20  2001  01/17/20  2317 01/18/20  0334 01/19/20  0300   INR 2.0*  --   --   --   --    APTT 26.1   < > 49.0* 52.8* 50.8*    < > = values in this interval not displayed.     LDH:  No results for input(s): LDH in the last 72 hours.  Microbiology:  Microbiology Results (last 7 days)     ** No results found for the last 168 hours. **          I have reviewed all pertinent labs within the past 24 hours.    Estimated Creatinine Clearance: 64.2 mL/min (A) (based on SCr of 1.6 mg/dL (H)).    Diagnostic Results:  I have reviewed all pertinent imaging results/findings within the past 24 hours.    Assessment and Plan:     54 yo BM with history of nonischemic CMP with EF 20% with chronic heart failure s/p ICD implantation for primary prevention on 2/15/19 ([employer]tr[employer]), tobacco and alcohol abuse (quit 2018), hx of DVT right leg, HTN. Chronic MARC - Class II-III and mild LE edema.      Hospital Course (synopsis of major diagnoses, care, treatment, and services provided during the course of the hospital stay):  -2/12 admit to CDU for diuresis with IV lasix  -IV abx   -CXR with suspected small left pleural effusion with associated left basilar atelectasis versus evolving airspace disease  -2/13 single chamber ICD implant; IV lasix decreased to BID  -2/16 add dobutamine, hold BB due to hypotension, no ACE-I or ARB due to recent HPI hypotension  -2/17 Lasix changed to PO  -2/18 dobutamine discontinued    Admitted to [facility] on Thursday due to severe SOB for a week with associated orthopnea, MARC, and PND unable to lie flat and found to be in acute diastolic heart failure. States he normally weighs around 219 but up to 254lb on admission tonight. Says he hasn't been the most compliant in terms of fluid restriction and daily weights but has avoided salt. BNP on admission was 2375. BUN/CRT 32/1.4 He was started on IV diuretics but  continued to deteriorate. Creatinine continued to increase and reached 4.3 with oliguria. He was started on CRRT for a few days and tolerated well. Renal u/s was negative for obstruction and liver u/s without findings of cirrhosis. All of this was progression of cardiorenal syndrome with liver congestion from severe volume overload. On arrival vitals stable and in no acute distress. He has obvious anasarca up to the chest. He did have uop of 500 at time of arrival also.    TTE 5/28/19  · Moderate left ventricular enlargement.  · Severely decreased left ventricular systolic function. The estimated ejection fraction is 20%  · Global hypokinetic wall motion.  · Grade III (severe) left ventricular diastolic dysfunction consistent with restrictive physiology.  · Severe left atrial enlargement.  · Moderate right ventricular enlargement.  · Low normal right ventricular systolic function.  · Mild right atrial enlargement.  · Moderate mitral regurgitation.  Mild to moderate tricuspid regurgitation    * Cardiogenic shock  -NICM; Likely Etoh induced  -Last 2D Echo 1/16/20: LVEF 20%, LVEDD 6.92 cm   -Transferred from [facility] with IABP at 1:1 for further level of care  -Continued IABP and ; diuresing well on Lasix at 30 mg/hr and creatinine has trended down to 1.6  -Hypervolemic on examination today  -CVP: 19, SVO2: 73. Will continue lasix infusion at current dose and consider diuril if UOP drops.  Continue IABP,  at 5 mcg/kg/min and Radha at 10 ppm  -GDMT: Increase Isordil and Hydralazine today.  Patient was on Entresto prior to admission  -Patient is not currently being evaluated for OHTx/VAD; currently too ill given ANJELICA. Will likely need workup soon if renal recovery.   -2g Na dietary restriction, 1500 mL fluid restriction, strict I/Os      Pruritus  -triamcinolone cream, atarax, Zyrtec and Famotidine   -improving    ANJELICA (acute kidney injury)  -Upon admit here; creat was down to 3 from 4.3 at OSH. Admitted at  1.4 per records and with normal function ~ 8 mos ago. Cardiorenal etiology most likely  -Creatinine improving with diuresis.  -Will continue to monitor  - avoid nephrotoxic agents     Essential hypertension  -Currently essentially normotensive   -Continue Hyd/Isordil    Deep vein thrombosis (DVT) of right lower extremity  -Unsure of timing but was on eliquis PTA.   -Continue on heparin gtt for now      AICD (automatic cardioverter/defibrillator) present  -Medtronic placed 2019 for primary prevention    Uninterrupted Critical Care/Counseling Time (not including procedures): 30 [place] [id number]  Heart Transplant  [facility]

## 2021-06-15 ENCOUNTER — OFFICE VISIT (OUTPATIENT)
Dept: FAMILY MEDICINE CLINIC | Facility: CLINIC | Age: 61
End: 2021-06-15

## 2021-06-15 VITALS
SYSTOLIC BLOOD PRESSURE: 138 MMHG | WEIGHT: 216 LBS | HEIGHT: 62 IN | HEART RATE: 66 BPM | OXYGEN SATURATION: 98 % | DIASTOLIC BLOOD PRESSURE: 82 MMHG | TEMPERATURE: 96.4 F | BODY MASS INDEX: 39.75 KG/M2

## 2021-06-15 DIAGNOSIS — R73.03 PREDIABETES: ICD-10-CM

## 2021-06-15 DIAGNOSIS — E78.2 MIXED HYPERLIPIDEMIA: ICD-10-CM

## 2021-06-15 DIAGNOSIS — R11.14 BILIOUS VOMITING WITH NAUSEA: Primary | ICD-10-CM

## 2021-06-15 DIAGNOSIS — K76.0 HEPATIC STEATOSIS: ICD-10-CM

## 2021-06-15 DIAGNOSIS — I10 ESSENTIAL HYPERTENSION: ICD-10-CM

## 2021-06-15 LAB
BILIRUB BLD-MCNC: NEGATIVE MG/DL
CLARITY, POC: CLEAR
COLOR UR: ABNORMAL
GLUCOSE UR STRIP-MCNC: NEGATIVE MG/DL
HBA1C MFR BLD: 5.4 %
KETONES UR QL: ABNORMAL
LEUKOCYTE EST, POC: NEGATIVE
NITRITE UR-MCNC: NEGATIVE MG/ML
PH UR: 5.5 [PH] (ref 5–8)
PROT UR STRIP-MCNC: ABNORMAL MG/DL
RBC # UR STRIP: NEGATIVE /UL
SP GR UR: 1.02 (ref 1–1.03)
UROBILINOGEN UR QL: NORMAL

## 2021-06-15 PROCEDURE — 99214 OFFICE O/P EST MOD 30 MIN: CPT | Performed by: PHYSICIAN ASSISTANT

## 2021-06-15 PROCEDURE — 81003 URINALYSIS AUTO W/O SCOPE: CPT | Performed by: PHYSICIAN ASSISTANT

## 2021-06-15 PROCEDURE — 83036 HEMOGLOBIN GLYCOSYLATED A1C: CPT | Performed by: PHYSICIAN ASSISTANT

## 2021-06-15 RX ORDER — ONDANSETRON 4 MG/1
4 TABLET, ORALLY DISINTEGRATING ORAL EVERY 8 HOURS PRN
Qty: 14 TABLET | Refills: 0 | Status: SHIPPED | OUTPATIENT
Start: 2021-06-15 | End: 2022-03-10 | Stop reason: SDUPTHER

## 2021-06-15 NOTE — PROGRESS NOTES
Chief Complaint   Patient presents with   • Vomiting     Pt states she has had 3 vomitting episodes within the last 3 weeks where she vomits so much that bile comes up. She states it doesn't happen everyday but when it happens it is just all of a sudden.        HPI      Yamila Neff is a 60 y.o. female who presents for Vomiting (Pt states she has had 3 vomitting episodes within the last 3 weeks where she vomits so much that bile comes up. She states it doesn't happen everyday but when it happens it is just all of a sudden. )    Patient reports an episode of nausea, vomiting, sweating and diarrhea at then end of May after having dinner.  She went to bed and symptoms resolved when she woke up.  She had another episode at work about 2 weeks later with nausea, flushing and vomiting.  This resolved within a few hours.  She had not eaten prior to this episode.  And she had a third episode a week ago at work with nausea and flushing that resolved after a few hours.  She denies any new medications, no abdominal pain, fever/chills, dizziness, chest pain, reflux, shortness of breath, cough or fatigue.  Other than the initial diarrhea, no other bowel changes.  History of diverticulitis.  Admits to taking metformin 1000 mg BID, sometimes without food but this is unchanged for her.  Lovaza was started about 9 months ago.  No history of smoking, doesn't drink alcohol.  Has hepatic steatosis.  Blood pressure is stable and well-controlled today.    Past Medical History:   Diagnosis Date   • Anemia    • Arthritis    • Carpal tunnel syndrome 04/09/2012   • Degenerative joint disease    • Depression    • Depression    • Elevated cholesterol    • Elevated liver enzymes    • Fatty liver    • Hypertension    • Kidney disorder     one kidney   • Palpitations    • Polycystic ovaries    • Psoriasis    • PVC (premature ventricular contraction)     occasional pvc's   • Sleep apnea     cpap at home   • Wears glasses        Past Surgical  History:   Procedure Laterality Date   • BREAST BIOPSY     • BREAST CYST EXCISION Right 2013    BENIGN, INTRADUCTAL PAPILLOMA (PER PT)   •  SECTION      x 3   • CHOLECYSTECTOMY  2013   • COLONOSCOPY     • HYSTERECTOMY  2006    complete   • JOINT REPLACEMENT Left 2011    left knee   • OOPHORECTOMY     • TOTAL KNEE ARTHROPLASTY Right 3/9/2021    Procedure: TOTAL KNEE ARTHROPLASTY RIGHT;  Surgeon: Mateo Keith MD;  Location: [facility];  Service: Orthopedics;  Laterality: Right;   • TUBAL ABDOMINAL LIGATION         Family History   Problem Relation Age of Onset   • Diabetes Mother    • Lymphoma Mother    • Hypertension Mother    • No Known Problems Father    • Hypertension Brother    • Hypertension Brother    • Heart disease Maternal Grandmother    • Heart disease Maternal Grandfather    • Heart attack Maternal Grandfather    • Breast cancer Neg Hx    • Ovarian cancer Neg Hx        Social History     Socioeconomic History   • Marital status:      Spouse name: Not on file   • Number of children: Not on file   • Years of education: Not on file   • Highest education level: Not on file   Tobacco Use   • Smoking status: Never Smoker   • Smokeless tobacco: Never Used   Substance and Sexual Activity   • Alcohol use: No   • Drug use: No   • Sexual activity: Yes     Partners: Male     Comment:        Allergies   Allergen Reactions   • Celexa [Citalopram] Other (See Comments)     Jittery    • Lisinopril Cough       ROS    Review of Systems   Constitutional: Negative for chills, diaphoresis, fatigue and fever.   HENT: Negative for congestion, postnasal drip and rhinorrhea.    Respiratory: Negative for cough, shortness of breath and wheezing.    Cardiovascular: Negative for chest pain, palpitations and leg swelling.   Gastrointestinal: Positive for nausea and vomiting. Negative for abdominal pain, blood in stool, constipation, diarrhea and GERD.   Endocrine: Positive for heat intolerance. Negative for  "polydipsia, polyphagia and polyuria.   Genitourinary: Negative for dysuria, flank pain, frequency, hematuria and urgency.   Neurological: Negative for dizziness, light-headedness and headache.       Vitals:    06/15/21 1243   BP: 138/82   Pulse: 66   Temp: 96.4 °F (35.8 °C)   SpO2: 98%   Weight: 98 kg (216 lb)   Height: 157.5 cm (62\")   PainSc: 0-No pain     Body mass index is 39.51 kg/m².    Current Outpatient Medications on File Prior to Visit   Medication Sig Dispense Refill   • amitriptyline (ELAVIL) 10 MG tablet Take 1 tablet by mouth Every Night. 90 tablet 0   • cholecalciferol (VITAMIN D3) 1000 units tablet Take 2,000 Units by mouth Daily.     • clobetasol (TEMOVATE) 0.05 % external solution Apply twice daily to itchy, scaly areas on scalp. 25 mL 3   • desvenlafaxine (Pristiq) 100 MG 24 hr tablet Take 1 tablet by mouth Daily. 90 tablet 1   • estradiol (Evamist) 1.53 MG/SPRAY transdermal spray Place 1 spray topically to the arm as directed by provider 2 (two) times a day. 24.3 mL 3   • hydrocortisone 2.5 % cream Apply every evening to affected areas on abdomen until resolved. 28.35 g 11   • hydrOXYzine (ATARAX) 50 MG tablet Take 1 tablet by mouth At Night As Needed for Anxiety. 90 tablet 1   • ketoconazole (NIZORAL) 2 % cream Apply every morning to underarms until resolved. 30 g 11   • ketoconazole (NIZORAL) 2 % shampoo Use as a shampoo to scalp and face 3 times a week. Leave in for 5 minutes and rinse. 120 mL 3   • losartan (Cozaar) 50 MG tablet Take 1 tablet by mouth Daily. 90 tablet 1   • Magnesium 250 MG tablet Take 1 tablet by mouth Daily.     • metFORMIN ER (GLUCOPHAGE-XR) 500 MG 24 hr tablet Take 2 tablets by mouth 2 (Two) Times a Day. 360 tablet 1   • nystatin-triamcinolone (MYCOLOG II) 061594-7.1 UNIT/GM-% cream Apply 1 application topically to the appropriate area sparingly as directed 3 (Three) Times a Day As Needed. 15 g 5   • omega-3 acid ethyl esters (LOVAZA) 1 g capsule Take 2 capsules by mouth " 2 (Two) Times a Day. 360 capsule 1   • pravastatin (PRAVACHOL) 40 MG tablet Take 1 tablet by mouth Every Night. 90 tablet 0   • Probiotic Product (PROBIOTIC DAILY PO) Take  by mouth Daily.     • propranolol LA (INDERAL LA) 60 MG 24 hr capsule Take 1 capsule by mouth Daily. 90 capsule 1   • Vitamin E 400 units tablet Take 800 Units by mouth Daily.     • [DISCONTINUED] clobetasol (TEMOVATE) 0.05 % external solution Apply twice daily to itchy, scaly areas on scalp. 50 mL 11     No current facility-administered medications on file prior to visit.       Results for orders placed or performed in visit on 06/15/21   POC Urinalysis Dipstick, Automated    Specimen: Urine   Result Value Ref Range    Color Dark Yellow Yellow, Straw, Dark Yellow, [place]    Clarity, UA Clear Clear    Specific Gravity  1.025 1.005 - 1.030    pH, Urine 5.5 5.0 - 8.0    Leukocytes Negative Negative    Nitrite, UA Negative Negative    Protein, POC Trace (A) Negative mg/dL    Glucose, UA Negative Negative, 1000 mg/dL (3+) mg/dL    Ketones, UA Trace (A) Negative    Urobilinogen, UA Normal Normal    Bilirubin Negative Negative    Blood, UA Negative Negative   POC Glycosylated Hemoglobin (Hb A1C)    Specimen: Blood   Result Value Ref Range    Hemoglobin A1C 5.4 %       PE    Physical Exam  Vitals reviewed.   Constitutional:       General: She is not in acute distress.     Appearance: Normal appearance. She is well-developed. She is not ill-appearing or diaphoretic.   HENT:      Head: Normocephalic and atraumatic.   Eyes:      Extraocular Movements: Extraocular movements intact.      Conjunctiva/sclera: Conjunctivae normal.   Cardiovascular:      Rate and Rhythm: Normal rate and regular rhythm.      Heart sounds: Normal heart sounds.   Pulmonary:      Effort: Pulmonary effort is normal.      Breath sounds: Normal breath sounds.   Abdominal:      Palpations: Abdomen is soft.      Tenderness: There is no abdominal tenderness. There is no right CVA  tenderness, left CVA tenderness, guarding or rebound.   Musculoskeletal:         General: Normal range of motion.      Cervical back: Normal range of motion.      Right lower leg: No edema.      Left lower leg: No edema.   Skin:     General: Skin is warm.      Findings: No erythema or rash.   Neurological:      General: No focal deficit present.      Mental Status: She is alert.   Psychiatric:         Attention and Perception: Attention and perception normal. She is attentive.         Mood and Affect: Mood and affect normal.         Speech: Speech normal.         Behavior: Behavior normal. Behavior is cooperative.         Thought Content: Thought content normal.         Cognition and Memory: Cognition and memory normal.         Judgment: Judgment normal.          A/P    Diagnoses and all orders for this visit:    1. Bilious vomiting with nausea (Primary)  -     POC Urinalysis Dipstick, Automated  3 episodes of nausea with flushing and 2 episodes of vomiting.  No weight loss and no other symptoms.  Will order labs.  Possibly related to metformin on empty stomach, hypoglycemia?  Decrease metformin to 1000 mg at night with food.  Zofran prn.  Call if she has another episode.  Keep follow-up in September.    2. Prediabetes  -     POC Glycosylated Hemoglobin (Hb A1C)  Recent hemoglobin AIC was 5.8%, will repeat today.  On metformin 1000 mg BID.  Does admit she often takes this without food, has been doing this for a long time.    3. Hepatic steatosis  On metformin.    4. Essential hypertension  Stable, well-controlled.    5. Hyperlipidemia   On lovaza.  Repeat lipid panel.    Plan of care reviewed with patient at the conclusion of today's visit. Education was provided regarding diagnosis, management and any prescribed or recommended OTC medications.  Patient verbalizes understanding of and agreement with management plan.    No follow-ups on file.     Daly Archibald PA-C

## 2021-06-17 ENCOUNTER — LAB (OUTPATIENT)
Dept: LAB | Facility: HOSPITAL | Age: 61
End: 2021-06-17

## 2021-06-17 DIAGNOSIS — I10 ESSENTIAL HYPERTENSION: ICD-10-CM

## 2021-06-17 DIAGNOSIS — R11.14 BILIOUS VOMITING WITH NAUSEA: ICD-10-CM

## 2021-06-17 DIAGNOSIS — E78.2 MIXED HYPERLIPIDEMIA: ICD-10-CM

## 2021-06-17 LAB
ALBUMIN SERPL-MCNC: 4.6 G/DL (ref 3.5–5.2)
ALBUMIN/GLOB SERPL: 1.8 G/DL
ALP SERPL-CCNC: 69 U/L (ref 39–117)
ALT SERPL W P-5'-P-CCNC: 61 U/L (ref 1–33)
ANION GAP SERPL CALCULATED.3IONS-SCNC: 12.6 MMOL/L (ref 5–15)
AST SERPL-CCNC: 44 U/L (ref 1–32)
BASOPHILS # BLD AUTO: 0.06 10*3/MM3 (ref 0–0.2)
BASOPHILS NFR BLD AUTO: 1.1 % (ref 0–1.5)
BILIRUB SERPL-MCNC: 0.2 MG/DL (ref 0–1.2)
BUN SERPL-MCNC: 19 MG/DL (ref 6–20)
BUN/CREAT SERPL: 20.4 (ref 7–25)
CALCIUM SPEC-SCNC: 9.8 MG/DL (ref 8.6–10.5)
CHLORIDE SERPL-SCNC: 101 MMOL/L (ref 98–107)
CHOLEST SERPL-MCNC: 176 MG/DL (ref 0–200)
CO2 SERPL-SCNC: 23.4 MMOL/L (ref 22–29)
CREAT SERPL-MCNC: 0.93 MG/DL (ref 0.57–1)
DEPRECATED RDW RBC AUTO: 39.9 FL (ref 37–54)
EOSINOPHIL # BLD AUTO: 0.17 10*3/MM3 (ref 0–0.4)
EOSINOPHIL NFR BLD AUTO: 3 % (ref 0.3–6.2)
ERYTHROCYTE [DISTWIDTH] IN BLOOD BY AUTOMATED COUNT: 12.4 % (ref 12.3–15.4)
GFR SERPL CREATININE-BSD FRML MDRD: 62 ML/MIN/1.73
GLOBULIN UR ELPH-MCNC: 2.5 GM/DL
GLUCOSE SERPL-MCNC: 102 MG/DL (ref 65–99)
HCT VFR BLD AUTO: 39.8 % (ref 34–46.6)
HDLC SERPL-MCNC: 49 MG/DL (ref 40–60)
HGB BLD-MCNC: 13.1 G/DL (ref 12–15.9)
IMM GRANULOCYTES # BLD AUTO: 0.01 10*3/MM3 (ref 0–0.05)
IMM GRANULOCYTES NFR BLD AUTO: 0.2 % (ref 0–0.5)
LDLC SERPL CALC-MCNC: 82 MG/DL (ref 0–100)
LDLC/HDLC SERPL: 1.46 {RATIO}
LYMPHOCYTES # BLD AUTO: 2.64 10*3/MM3 (ref 0.7–3.1)
LYMPHOCYTES NFR BLD AUTO: 47.1 % (ref 19.6–45.3)
MCH RBC QN AUTO: 29 PG (ref 26.6–33)
MCHC RBC AUTO-ENTMCNC: 32.9 G/DL (ref 31.5–35.7)
MCV RBC AUTO: 88.1 FL (ref 79–97)
MONOCYTES # BLD AUTO: 0.46 10*3/MM3 (ref 0.1–0.9)
MONOCYTES NFR BLD AUTO: 8.2 % (ref 5–12)
NEUTROPHILS NFR BLD AUTO: 2.26 10*3/MM3 (ref 1.7–7)
NEUTROPHILS NFR BLD AUTO: 40.4 % (ref 42.7–76)
NRBC BLD AUTO-RTO: 0 /100 WBC (ref 0–0.2)
PLATELET # BLD AUTO: 300 10*3/MM3 (ref 140–450)
PMV BLD AUTO: 10.2 FL (ref 6–12)
POTASSIUM SERPL-SCNC: 4.2 MMOL/L (ref 3.5–5.2)
PROT SERPL-MCNC: 7.1 G/DL (ref 6–8.5)
RBC # BLD AUTO: 4.52 10*6/MM3 (ref 3.77–5.28)
SODIUM SERPL-SCNC: 137 MMOL/L (ref 136–145)
TRIGL SERPL-MCNC: 277 MG/DL (ref 0–150)
VLDLC SERPL-MCNC: 45 MG/DL (ref 5–40)
WBC # BLD AUTO: 5.6 10*3/MM3 (ref 3.4–10.8)

## 2021-06-17 PROCEDURE — 84443 ASSAY THYROID STIM HORMONE: CPT

## 2021-06-17 PROCEDURE — 80061 LIPID PANEL: CPT

## 2021-06-17 PROCEDURE — 85025 COMPLETE CBC W/AUTO DIFF WBC: CPT

## 2021-06-17 PROCEDURE — 36415 COLL VENOUS BLD VENIPUNCTURE: CPT

## 2021-06-17 PROCEDURE — 83690 ASSAY OF LIPASE: CPT

## 2021-06-17 PROCEDURE — 80053 COMPREHEN METABOLIC PANEL: CPT

## 2021-06-18 LAB
LIPASE SERPL-CCNC: 51 U/L (ref 13–60)
TSH SERPL DL<=0.05 MIU/L-ACNC: 1.59 UIU/ML (ref 0.27–4.2)

## 2021-06-20 ENCOUNTER — TELEPHONE (OUTPATIENT)
Dept: FAMILY MEDICINE CLINIC | Facility: CLINIC | Age: 61
End: 2021-06-20

## 2021-06-20 NOTE — TELEPHONE ENCOUNTER
Spoke with patient regarding labs.  She has not had any further episodes of vomiting.  Will call if she does and would order CT abdo at that time and possibly EGD.

## 2021-07-23 ENCOUNTER — TELEPHONE (OUTPATIENT)
Dept: FAMILY MEDICINE CLINIC | Facility: CLINIC | Age: 61
End: 2021-07-23

## 2021-07-23 DIAGNOSIS — E78.2 MIXED HYPERLIPIDEMIA: ICD-10-CM

## 2021-07-23 DIAGNOSIS — F51.01 PRIMARY INSOMNIA: ICD-10-CM

## 2021-07-23 DIAGNOSIS — G43.019 INTRACTABLE MIGRAINE WITHOUT AURA AND WITHOUT STATUS MIGRAINOSUS: ICD-10-CM

## 2021-07-26 RX ORDER — AMITRIPTYLINE HYDROCHLORIDE 10 MG/1
10 TABLET, FILM COATED ORAL NIGHTLY
Qty: 90 TABLET | Refills: 0 | Status: SHIPPED | OUTPATIENT
Start: 2021-07-26 | End: 2021-10-23 | Stop reason: SDUPTHER

## 2021-07-26 RX ORDER — PRAVASTATIN SODIUM 40 MG
40 TABLET ORAL NIGHTLY
Qty: 90 TABLET | Refills: 0 | Status: SHIPPED | OUTPATIENT
Start: 2021-07-26 | End: 2021-10-23 | Stop reason: SDUPTHER

## 2021-07-27 DIAGNOSIS — I10 ESSENTIAL HYPERTENSION: ICD-10-CM

## 2021-07-27 RX ORDER — LOSARTAN POTASSIUM 100 MG/1
100 TABLET ORAL DAILY
Qty: 90 TABLET | Refills: 1 | Status: SHIPPED | OUTPATIENT
Start: 2021-07-27 | End: 2021-11-11 | Stop reason: SDUPTHER

## 2021-07-28 NOTE — TELEPHONE ENCOUNTER
Attempted to call pt, received no answer. Left detailed message informing pt. A new prescription for losartan to 100 mg daily has been sent in to pts pharmacy. No other medication changes.  Monitor BP and message with readings. Office number given for any questions or concerns.

## 2021-07-28 NOTE — TELEPHONE ENCOUNTER
Patient should increase losartan to 100 mg daily.  Sent in new prescription.  No other medication changes.  Monitor BP and message with readings.

## 2021-08-05 DIAGNOSIS — I10 ESSENTIAL HYPERTENSION: Primary | ICD-10-CM

## 2021-08-05 RX ORDER — AMLODIPINE BESYLATE 5 MG/1
5 TABLET ORAL DAILY
Qty: 90 TABLET | Refills: 1 | Status: SHIPPED | OUTPATIENT
Start: 2021-08-05 | End: 2021-11-11 | Stop reason: SDUPTHER

## 2021-08-06 ENCOUNTER — TRANSCRIBE ORDERS (OUTPATIENT)
Dept: ADMINISTRATIVE | Facility: HOSPITAL | Age: 61
End: 2021-08-06

## 2021-08-06 DIAGNOSIS — Z12.31 VISIT FOR SCREENING MAMMOGRAM: Primary | ICD-10-CM

## 2021-08-20 ENCOUNTER — APPOINTMENT (OUTPATIENT)
Dept: MAMMOGRAPHY | Facility: HOSPITAL | Age: 61
End: 2021-08-20

## 2021-08-24 ENCOUNTER — HOSPITAL ENCOUNTER (OUTPATIENT)
Dept: MAMMOGRAPHY | Facility: HOSPITAL | Age: 61
Discharge: HOME | End: 2021-08-24
Admitting: SURGERY

## 2021-08-24 DIAGNOSIS — Z12.31 VISIT FOR SCREENING MAMMOGRAM: ICD-10-CM

## 2021-08-24 PROCEDURE — 77067 SCR MAMMO BI INCL CAD: CPT | Performed by: RADIOLOGY

## 2021-08-24 PROCEDURE — 77063 BREAST TOMOSYNTHESIS BI: CPT

## 2021-08-24 PROCEDURE — 77063 BREAST TOMOSYNTHESIS BI: CPT | Performed by: RADIOLOGY

## 2021-08-24 PROCEDURE — 77067 SCR MAMMO BI INCL CAD: CPT

## 2021-08-25 ENCOUNTER — OFFICE VISIT (OUTPATIENT)
Dept: ORTHOPEDIC SURGERY | Facility: CLINIC | Age: 61
End: 2021-08-25

## 2021-08-25 VITALS
WEIGHT: 216.8 LBS | SYSTOLIC BLOOD PRESSURE: 139 MMHG | HEIGHT: 62 IN | HEART RATE: 77 BPM | DIASTOLIC BLOOD PRESSURE: 79 MMHG | BODY MASS INDEX: 39.9 KG/M2

## 2021-08-25 DIAGNOSIS — M76.61 ACHILLES TENDINITIS OF RIGHT LOWER EXTREMITY: ICD-10-CM

## 2021-08-25 DIAGNOSIS — M79.671 RIGHT FOOT PAIN: Primary | ICD-10-CM

## 2021-08-25 PROCEDURE — 99213 OFFICE O/P EST LOW 20 MIN: CPT | Performed by: ORTHOPAEDIC SURGERY

## 2021-08-25 NOTE — PROGRESS NOTES
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  She has a 1 month history of right posterior heel pain.  No history of injury, no change in activity.  She notes that her shoes now rub.  She does have custom orthotics because she has known midfoot arthritis and hallux rigidus.  She has polycystic ovarian disease and some glucose intolerance, her hga1c was 5.4 on 6/15/2021.    Current Outpatient Medications on File Prior to Visit   Medication Sig Dispense Refill   • amitriptyline (ELAVIL) 10 MG tablet Take 1 tablet by mouth Every Night. 90 tablet 0   • amLODIPine (NORVASC) 5 MG tablet Take 1 tablet by mouth Daily. 90 tablet 1   • cholecalciferol (VITAMIN D3) 1000 units tablet Take 2,000 Units by mouth Daily.     • clobetasol (TEMOVATE) 0.05 % external solution Apply topically to the itchy, scaly areas on scalp as directed 2 (two) times a day. 25 mL 3   • desvenlafaxine (Pristiq) 100 MG 24 hr tablet Take 1 tablet by mouth Daily. 90 tablet 1   • estradiol (Evamist) 1.53 MG/SPRAY transdermal spray Place 1 spray topically to the arm as directed by provider 2 (two) times a day. 24.3 mL 3   • hydrocortisone 2.5 % cream Apply every evening to affected areas on abdomen until resolved. 28.35 g 11   • hydrOXYzine (ATARAX) 50 MG tablet Take 1 tablet by mouth At Night As Needed for Anxiety. 90 tablet 1   • ketoconazole (NIZORAL) 2 % cream Apply every morning to underarms until resolved. 30 g 11   • ketoconazole (NIZORAL) 2 % shampoo Use as a shampoo to scalp and face 3 times a week. Leave in for 5 minutes and rinse. 120 mL 3   • losartan (Cozaar) 100 MG tablet Take 1 tablet by mouth Daily. 90 tablet 1   • Magnesium  250 MG tablet Take 1 tablet by mouth Daily.     • metFORMIN ER (GLUCOPHAGE-XR) 500 MG 24 hr tablet Take 2 tablets by mouth 2 (Two) Times a Day. 360 tablet 1   • nystatin-triamcinolone (MYCOLOG II) 415341-4.1 UNIT/GM-% cream Apply 1 application topically to the appropriate area sparingly as directed 3 (Three) Times a Day As Needed. 15 g 5   • omega-3 acid ethyl esters (LOVAZA) 1 g capsule Take 2 capsules by mouth 2 (Two) Times a Day. 360 capsule 1   • ondansetron ODT (Zofran ODT) 4 MG disintegrating tablet Place 1 tablet on the tongue Every 8 (Eight) Hours As Needed for Nausea or Vomiting. 14 tablet 0   • pravastatin (PRAVACHOL) 40 MG tablet Take 1 tablet by mouth Every Night. 90 tablet 0   • Probiotic Product (PROBIOTIC DAILY PO) Take  by mouth Daily.     • propranolol LA (INDERAL LA) 60 MG 24 hr capsule Take 1 capsule by mouth Daily. 90 capsule 1   • Vitamin E 400 units tablet Take 800 Units by mouth Daily.     • clobetasol (TEMOVATE) 0.05 % external solution Apply twice daily to itchy, scaly areas on scalp. 25 mL 3   • hydrocortisone 2.5 % cream Apply  topically to the affected areas on abdomen as directed Every Evening until resolved. 28.35 g 3   • ketoconazole (NIZORAL) 2 % cream Apply topically to the underarms Every Morning until resolved. 30 g 3   • ketoconazole (NIZORAL) 2 % shampoo Use as a shampo to the scalp and face 3 (Three) Times a Week. Leave in for 5 minutes and rinse. 120 mL 3     No current facility-administered medications on file prior to visit.      Allergies   Allergen Reactions   • Celexa [Citalopram] Other (See Comments)     Jittery    • Lisinopril Cough      Past Medical History:   Diagnosis Date   • Anemia    • Arthritis    • Carpal tunnel syndrome 04/09/2012   • Degenerative joint disease    • Depression    • Depression    • Elevated cholesterol    • Elevated liver enzymes    • Fatty liver    • Hypertension    • Kidney disorder     one kidney   • Palpitations    • Polycystic ovaries    •  Psoriasis    • PVC (premature ventricular contraction)     occasional pvc's   • Sleep apnea     cpap at home   • Wears glasses      Past Surgical History:   Procedure Laterality Date   • BREAST BIOPSY Right     PAPILLOMA DR [facility]LL   •  SECTION      x 3   • CHOLECYSTECTOMY     • COLONOSCOPY     • HYSTERECTOMY  2006    complete   • JOINT REPLACEMENT Left 2011    left knee   • OOPHORECTOMY     • TOTAL KNEE ARTHROPLASTY Right 3/9/2021    Procedure: TOTAL KNEE ARTHROPLASTY RIGHT;  Surgeon: Mateo Keith MD;  [facility]  Service: Orthopedics;  Laterality: Right;   • TUBAL ABDOMINAL LIGATION       Family History   Problem Relation Age of Onset   • Diabetes Mother    • Lymphoma Mother    • Hypertension Mother    • No Known Problems Father    • Hypertension Brother    • Hypertension Brother    • Heart disease Maternal Grandmother    • Heart disease Maternal Grandfather    • Heart attack Maternal Grandfather    • Colon cancer Maternal Aunt    • Breast cancer Neg Hx    • Ovarian cancer Neg Hx       Social History     Socioeconomic History   • Marital status:      Spouse name: Not on file   • Number of children: Not on file   • Years of education: Not on file   • Highest education level: Not on file   Tobacco Use   • Smoking status: Never Smoker   • Smokeless tobacco: Never Used   Substance and Sexual Activity   • Alcohol use: No   • Drug use: No   • Sexual activity: Yes     Partners: Male     Comment:         Review of Systems   Constitutional: Negative.    HENT: Negative.    Eyes: Negative.    Respiratory: Negative.    Cardiovascular: Negative.    Gastrointestinal: Negative.    Endocrine: Negative.    Genitourinary: Negative.    Musculoskeletal: Positive for arthralgias.   Skin: Negative.    Allergic/Immunologic: Negative.    Neurological: Negative.    Hematological: Negative.    Psychiatric/Behavioral: Negative.        The following portions of the patient's history were reviewed and  "updated as appropriate: allergies, current medications, past family history, past medical history, past social history, past surgical history and problem list.    Physical Exam:   /79   Pulse 77   Ht 157.5 cm (62.01\")   Wt 98.3 kg (216 lb 12.8 oz)   BMI 39.64 kg/m²   GENERAL: Body habitus: obese    Lower extremity edema: Left: none; Right: none    Gait: antalgic with the first few steps     Mental Status:  awake and alert; oriented to person, place, and time  MSK:  [place]:  Right:  non tender; Left:  non tender        Ankle:  Right: non tender; Left:  non tender        Foot:  Right:  Very tender at the insertion of the Achilles tendon, moderate bursitis, nontender proximally, the tendon is intact, no other tenderness, pulses are 2+; Left:  non tender    NEURO Sensation:  Intact to [place]    Medical Decision Making    Data Review:   ordered and reviewed x-rays today    Assessment/Plan/Diagnosis/Treatment Options:   1. Achilles tendinitis of right lower extremity  She has insertional Achilles tendinitis retrocalcaneal bursitis.    I have explained the problem to the patient in detail.  It is generally thought to be an overuse syndrome or due to chronic aging change.  I explained that it is NOT due to a “spur”.  The osteophyte (“spur”) often visible on x-ray is not the source of the problem: Many, many people have the same x-ray finding and do not have Achilles pain.  The problem causing the pain is the chronic tendinitis, inflammation, wear and tear of the tendon where it goes into the bone.  I explained it is very common in overweight people over 40.  I explained that if you do an MRI on everyone with this problem we will see tears and inflammation in the tendon, and likely some edema in the bone, this does not change treatment.    The bump on the heel NOT go away, the goal of the treatment is to have pain resolve.    Literature shows it is best treated with a 12 week course of physical therapy and " night splinting.  I have shown the patient the 2 stretches to do at home (in addition to what PT shows them), and recommend they do them 5 reps, 6-8 times per day.  I explained that injections and orthotics will not help.  I wrote a prescription for physical therapy, and we explained where to obtain a night splint.  I also recommend topical Voltaren gel.    Follow-up in 4 months if not improved.    If not improved, I explained the next step is a short leg walking cast for 6 weeks.    Surgery is a last resort as it is only helpful in about 60% of the time in improving the pain from this problem.        - Ambulatory Referral to Physical Therapy        Pam Phoenix MD

## 2021-09-01 DIAGNOSIS — G43.019 INTRACTABLE MIGRAINE WITHOUT AURA AND WITHOUT STATUS MIGRAINOSUS: ICD-10-CM

## 2021-09-02 RX ORDER — PROPRANOLOL HCL 60 MG
60 CAPSULE, EXTENDED RELEASE 24HR ORAL DAILY
Qty: 90 CAPSULE | Refills: 1 | Status: SHIPPED | OUTPATIENT
Start: 2021-09-02 | End: 2022-02-26 | Stop reason: SDUPTHER

## 2021-09-03 ENCOUNTER — TREATMENT (OUTPATIENT)
Dept: PHYSICAL THERAPY | Facility: CLINIC | Age: 61
End: 2021-09-03

## 2021-09-03 DIAGNOSIS — M76.61 ACHILLES TENDINITIS OF RIGHT LOWER EXTREMITY: Primary | ICD-10-CM

## 2021-09-03 PROCEDURE — 97161 PT EVAL LOW COMPLEX 20 MIN: CPT | Performed by: PHYSICAL THERAPIST

## 2021-09-03 PROCEDURE — 97110 THERAPEUTIC EXERCISES: CPT | Performed by: PHYSICAL THERAPIST

## 2021-09-03 PROCEDURE — 97530 THERAPEUTIC ACTIVITIES: CPT | Performed by: PHYSICAL THERAPIST

## 2021-09-03 NOTE — PATIENT INSTRUCTIONS
Access Code: [email or web address]  URL: [email or web address]  Date: 09/03/2021  Prepared by: Susana Fried    Exercises  Gastroc Stretch on Wall - 2 x daily - 3 reps - 15 sec hold  Soleus Stretch on Wall - 2 x daily - 3 reps - 15 sec hold  Long Sitting Ankle Plantar Flexion with Resistance - 2 x daily - 30 reps  Seated Heel Toe Raises - 2 x daily - 30 reps

## 2021-09-03 NOTE — PROGRESS NOTES
Physical Therapy Initial Evaluation and Plan of Care      Patient: Yamila Neff   : 1960  Diagnosis/ICD-10 Code:  Achilles tendinitis of right lower extremity [M76.61]  Referring practitioner: Pam Fairchild MD  Date of Initial Visit: 9/3/2021  Today's Date: 9/3/2021  Patient seen for 1 sessions           Subjective Questionnaire: LEFS: 55/80  Subjective Evaluation    History of Present Illness  Mechanism of injury: R heel pain x 1 month, insidious in onset. Pn is constant, worsens w/ WBing and dorsiflexing her foot. Voltaren gel helps some. Has fatty liver so does not take Tylenol, one kidney so doesn't take Motrin. Using night splint provided by MD and recently fitted for custom orthotics. Has started using open back shoes as recommended by MD. Of note had R TKA 2021.    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 Quality of life: fair    Pain  Current pain rating: 3  At best pain ratin  At worst pain ratin  Quality: sharp  Relieving factors: medications    Social Support  Lives in: one-story house    Diagnostic Tests  X-ray: abnormal (significant midfoot arthritis and hallux rigidus)    Treatments  Previous treatment: medication  Patient Goals  Patient goals for therapy: decreased pain, increased motion, increased strength, independence with ADLs/IADLs, return to sport/leisure activities and return to work           Treatment  Exercise 1  Exercise Name 1: standing gastroc/soleus stretches  Sets/Reps 1: 3  Time: 15 sec  Exercise 2  Exercise Name 2: seated PF-knee bent and extended  Equipment/Resistance 2: GTB  Sets/Reps 2: 30  Exercise 3  Exercise Name 3: seated heel/toe raises  Sets/Reps 3: 30  Exercise 4  Exercise Name 4: discussion symptom mgmt, nature of condition and recovery       Objective          Observations     Additional Ankle/Foot Observation Details  Bone spur/hypertrophy noted dorsal 1st MTP joint  Pes planus  In prone: Dec  rearfoot eversion on R (0-3 deg); forefoot varus    Tenderness     Right Ankle/Foot   Tenderness in the Achilles insertion.     Active Range of Motion   Left Ankle/Foot   Dorsiflexion (ke): 10 degrees   Dorsiflexion (kf): 10 degrees   Plantar flexion: 55 degrees   Inversion: 34 degrees   Eversion: 12 degrees     Right Ankle/Foot   Dorsiflexion (ke): 12 degrees   Dorsiflexion (kf): 5 degrees   Plantar flexion: 52 degrees   Inversion: 40 degrees   Eversion: 10 degrees     Joint Play     Right Ankle/Foot  Joints within functional limits are the talocrural joint and subtalar joint. Hypomobile in the midfoot and forefoot.     Additional Joint Play Details  First ray hypomobile    Strength/Myotome Testing     Left Ankle/Foot   Dorsiflexion: 5  Plantar flexion: 5  Inversion: 5  Eversion: 5    Right Ankle/Foot   Dorsiflexion: 5  Plantar flexion: 3+  Inversion: 4+  Eversion: 4+    Tests     Right Ankle/Foot   Positive for Flores.   Negative for navicular drop and windlass.     Ambulation     Observational Gait   Decreased walking speed, stride length and right stance time.     Additional Observational Gait Details  Mildly antalgic w/ dec push off          Assessment & Plan     Assessment  Impairments: abnormal gait, abnormal or restricted ROM, activity intolerance, impaired balance, impaired physical strength, lacks appropriate home exercise program, pain with function and weight-bearing intolerance  Assessment details: Pt is a 60 YOF who presents to PT w/ evolving symptoms of low complexity. Findings consistent w/ acute insertional achilles tendonitis. Pt c/o post heel pn w/ WBing/walking. She exhibits deficits in soleus length, calf strength. Other likely contributing factors include decreased mid/rear foot mobility, forefoot varus, hallux rigidus and known midfoot arthritis. Pt's pn and deficits limit her tolerance to daily and work activities. She would benefit from skilled PT services to address her deficits and and  maximize function.  Barriers to therapy: N/A  Prognosis: good  Functional Limitations: walking, uncomfortable because of pain and standing  Goals  Plan Goals: STG 4 wks  1) Pt to be compliant w/ initial HEP for ROM, strength and symptom mgmt.  2) Pt to report pn w/ ambulation over household distances to be no greater than 4/10.  3) Pt to demonstrate restored soleus length (compared to contralateral LE).  4) Pt to improve R ankle PF strength to 4/5 or better.  5) Pt to maintain SLS for 10 seconds or greater.    LTG 8 wks  1) Pt to be independent w/ long term HEP and self mgmt.  2) Pt to report pn w/ working to be no greater than 2/10.  3) Pt to improve R ankle strength to 5/5.  4) Pt to maintain SLS for 20 seconds or greater.  5) LEFS score 63/80 or better to reflect improved pn and function.      Plan  Therapy options: will be seen for skilled physical therapy services  Planned modality interventions: TENS, cryotherapy, ultrasound, thermotherapy (hydrocollator packs) and dry needling  Planned therapy interventions: balance/weight-bearing training, flexibility, functional ROM exercises, gait training, home exercise program, joint mobilization, manual therapy, neuromuscular re-education, soft tissue mobilization, strengthening, stretching and therapeutic activities  Frequency: 1x week  Duration in visits: 12  Treatment plan discussed with: patient  Plan details: TE/TA/NMR/MT to address noted deficits w/ progression to eccentric training as tolerated; incorporate modalities as indicated        Timed:  Manual Therapy:    0     mins  87638;  Therapeutic Exercise:    15     mins  14987;     Neuromuscular [place]:    0    mins  19437;    Therapeutic Activity:     8     mins  06992;     Gait Trainin     mins  62956;     Ultrasound:     0     mins  04514;    Electrical Stimulation:    0     mins  02250 ( );    Untimed:  Electrical Stimulation:    0     mins  29518 ( );  Mechanical Traction:    0     mins   42311;     Timed Treatment:   23   mins   Total Treatment:     50   mins    PT SIGNATURE: Susana Fried, PT   DATE TREATMENT INITIATED: 9/3/2021    Initial Certification  Certification Period: 12/2/2021  I certify that the therapy services are furnished while this patient is under my care.  The services outlined above are required by this patient, and will be reviewed every 90 days.     PHYSICIAN: Pam Fairchild MD      DATE:     Please sign and return via fax to [phone]. Thank you, [facility].

## 2021-09-08 ENCOUNTER — TREATMENT (OUTPATIENT)
Dept: PHYSICAL THERAPY | Facility: CLINIC | Age: 61
End: 2021-09-08

## 2021-09-08 DIAGNOSIS — M76.61 ACHILLES TENDINITIS OF RIGHT LOWER EXTREMITY: Primary | ICD-10-CM

## 2021-09-08 PROCEDURE — 97140 MANUAL THERAPY 1/> REGIONS: CPT | Performed by: PHYSICAL THERAPIST

## 2021-09-08 PROCEDURE — 97112 NEUROMUSCULAR REEDUCATION: CPT | Performed by: PHYSICAL THERAPIST

## 2021-09-08 PROCEDURE — 97110 THERAPEUTIC EXERCISES: CPT | Performed by: PHYSICAL THERAPIST

## 2021-09-08 NOTE — PROGRESS NOTES
Physical Therapy Daily Progress Note  Visit: 2      Patient: Yamila Neff   : 1960  Referring practitioner: Pam Fairchild MD  Visit Diagnosis:     ICD-10-CM ICD-9-CM   1. Achilles tendinitis of right lower extremity  M76.61 726.71     Date of Initial Visit: Type: THERAPY  Noted: 9/3/2021  Today's Date: 2021        Subjective     Yamila Neff reports: Exercises going ok, stretches a little sore, especially calf stretch when bending her knee.    Treatment  Pre Treatment Pn Score: 4-lateral to achilles insertion  Post Treatment Pn Score:  4      Exercise 1  Exercise Name 1: NuStep  Equipment/Resistance 1: Lv 4  Time: 5 min  Exercise 2  Exercise Name 2: longsitting gastroc/soleus stretches w/ strap  Sets/Reps 2: 3 ea  Time 2: 15 sec  Exercise 3  Exercise Name 3: seated heel/toe raises  Sets/Reps 3: 30  Exercise 4  Exercise Name 4: seated great toe extn stretch  Exercise 5  Exercise Name 5: seated great toe extn AROM  Sets/Reps 5: 30  Exercise 6  Exercise Name 6: seated arch lifts  Sets/Reps 6: 30  Exercise 7  Exercise Name 7: seated PF-knee extended/flexed  Equipment/Resistance 7: GTB  Sets/Reps 7: 30      Manual Rx 1  Manual Rx 1 Location: mid foot, 1st ray, 1st MTP joint mobs  Manual Rx 2  Manual Rx 2 Location: lateral calcaneal glides             Objective         Assessment & Plan     Assessment  Assessment details: Pt compliant w/ HEP and tolerating exercises well. Focused on mobilization of the foot/ankle w/ emphasis on improving great toe extension, calcaneal eversion. No pn noted w/ MT techniques. She has some discomfort w/ WBing stretches so modified to sitting w/ better tolerance. Discussed adding toe extn stretch to HEP. No inc in pn noted at end of visit.    Plan  Plan details: May attempt DL calf raises on TG               Timed:  Manual Therapy:    12     mins  23491;  Therapeutic Exercise:    24     mins  66412;     Neuromuscular [place]:    8    mins  99064;    Therapeutic  Activity:     0     mins  39283;     Gait Trainin     mins  13123;     Ultrasound:     0     mins  53172;    Electrical Stimulation:    0     mins  81533 ( );    Untimed:  Electrical Stimulation:    0     mins  81851 ( );  Mechanical Traction:    0     mins  80201;     Timed Treatment:   44   mins   Total Treatment:     44   mins      Susana Fried, PT  Physical Therapist

## 2021-09-14 ENCOUNTER — TREATMENT (OUTPATIENT)
Dept: PHYSICAL THERAPY | Facility: CLINIC | Age: 61
End: 2021-09-14

## 2021-09-14 DIAGNOSIS — M76.61 ACHILLES TENDINITIS OF RIGHT LOWER EXTREMITY: Primary | ICD-10-CM

## 2021-09-14 PROCEDURE — 97110 THERAPEUTIC EXERCISES: CPT | Performed by: PHYSICAL THERAPIST

## 2021-09-14 PROCEDURE — 97112 NEUROMUSCULAR REEDUCATION: CPT | Performed by: PHYSICAL THERAPIST

## 2021-09-14 PROCEDURE — 97530 THERAPEUTIC ACTIVITIES: CPT | Performed by: PHYSICAL THERAPIST

## 2021-09-14 NOTE — PROGRESS NOTES
Physical Therapy Daily Progress Note  Visit: 3      Patient: Yamila Neff   : 1960  Referring practitioner: Pam Fairchild MD  Visit Diagnosis:     ICD-10-CM ICD-9-CM   1. Achilles tendinitis of right lower extremity  M76.61 726.71     Date of Initial Visit: Type: THERAPY  Noted: 9/3/2021  Today's Date: 2021        Subjective     Yamila Neff reports: Has pn at work when she is assigned pt rooms that aren't close together and has to walk more over the course of the day. So bad last week that she had to call in a day.    Treatment  Pre Treatment Pn Score: 2  Post Treatment Pn Score:  2      Exercise 1  Exercise Name 1: NuStep-ROM/strengthening  Equipment/Resistance 1: Lv 4  Time: 9 min  Exercise 2  Exercise Name 2: longsitting gastroc/soleus stretches w/ strap  Sets/Reps 2: 3 ea  Time 2: 15 sec  Exercise 3  Exercise Name 3: seated great toe extn stretch  Sets/Reps 3: 5  Time 3: 20 sec  Exercise 4  Exercise Name 4: seated great toe extn AROM  Sets/Reps 4: 30  Exercise 5  Exercise Name 5: TG DL heel raises  Sets/Reps 5: 30  Exercise 6  Exercise Name 6: seated arch lifts  Sets/Reps 6: 30  Exercise 7  Exercise Name 7: standing arch lifts  Sets/Reps 7: 30  Exercise 8  Exercise Name 8: seated PF w/ knee flexed  Equipment/Resistance 8: GTB  Sets/Reps 8: 40             Ice  Ice Applied: Yes  Location: R heel  PT Ice Rx Minutes: 10  Other Treatment Provided  Ice Applied: Yes      Objective         Assessment & Plan     Assessment  Assessment details: Ongoing pn at work, w/ extended walking. Initiated heel raises on TG today. Pt noted mild pn but complained more of fatigue in calf. Improved active toe extn and arch activation today. Progressed to standing arch raises. Pt would benefit from ongoing PT for progressive calf/achilles loading to reduce pn and dysfunction.    Plan  Plan details: Progress as tolerated-may attempt eccentric heel raises on TG               Timed:  Manual Therapy:    0      mins  89611;  Therapeutic Exercise:    30     mins  95856;     Neuromuscular [place]:    10    mins  74594;    Therapeutic Activity:     15     mins  39187;     Gait Trainin     mins  51286;     Ultrasound:     0     mins  01631;    Electrical Stimulation:    0     mins  24535 ( );    Untimed:  Electrical Stimulation:    0     mins  43535 ( );  Mechanical Traction:    0     mins  37554;     Timed Treatment:   55   mins   Total Treatment:     65   mins      Susana Fried, PT  Physical Therapist

## 2021-09-20 ENCOUNTER — TREATMENT (OUTPATIENT)
Dept: PHYSICAL THERAPY | Facility: CLINIC | Age: 61
End: 2021-09-20

## 2021-09-20 DIAGNOSIS — M76.61 ACHILLES TENDINITIS OF RIGHT LOWER EXTREMITY: Primary | ICD-10-CM

## 2021-09-20 PROCEDURE — 97112 NEUROMUSCULAR REEDUCATION: CPT | Performed by: PHYSICAL THERAPIST

## 2021-09-20 PROCEDURE — 97140 MANUAL THERAPY 1/> REGIONS: CPT | Performed by: PHYSICAL THERAPIST

## 2021-09-20 PROCEDURE — 97110 THERAPEUTIC EXERCISES: CPT | Performed by: PHYSICAL THERAPIST

## 2021-09-20 PROCEDURE — 97530 THERAPEUTIC ACTIVITIES: CPT | Performed by: PHYSICAL THERAPIST

## 2021-09-20 NOTE — PROGRESS NOTES
Physical Therapy Daily Progress Note  Visit: 4      Patient: Yamila Neff   : 1960  Referring practitioner: Pam Fairchild MD  Visit Diagnosis:     ICD-10-CM ICD-9-CM   1. Achilles tendinitis of right lower extremity  M76.61 726.71     Date of Initial Visit: Type: THERAPY  Noted: 9/3/2021  Today's Date: 2021        Subjective     Yamila Neff reports: Pn about the same. No new issues to report. Working on heel raises at home and are tolerable, minimally sore, but fatigues by about 30 reps.    Treatment  Pre Treatment Pn Score: 2  Post Treatment Pn Score:  2      Exercise 1  Exercise Name 1: NuStep-ROM/strengthening  Equipment/Resistance 1: Lv 4  Time: 5 min  Exercise 2  Exercise Name 2: TG eccentric heel raises  Sets/Reps 2: 15  Exercise 3  Exercise Name 3: TG DL heel raises  Sets/Reps 3: 20  Exercise 4  Exercise Name 4: seated great toe extn stretch  Sets/Reps 4: 15  Time 4: 5 sec  Exercise 5  Exercise Name 5: seated great toe active extn  Sets/Reps 5: 30  Exercise 6  Exercise Name 6: seated arch lifts  Sets/Reps 6: 30  Exercise 7  Exercise Name 7: standing arch lifts  Sets/Reps 7: 20  Exercise 8  Exercise Name 8: SLS w/ arch lift  Sets/Reps 8: 15-20 sec  Time 8: 5  Exercise 9  Exercise Name 9: standing calf raises  Sets/Reps 9: 2x30      Manual Rx 1  Manual Rx 1 Location: mid foot, 1st ray, 1st MTP joint mobs  Manual Rx 2  Manual Rx 2 Location: lateral calcaneal glides             Objective      DF knee extended/knee flexed >10 deg  1st ray, slightly hypomobile  TTP achilles insertion on calcaneus       Assessment & Plan     Assessment  Assessment details: Pt demonstrates restored DF ROM, improved first ray mobility and great toe extn. 1st ray and great toe ROM remain slightly impaired relative to contralateral side. She tolerates standing DL heel raises w/ little discomfort. Initiated eccentric heel raises on TG today which she reported to be moderately uncomfortable, but was able  to tolerate several repetitions. Emphasized heel raises, great toe stretches, and WBing arch raises w/ HEP. Encouraged her to perform heel raises to fatigue 2-3x/day to improve load tolerance.    Plan  Plan details: Cont w/ POC-continue eccentric heel raises on TG               Timed:  Manual Therapy:    8     mins  45748;  Therapeutic Exercise:    20     mins  40883;     Neuromuscular [place]:    8    mins  48435;    Therapeutic Activity:     10     mins  99983;     Gait Trainin     mins  94074;     Ultrasound:     0     mins  32952;    Electrical Stimulation:    0     mins  77418 ( );    Untimed:  Electrical Stimulation:    0     mins  58140 ( );  Mechanical Traction:    0     mins  78703;     Timed Treatment:   46   mins   Total Treatment:     46   mins      Susana Fried, PT  Physical Therapist

## 2021-09-23 ENCOUNTER — HOSPITAL ENCOUNTER (OUTPATIENT)
Dept: ULTRASOUND IMAGING | Facility: HOSPITAL | Age: 61
Discharge: HOME | End: 2021-09-23

## 2021-09-23 ENCOUNTER — HOSPITAL ENCOUNTER (OUTPATIENT)
Dept: MAMMOGRAPHY | Facility: HOSPITAL | Age: 61
Discharge: HOME | End: 2021-09-23

## 2021-09-23 DIAGNOSIS — R92.8 ABNORMAL MAMMOGRAM: ICD-10-CM

## 2021-09-23 PROCEDURE — 77066 DX MAMMO INCL CAD BI: CPT | Performed by: RADIOLOGY

## 2021-09-23 PROCEDURE — 76642 ULTRASOUND BREAST LIMITED: CPT

## 2021-09-23 PROCEDURE — G0279 TOMOSYNTHESIS, MAMMO: HCPCS

## 2021-09-23 PROCEDURE — 77062 BREAST TOMOSYNTHESIS BI: CPT | Performed by: RADIOLOGY

## 2021-09-23 PROCEDURE — 76642 ULTRASOUND BREAST LIMITED: CPT | Performed by: RADIOLOGY

## 2021-09-23 PROCEDURE — 77066 DX MAMMO INCL CAD BI: CPT

## 2021-09-24 DIAGNOSIS — F33.42 RECURRENT MAJOR DEPRESSIVE DISORDER, IN FULL REMISSION (HCC): ICD-10-CM

## 2021-09-24 RX ORDER — DESVENLAFAXINE 100 MG/1
100 TABLET, EXTENDED RELEASE ORAL DAILY
Qty: 90 TABLET | Refills: 1 | Status: SHIPPED | OUTPATIENT
Start: 2021-09-24 | End: 2021-11-11

## 2021-09-24 NOTE — TELEPHONE ENCOUNTER
Rx Refill Note  Requested Prescriptions     Pending Prescriptions Disp Refills   • desvenlafaxine (Pristiq) 100 MG 24 hr tablet 90 tablet 1     Sig: Take 1 tablet by mouth Daily.      Last office visit with prescribing clinician: 6/15/2021      Next office visit with prescribing clinician: 11/11/2021   }  Sary Mills MA  09/24/21, 12:47 EDT     Last fill: 03/29/2021

## 2021-09-28 ENCOUNTER — TREATMENT (OUTPATIENT)
Dept: PHYSICAL THERAPY | Facility: CLINIC | Age: 61
End: 2021-09-28

## 2021-09-28 DIAGNOSIS — M76.61 ACHILLES TENDINITIS OF RIGHT LOWER EXTREMITY: Primary | ICD-10-CM

## 2021-09-28 PROCEDURE — 97140 MANUAL THERAPY 1/> REGIONS: CPT | Performed by: PHYSICAL THERAPIST

## 2021-09-28 PROCEDURE — 97110 THERAPEUTIC EXERCISES: CPT | Performed by: PHYSICAL THERAPIST

## 2021-09-28 NOTE — PROGRESS NOTES
"   Physical Therapy Daily Progress Note        Patient: Yamila Neff   : 1960  Diagnosis/ICD-10 Code:  Achilles tendinitis of right lower extremity [M76.61]  Referring practitioner: Pam Fairchild MD  Date of Initial Visit: Type: THERAPY  Noted: 9/3/2021  Today's Date: 2021  Patient seen for 5 sessions         Yamila Neff     Subjective \"I am very frustrated.  Nothing seems to help.  I am still having as much pain as before.  This has been going on for a long time.  I can barely walk after I finish a shift and get home. Noting seems to help me at all.\"     Objective   See Exercise, Manual, and Modality Logs for complete treatment.       Assessment/Plan  R calf quick to fatigue with exercises.  Increased manual this session promoting bloodflow to region and recreate healing process. KT tape applied to R foot/ankle/achilles as well. Discussed etiology, healing process and with therapist possibility of IDN into sessions as well.     Progress per Plan of Care           Manual Therapy:    24     mins  41549;  Therapeutic Exercise:    20     mins  95580;     Neuromuscular [place]:        mins  03637;    Therapeutic Activity:          mins  23957;     Gait Training:           mins  88115;     Ultrasound:          mins  82900;    Electrical Stimulation:         mins  99928 ( );  Dry Needling          mins self-pay    Timed Treatment:   44   mins   Total Treatment:     44   mins    Madelyn Galarza PTA  Physical Therapist                    "

## 2021-10-06 ENCOUNTER — TREATMENT (OUTPATIENT)
Dept: PHYSICAL THERAPY | Facility: CLINIC | Age: 61
End: 2021-10-06

## 2021-10-06 DIAGNOSIS — M76.61 ACHILLES TENDINITIS OF RIGHT LOWER EXTREMITY: Primary | ICD-10-CM

## 2021-10-06 PROCEDURE — 97110 THERAPEUTIC EXERCISES: CPT | Performed by: PHYSICAL THERAPIST

## 2021-10-06 PROCEDURE — 97140 MANUAL THERAPY 1/> REGIONS: CPT | Performed by: PHYSICAL THERAPIST

## 2021-10-06 PROCEDURE — 97530 THERAPEUTIC ACTIVITIES: CPT | Performed by: PHYSICAL THERAPIST

## 2021-10-06 PROCEDURE — 97112 NEUROMUSCULAR REEDUCATION: CPT | Performed by: PHYSICAL THERAPIST

## 2021-10-06 NOTE — PROGRESS NOTES
Re-Assessment / Re-Certification      Patient: Yamila Neff   : 1960  Diagnosis/ICD-10 Code:  Achilles tendinitis of right lower extremity [M76.61]  Referring practitioner: Pam Fairchild MD  Date of Initial Visit: Type: THERAPY  Noted: 9/3/2021  Today's Date: 10/6/2021  Patient seen for 6 sessions      Subjective:   Subjective Questionnaire: LEFS: 44/80  Clinical Progress: improved  Home Program Compliance: Yes  Treatment has included: therapeutic exercise, neuromuscular re-education, manual therapy, therapeutic activity and dry needling    Subjective    Yamila Neff reports: Expresses frustration that pn does not seem to be improving. Has had difficulty performing heel raises at home due to inc pn in big toe. Still has pn w/ walking even household distances. Has had to call in at work some days due to pn.    Pre tx pn score: 2  Post tx pn score: 2      Treatment  Exercise 1  Exercise Name 1: NuStep-ROM/strengthening  Equipment/Resistance 1: Lv 4  Time: 5 min  Exercise 2  Exercise Name 2: Reassessment  Exercise 3  Exercise Name 3: DL heel raises on step-toe off  Sets/Reps 3: 30  Exercise 4  Exercise Name 4: ski jumpers  Sets/Reps 4: 15  Exercise 5  Exercise Name 5: SLS RDL w/ forward reach-partial ROM  Sets/Reps 5: 20  Exercise 6  Exercise Name 6: long sitting PF-knee flexed/extended  Equipment/Resistance 6: black TB  Sets/Reps 6: 30    Manual Rx 1  Manual Rx 1 Location: STM R calf, cross friction massage achilles tendon in prone    Dry Needling 1  Region/Structure 1: achilles tendon insertion-R ankle  Technique 1: needle setting at calcaneal insertion  Needle Size/Depth 1: .5 in  Quantity of Needles 1: 8        Objective          Observations     Additional Ankle/Foot Observation Details  Bone spur/hypertrophy noted dorsal 1st MTP joint  Pes planus  In prone: rearfoot eversion approx 6 deg; forefoot varus    Tenderness     Right Ankle/Foot   Tenderness in the Achilles insertion.     Active Range  of Motion   Left Ankle/Foot   Dorsiflexion (ke): 10 degrees   Dorsiflexion (kf): 10 degrees   Plantar flexion: 55 degrees   Inversion: 34 degrees   Eversion: 12 degrees     Right Ankle/Foot   Dorsiflexion (ke): 12 degrees   Dorsiflexion (kf): 15 degrees   Plantar flexion: 55 degrees   Inversion: 40 degrees   Eversion: 22 degrees     Joint Play     Right Ankle/Foot  Joints within functional limits are the talocrural joint and subtalar joint. Hypomobile in the midfoot.     Additional Joint Play Details  First ray, MTP hypomobile    Strength/Myotome Testing     Left Ankle/Foot   Dorsiflexion: 5  Plantar flexion: 5  Inversion: 5  Eversion: 5    Right Ankle/Foot   Dorsiflexion: 5  Plantar flexion: 4-  Inversion: 4+  Eversion: 4+    Tests     Right Ankle/Foot   Negative for navicular drop, Flores and windlass.     Ambulation     Observational Gait   Walking speed and stride length within functional limits. Decreased right stance time.           Assessment & Plan     Assessment  Assessment details: Reassessment performed. Pt demonstrates improvements in ankle/rearfoot mobility, soft tissue flexibility, and calf strength. She continues to have pn w/ ambulation over short distances, localized over achilles insertion on calcaneus. She expresses frustration that pn is not yet improved. Reminded her that strengthening muscle tissue is not a fast process and that protocols supported by literature for rehab of achilles tendinopathy are 12 weeks. She has been having limited tolerance to standing heel raises due to pn at big toe 2/2 hallux rigidus. Instructed in modified version w/ toes hanging off step edge so WBing is on ball of foot vs 1st MTP. Seemed to tolerate better. Also initiated DN to achilles insertion in attempt to improve local circulation and promote healing. Pt is making good progress toward goals and would benefit from ongoing PT to address remaining deficits and maximize function.    Goals  Plan Goals: STG 4  wks  1) Pt to be compliant w/ initial HEP for ROM, strength and symptom mgmt.-MET  2) Pt to report pn w/ ambulation over household distances to be no greater than 4/10.-ONGOING  3) Pt to demonstrate restored soleus length (compared to contralateral LE).-MET  4) Pt to improve R ankle PF strength to 4/5 or better.-PROGRESSING  5) Pt to maintain SLS for 10 seconds or greater.-PROGRESSING    LTG 8 wks  1) Pt to be independent w/ long term HEP and self mgmt.-PROGRESSING  2) Pt to report pn w/ working to be no greater than 2/10.-ONGOING  3) Pt to improve R ankle strength to 5/5.-PROGRESSING  4) Pt to maintain SLS for 20 seconds or greater.-PROGRESSING  5) LEFS score 63/80 or better to reflect improved pn and function. -ONGOING    Plan  Plan details: Cont w/ focus on calf/foot intrinsic strengthening for improved load mgmt; assess response to DN      Progress toward previous goals: Not Met        PT Signature: Susana Fried, PT        Timed:  Manual Therapy:    8     mins  42228;  Therapeutic Exercise:    12     mins  97441;     Neuromuscular [place]:    10    mins  27687;    Therapeutic Activity:     12     mins  21961;     Gait Trainin     mins  16099;     Ultrasound:     0     mins  67276;    Electrical Stimulation:    0     mins  34453 ( );    Untimed:  Electrical Stimulation:    0     mins  80355 ( );  Mechanical Traction:    0     mins  23080;     Timed Treatment:   42   mins   Total Treatment:     48   mins

## 2021-10-10 DIAGNOSIS — E78.1 HYPERTRIGLYCERIDEMIA: ICD-10-CM

## 2021-10-10 RX ORDER — OMEGA-3-ACID ETHYL ESTERS 1 G/1
2 CAPSULE, LIQUID FILLED ORAL 2 TIMES DAILY
Qty: 360 CAPSULE | Refills: 1 | Status: SHIPPED | OUTPATIENT
Start: 2021-10-10 | End: 2022-03-10

## 2021-10-11 ENCOUNTER — TREATMENT (OUTPATIENT)
Dept: PHYSICAL THERAPY | Facility: CLINIC | Age: 61
End: 2021-10-11

## 2021-10-11 DIAGNOSIS — M76.61 ACHILLES TENDINITIS OF RIGHT LOWER EXTREMITY: Primary | ICD-10-CM

## 2021-10-11 PROCEDURE — 97110 THERAPEUTIC EXERCISES: CPT | Performed by: PHYSICAL THERAPIST

## 2021-10-11 PROCEDURE — 97112 NEUROMUSCULAR REEDUCATION: CPT | Performed by: PHYSICAL THERAPIST

## 2021-10-11 NOTE — PROGRESS NOTES
Physical Therapy Daily Progress Note  Visit: 7      Patient: Yamila Neff   : 1960  Referring practitioner: Pam Fairchild MD  Visit Diagnosis:     ICD-10-CM ICD-9-CM   1. Achilles tendinitis of right lower extremity  M76.61 726.71     Date of Initial Visit: Type: THERAPY  Noted: 9/3/2021  Today's Date: 10/11/2021        Subjective     Yamila Neff reports: Pn maybe slightly better after DN last visit, until she returned to work and was up on her feet. Pn feels a little more lateral on her heel today. Has worked on new exercises some but has been busy.    Treatment  Pre Treatment Pn Score: 2  Post Treatment Pn Score:  2      Exercise 1  Exercise Name 1: NuStep-ROM/strengthening  Equipment/Resistance 1: Lv 4  Time: 5 min  Exercise 2  Exercise Name 2: heel raises (toe off step edge)-knee extended/knee flexed  Sets/Reps 2: 30 ea  Exercise 3  Exercise Name 3: 3 way [facility] DF mob at wall  Sets/Reps 3: 30 ea  Exercise 4  Exercise Name 4: ski jumpers  Sets/Reps 4: 30  Exercise 5  Exercise Name 5: SLS RDL w/ forward reach-partial ROM  Sets/Reps 5: 20  Exercise 6  Exercise Name 6: piano toes-sitting/standing  Sets/Reps 6: 20 ea  Exercise 7  Exercise Name 7: seated toe spreading/squeezing-sitting  Sets/Reps 7: 20  Exercise 8  Exercise Name 8: SLS w/ UE support  Sets/Reps 8: 2  Time 8: 20 sec      Manual Rx 1  Manual Rx 1 Location: arch taping      Dry Needling 1  Region/Structure 1: achilles tendon insertion-R ankle  Technique 1: needle setting at calcaneal insertion; distal achilles tendon fenestration  Needle Size/Depth 1: .5 in-1 in  Quantity of Needles 1: 10      Objective         Assessment & Plan     Assessment  Assessment details: Pt notes somewhat improved tolerance to heel raises when performed on step edge w/ great toe hanging off, less irritation at 1st MTP joint than when performed w/ feet flat on ground. Fatigued quickly w/ soleus heel raises. Demo intact DF ROM when measured in [facility], but  remains somewhat limited when assessed in [facility], indicating soleus length impairment. Initiated 3 way DF mob at wall w/ good tolerance. Tightness noted in achilles/heel w/ knee movement laterally. Dicussed incorporating into HEP.     Plan  Plan details: Assess response to DN, arch taping               Timed:  Manual Therapy:    3     mins  69591;  Therapeutic Exercise:    24     mins  10462;     Neuromuscular [place]:    8    mins  91003;    Therapeutic Activity:     0     mins  77544;     Gait Trainin     mins  94211;     Ultrasound:     0     mins  71533;    Electrical Stimulation:    0     mins  58946 ( );    Untimed:  Electrical Stimulation:    0     mins  17468 ( );  Mechanical Traction:    0     mins  05717;     Timed Treatment:   35   mins   Total Treatment:     42   mins      Susana Fried, PT  Physical Therapist

## 2021-10-21 ENCOUNTER — TREATMENT (OUTPATIENT)
Dept: PHYSICAL THERAPY | Facility: CLINIC | Age: 61
End: 2021-10-21

## 2021-10-21 DIAGNOSIS — M76.61 ACHILLES TENDINITIS OF RIGHT LOWER EXTREMITY: Primary | ICD-10-CM

## 2021-10-21 PROCEDURE — 97140 MANUAL THERAPY 1/> REGIONS: CPT | Performed by: PHYSICAL THERAPIST

## 2021-10-21 PROCEDURE — 97110 THERAPEUTIC EXERCISES: CPT | Performed by: PHYSICAL THERAPIST

## 2021-10-22 NOTE — PROGRESS NOTES
"   Physical Therapy Daily Progress Note      Patient: Yamila Neff   : 1960  Diagnosis/ICD-10 Code:  Achilles tendinitis of right lower extremity [M76.61]  Referring practitioner: Pam Fairchild MD  Date of Initial Visit: Type: THERAPY  Noted: 9/3/2021  Today's Date: 10/22/2021  Patient seen for 8 sessions         Yamila Neff    Subjective \"I am doing ok.  I might be able to move my foot more but I still have the pain.  I think the dry needling helped some if I can do that again I would like that.\"    Objective   See Exercise, Manual, and Modality Logs for complete treatment.       Assessment/Plan PT performed Dry needling after ex and manual. Pt progressing toward goals.  Pt seems to be having less lateral pain and improved AROM since SOC.    Progress per Plan of Care           Manual Therapy:    10    mins  68429;  Therapeutic Exercise:    20     mins  00491;     Neuromuscular [place]:        mins  42351;    Therapeutic Activity:          mins  29511;     Gait Training:           mins  77919;     Ultrasound:          mins  13949;    Electrical Stimulation:         mins  05731 ( );  Dry Needling          mins self-pay    Timed Treatment:   30   mins   Total Treatment:         mins    Madelyn Galarza PTA  Physical Therapist                    "

## 2021-10-23 DIAGNOSIS — E78.2 MIXED HYPERLIPIDEMIA: ICD-10-CM

## 2021-10-23 DIAGNOSIS — G43.019 INTRACTABLE MIGRAINE WITHOUT AURA AND WITHOUT STATUS MIGRAINOSUS: ICD-10-CM

## 2021-10-23 DIAGNOSIS — F51.01 PRIMARY INSOMNIA: ICD-10-CM

## 2021-10-25 RX ORDER — PRAVASTATIN SODIUM 40 MG
40 TABLET ORAL NIGHTLY
Qty: 90 TABLET | Refills: 0 | Status: SHIPPED | OUTPATIENT
Start: 2021-10-25 | End: 2021-11-11 | Stop reason: SDUPTHER

## 2021-10-25 RX ORDER — AMITRIPTYLINE HYDROCHLORIDE 10 MG/1
10 TABLET, FILM COATED ORAL NIGHTLY
Qty: 90 TABLET | Refills: 0 | Status: SHIPPED | OUTPATIENT
Start: 2021-10-25 | End: 2021-11-11 | Stop reason: SDUPTHER

## 2021-10-25 NOTE — TELEPHONE ENCOUNTER
Rx Refill Note  Requested Prescriptions     Pending Prescriptions Disp Refills   • amitriptyline (ELAVIL) 10 MG tablet 90 tablet 0     Sig: Take 1 tablet by mouth Every Night.   • pravastatin (PRAVACHOL) 40 MG tablet 90 tablet 0     Sig: Take 1 tablet by mouth Every Night.      Last office visit with prescribing clinician: 6/15/2021      Next office visit with prescribing clinician: 11/11/2021            EDUIN GARCIA MA  10/25/21, 08:02 EDT

## 2021-10-29 ENCOUNTER — TREATMENT (OUTPATIENT)
Dept: PHYSICAL THERAPY | Facility: CLINIC | Age: 61
End: 2021-10-29

## 2021-10-29 DIAGNOSIS — M76.61 ACHILLES TENDINITIS OF RIGHT LOWER EXTREMITY: Primary | ICD-10-CM

## 2021-10-29 PROCEDURE — 97110 THERAPEUTIC EXERCISES: CPT | Performed by: PHYSICAL THERAPIST

## 2021-10-29 PROCEDURE — 97112 NEUROMUSCULAR REEDUCATION: CPT | Performed by: PHYSICAL THERAPIST

## 2021-10-29 PROCEDURE — 97140 MANUAL THERAPY 1/> REGIONS: CPT | Performed by: PHYSICAL THERAPIST

## 2021-10-29 NOTE — PROGRESS NOTES
"   Physical Therapy Daily Progress Note  Visit: 9      Patient: Yamila Neff   : 1960  Referring practitioner: Pam Fairchild MD  Visit Diagnosis:     ICD-10-CM ICD-9-CM   1. Achilles tendinitis of right lower extremity  M76.61 726.71     Date of Initial Visit: Type: THERAPY  Noted: 9/3/2021  Today's Date: 10/29/2021        Subjective     Yamila Neff reports: C/o increased sensitivity/pn over the lateral aspect of her heel. Uncomfortable even to light touch. Feels like the \"nerve pn\" she had when she was recovering from her TKA. Has worked on exercises some, but not as often as she should-has new grandbaby.    Treatment  Pre Treatment Pn Score: 4  Post Treatment Pn Score:  4      Exercise 1  Exercise Name 1: longsitting PF  Equipment/Resistance 1: black TB  Sets/Reps 1: 50  Exercise 2  Exercise Name 2: TG single leg heel raises-gastroc  Sets/Reps 2: 15  Exercise 3  Exercise Name 3: arch raises-standing w/ DL support  Sets/Reps 3: 30  Exercise 4  Exercise Name 4: arch raise in SLS  Sets/Reps 4: 4  Time 4: 15 sec  Exercise 5  Exercise Name 5: standing DL support heel raises on airex  Sets/Reps 5: 40      Manual Rx 1  Manual Rx 1 Location: posterior talar glides in supine  Manual Rx 1 Grade: Gr II-III  Manual Rx 2  Manual Rx 2 Location: first ray mobilization  Manual Rx 3  Manual Rx 3 Location: lateral calcaneal glides  Manual Rx 3 Grade: Gr II-III      Dry Needling 1  Region/Structure 1: lateral achilles insertion on calcaneus  Technique 1: in/out  Needle Size/Depth 1: .5 in  Quantity of Needles 1: 2  Dry Needling 2  Region/Structure 2: lateral sural cutaneous n  Technique 2: in/out  Needle Size/Depth 2: .5 in  Quantity of Needles 2: 2      Objective         Assessment & Plan     Assessment  Assessment details: Pt not as consistent w/ HEP after arrival of new grandbaby. She complains of significant sensitivity over the posterolateral aspect of the calcanus, very tender to even light palpation. " She remains unable to perform a unilateral heel raise on the RLE w/o the knee moving into flexion, indicating ongoing gastroc weakness. Incorporated superficial DN over the lateral sural cutaneous nerve distribution given her recent complaints of increasing lateral heel discomfort. She tolerated all intervention well today, though pn remained unchanged at end of visit.     Plan  Plan details: Reassess               Timed:  Manual Therapy:    8     mins  36237;  Therapeutic Exercise:    12     mins  42374;     Neuromuscular [place]:   10    mins  47046;    Therapeutic Activity:     0     mins  88646;     Gait Trainin     mins  39751;     Ultrasound:     0     mins  41340;    Electrical Stimulation:    0     mins  25345 ( );    Untimed:  Electrical Stimulation:    0     mins  41789 ( );  Mechanical Traction:    0     mins  68249;     Timed Treatment:   30   mins   Total Treatment:     38   mins      Susana Fried, PT  Physical Therapist

## 2021-11-03 ENCOUNTER — TREATMENT (OUTPATIENT)
Dept: PHYSICAL THERAPY | Facility: CLINIC | Age: 61
End: 2021-11-03

## 2021-11-03 DIAGNOSIS — M76.61 ACHILLES TENDINITIS OF RIGHT LOWER EXTREMITY: Primary | ICD-10-CM

## 2021-11-03 PROCEDURE — 97110 THERAPEUTIC EXERCISES: CPT | Performed by: PHYSICAL THERAPIST

## 2021-11-03 PROCEDURE — 97530 THERAPEUTIC ACTIVITIES: CPT | Performed by: PHYSICAL THERAPIST

## 2021-11-03 PROCEDURE — 97112 NEUROMUSCULAR REEDUCATION: CPT | Performed by: PHYSICAL THERAPIST

## 2021-11-03 NOTE — PROGRESS NOTES
Re-Assessment / Re-Certification      Patient: Yamila Neff   : 1960  Diagnosis/ICD-10 Code:  Achilles tendinitis of right lower extremity [M76.61]  Referring practitioner: Pam Fairchild MD  Date of Initial Visit: Type: THERAPY  Noted: 9/3/2021  Today's Date: 11/3/2021  Patient seen for 10 sessions      Subjective:   Subjective Questionnaire: LEFS: 46/80  Clinical Progress: improved  Home Program Compliance: Yes  Treatment has included: therapeutic exercise, neuromuscular re-education, manual therapy, therapeutic activity and dry needling    Subjective    Yamila Neff reports: No apparent change in symptoms. Still has pn in heel to walk and distance. Heel raises continue to irritate her big toe, causes burning sensation in posterolateral heel.    Pre tx pn score: 4  Post tx pn score: 4      Treatment  Exercise 1  Exercise Name 1: Reassessment  Exercise 2  Exercise Name 2: eccentric heel raises on airex  Sets/Reps 2: 20  Exercise 3  Exercise Name 3: eccentric PF  Equipment/Resistance 3: black TB  Sets/Reps 3: 30  Exercise 4  Exercise Name 4: toe flexion  Equipment/Resistance 4: RTB  Sets/Reps 4: 30  Exercise 5  Exercise Name 5: arch raises-DL support  Sets/Reps 5: 30  Exercise 6  Exercise Name 6: arch raise in SLS  Sets/Reps 6: 4  Time 6: 20 sec    Manual Rx 1  Manual Rx 1 Location: KT R achilles              Objective          Observations     Additional Ankle/Foot Observation Details  Bone spur/hypertrophy noted dorsal 1st MTP joint  Pes planus  In prone: rearfoot eversion approx 6 deg; forefoot varus    Tenderness     Right Ankle/Foot   Tenderness in the Achilles insertion.     Active Range of Motion   Left Ankle/Foot   Dorsiflexion (ke): 10 degrees   Dorsiflexion (kf): 10 degrees   Plantar flexion: 55 degrees   Inversion: 34 degrees   Eversion: 12 degrees     Right Ankle/Foot   Dorsiflexion (ke): 15 degrees   Dorsiflexion (kf): 20 degrees   Plantar flexion: 55 degrees   Inversion: 40 degrees    Eversion: 26 degrees     Joint Play     Right Ankle/Foot  Joints within functional limits are the talocrural joint and subtalar joint. Hypomobile in the midfoot.     Additional Joint Play Details  First ray, MTP hypomobile    Strength/Myotome Testing     Left Ankle/Foot   Dorsiflexion: 5  Plantar flexion: 5  Inversion: 5  Eversion: 5    Right Ankle/Foot   Dorsiflexion: 5  Plantar flexion: 4  Inversion: 5  Eversion: 5    Tests     Right Ankle/Foot   Negative for navicular drop, Flores and windlass.     Ambulation     Observational Gait   Walking speed and stride length within functional limits. Decreased right stance time.     Additional Observational Gait Details  Dec push off R foot          Assessment & Plan     Assessment  Assessment details: Reassessment performed today. Pt has completed 8 weeks (10 visits) of PT. Interventions have included LE strengthening w/ emphasis on post chain loading and eccentric calf activation, calf stretching, dry needling at achilles insertion, and arch taping. While pt shows continued improvement in ankle mobility, flexibility, and strength she denies any improvement in pn or function. She cont to note post heel pn w/ walking any distance. WBing calf strengthening limited by pn in 1st MTP, which remains hypomobile. We have discussed several times the length of the protocol supported by literature to treat achilles tendonitis. She verbalizes understanding but is frustrated pn has not improved. Considered use of heel lift to offload the achilles but pt wears backless shoes so not an optimal intervention. Instead applied KT w/ foot slightly plantarflexed-will assess response at next visit. Pt is making progress toward goals and would benefit from additional PT w/ focus on continued, gradual calf/achilles strengthening to improve load tolerance.    Goals  Plan Goals: STG 4 wks  1) Pt to be compliant w/ initial HEP for ROM, strength and symptom mgmt.-MET  2) Pt to report pn w/  ambulation over household distances to be no greater than 4/10.-ONGOING  3) Pt to demonstrate restored soleus length (compared to contralateral LE).-MET  4) Pt to improve R ankle PF strength to 4/5 or better.-MET  5) Pt to maintain SLS for 10 seconds or greater.-MET    LTG 8 wks  1) Pt to be independent w/ long term HEP and self mgmt.-PROGRESSING  2) Pt to report pn w/ working to be no greater than 2/10.-ONGOING  3) Pt to improve R ankle strength to 5/5.-PARTIALLY MET (all planes but PF)  4) Pt to maintain SLS for 20 seconds or greater.-MET  5) LEFS score 63/80 or better to reflect improved pn and function. -ONGOING    Plan  Plan details: Cont w/ eccentric calf strengthening, foot intrinsic strengthening, stretching      Progress toward previous goals: Partially Met        PT Signature: Susana Fried, PT        Timed:  Manual Therapy:    0     mins  95436;  Therapeutic Exercise:    23     mins  17925;     Neuromuscular [place]:    8    mins  07064;    Therapeutic Activity:     10     mins  41903;     Gait Trainin     mins  36323;     Ultrasound:     0     mins  66688;    Electrical Stimulation:    0     mins  15377 ( );    Untimed:  Electrical Stimulation:    0     mins  91163 ( );  Mechanical Traction:    0     mins  13813;     Timed Treatment:   41   mins   Total Treatment:     41   mins

## 2021-11-11 ENCOUNTER — TREATMENT (OUTPATIENT)
Dept: PHYSICAL THERAPY | Facility: CLINIC | Age: 61
End: 2021-11-11

## 2021-11-11 ENCOUNTER — OFFICE VISIT (OUTPATIENT)
Dept: FAMILY MEDICINE CLINIC | Facility: CLINIC | Age: 61
End: 2021-11-11

## 2021-11-11 VITALS
HEART RATE: 65 BPM | HEIGHT: 62 IN | SYSTOLIC BLOOD PRESSURE: 136 MMHG | DIASTOLIC BLOOD PRESSURE: 86 MMHG | OXYGEN SATURATION: 98 % | BODY MASS INDEX: 41.59 KG/M2 | WEIGHT: 226 LBS

## 2021-11-11 DIAGNOSIS — G47.33 OSA ON CPAP: ICD-10-CM

## 2021-11-11 DIAGNOSIS — E66.01 CLASS 3 SEVERE OBESITY DUE TO EXCESS CALORIES WITH SERIOUS COMORBIDITY AND BODY MASS INDEX (BMI) OF 40.0 TO 44.9 IN ADULT (HCC): ICD-10-CM

## 2021-11-11 DIAGNOSIS — M25.542 JOINT PAIN IN BOTH HANDS: ICD-10-CM

## 2021-11-11 DIAGNOSIS — M25.541 JOINT PAIN IN BOTH HANDS: ICD-10-CM

## 2021-11-11 DIAGNOSIS — R73.02 IMPAIRED GLUCOSE TOLERANCE: ICD-10-CM

## 2021-11-11 DIAGNOSIS — M85.88 OTHER SPECIFIED DISORDERS OF BONE DENSITY AND STRUCTURE, OTHER SITE: ICD-10-CM

## 2021-11-11 DIAGNOSIS — F33.1 MODERATE EPISODE OF RECURRENT MAJOR DEPRESSIVE DISORDER (HCC): ICD-10-CM

## 2021-11-11 DIAGNOSIS — F41.9 ANXIETY: ICD-10-CM

## 2021-11-11 DIAGNOSIS — K76.0 HEPATIC STEATOSIS: ICD-10-CM

## 2021-11-11 DIAGNOSIS — F51.01 PRIMARY INSOMNIA: ICD-10-CM

## 2021-11-11 DIAGNOSIS — G43.019 INTRACTABLE MIGRAINE WITHOUT AURA AND WITHOUT STATUS MIGRAINOSUS: ICD-10-CM

## 2021-11-11 DIAGNOSIS — E78.2 MIXED HYPERLIPIDEMIA: ICD-10-CM

## 2021-11-11 DIAGNOSIS — Q60.0 CONGENITAL SINGLE KIDNEY: ICD-10-CM

## 2021-11-11 DIAGNOSIS — Z99.89 OSA ON CPAP: ICD-10-CM

## 2021-11-11 DIAGNOSIS — H91.93 BILATERAL HEARING LOSS, UNSPECIFIED HEARING LOSS TYPE: ICD-10-CM

## 2021-11-11 DIAGNOSIS — M76.61 ACHILLES TENDINITIS OF RIGHT LOWER EXTREMITY: Primary | ICD-10-CM

## 2021-11-11 DIAGNOSIS — I10 ESSENTIAL HYPERTENSION: ICD-10-CM

## 2021-11-11 DIAGNOSIS — Z00.00 PHYSICAL EXAM, ANNUAL: Primary | ICD-10-CM

## 2021-11-11 PROCEDURE — 97140 MANUAL THERAPY 1/> REGIONS: CPT | Performed by: PHYSICAL THERAPIST

## 2021-11-11 PROCEDURE — 99396 PREV VISIT EST AGE 40-64: CPT | Performed by: PHYSICIAN ASSISTANT

## 2021-11-11 RX ORDER — PRAVASTATIN SODIUM 40 MG
40 TABLET ORAL NIGHTLY
Qty: 90 TABLET | Refills: 1 | Status: SHIPPED | OUTPATIENT
Start: 2021-11-11 | End: 2021-11-29

## 2021-11-11 RX ORDER — DESVENLAFAXINE SUCCINATE 50 MG/1
50 TABLET, EXTENDED RELEASE ORAL DAILY
Qty: 30 TABLET | Refills: 0 | Status: SHIPPED | OUTPATIENT
Start: 2021-11-11 | End: 2021-11-29

## 2021-11-11 RX ORDER — AMITRIPTYLINE HYDROCHLORIDE 10 MG/1
10 TABLET, FILM COATED ORAL NIGHTLY
Qty: 90 TABLET | Refills: 0 | Status: SHIPPED | OUTPATIENT
Start: 2021-11-11 | End: 2022-05-13 | Stop reason: SDUPTHER

## 2021-11-11 RX ORDER — METFORMIN HYDROCHLORIDE 500 MG/1
1000 TABLET, EXTENDED RELEASE ORAL 2 TIMES DAILY
Qty: 360 TABLET | Refills: 1 | Status: SHIPPED | OUTPATIENT
Start: 2021-11-11 | End: 2022-06-10 | Stop reason: SDUPTHER

## 2021-11-11 RX ORDER — AMLODIPINE BESYLATE 10 MG/1
10 TABLET ORAL DAILY
Qty: 90 TABLET | Refills: 1 | Status: SHIPPED | OUTPATIENT
Start: 2021-11-11 | End: 2022-05-13 | Stop reason: SDUPTHER

## 2021-11-11 RX ORDER — LOSARTAN POTASSIUM 100 MG/1
100 TABLET ORAL DAILY
Qty: 90 TABLET | Refills: 1 | Status: SHIPPED | OUTPATIENT
Start: 2021-11-11 | End: 2022-05-13 | Stop reason: SDUPTHER

## 2021-11-11 RX ORDER — HYDROXYZINE 50 MG/1
50 TABLET, FILM COATED ORAL NIGHTLY PRN
Qty: 90 TABLET | Refills: 1 | Status: SHIPPED | OUTPATIENT
Start: 2021-11-11

## 2021-11-11 NOTE — PROGRESS NOTES
"   Physical Therapy Daily Progress Note      Patient: Yamila Neff   : 1960  Diagnosis/ICD-10 Code:  Achilles tendinitis of right lower extremity [M76.61]  Referring practitioner: Pam Fairchild MD  Date of Initial Visit: Type: THERAPY  Noted: 9/3/2021  Today's Date: 2021  Patient seen for 11 sessions         Yamila Neff       Subjective \"nothing seems to be helping.  I get some relief but nothing long term. I can do the exercises but they seem to flare me up some.\"   No change in pain since SOC.    Objective   See Exercise, Manual, and Modality Logs for complete treatment.       Assessment/Plan focused session on gastroc, soleus, peroneal TP work with manual stretching of achilles tendon as well promoting elongation and neutral mm fiber alignment.  Weakness noted after manual with slight antalgic pattern. Edu pt with self TP hold if manual seems to give pain relief.    Progress per Plan of Care           Manual Therapy:    25     mins  23964;  Therapeutic Exercise:    3     mins  63424;     Neuromuscular [place]:        mins  40616;    Therapeutic Activity:          mins  04768;     Gait Training:          mins  54775;     Ultrasound:          mins  62924;    Electrical Stimulation:         mins  27365 ( );  Dry Needling          mins self-pay    Timed Treatment:   28   mins   Total Treatment:     28   mins    Madelyn Galarza PTA  Physical Therapist Assitant                    "

## 2021-11-11 NOTE — PROGRESS NOTES
Patient Care Team:  Daly Archibald PA-C as PCP - General (Physician Assistant)  Delphine Giles APRN as Nurse Practitioner (Obstetrics and Gynecology)     Chief complaint: Patient is in today for a physical     Yamila Oanh Neff is a 60 y.o. female who presents for her yearly physical exam.     Patient presents for annual physical exam and management of her depression, anxiety, obesity, POTTER, hyperlipidemia, hypertension, congenital single kidney, impaired glucose tolerance, insomnia, migraine, hearing loss, and sleep apnea.  Patient is compliant on all medications.  She has recently gained 10 pounds.  She reports that her blood pressure has been high at home.  It is better than before with the addition of the amlodipine 5 mg nightly.  But she is still having readings in the 170s/90s at times.  She has a headache when this occurs.  She is wearing her CPAP nightly.  She reports hearing loss and requests referral to audiology.  Her main concern today is worsening depression.  She reports daily sadness.  She is on Pristiq 200 mg daily and has been on this for years.  She has tried other medications which all worked initially but ultimately stopped.  These medications include Zoloft, Wellbutrin and Prozac.  She tried Celexa but this made her jittery.  She has never tried Trintellix.  She denies any manic events.  She has no suicidal thoughts.  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  She reports that she has significant joint pain especially in her hands.  She states that it takes her a few days to recover after working due to the pain and fatigue.  She is currently being treated for Achilles tendinitis by Dr. Phoenix.  She may have to wear a cast for this given that it is not responding to other conservative treatments.  She goes to the ophthalmologist and dentist regularly.  Denies any skin lesions or moles of concern.  Is up-to-date with mammogram and colonoscopy.  She  has had her COVID-19 series and booster.  She has had her flu vaccine.  She goes to gynecology regularly.         Review of Systems   Constitutional: Positive for fatigue. Negative for chills, diaphoresis and fever.   HENT: Negative for congestion, ear pain, hearing loss, postnasal drip, rhinorrhea and sore throat.    Eyes: Negative for blurred vision and pain.   Respiratory: Negative for cough, shortness of breath and wheezing.    Cardiovascular: Negative for chest pain and leg swelling.   Gastrointestinal: Negative for abdominal pain, blood in stool, constipation, diarrhea, nausea, vomiting and indigestion.   Endocrine: Negative for polyuria.   Genitourinary: Negative for dysuria, flank pain and hematuria.   Musculoskeletal: Positive for arthralgias and myalgias. Negative for gait problem.   Skin: Negative for rash and skin lesions.   Neurological: Negative for dizziness and headache.   Psychiatric/Behavioral: Positive for agitation, sleep disturbance, depressed mood and stress. Negative for self-injury and suicidal ideas. The patient is nervous/anxious.         History  Past Medical History:   Diagnosis Date   • Anemia    • Arthritis    • Carpal tunnel syndrome 2012   • Degenerative joint disease    • Depression    • Depression    • Elevated cholesterol    • Elevated liver enzymes    • Fatty liver    • Hypertension    • Kidney disorder     one kidney   • Palpitations    • Polycystic ovaries    • Psoriasis    • PVC (premature ventricular contraction)     occasional pvc's   • Sleep apnea     cpap at home   • Wears glasses       Past Surgical History:   Procedure Laterality Date   • BREAST BIOPSY Right     PAPILLOMA DR BELL   •  SECTION      x 3   • CHOLECYSTECTOMY  2013   • COLONOSCOPY     • HYSTERECTOMY  2006    complete   • JOINT REPLACEMENT Left     left knee   • OOPHORECTOMY     • TOTAL KNEE ARTHROPLASTY Right 3/9/2021    Procedure: TOTAL KNEE ARTHROPLASTY RIGHT;  Surgeon: Mateo Keith MD;   Location: [facility];  Service: Orthopedics;  Laterality: Right;   • TUBAL ABDOMINAL LIGATION        Allergies   Allergen Reactions   • Celexa [Citalopram] Other (See Comments)     Jittery    • Lisinopril Cough      Family History   Problem Relation Age of Onset   • Diabetes Mother    • Lymphoma Mother    • Hypertension Mother    • No Known Problems Father    • Hypertension Brother    • Hypertension Brother    • Heart disease Maternal Grandmother    • Heart disease Maternal Grandfather    • Heart attack Maternal Grandfather    • Colon cancer Maternal Aunt    • Breast cancer Neg Hx    • Ovarian cancer Neg Hx       Social History     Socioeconomic History   • Marital status:    Tobacco Use   • Smoking status: Never Smoker   • Smokeless tobacco: Never Used   Substance and Sexual Activity   • Alcohol use: No   • Drug use: No   • Sexual activity: Yes     Partners: Male     Comment:       Current Outpatient Medications on File Prior to Visit   Medication Sig Dispense Refill   • cholecalciferol (VITAMIN D3) 1000 units tablet Take 2,000 Units by mouth Daily.     • clobetasol (TEMOVATE) 0.05 % external solution Apply twice daily to itchy, scaly areas on scalp. 25 mL 3   • clobetasol (TEMOVATE) 0.05 % external solution Apply topically to the itchy, scaly areas on scalp as directed 2 (two) times a day. 25 mL 3   • hydrocortisone 2.5 % cream Apply every evening to affected areas on abdomen until resolved. 28.35 g 11   • hydrocortisone 2.5 % cream Apply  topically to the affected areas on abdomen as directed Every Evening until resolved. 28.35 g 3   • ketoconazole (NIZORAL) 2 % cream Apply every morning to underarms until resolved. 30 g 11   • ketoconazole (NIZORAL) 2 % cream Apply topically to the underarms Every Morning until resolved. 30 g 3   • ketoconazole (NIZORAL) 2 % shampoo Use as a shampoo to scalp and face 3 times a week. Leave in for 5 minutes and rinse. 120 mL 3   • ketoconazole (NIZORAL) 2 % shampoo  Use as a shampo to the scalp and face 3 (Three) Times a Week. Leave in for 5 minutes and rinse. 120 mL 3   • Magnesium 250 MG tablet Take 1 tablet by mouth Daily.     • nystatin-triamcinolone (MYCOLOG II) 185494-4.1 UNIT/GM-% cream Apply 1 application topically to the appropriate area sparingly as directed 3 (Three) Times a Day As Needed. 15 g 5   • omega-3 acid ethyl esters (LOVAZA) 1 g capsule Take 2 capsules by mouth 2 (Two) Times a Day. 360 capsule 1   • ondansetron ODT (Zofran ODT) 4 MG disintegrating tablet Place 1 tablet on the tongue Every 8 (Eight) Hours As Needed for Nausea or Vomiting. 14 tablet 0   • Probiotic Product (PROBIOTIC DAILY PO) Take  by mouth Daily.     • propranolol LA (INDERAL LA) 60 MG 24 hr capsule Take 1 capsule by mouth Daily. 90 capsule 1   • Vitamin E 400 units tablet Take 800 Units by mouth Daily.     • [DISCONTINUED] amitriptyline (ELAVIL) 10 MG tablet Take 1 tablet by mouth Every Night. 90 tablet 0   • [DISCONTINUED] amLODIPine (NORVASC) 5 MG tablet Take 1 tablet by mouth Daily. 90 tablet 1   • [DISCONTINUED] desvenlafaxine (Pristiq) 100 MG 24 hr tablet Take 1 tablet by mouth Daily. 90 tablet 1   • [DISCONTINUED] estradiol (Evamist) 1.53 MG/SPRAY transdermal spray Place 1 spray topically to the arm as directed by provider 2 (two) times a day. 24.3 mL 3   • [DISCONTINUED] hydrOXYzine (ATARAX) 50 MG tablet Take 1 tablet by mouth At Night As Needed for Anxiety. 90 tablet 1   • [DISCONTINUED] losartan (Cozaar) 100 MG tablet Take 1 tablet by mouth Daily. 90 tablet 1   • [DISCONTINUED] metFORMIN ER (GLUCOPHAGE-XR) 500 MG 24 hr tablet Take 2 tablets by mouth 2 (Two) Times a Day. 360 tablet 1   • [DISCONTINUED] pravastatin (PRAVACHOL) 40 MG tablet Take 1 tablet by mouth Every Night. 90 tablet 0     No current facility-administered medications on file prior to visit.       Results for orders placed or performed in visit on 06/17/21   Lipid Panel    Specimen: Blood   Result Value Ref Range     Total Cholesterol 176 0 - 200 mg/dL    Triglycerides 277 (H) 0 - 150 mg/dL    HDL Cholesterol 49 40 - 60 mg/dL    LDL Cholesterol  82 0 - 100 mg/dL    VLDL Cholesterol 45 (H) 5 - 40 mg/dL    LDL/HDL Ratio 1.46    Comprehensive Metabolic Panel    Specimen: Blood   Result Value Ref Range    Glucose 102 (H) 65 - 99 mg/dL    BUN 19 6 - 20 mg/dL    Creatinine 0.93 0.57 - 1.00 mg/dL    Sodium 137 136 - 145 mmol/L    Potassium 4.2 3.5 - 5.2 mmol/L    Chloride 101 98 - 107 mmol/L    CO2 23.4 22.0 - 29.0 mmol/L    Calcium 9.8 8.6 - 10.5 mg/dL    Total Protein 7.1 6.0 - 8.5 g/dL    Albumin 4.60 3.50 - 5.20 g/dL    ALT (SGPT) 61 (H) 1 - 33 U/L    AST (SGOT) 44 (H) 1 - 32 U/L    Alkaline Phosphatase 69 39 - 117 U/L    Total Bilirubin 0.2 0.0 - 1.2 mg/dL    eGFR Non African Amer 62 >60 mL/min/1.73    Globulin 2.5 gm/dL    A/G Ratio 1.8 g/dL    BUN/Creatinine Ratio 20.4 7.0 - 25.0    Anion Gap 12.6 5.0 - 15.0 mmol/L   CBC Auto Differential    Specimen: Blood   Result Value Ref Range    WBC 5.60 3.40 - 10.80 10*3/mm3    RBC 4.52 3.77 - 5.28 10*6/mm3    Hemoglobin 13.1 12.0 - 15.9 g/dL    Hematocrit 39.8 34.0 - 46.6 %    MCV 88.1 79.0 - 97.0 fL    MCH 29.0 26.6 - 33.0 pg    MCHC 32.9 31.5 - 35.7 g/dL    RDW 12.4 12.3 - 15.4 %    RDW-SD 39.9 37.0 - 54.0 fl    MPV 10.2 6.0 - 12.0 fL    Platelets 300 140 - 450 10*3/mm3    Neutrophil % 40.4 (L) 42.7 - 76.0 %    Lymphocyte % 47.1 (H) 19.6 - 45.3 %    Monocyte % 8.2 5.0 - 12.0 %    Eosinophil % 3.0 0.3 - 6.2 %    Basophil % 1.1 0.0 - 1.5 %    Immature Grans % 0.2 0.0 - 0.5 %    Neutrophils, Absolute 2.26 1.70 - 7.00 10*3/mm3    Lymphocytes, Absolute 2.64 0.70 - 3.10 10*3/mm3    Monocytes, Absolute 0.46 0.10 - 0.90 10*3/mm3    Eosinophils, Absolute 0.17 0.00 - 0.40 10*3/mm3    Basophils, Absolute 0.06 0.00 - 0.20 10*3/mm3    Immature Grans, Absolute 0.01 0.00 - 0.05 10*3/mm3    nRBC 0.0 0.0 - 0.2 /100 WBC   TSH Rfx On Abnormal To Free T4    Specimen: Blood   Result Value Ref Range    TSH  1.590 0.270 - 4.200 uIU/mL   Lipase    Specimen: Blood   Result Value Ref Range    Lipase 51 13 - 60 U/L       Health Maintenance   Topic Date Due   • DXA SCAN  08/30/2018   • LIPID PANEL  06/17/2022   • ANNUAL PHYSICAL  11/12/2022   • MAMMOGRAM  09/23/2023   • TDAP/TD VACCINES (2 - Td or Tdap) 10/07/2023   • COLORECTAL CANCER SCREENING  08/28/2028   • HEPATITIS C SCREENING  Completed   • COVID-19 Vaccine  Completed   • INFLUENZA VACCINE  Completed   • Pneumococcal Vaccine 0-64  Aged Out   • ZOSTER VACCINE  Discontinued       Immunization History   Administered Date(s) Administered   • COVID-19 (PFIZER) 12/17/2020, 01/07/2021, 09/24/2021   • Flu Vaccine Quad PF >36MO 10/18/2012, 10/01/2016   • Pneumococcal Polysaccharide (PPSV23) 09/01/2020   • Shingrix 02/22/2019, 12/02/2019   • Tdap 10/07/2013   • Zostavax 02/22/2019       Patient's Body mass index is 41.32 kg/m². indicating that she is morbidly obese (BMI > 40 or > 35 with obesity - related health condition). Obesity-related health conditions include the following: obstructive sleep apnea, hypertension and dyslipidemias. Obesity is worsening. BMI is is above average; BMI management plan is completed. We discussed portion control and increasing exercise..      Results for orders placed or performed in visit on 06/17/21   Lipid Panel    Specimen: Blood   Result Value Ref Range    Total Cholesterol 176 0 - 200 mg/dL    Triglycerides 277 (H) 0 - 150 mg/dL    HDL Cholesterol 49 40 - 60 mg/dL    LDL Cholesterol  82 0 - 100 mg/dL    VLDL Cholesterol 45 (H) 5 - 40 mg/dL    LDL/HDL Ratio 1.46    Comprehensive Metabolic Panel    Specimen: Blood   Result Value Ref Range    Glucose 102 (H) 65 - 99 mg/dL    BUN 19 6 - 20 mg/dL    Creatinine 0.93 0.57 - 1.00 mg/dL    Sodium 137 136 - 145 mmol/L    Potassium 4.2 3.5 - 5.2 mmol/L    Chloride 101 98 - 107 mmol/L    CO2 23.4 22.0 - 29.0 mmol/L    Calcium 9.8 8.6 - 10.5 mg/dL    Total Protein 7.1 6.0 - 8.5 g/dL    Albumin 4.60 3.50  "- 5.20 g/dL    ALT (SGPT) 61 (H) 1 - 33 U/L    AST (SGOT) 44 (H) 1 - 32 U/L    Alkaline Phosphatase 69 39 - 117 U/L    Total Bilirubin 0.2 0.0 - 1.2 mg/dL    eGFR Non African Amer 62 >60 mL/min/1.73    Globulin 2.5 gm/dL    A/G Ratio 1.8 g/dL    BUN/Creatinine Ratio 20.4 7.0 - 25.0    Anion Gap 12.6 5.0 - 15.0 mmol/L   CBC Auto Differential    Specimen: Blood   Result Value Ref Range    WBC 5.60 3.40 - 10.80 10*3/mm3    RBC 4.52 3.77 - 5.28 10*6/mm3    Hemoglobin 13.1 12.0 - 15.9 g/dL    Hematocrit 39.8 34.0 - 46.6 %    MCV 88.1 79.0 - 97.0 fL    MCH 29.0 26.6 - 33.0 pg    MCHC 32.9 31.5 - 35.7 g/dL    RDW 12.4 12.3 - 15.4 %    RDW-SD 39.9 37.0 - 54.0 fl    MPV 10.2 6.0 - 12.0 fL    Platelets 300 140 - 450 10*3/mm3    Neutrophil % 40.4 (L) 42.7 - 76.0 %    Lymphocyte % 47.1 (H) 19.6 - 45.3 %    Monocyte % 8.2 5.0 - 12.0 %    Eosinophil % 3.0 0.3 - 6.2 %    Basophil % 1.1 0.0 - 1.5 %    Immature Grans % 0.2 0.0 - 0.5 %    Neutrophils, Absolute 2.26 1.70 - 7.00 10*3/mm3    Lymphocytes, Absolute 2.64 0.70 - 3.10 10*3/mm3    Monocytes, Absolute 0.46 0.10 - 0.90 10*3/mm3    Eosinophils, Absolute 0.17 0.00 - 0.40 10*3/mm3    Basophils, Absolute 0.06 0.00 - 0.20 10*3/mm3    Immature Grans, Absolute 0.01 0.00 - 0.05 10*3/mm3    nRBC 0.0 0.0 - 0.2 /100 WBC   TSH Rfx On Abnormal To Free T4    Specimen: Blood   Result Value Ref Range    TSH 1.590 0.270 - 4.200 uIU/mL   Lipase    Specimen: Blood   Result Value Ref Range    Lipase 51 13 - 60 U/L            Vitals:    11/11/21 1309   BP: 136/86   Pulse: 65   SpO2: 98%   Weight: 103 kg (226 lb)   Height: 157.5 cm (62.01\")       Body mass index is 41.32 kg/m².    Physical Exam  Vitals reviewed.   Constitutional:       General: She is not in acute distress.     Appearance: Normal appearance. She is well-developed. She is morbidly obese. She is not ill-appearing or diaphoretic.   HENT:      Head: Normocephalic and atraumatic.      Right Ear: Hearing, tympanic membrane, ear canal and " external ear normal.      Left Ear: Hearing, tympanic membrane, ear canal and external ear normal.      Nose: Nose normal.      Right Sinus: No maxillary sinus tenderness or frontal sinus tenderness.      Left Sinus: No maxillary sinus tenderness or frontal sinus tenderness.      Mouth/Throat:      Pharynx: Uvula midline.   Eyes:      General: Lids are normal.      Extraocular Movements: Extraocular movements intact.      Conjunctiva/sclera: Conjunctivae normal.   Neck:      Thyroid: No thyroid mass or thyromegaly.      Trachea: Trachea and phonation normal.   Cardiovascular:      Rate and Rhythm: Normal rate and regular rhythm.      Heart sounds: Normal heart sounds.   Pulmonary:      Effort: Pulmonary effort is normal.      Breath sounds: Normal breath sounds.   Abdominal:      General: Bowel sounds are normal. There is no distension.      Palpations: Abdomen is soft. Abdomen is not rigid.      Tenderness: There is no abdominal tenderness. There is no guarding.   Musculoskeletal:         General: Normal range of motion.      Cervical back: Normal range of motion.      Right lower leg: No edema.      Left lower leg: No edema.   Lymphadenopathy:      Cervical: No cervical adenopathy.      Right cervical: No superficial cervical adenopathy.     Left cervical: No superficial cervical adenopathy.   Skin:     General: Skin is warm.      Findings: No erythema or rash.      Nails: There is no clubbing.   Neurological:      Mental Status: She is alert and oriented to person, place, and time.      Coordination: Coordination normal.      Gait: Gait normal.      Deep Tendon Reflexes: Reflexes are normal and symmetric.      Comments: CN grossly intact   Psychiatric:         Attention and Perception: Attention and perception normal. She is attentive.         Mood and Affect: Mood is depressed.         Speech: Speech normal.         Behavior: Behavior normal. Behavior is cooperative.         Thought Content: Thought content  normal.         Cognition and Memory: Cognition and memory normal.         Judgment: Judgment normal.             Counseling provided on diet and nutrition, exercise, weight management, supplements, mental health concerns, insomnia and vaccinations..    Diagnoses and all orders for this visit:    1. Physical exam, annual (Primary)  PE completed  Preventative labs ordered  Colonoscopy is up-to-date  Mammogram is up-to-date  Gynecologist-goes regularly  Dentist-goes regularly  Ophthalmologist-goes regularly  Dermatologist-denies any moles or lesions of concern  Flu and COVID-19 vaccinations completed    2. Class 3 severe obesity due to excess calories with serious comorbidity and body mass index (BMI) of 40.0 to 44.9 in adult ([facility])  Has recently gained 10 lbs.    3. Impaired glucose tolerance  -     metFORMIN ER (GLUCOPHAGE-XR) 500 MG 24 hr tablet; Take 2 tablets by mouth 2 (Two) Times a Day.  Dispense: 360 tablet; Refill: 1  -     Hemoglobin A1c; Future    4. NGOC on CPAP  Wearing nightly.    5. Essential hypertension  -     amLODIPine (NORVASC) 10 MG tablet; Take 1 tablet by mouth Daily.  Dispense: 90 tablet; Refill: 1  -     losartan (Cozaar) 100 MG tablet; Take 1 tablet by mouth Daily.  Dispense: 90 tablet; Refill: 1  -     Comprehensive Metabolic Panel; Future  Borderline today.  Overall improved with addition of amlodipine 5 mg nightly.  Still having symptoms of hypertension and elevations in the 170s/90s.  Increase amlodipine to 10 mg nightly.  If unable to tolerate or not effective, will switch to diltiazem.  Return in 2 weeks.  Monitor at home.    6. Mixed hyperlipidemia  -     pravastatin (PRAVACHOL) 40 MG tablet; Take 1 tablet by mouth Every Night.  Dispense: 90 tablet; Refill: 1  -     Lipid Panel; Future  Switch to atorvastatin for POTTER?    7. Congenital single kidney  -     Comprehensive Metabolic Panel; Future    8. Hepatic steatosis  -     POTTER Fibrosure; Future  Consider adding actos.  On metformin and  statin.  Taking vitamin E.  May need update on imaging.  She avoids alcohol and tylenol.    9. Intractable migraine without aura and without status migrainosus  -     amitriptyline (ELAVIL) 10 MG tablet; Take 1 tablet by mouth Every Night.  Dispense: 90 tablet; Refill: 0    10. Joint pain in both hands  -     Rheumatoid Factor; Future  -     C-reactive Protein; Future  -     Uric acid; Future    11. Moderate episode of recurrent major depressive disorder (HCC)  -     desvenlafaxine (Pristiq) 50 MG 24 hr tablet; Take 1 tablet by mouth Daily.  Dispense: 30 tablet; Refill: 0  Worsening depression.  Failed zoloft, celexa, wellbutrin and prozac.  Reduce pristiq to 50 mg nightly for 2 weeks, then take every other day for 10 days and discontinue.  Trial of trintellix.  No suicidal ideation.  Return in 2 weeks.    12. Anxiety  -     hydrOXYzine (ATARAX) 50 MG tablet; Take 1 tablet by mouth At Night As Needed for Anxiety.  Dispense: 90 tablet; Refill: 1    13. Primary insomnia  -     amitriptyline (ELAVIL) 10 MG tablet; Take 1 tablet by mouth Every Night.  Dispense: 90 tablet; Refill: 0  Consider increasing to 20 mg nightly to help with sleep.    14. Bilateral hearing loss, unspecified hearing loss type  -     Ambulatory Referral to Audiology    15. Other specified disorders of bone density and structure, other site  -     DEXA Bone Density Axial       Daly Archibald PA-C   11/11/2021   17:24 EST

## 2021-11-12 ENCOUNTER — LAB (OUTPATIENT)
Dept: LAB | Facility: HOSPITAL | Age: 61
End: 2021-11-12

## 2021-11-12 DIAGNOSIS — I10 ESSENTIAL HYPERTENSION: ICD-10-CM

## 2021-11-12 DIAGNOSIS — M25.541 JOINT PAIN IN BOTH HANDS: ICD-10-CM

## 2021-11-12 DIAGNOSIS — Q60.0 CONGENITAL SINGLE KIDNEY: ICD-10-CM

## 2021-11-12 DIAGNOSIS — R73.02 IMPAIRED GLUCOSE TOLERANCE: ICD-10-CM

## 2021-11-12 DIAGNOSIS — E78.2 MIXED HYPERLIPIDEMIA: ICD-10-CM

## 2021-11-12 DIAGNOSIS — M25.542 JOINT PAIN IN BOTH HANDS: ICD-10-CM

## 2021-11-12 DIAGNOSIS — K76.0 HEPATIC STEATOSIS: ICD-10-CM

## 2021-11-12 LAB
ALBUMIN SERPL-MCNC: 5 G/DL (ref 3.5–5.2)
ALBUMIN/GLOB SERPL: 1.8 G/DL
ALP SERPL-CCNC: 85 U/L (ref 39–117)
ALT SERPL W P-5'-P-CCNC: 101 U/L (ref 1–33)
ANION GAP SERPL CALCULATED.3IONS-SCNC: 13.1 MMOL/L (ref 5–15)
AST SERPL-CCNC: 95 U/L (ref 1–32)
BILIRUB SERPL-MCNC: 0.4 MG/DL (ref 0–1.2)
BUN SERPL-MCNC: 23 MG/DL (ref 6–20)
BUN/CREAT SERPL: 24 (ref 7–25)
CALCIUM SPEC-SCNC: 10.6 MG/DL (ref 8.6–10.5)
CHLORIDE SERPL-SCNC: 101 MMOL/L (ref 98–107)
CHOLEST SERPL-MCNC: 173 MG/DL (ref 0–200)
CHROMATIN AB SERPL-ACNC: <10 IU/ML (ref 0–14)
CO2 SERPL-SCNC: 22.9 MMOL/L (ref 22–29)
CREAT SERPL-MCNC: 0.96 MG/DL (ref 0.57–1)
CRP SERPL-MCNC: <0.3 MG/DL (ref 0–0.5)
GFR SERPL CREATININE-BSD FRML MDRD: 59 ML/MIN/1.73
GLOBULIN UR ELPH-MCNC: 2.8 GM/DL
GLUCOSE SERPL-MCNC: 107 MG/DL (ref 65–99)
HBA1C MFR BLD: 5.72 % (ref 4.8–5.6)
HDLC SERPL-MCNC: 50 MG/DL (ref 40–60)
LDLC SERPL CALC-MCNC: 90 MG/DL (ref 0–100)
LDLC/HDLC SERPL: 1.68 {RATIO}
POTASSIUM SERPL-SCNC: 4.7 MMOL/L (ref 3.5–5.2)
PROT SERPL-MCNC: 7.8 G/DL (ref 6–8.5)
SODIUM SERPL-SCNC: 137 MMOL/L (ref 136–145)
TRIGL SERPL-MCNC: 196 MG/DL (ref 0–150)
URATE SERPL-MCNC: 7.8 MG/DL (ref 2.4–5.7)
VLDLC SERPL-MCNC: 33 MG/DL (ref 5–40)

## 2021-11-12 PROCEDURE — 84478 ASSAY OF TRIGLYCERIDES: CPT

## 2021-11-12 PROCEDURE — 36415 COLL VENOUS BLD VENIPUNCTURE: CPT

## 2021-11-12 PROCEDURE — 82172 ASSAY OF APOLIPOPROTEIN: CPT

## 2021-11-12 PROCEDURE — 83883 ASSAY NEPHELOMETRY NOT SPEC: CPT

## 2021-11-12 PROCEDURE — 86431 RHEUMATOID FACTOR QUANT: CPT

## 2021-11-12 PROCEDURE — 83010 ASSAY OF HAPTOGLOBIN QUANT: CPT

## 2021-11-12 PROCEDURE — 84550 ASSAY OF BLOOD/URIC ACID: CPT

## 2021-11-12 PROCEDURE — 80061 LIPID PANEL: CPT

## 2021-11-12 PROCEDURE — 82977 ASSAY OF GGT: CPT

## 2021-11-12 PROCEDURE — 86140 C-REACTIVE PROTEIN: CPT

## 2021-11-12 PROCEDURE — 82465 ASSAY BLD/SERUM CHOLESTEROL: CPT

## 2021-11-12 PROCEDURE — 80053 COMPREHEN METABOLIC PANEL: CPT

## 2021-11-12 PROCEDURE — 83036 HEMOGLOBIN GLYCOSYLATED A1C: CPT

## 2021-11-16 ENCOUNTER — TELEPHONE (OUTPATIENT)
Dept: FAMILY MEDICINE CLINIC | Facility: CLINIC | Age: 61
End: 2021-11-16

## 2021-11-16 DIAGNOSIS — E79.0 ELEVATED URIC ACID IN BLOOD: Primary | ICD-10-CM

## 2021-11-16 RX ORDER — INDOMETHACIN 50 MG/1
50 CAPSULE ORAL
Qty: 15 CAPSULE | Refills: 0 | Status: SHIPPED | OUTPATIENT
Start: 2021-11-16 | End: 2021-11-22

## 2021-11-16 NOTE — TELEPHONE ENCOUNTER
Spoke with patient regarding labs.  Elevated uric acid.  Will treat with indomethacin.  Keep appointment on 11/29.

## 2021-11-18 ENCOUNTER — TREATMENT (OUTPATIENT)
Dept: PHYSICAL THERAPY | Facility: CLINIC | Age: 61
End: 2021-11-18

## 2021-11-18 DIAGNOSIS — M76.61 ACHILLES TENDINITIS OF RIGHT LOWER EXTREMITY: Primary | ICD-10-CM

## 2021-11-18 LAB
A2 MACROGLOB SERPL-MCNC: 173 MG/DL (ref 110–276)
ALT SERPL W P-5'-P-CCNC: 112 IU/L (ref 0–40)
APO A-I SERPL-MCNC: 163 MG/DL (ref 116–209)
AST SERPL W P-5'-P-CCNC: 106 IU/L (ref 0–40)
BILIRUB SERPL-MCNC: <0.1 MG/DL (ref 0–1.2)
CHOLEST SERPL-MCNC: 200 MG/DL (ref 100–199)
FIBROSIS SCORING:: ABNORMAL
FIBROSIS STAGE SERPL QL: ABNORMAL
GGT SERPL-CCNC: 139 IU/L (ref 0–60)
GLUCOSE SERPL-MCNC: 108 MG/DL (ref 65–99)
HAPTOGLOB SERPL-MCNC: 184 MG/DL (ref 33–346)
INTERPRETATIONS: (REFERENCE): ABNORMAL
LABORATORY COMMENT REPORT: ABNORMAL
LIVER FIBR SCORE SERPL CALC.FIBROSURE: 0.08 (ref 0–0.21)
NASH SCORING (REFERENCE): ABNORMAL
NECROINFLAMMATORY ACT GRADE SERPL QL: ABNORMAL
NECROINFLAMMATORY ACT SCORE SERPL: 0.5
SERVICE CMNT-IMP: ABNORMAL
STEATOSIS GRADE (REFERENCE): ABNORMAL
STEATOSIS GRADING (REFERENCE): ABNORMAL
STEATOSIS SCORE (REFERENCE): 0.9 (ref 0–0.3)
TRIGL SERPL-MCNC: 234 MG/DL (ref 0–149)

## 2021-11-18 PROCEDURE — 97110 THERAPEUTIC EXERCISES: CPT | Performed by: PHYSICAL THERAPIST

## 2021-11-18 PROCEDURE — 97112 NEUROMUSCULAR REEDUCATION: CPT | Performed by: PHYSICAL THERAPIST

## 2021-11-18 NOTE — PROGRESS NOTES
Physical Therapy Daily Treatment Note      Patient: Yamila Neff   : 1960  Referring practitioner: Pam Fairchild MD  Date of Initial Visit: Type: THERAPY  Noted: 9/3/2021  Today's Date: 2021  Patient seen for 12 sessions       Visit Diagnoses:    ICD-10-CM ICD-9-CM   1. Achilles tendinitis of right lower extremity  M76.61 726.71       Subjective   Had a physical w/ her PCP, found to have high uric acid level. MD wondered if some of her pn could be related to gout. Put her on an antiinflammatory, which seems to have helped slightly. Returns to Dr. Fairchild next week. Cont to have pn immediately upon walking. No significant changes in pn since starting PT, though does feel that her strength is improved.    Objective   See Exercise, Manual, and Modality Logs for complete treatment.     TTP posterolateral calcaneus, distal achilles tendon at insertion    Dec 1st MTP extn    Ankle mobility WNL    Right Ankle/Foot Strength  Dorsiflexion: 5  Plantar flexion: 4  Inversion: 5  Eversion: 5    Ambulates w/ mild antalgic pattern, dec push off R foot    Assessment & Plan     Assessment  Assessment details: Pt has undergone 12 wks of PT w/ interventions focusing on progressive calf strengthening/achilles loading, calf stretching, and ankle/foot mobility along w/ kinesiotaping and dry needling. She has regained full ankle ROM and calf length, and has made good progress w/ strength. However she denies any significant changes in her pn. She continues to have mild weakness in the R calf musculature. 1st MTP extn remains significantly limited 2/2 hallux rigidus, resulting in decreased push off during gait cycle. Pn in the 1st MTP has limited tolerance to [facility] calf strengthening. Pt returns to referring provider next week.    Plan  Plan details: Await MD recommendation          Timed:         Manual Therapy:    0     mins  72345;     Therapeutic Exercise:    24     mins  80459;     Neuromuscular [place]:    8    mins   29888;    Therapeutic Activity:     0     mins  94393;     Gait Trainin     mins  26916;     Ultrasound:     0     mins  09226;    [place]                               0    mins   48519  Self Care                       0     mins   89011  Canalith Repos    0     mins 65810      Un-Timed:  Electrical Stimulation:    0     mins  73681 ( );  Dry Needling     0     mins self-pay  Traction     0     mins 41129      Timed Treatment:   32   mins   Total Treatment:     32   mins    Susana Fried, PT  [place] License: #[id number]

## 2021-11-24 ENCOUNTER — OFFICE VISIT (OUTPATIENT)
Dept: ORTHOPEDIC SURGERY | Facility: CLINIC | Age: 61
End: 2021-11-24

## 2021-11-24 VITALS
HEIGHT: 62 IN | WEIGHT: 227.07 LBS | HEART RATE: 82 BPM | BODY MASS INDEX: 41.79 KG/M2 | SYSTOLIC BLOOD PRESSURE: 160 MMHG | DIASTOLIC BLOOD PRESSURE: 82 MMHG

## 2021-11-24 DIAGNOSIS — M76.61 ACHILLES TENDINITIS OF RIGHT LOWER EXTREMITY: Primary | ICD-10-CM

## 2021-11-24 PROCEDURE — 99213 OFFICE O/P EST LOW 20 MIN: CPT | Performed by: ORTHOPAEDIC SURGERY

## 2021-11-24 NOTE — PROGRESS NOTES
ESTABLISHED PATIENT    Patient: Yamila Neff  : 1960    Primary Care Provider: Daly Archibald PA-C    Requesting Provider: As above    Follow-up (3 months- Achilles tendinitis of right lower extremity)      History    Chief Complaint: Right Achilles tendinitis    History of Present Illness: She returns noting that she is not certain how much therapy helped the Achilles tendinitis on the right.  Open back shoes definitely helped.  She recently had lab studies because she was having pain in many joints and her uric acid level was elevated.  Her primary care provider recommended that she take Indocin for a week, as she notes that has also helped the Achilles tendinitis and helped her overall pain.  They talked about starting her on allopurinol.  The uric acid level was 7.8.  On 2021.  She asked me if gout could be involved in the tendinitis and I explained that definitely it can.    Current Outpatient Medications on File Prior to Visit   Medication Sig Dispense Refill   • amitriptyline (ELAVIL) 10 MG tablet Take 1 tablet by mouth Every Night. 90 tablet 0   • amLODIPine (NORVASC) 10 MG tablet Take 1 tablet by mouth Daily. 90 tablet 1   • cholecalciferol (VITAMIN D3) 1000 units tablet Take 2,000 Units by mouth Daily.     • clobetasol (TEMOVATE) 0.05 % external solution Apply twice daily to itchy, scaly areas on scalp. 25 mL 3   • clobetasol (TEMOVATE) 0.05 % external solution Apply topically to the itchy, scaly areas on scalp as directed 2 (two) times a day. 25 mL 3   • desvenlafaxine (Pristiq) 50 MG 24 hr tablet Take 1 tablet by mouth Daily. 30 tablet 0   • estradiol (Evamist) 1.53 MG/SPRAY transdermal spray Apply 1 spray to skin once a day as directed 8.1 mL 12   • hydrocortisone 2.5 % cream Apply every evening to affected areas on abdomen until resolved. 28.35 g 11   • hydrocortisone 2.5 % cream Apply  topically to the affected areas on abdomen as directed Every Evening until resolved.  28.35 g 3   • hydrOXYzine (ATARAX) 50 MG tablet Take 1 tablet by mouth At Night As Needed for Anxiety. 90 tablet 1   • ketoconazole (NIZORAL) 2 % cream Apply every morning to underarms until resolved. 30 g 11   • ketoconazole (NIZORAL) 2 % cream Apply topically to the underarms Every Morning until resolved. 30 g 3   • ketoconazole (NIZORAL) 2 % shampoo Use as a shampoo to scalp and face 3 times a week. Leave in for 5 minutes and rinse. 120 mL 3   • ketoconazole (NIZORAL) 2 % shampoo Use as a shampo to the scalp and face 3 (Three) Times a Week. Leave in for 5 minutes and rinse. 120 mL 3   • losartan (Cozaar) 100 MG tablet Take 1 tablet by mouth Daily. 90 tablet 1   • Magnesium 250 MG tablet Take 1 tablet by mouth Daily.     • metFORMIN ER (GLUCOPHAGE-XR) 500 MG 24 hr tablet Take 2 tablets by mouth 2 (Two) Times a Day. 360 tablet 1   • nystatin-triamcinolone (MYCOLOG II) 473380-6.1 UNIT/GM-% cream Apply 1 application topically to the appropriate area sparingly as directed 3 (Three) Times a Day As Needed. 15 g 5   • nystatin-triamcinolone (MYCOLOG II) 956329-9.1 UNIT/GM-% cream Apply a small amount to affected area 3 (Three) times a day as needed 30 g 5   • omega-3 acid ethyl esters (LOVAZA) 1 g capsule Take 2 capsules by mouth 2 (Two) Times a Day. 360 capsule 1   • ondansetron ODT (Zofran ODT) 4 MG disintegrating tablet Place 1 tablet on the tongue Every 8 (Eight) Hours As Needed for Nausea or Vomiting. 14 tablet 0   • pravastatin (PRAVACHOL) 40 MG tablet Take 1 tablet by mouth Every Night. 90 tablet 1   • Probiotic Product (PROBIOTIC DAILY PO) Take  by mouth Daily.     • propranolol LA (INDERAL LA) 60 MG 24 hr capsule Take 1 capsule by mouth Daily. 90 capsule 1   • Vitamin E 400 units tablet Take 800 Units by mouth Daily.       No current facility-administered medications on file prior to visit.      Allergies   Allergen Reactions   • Celexa [Citalopram] Other (See Comments)     Jittery    • Lisinopril Cough       Past Medical History:   Diagnosis Date   • Anemia    • Arthritis    • Carpal tunnel syndrome 2012   • Degenerative joint disease    • Depression    • Depression    • Elevated cholesterol    • Elevated liver enzymes    • Fatty liver    • Hypertension    • Kidney disorder     one kidney   • Palpitations    • Polycystic ovaries    • Psoriasis    • PVC (premature ventricular contraction)     occasional pvc's   • Sleep apnea     cpap at home   • Wears glasses      Past Surgical History:   Procedure Laterality Date   • BREAST BIOPSY Right     PAPILLOMA DR BELL   •  SECTION      x 3   • CHOLECYSTECTOMY  2013   • COLONOSCOPY     • HYSTERECTOMY  2006    complete   • JOINT REPLACEMENT Left 2011    left knee   • OOPHORECTOMY     • TOTAL KNEE ARTHROPLASTY Right 3/9/2021    Procedure: TOTAL KNEE ARTHROPLASTY RIGHT;  Surgeon: Mateo Keith MD;  Location: [facility];  Service: Orthopedics;  Laterality: Right;   • TUBAL ABDOMINAL LIGATION       Family History   Problem Relation Age of Onset   • Diabetes Mother    • Lymphoma Mother    • Hypertension Mother    • No Known Problems Father    • Hypertension Brother    • Hypertension Brother    • Heart disease Maternal Grandmother    • Heart disease Maternal Grandfather    • Heart attack Maternal Grandfather    • Colon cancer Maternal Aunt    • Breast cancer Neg Hx    • Ovarian cancer Neg Hx       Social History     Socioeconomic History   • Marital status:    Tobacco Use   • Smoking status: Never Smoker   • Smokeless tobacco: Never Used   Substance and Sexual Activity   • Alcohol use: No   • Drug use: No   • Sexual activity: Yes     Partners: Male     Comment:         Review of Systems   Constitutional: Negative.    HENT: Negative.    Eyes: Negative.    Respiratory: Negative.    Cardiovascular: Negative.    Gastrointestinal: Negative.    Endocrine: Negative.    Genitourinary: Negative.    Musculoskeletal: Positive for arthralgias.   Skin: Negative.   "  Allergic/Immunologic: Negative.    Neurological: Negative.    Hematological: Negative.    Psychiatric/Behavioral: Negative.        The following portions of the patient's history were reviewed and updated as appropriate: allergies, current medications, past family history, past medical history, past social history, past surgical history and problem list.    Physical Exam:   /82   Pulse 82   Ht 157.5 cm (62.01\")   Wt 103 kg (227 lb 1.2 oz)   BMI 41.52 kg/m²   GENERAL: Body habitus: morbidly obese    Mildly tender at the most distal aspect of the insertion of the right Achilles, but overall much improved, no significant warmth, no significant swelling of the bursa    Medical Decision Making    Data Review:   reviewed prior lab results    Assessment/Plan/Diagnosis/Treatment Options:   1. Achilles tendinitis of right lower extremity  Gout may indeed be playing a role in the persistent tendinitis, especially now that it is improved with the Indocin.  We talked about the options of casting.  However since she is getting better I would recommend waiting and seeing if the allopurinol helps keep the improvement.  We talked about gout.  We talked about continuing stretching.  I will be happy to see her again if the pain worsens        Pam Phoenix MD                      "

## 2021-11-29 ENCOUNTER — OFFICE VISIT (OUTPATIENT)
Dept: FAMILY MEDICINE CLINIC | Facility: CLINIC | Age: 61
End: 2021-11-29

## 2021-11-29 VITALS
TEMPERATURE: 97.4 F | HEIGHT: 62 IN | WEIGHT: 225 LBS | DIASTOLIC BLOOD PRESSURE: 64 MMHG | BODY MASS INDEX: 41.41 KG/M2 | OXYGEN SATURATION: 96 % | SYSTOLIC BLOOD PRESSURE: 122 MMHG | HEART RATE: 77 BPM

## 2021-11-29 DIAGNOSIS — M1A.0710 IDIOPATHIC CHRONIC GOUT OF RIGHT FOOT WITHOUT TOPHUS: ICD-10-CM

## 2021-11-29 DIAGNOSIS — E78.2 MIXED HYPERLIPIDEMIA: ICD-10-CM

## 2021-11-29 DIAGNOSIS — F33.2 SEVERE EPISODE OF RECURRENT MAJOR DEPRESSIVE DISORDER, WITHOUT PSYCHOTIC FEATURES (HCC): ICD-10-CM

## 2021-11-29 DIAGNOSIS — K76.0 HEPATIC STEATOSIS: ICD-10-CM

## 2021-11-29 DIAGNOSIS — I10 ESSENTIAL HYPERTENSION: Primary | ICD-10-CM

## 2021-11-29 DIAGNOSIS — F41.9 ANXIETY: ICD-10-CM

## 2021-11-29 PROCEDURE — 99214 OFFICE O/P EST MOD 30 MIN: CPT | Performed by: PHYSICIAN ASSISTANT

## 2021-11-29 RX ORDER — ATORVASTATIN CALCIUM 20 MG/1
20 TABLET, FILM COATED ORAL NIGHTLY
Qty: 30 TABLET | Refills: 2 | Status: SHIPPED | OUTPATIENT
Start: 2021-11-29 | End: 2022-01-18 | Stop reason: SDUPTHER

## 2021-11-29 RX ORDER — VORTIOXETINE 10 MG/1
1 TABLET, FILM COATED ORAL DAILY
Qty: 30 TABLET | Refills: 0 | Status: SHIPPED | OUTPATIENT
Start: 2021-11-29 | End: 2021-12-13

## 2021-11-29 RX ORDER — DESVENLAFAXINE 25 MG/1
25 TABLET, EXTENDED RELEASE ORAL DAILY
Qty: 14 TABLET | Refills: 0 | Status: SHIPPED | OUTPATIENT
Start: 2021-11-29 | End: 2021-12-13

## 2021-11-29 RX ORDER — ALLOPURINOL 300 MG/1
300 TABLET ORAL DAILY
Qty: 30 TABLET | Refills: 2 | Status: SHIPPED | OUTPATIENT
Start: 2021-11-29 | End: 2022-02-26 | Stop reason: SDUPTHER

## 2021-11-29 NOTE — PROGRESS NOTES
Chief Complaint   Patient presents with   • Hypertension     2 week f/u   • Anxiety   • Depression     Pt. states she will like to talk about the meds       HPI     Yamila Neff is a pleasant 60 y.o. female who is here for routine follow-up of hypertension, POTTER, depression with anxiety.  Patient reports improvement in blood pressure with increase in amlodipine to 10 mg nightly.  She reports that her constipation and like swelling has resolved and she is tolerating the medication well.     She is compliant on her Metformin and pravastatin.  Discussed her recent labs showing increase in hepatic steatosis.  She is willing to switch her pravastatin to atorvastatin.  May consider adding Actos in the future.    She is taking Pristiq 50 mg daily.  She has noticed worsening anxiety with the decrease in dose.  Overall she is doing well and has notified her family that she is changing her anxiety and depression medications.  She is still sleeping well with her amitriptyline at night.    Patient reports that she had a improvement in her right heel pain with the indomethacin.  She would like to try daily allopurinol.    Past Medical History:   Diagnosis Date   • Anemia    • Arthritis    • Carpal tunnel syndrome 2012   • Degenerative joint disease    • Depression    • Depression    • Elevated cholesterol    • Elevated liver enzymes    • Fatty liver    • Hypertension    • Kidney disorder     one kidney   • Palpitations    • Polycystic ovaries    • Psoriasis    • PVC (premature ventricular contraction)     occasional pvc's   • Sleep apnea     cpap at home   • Wears glasses        Past Surgical History:   Procedure Laterality Date   • BREAST BIOPSY Right     PAPILLOMA DR BELL   •  SECTION      x 3   • CHOLECYSTECTOMY  2013   • COLONOSCOPY     • HYSTERECTOMY  2006    complete   • JOINT REPLACEMENT Left     left knee   • OOPHORECTOMY     • TOTAL KNEE ARTHROPLASTY Right 3/9/2021    Procedure: TOTAL KNEE  "ARTHROPLASTY RIGHT;  Surgeon: Mateo Keith MD;  Location: [facility];  Service: Orthopedics;  Laterality: Right;   • TUBAL ABDOMINAL LIGATION         Family History   Problem Relation Age of Onset   • Diabetes Mother    • Lymphoma Mother    • Hypertension Mother    • No Known Problems Father    • Hypertension Brother    • Hypertension Brother    • Heart disease Maternal Grandmother    • Heart disease Maternal Grandfather    • Heart attack Maternal Grandfather    • Colon cancer Maternal Aunt    • Breast cancer Neg Hx    • Ovarian cancer Neg Hx        Social History     Socioeconomic History   • Marital status:    Tobacco Use   • Smoking status: Never Smoker   • Smokeless tobacco: Never Used   Vaping Use   • Vaping Use: Never used   Substance and Sexual Activity   • Alcohol use: No   • Drug use: No   • Sexual activity: Yes     Partners: Male     Comment:        Allergies   Allergen Reactions   • Celexa [Citalopram] Other (See Comments)     Jittery    • Lisinopril Cough       ROS  Review of Systems   Constitutional: Positive for fatigue. Negative for chills, diaphoresis and fever.   HENT: Negative for congestion, postnasal drip and rhinorrhea.    Respiratory: Negative for cough, shortness of breath and wheezing.    Cardiovascular: Negative for chest pain and leg swelling.   Musculoskeletal: Positive for arthralgias.   Neurological: Negative for dizziness and headache.   Psychiatric/Behavioral: Positive for depressed mood and stress. Negative for self-injury, sleep disturbance and suicidal ideas. The patient is nervous/anxious.        Vitals:    11/29/21 0914   BP: 122/64   Pulse: 77   Temp: 97.4 °F (36.3 °C)   SpO2: 96%   Weight: 102 kg (225 lb)   Height: 157.5 cm (62\")   PainSc:   4     Body mass index is 41.15 kg/m².    Current Outpatient Medications on File Prior to Visit   Medication Sig Dispense Refill   • amitriptyline (ELAVIL) 10 MG tablet Take 1 tablet by mouth Every Night. 90 tablet 0   • " amLODIPine (NORVASC) 10 MG tablet Take 1 tablet by mouth Daily. 90 tablet 1   • cholecalciferol (VITAMIN D3) 1000 units tablet Take 2,000 Units by mouth Daily.     • clobetasol (TEMOVATE) 0.05 % external solution Apply twice daily to itchy, scaly areas on scalp. 25 mL 3   • clobetasol (TEMOVATE) 0.05 % external solution Apply topically to the itchy, scaly areas on scalp as directed 2 (two) times a day. 25 mL 3   • estradiol (Evamist) 1.53 MG/SPRAY transdermal spray Apply 1 spray to skin once a day as directed 8.1 mL 12   • hydrocortisone 2.5 % cream Apply every evening to affected areas on abdomen until resolved. 28.35 g 11   • hydrocortisone 2.5 % cream Apply  topically to the affected areas on abdomen as directed Every Evening until resolved. 28.35 g 3   • hydrOXYzine (ATARAX) 50 MG tablet Take 1 tablet by mouth At Night As Needed for Anxiety. 90 tablet 1   • ketoconazole (NIZORAL) 2 % cream Apply every morning to underarms until resolved. 30 g 11   • ketoconazole (NIZORAL) 2 % cream Apply topically to the underarms Every Morning until resolved. 30 g 3   • ketoconazole (NIZORAL) 2 % shampoo Use as a shampoo to scalp and face 3 times a week. Leave in for 5 minutes and rinse. 120 mL 3   • ketoconazole (NIZORAL) 2 % shampoo Use as a shampo to the scalp and face 3 (Three) Times a Week. Leave in for 5 minutes and rinse. 120 mL 3   • losartan (Cozaar) 100 MG tablet Take 1 tablet by mouth Daily. 90 tablet 1   • Magnesium 250 MG tablet Take 1 tablet by mouth Daily.     • metFORMIN ER (GLUCOPHAGE-XR) 500 MG 24 hr tablet Take 2 tablets by mouth 2 (Two) Times a Day. 360 tablet 1   • nystatin-triamcinolone (MYCOLOG II) 154458-6.1 UNIT/GM-% cream Apply 1 application topically to the appropriate area sparingly as directed 3 (Three) Times a Day As Needed. 15 g 5   • nystatin-triamcinolone (MYCOLOG II) 158975-8.1 UNIT/GM-% cream Apply a small amount to affected area 3 (Three) times a day as needed 30 g 5   • omega-3 acid ethyl  esters (LOVAZA) 1 g capsule Take 2 capsules by mouth 2 (Two) Times a Day. 360 capsule 1   • ondansetron ODT (Zofran ODT) 4 MG disintegrating tablet Place 1 tablet on the tongue Every 8 (Eight) Hours As Needed for Nausea or Vomiting. 14 tablet 0   • Probiotic Product (PROBIOTIC DAILY PO) Take  by mouth Daily.     • propranolol LA (INDERAL LA) 60 MG 24 hr capsule Take 1 capsule by mouth Daily. 90 capsule 1   • Vitamin E 400 units tablet Take 800 Units by mouth Daily.     • [DISCONTINUED] desvenlafaxine (Pristiq) 50 MG 24 hr tablet Take 1 tablet by mouth Daily. 30 tablet 0   • [DISCONTINUED] pravastatin (PRAVACHOL) 40 MG tablet Take 1 tablet by mouth Every Night. 90 tablet 1     No current facility-administered medications on file prior to visit.       Results for orders placed or performed in visit on 11/12/21   Comprehensive Metabolic Panel    Specimen: Blood   Result Value Ref Range    Glucose 107 (H) 65 - 99 mg/dL    BUN 23 (H) 6 - 20 mg/dL    Creatinine 0.96 0.57 - 1.00 mg/dL    Sodium 137 136 - 145 mmol/L    Potassium 4.7 3.5 - 5.2 mmol/L    Chloride 101 98 - 107 mmol/L    CO2 22.9 22.0 - 29.0 mmol/L    Calcium 10.6 (H) 8.6 - 10.5 mg/dL    Total Protein 7.8 6.0 - 8.5 g/dL    Albumin 5.00 3.50 - 5.20 g/dL    ALT (SGPT) 101 (H) 1 - 33 U/L    AST (SGOT) 95 (H) 1 - 32 U/L    Alkaline Phosphatase 85 39 - 117 U/L    Total Bilirubin 0.4 0.0 - 1.2 mg/dL    eGFR Non African Amer 59 (L) >60 mL/min/1.73    Globulin 2.8 gm/dL    A/G Ratio 1.8 g/dL    BUN/Creatinine Ratio 24.0 7.0 - 25.0    Anion Gap 13.1 5.0 - 15.0 mmol/L   Lipid Panel    Specimen: Blood   Result Value Ref Range    Total Cholesterol 173 0 - 200 mg/dL    Triglycerides 196 (H) 0 - 150 mg/dL    HDL Cholesterol 50 40 - 60 mg/dL    LDL Cholesterol  90 0 - 100 mg/dL    VLDL Cholesterol 33 5 - 40 mg/dL    LDL/HDL Ratio 1.68    Hemoglobin A1c    Specimen: Blood   Result Value Ref Range    Hemoglobin A1C 5.72 (H) 4.80 - 5.60 %   POTTER Fibrosure    Specimen: Blood    Result Value Ref Range    Fibrosis Score (References) 0.08 0.00 - 0.21    Fibrosis Stage (Reference) Comment     Steatosis Score (Reference) 0.90 (H) 0.00 - 0.30    Steatosis Grade (Reference) Comment     POTTER Score (Reference) 0.50 (H) 0.25    Potter Grade (Reference) Comment     Height: (Reference) 62 in    Weight: (Reference) 226 LBS    Alpha 2-Macroglobulins, Qn 173 110 - 276 mg/dL    Haptoglobin 184 33 - 346 mg/dL    Apolipoprotein A-1 163 116 - 209 mg/dL    Total Bilirubin <0.1 0.0 - 1.2 mg/dL     (H) 0 - 60 IU/L    ALT (SGPT) 112 (H) 0 - 40 IU/L    AST (SGOT) P5P (Reference) 106 (H) 0 - 40 IU/L    Cholesterol, Total (Reference) 200 (H) 100 - 199 mg/dL    Glucose, Serum (Reference) 108 (H) 65 - 99 mg/dL    Triglycerides 234 (H) 0 - 149 mg/dL    Interpretations: (Reference) Comment     Fibrosis Scoring: Comment     Steatosis Grading (Reference) Comment     Potter Scoring (Reference) Comment     Limitations: (Reference) Comment     Comment (Reference) Comment    Rheumatoid Factor    Specimen: Blood   Result Value Ref Range    Rheumatoid Factor Quantitative <10.0 0.0 - 14.0 IU/mL   C-reactive Protein    Specimen: Blood   Result Value Ref Range    C-Reactive Protein <0.30 0.00 - 0.50 mg/dL   Uric acid    Specimen: Blood   Result Value Ref Range    Uric Acid 7.8 (H) 2.4 - 5.7 mg/dL       PE    Physical Exam  Vitals reviewed.   Constitutional:       General: She is not in acute distress.     Appearance: Normal appearance. She is well-developed. She is morbidly obese. She is not ill-appearing or diaphoretic.   HENT:      Head: Normocephalic and atraumatic.   Eyes:      Extraocular Movements: Extraocular movements intact.      Conjunctiva/sclera: Conjunctivae normal.   Cardiovascular:      Rate and Rhythm: Normal rate and regular rhythm.      Heart sounds: Normal heart sounds.   Pulmonary:      Effort: Pulmonary effort is normal.      Breath sounds: Normal breath sounds.   Musculoskeletal:         General: Normal  range of motion.      Cervical back: Normal range of motion.      Right lower leg: No edema.      Left lower leg: No edema.   Skin:     General: Skin is warm.      Findings: No erythema or rash.   Neurological:      General: No focal deficit present.      Mental Status: She is alert.   Psychiatric:         Attention and Perception: Attention and perception normal. She is attentive.         Mood and Affect: Affect normal. Mood is anxious.         Speech: Speech normal.         Behavior: Behavior normal. Behavior is cooperative.         Thought Content: Thought content normal.         Cognition and Memory: Cognition and memory normal.         Judgment: Judgment normal.         A/P    Diagnoses and all orders for this visit:    1. Essential hypertension (Primary)  Improved with increase in amlodipine to 10 mg daily.    2. Hepatic steatosis  Continue metformin 1000 mg BID.  Switch to atorvastatin.  Consider actos in future.    3. Mixed hyperlipidemia  -     atorvastatin (LIPITOR) 20 MG tablet; Take 1 tablet by mouth Every Night.  Dispense: 30 tablet; Refill: 2  Stop pravastatin.  Start atorvastatin.    4. Moderate episode of recurrent major depressive disorder (HCC)  5. Anxiety  -     Desvenlafaxine Succinate ER 25 MG tablet sustained-release 24 hour; Take 1 tablet by mouth Daily.  Dispense: 14 tablet; Refill: 0  -     Vortioxetine HBr (Trintellix) 10 MG tablet; Take 1 tablet by mouth Daily.  Dispense: 30 tablet; Refill: 0  Reduce Pristiq to 25 mg daily for 2 weeks.  Take 2 week break without any medication and then start Trintellix 10 mg daily.  Patient to call if she is having significant depression or anxiety, could start Trintellix right away and not take 2 weeks break from medication.  Return in 6 weeks.    6. Idiopathic chronic gout of right foot without tophus  -     allopurinol (Zyloprim) 300 MG tablet; Take 1 tablet by mouth Daily.  Dispense: 30 tablet; Refill: 2  Noticed improvement in right heel pain with  indomethacin treatment.  We will start daily allopurinol 300 mg.  Plan on repeating uric acid levels at next appointment.       Plan of care reviewed with patient at the conclusion of today's visit. Education was provided regarding diagnosis, management and any prescribed or recommended OTC medications.  Patient verbalizes understanding of and agreement with management plan.    Return in about 6 weeks (around 1/10/2022) for Recheck, depression/anxiety/cholesterol/POTTER.     Daly Archibald PA-C

## 2021-12-06 ENCOUNTER — PATIENT MESSAGE (OUTPATIENT)
Dept: FAMILY MEDICINE CLINIC | Facility: CLINIC | Age: 61
End: 2021-12-06

## 2021-12-08 ENCOUNTER — TELEPHONE (OUTPATIENT)
Dept: FAMILY MEDICINE CLINIC | Facility: CLINIC | Age: 61
End: 2021-12-08

## 2021-12-08 NOTE — TELEPHONE ENCOUNTER
----- Message from Daly Archibald PA-C sent at 12/6/2021  9:50 PM EST -----  Regarding: check on how patient is feeling on trintellix

## 2021-12-08 NOTE — TELEPHONE ENCOUNTER
Internal Medicine Daily Progress Note  Coni Niñoelliot   [id number]  84 year old female    Date: 11/24/2019     Chief complaint:  Abdominal pain    S: Patient continues to have postoperative pain, 8/10 intensity, continues to have bilious drainage through NGT        Problem list:     Small bowel obstruction  Abdominal pain  Hypertension  Atrial fibrillation  Hypothyroidism    Vital Last Value 24 Hour Range   Temperature 97.7 °F (36.5 °C) Temp  Min: 97 °F (36.1 °C)  Max: 98.2 °F (36.8 °C)   Pulse 101 Pulse  Min: 89  Max: 105   Respiratory 18 Resp  Min: 16  Max: 18   Blood Pressure 170/66 BP  Min: 122/76  Max: 210/110   Pulse Oximetry 93 % SpO2  Min: 93 %  Max: 98 %   CVP   No data recorded     Vital Today Admit   Weight 82.5 kg Weight: 79.7 kg   Height N/A Height: 5' 4\" (162.6 cm)   BMI N/A BMI (Calculated): 30.61       Weight over the past 48 Hours:  Patient Vitals for the past 48 hrs:   Weight   11/23/19 0456 80.2 kg   11/24/19 0334 82.5 kg        Intake/Output:  Last Stool Occurrence:    I/O this shift:  In: -   Out: 150 [Urine:150]  I/O last 3 completed shifts:  In: 2100 [I.V.:1000; IV Piggyback:350]  Out: 3125 [Urine:1400; Drains:1725]  Weight change: 2.3 kg        Medications/Infusions:  Scheduled:   • ketorolac  15 mg Intravenous 4 times per day   • traMADol  50 mg Oral 3 times per day   • ondansetron  4 mg Intravenous BID   • sodium chloride (PF)  10 mL Injection 3 times per day   • fat emulsion  250 mL Intravenous Q24H   • lidocaine  1 patch Transdermal Daily   • traZODone   mg Oral QHS   • sodium chloride (PF)  2 mL Intracatheter 2 times per day   • lidocaine  10 mL Topical Once   • enoxaparin  40 mg Subcutaneous Daily   • metoCLOPramide  5 mg Intravenous TID AC   • metoPROLOL  2.5 mg Intravenous 4 times per day   • diphenhydrAMINE  25 mg Intravenous Nightly       Review of systems: 10 systems  were reviewed by me, pertinent positives mentioned above, other systems are negative    Physical  Spoke with patient.  She has started trintellix x 2 days.  Feeling sad and tearful.  No particular stressor.  Denies any suicidal thoughts.  Doesn't work again until Friday, will call if she doesn't feel she can go to work and needs note.   Exam  GENERAL : Alert. No Respiratory Distress  HEENT: Sclerae is anicteric. Oral mucosa is moist. Pupils are equally round and reactive to light. Extraocular movements are intact, oropharynx clear .   NECK: No palpable nodes or mass. No bruits heard. No thyromegaly  LYMPHATICS: No cervical or inguinal lymphadenopathy  CARDIAC: S1, S2, regular. No S3, S4, pedal pulses well felt    LUNGS: CTA bilaterally No wheezes, no accessory muscles active .   ABDOMEN: Soft, mild tenderness on palpation, hyperactive bowel sounds present, no hepatosplenomegaly   SKIN: No evidence of rash or excoriations.   MUSCULOSKELETAL: No pedal edema, clubbing or cyanosis. Range of motion normal in all four extremities   NEURO: The patient is alert, oriented, cranial nerves intact , no motor deficits  sensations intact to touch,deep tendon  Reflexes present   PSYCH: Mood and affect are normal     Recent Labs   Lab 11/24/19  0615 11/23/19  0423 11/21/19  0421 11/20/19  0357 11/19/19  0646   WBC 17.0* 12.5*  --  9.1 9.7   HCT 43.8 41.3  --  40.4 41.7   HGB 14.1 13.2  --  12.4 13.3    293  --  310 338   SODIUM 139 140 140 140 137   POTASSIUM 3.4 3.5 4.0 3.9 3.5   CHLORIDE 99 101 107 108* 102   CO2 33* 30 29 27 26   CALCIUM 8.9 8.9 8.9 8.9 9.7   GLUCOSE 113* 125* 106* 103* 126*   BUN 27* 6 6 11 24*   CREATININE 0.74 0.62 0.65 0.73 0.92   AST 10  --   --   --  17   GPT 17  --   --   --  20   ALKPT 70  --   --   --  77   BILIRUBIN 0.4  --   --   --  0.3   ALBUMIN 2.0*  --   --   --  3.2*   LIPA <50*  --   --   --  129   GFRNA 75 83 82 76 58   PHOS 3.3 2.6  --   --   --          Imaging: No results found.        DVT/VTE Prophylaxis:  VTE Pharmacologic Prophylaxis:yes  VTE Mechanical Prophylaxis: yes    Assessment/plan:     Recurrent small-bowel obstruction status post exploratory laparotomy, lysis of adhesions 11/23/2019  IV Toradol added for pain control  Will limit use of Dilaudid, only for very severe pain  Patient has tendency to ask for  more narcotics.    Continue nasogastric tube drainage  TPN started  Continue Reglan , Zofran for nausea,     Uncontrolled Hypertension:  Continue metoprolol for hypertension, dose increased to 5 mg q.6 hours for better control of blood pressure    Hypothyroidism:  Continue  Levothyroxine IV     Atrial fibrillation:  Currently rate controlled:  Continue telemetry monitoring    Encourage ambulation,  GI/DVT prophylaxis  Discharge    BRUNO  2-3 days  Barriers:  Pending improvement of bowel obstruction  Destination: home        Shaun Gold MD  11/24/2019

## 2021-12-13 ENCOUNTER — TELEPHONE (OUTPATIENT)
Dept: FAMILY MEDICINE CLINIC | Facility: CLINIC | Age: 61
End: 2021-12-13

## 2021-12-13 DIAGNOSIS — F33.1 MODERATE EPISODE OF RECURRENT MAJOR DEPRESSIVE DISORDER (HCC): ICD-10-CM

## 2021-12-13 DIAGNOSIS — E79.0 ELEVATED URIC ACID IN BLOOD: Primary | ICD-10-CM

## 2021-12-13 RX ORDER — VORTIOXETINE 20 MG/1
20 TABLET, FILM COATED ORAL DAILY
Qty: 90 TABLET | Refills: 1 | Status: SHIPPED | OUTPATIENT
Start: 2021-12-13 | End: 2022-06-13

## 2021-12-13 NOTE — TELEPHONE ENCOUNTER
Spoke with patient.  She has increased the trintellix to 20 mg daily.  She reports that she is doing well today.  Was treated with indomethacin initially for gout and is taking allopurinol daily.  Reports worsening pain in her feet.  Will check uric acid and determine treatment based on labs if necessary.

## 2021-12-13 NOTE — TELEPHONE ENCOUNTER
----- Message from Karie Ziegler MA sent at 12/10/2021 10:19 AM EST -----  Regarding: FW: Trintellix     ----- Message -----  From: Yamila Neff  Sent: 12/10/2021   9:54 AM EST  To: [facility] [address] Clinical Pool  Subject: Trintellix                                       I'm starting to feel a little better. Not crying as much. Head still feels a little funny (foggy I guess you might say). I did call in to work today. I'll be contacting [employer] about a FMLA. My arthritis is also bad right now (hands & feet) and whatever my foot problem is (achilles tendonitis/gout). I assume it will take the allopurinol a bit to work. Thanks again for checking on me.

## 2021-12-14 ENCOUNTER — LAB (OUTPATIENT)
Dept: LAB | Facility: HOSPITAL | Age: 61
End: 2021-12-14

## 2021-12-14 DIAGNOSIS — E79.0 ELEVATED URIC ACID IN BLOOD: ICD-10-CM

## 2021-12-14 PROCEDURE — 84550 ASSAY OF BLOOD/URIC ACID: CPT

## 2021-12-15 LAB — URATE SERPL-MCNC: 4.4 MG/DL (ref 2.4–5.7)

## 2021-12-27 ENCOUNTER — TELEPHONE (OUTPATIENT)
Dept: FAMILY MEDICINE CLINIC | Facility: CLINIC | Age: 61
End: 2021-12-27

## 2021-12-27 NOTE — TELEPHONE ENCOUNTER
----- Message from Michi Suggs MA sent at 12/14/2021  2:25 PM EST -----  Regarding: FW: NANCI    ----- Message -----  From: Yamila Neff  Sent: 12/14/2021  12:43 PM EST  To: [facility] [address] Clinical Pool  Subject: NANCI                                             I think episodic would be fine. The first day I missed was 12/10 and I missed 12/13. Thank you.

## 2021-12-27 NOTE — TELEPHONE ENCOUNTER
Spoke with patient.  She is doing well.  Medication is helping.  Keep follow-up appointment in January.

## 2022-01-18 ENCOUNTER — OFFICE VISIT (OUTPATIENT)
Dept: FAMILY MEDICINE CLINIC | Facility: CLINIC | Age: 62
End: 2022-01-18

## 2022-01-18 ENCOUNTER — DOCUMENTATION (OUTPATIENT)
Dept: PHYSICAL THERAPY | Facility: CLINIC | Age: 62
End: 2022-01-18

## 2022-01-18 VITALS
HEIGHT: 62 IN | TEMPERATURE: 98.3 F | WEIGHT: 228.4 LBS | BODY MASS INDEX: 42.03 KG/M2 | OXYGEN SATURATION: 97 % | HEART RATE: 61 BPM | SYSTOLIC BLOOD PRESSURE: 118 MMHG | DIASTOLIC BLOOD PRESSURE: 64 MMHG

## 2022-01-18 DIAGNOSIS — F33.2 SEVERE EPISODE OF RECURRENT MAJOR DEPRESSIVE DISORDER, WITHOUT PSYCHOTIC FEATURES: Primary | ICD-10-CM

## 2022-01-18 DIAGNOSIS — E66.01 CLASS 3 SEVERE OBESITY DUE TO EXCESS CALORIES WITH SERIOUS COMORBIDITY AND BODY MASS INDEX (BMI) OF 40.0 TO 44.9 IN ADULT: ICD-10-CM

## 2022-01-18 DIAGNOSIS — R73.02 IMPAIRED GLUCOSE TOLERANCE: ICD-10-CM

## 2022-01-18 DIAGNOSIS — I10 ESSENTIAL HYPERTENSION: ICD-10-CM

## 2022-01-18 DIAGNOSIS — M15.9 PRIMARY OSTEOARTHRITIS INVOLVING MULTIPLE JOINTS: ICD-10-CM

## 2022-01-18 DIAGNOSIS — M76.61 ACHILLES TENDINITIS OF RIGHT LOWER EXTREMITY: Primary | ICD-10-CM

## 2022-01-18 DIAGNOSIS — K76.0 HEPATIC STEATOSIS: ICD-10-CM

## 2022-01-18 DIAGNOSIS — E78.2 MIXED HYPERLIPIDEMIA: ICD-10-CM

## 2022-01-18 PROCEDURE — 99214 OFFICE O/P EST MOD 30 MIN: CPT | Performed by: PHYSICIAN ASSISTANT

## 2022-01-18 RX ORDER — ATORVASTATIN CALCIUM 20 MG/1
20 TABLET, FILM COATED ORAL NIGHTLY
Qty: 90 TABLET | Refills: 3 | Status: SHIPPED | OUTPATIENT
Start: 2022-01-18 | End: 2023-02-21 | Stop reason: SDUPTHER

## 2022-01-18 RX ORDER — ARIPIPRAZOLE 2 MG/1
2 TABLET ORAL DAILY
Qty: 30 TABLET | Refills: 1 | Status: SHIPPED | OUTPATIENT
Start: 2022-01-18 | End: 2022-03-08

## 2022-01-18 NOTE — PROGRESS NOTES
Discharge Summary  Discharge Summary from Physical Therapy Report    Patient: Yamila Neff   : 1960  Diagnosis/ICD-10 Code:  The encounter diagnosis was Achilles tendinitis of right lower extremity.   Referring practitioner: Pam Fairchild MD  Date of Initial Visit: Type: THERAPY  Noted: 9/3/2021  Today's Date: 2022      Number of Visits: 12     Date of Discharge: 2021    Goals: Partially Met    Assessment/Reason for Discharge: lack of progress, persistent pn    Discharge Plan: Continue with current home exercise program as instructed  Patient to return to referring/providing physician            Susana Fried, PT  Physical Therapist

## 2022-01-18 NOTE — PROGRESS NOTES
Chief Complaint   Patient presents with   • Follow-up     6 week       HPI     Yamila Neff is a pleasant 61 y.o. female who is here for routine follow-up of depression and anxiety.  Patient recently discontinued pristiq and started trintellix 20 mg daily.  She reports that she is doing well on medication.  She still feels depressed most days.  Has tearful episodes.  Has ongoing anxiety as well.  Taking amitriptyline 10 mg nightly for sleep and this helps some nights.  Has never tried abilify.  Has tried wellbutrin in the past and feels it stopped working after some time.  Denies suicidal ideation.    Switched from pravastatin to atorvastatin and tolerating well.    Past Medical History:   Diagnosis Date   • Anemia    • Arthritis    • Carpal tunnel syndrome 2012   • Degenerative joint disease    • Depression    • Depression    • Elevated cholesterol    • Elevated liver enzymes    • Fatty liver    • Hypertension    • Kidney disorder     one kidney   • Palpitations    • Polycystic ovaries    • Psoriasis    • PVC (premature ventricular contraction)     occasional pvc's   • Sleep apnea     cpap at home   • Wears glasses        Past Surgical History:   Procedure Laterality Date   • BREAST BIOPSY Right     PAPILLOMA DR BELL   •  SECTION      x 3   • CHOLECYSTECTOMY  2013   • COLONOSCOPY     • HYSTERECTOMY  2006    complete   • JOINT REPLACEMENT Left     left knee   • OOPHORECTOMY     • TOTAL KNEE ARTHROPLASTY Right 3/9/2021    Procedure: TOTAL KNEE ARTHROPLASTY RIGHT;  Surgeon: Mateo Keith MD;  Location: [facility];  Service: Orthopedics;  Laterality: Right;   • TUBAL ABDOMINAL LIGATION         Family History   Problem Relation Age of Onset   • Diabetes Mother    • Lymphoma Mother    • Hypertension Mother    • No Known Problems Father    • Hypertension Brother    • Hypertension Brother    • Heart disease Maternal Grandmother    • Heart disease Maternal Grandfather    • Heart attack Maternal  "Grandfather    • Colon cancer Maternal Aunt    • Breast cancer Neg Hx    • Ovarian cancer Neg Hx        Social History     Socioeconomic History   • Marital status:    Tobacco Use   • Smoking status: Never Smoker   • Smokeless tobacco: Never Used   Vaping Use   • Vaping Use: Never used   Substance and Sexual Activity   • Alcohol use: No   • Drug use: No   • Sexual activity: Yes     Partners: Male     Comment:        Allergies   Allergen Reactions   • Celexa [Citalopram] Other (See Comments)     Jittery    • Lisinopril Cough       ROS  Review of Systems   Constitutional: Positive for fatigue. Negative for chills and fever.   Musculoskeletal: Positive for arthralgias and myalgias.   Neurological: Negative for dizziness and headache.   Psychiatric/Behavioral: Positive for agitation, sleep disturbance, depressed mood and stress. Negative for self-injury and suicidal ideas. The patient is nervous/anxious.        Vitals:    01/18/22 1455   BP: 118/64   BP Location: Right arm   Patient Position: Sitting   Cuff Size: Adult   Pulse: 61   Temp: 98.3 °F (36.8 °C)   SpO2: 97%   Weight: 104 kg (228 lb 6.4 oz)   Height: 157.5 cm (62.01\")   PainSc:   2     Body mass index is 41.76 kg/m².    Current Outpatient Medications on File Prior to Visit   Medication Sig Dispense Refill   • allopurinol (Zyloprim) 300 MG tablet Take 1 tablet by mouth Daily. 30 tablet 2   • amitriptyline (ELAVIL) 10 MG tablet Take 1 tablet by mouth Every Night. 90 tablet 0   • amLODIPine (NORVASC) 10 MG tablet Take 1 tablet by mouth Daily. 90 tablet 1   • cholecalciferol (VITAMIN D3) 1000 units tablet Take 2,000 Units by mouth Daily.     • clobetasol (TEMOVATE) 0.05 % external solution Apply topically to the itchy, scaly areas on scalp as directed 2 (two) times a day. 25 mL 3   • hydrocortisone 2.5 % cream Apply  topically to the affected areas on abdomen as directed Every Evening until resolved. 28.35 g 3   • hydrOXYzine (ATARAX) 50 MG tablet " Take 1 tablet by mouth At Night As Needed for Anxiety. 90 tablet 1   • ketoconazole (NIZORAL) 2 % shampoo Use as a shampo to the scalp and face 3 (Three) Times a Week. Leave in for 5 minutes and rinse. 120 mL 3   • losartan (Cozaar) 100 MG tablet Take 1 tablet by mouth Daily. 90 tablet 1   • Magnesium 250 MG tablet Take 1 tablet by mouth Daily.     • metFORMIN ER (GLUCOPHAGE-XR) 500 MG 24 hr tablet Take 2 tablets by mouth 2 (Two) Times a Day. 360 tablet 1   • nystatin-triamcinolone (MYCOLOG II) 846217-6.1 UNIT/GM-% cream Apply 1 application topically to the appropriate area sparingly as directed 3 (Three) Times a Day As Needed. 15 g 5   • nystatin-triamcinolone (MYCOLOG II) 582032-9.1 UNIT/GM-% cream Apply a small amount to affected area 3 (Three) times a day as needed 30 g 5   • omega-3 acid ethyl esters (LOVAZA) 1 g capsule Take 2 capsules by mouth 2 (Two) Times a Day. 360 capsule 1   • ondansetron ODT (Zofran ODT) 4 MG disintegrating tablet Place 1 tablet on the tongue Every 8 (Eight) Hours As Needed for Nausea or Vomiting. 14 tablet 0   • Probiotic Product (PROBIOTIC DAILY PO) Take  by mouth Daily.     • propranolol LA (INDERAL LA) 60 MG 24 hr capsule Take 1 capsule by mouth Daily. 90 capsule 1   • Vitamin E 400 units tablet Take 800 Units by mouth Daily.     • Vortioxetine HBr (Trintellix) 20 MG tablet Take 20 mg by mouth Daily. 90 tablet 1   • [DISCONTINUED] atorvastatin (LIPITOR) 20 MG tablet Take 1 tablet by mouth Every Night. 30 tablet 2   • [DISCONTINUED] clobetasol (TEMOVATE) 0.05 % external solution Apply twice daily to itchy, scaly areas on scalp. 25 mL 3   • [DISCONTINUED] estradiol (Evamist) 1.53 MG/SPRAY transdermal spray Apply 1 spray to skin once a day as directed 8.1 mL 12   • [DISCONTINUED] hydrocortisone 2.5 % cream Apply every evening to affected areas on abdomen until resolved. 28.35 g 11   • [DISCONTINUED] ketoconazole (NIZORAL) 2 % cream Apply every morning to underarms until resolved. 30 g  11   • [DISCONTINUED] ketoconazole (NIZORAL) 2 % cream Apply topically to the underarms Every Morning until resolved. 30 g 3   • [DISCONTINUED] ketoconazole (NIZORAL) 2 % shampoo Use as a shampoo to scalp and face 3 times a week. Leave in for 5 minutes and rinse. 120 mL 3     No current facility-administered medications on file prior to visit.       Results for orders placed or performed in visit on 12/14/21   Uric Acid    Specimen: Blood   Result Value Ref Range    Uric Acid 4.4 2.4 - 5.7 mg/dL       PE    Physical Exam  Vitals reviewed.   Constitutional:       General: She is not in acute distress.     Appearance: Normal appearance. She is well-developed. She is morbidly obese. She is not ill-appearing or diaphoretic.   HENT:      Head: Normocephalic and atraumatic.   Eyes:      Extraocular Movements: Extraocular movements intact.      Conjunctiva/sclera: Conjunctivae normal.   Pulmonary:      Effort: No respiratory distress.   Musculoskeletal:         General: Normal range of motion.      Cervical back: Normal range of motion.   Neurological:      General: No focal deficit present.      Mental Status: She is alert.   Psychiatric:         Attention and Perception: Attention and perception normal. She is attentive.         Mood and Affect: Mood is anxious and depressed.         Speech: Speech normal.         Behavior: Behavior is agitated. Behavior is cooperative.         Thought Content: Thought content normal.         Cognition and Memory: Cognition and memory normal.         Judgment: Judgment normal.         A/P    Diagnoses and all orders for this visit:    1. Severe episode of recurrent major depressive disorder, without psychotic features ([facility]) (Primary)  -     ARIPiprazole (Abilify) 2 MG tablet; Take 1 tablet by mouth Daily.  Dispense: 30 tablet; Refill: 1  Continue on trintellix 20 mg daily.  Add Abilify 2 mg.  Continue on amitriptyline 10 mg daily.  Encouraged exercise daily.  Start with walking and  stretching.  Follow-up in 6 weeks.  Call sooner if symptoms worsen or change.    2. Class 3 severe obesity due to excess calories with serious comorbidity and body mass index (BMI) of 40.0 to 44.9 in adult (HCC)  Encouraged weight loss.    3. Hepatic steatosis  On atrovastatin and metformin.  Thinks she was previously on actos, unsure why she stopped.    4. Essential hypertension  Stable, well-controlled.  Compliant on medication.    5. Mixed hyperlipidemia  -     atorvastatin (LIPITOR) 20 MG tablet; Take 1 tablet by mouth Every Night.  Dispense: 90 tablet; Refill: 3  Tolerating atorvastatin well.  Recently switched from pravastatin.    6. Impaired glucose tolerance  Recent hemoglobin AIC was 5.7%.  On metformin.    7. Primary osteoarthritis involving multiple joints  Ongoing joint pain especially after working.  No improvement in pain with treatment of elevated uric acid with allopurinol.       Plan of care reviewed with patient at the conclusion of today's visit. Education was provided regarding diagnosis, management and any prescribed or recommended OTC medications.  Patient verbalizes understanding of and agreement with management plan.    Return in about 6 weeks (around 3/1/2022).     Daly Archibald PA-C

## 2022-02-04 ENCOUNTER — APPOINTMENT (OUTPATIENT)
Dept: BONE DENSITY | Facility: HOSPITAL | Age: 62
End: 2022-02-04

## 2022-02-26 DIAGNOSIS — M1A.0710 IDIOPATHIC CHRONIC GOUT OF RIGHT FOOT WITHOUT TOPHUS: ICD-10-CM

## 2022-02-26 DIAGNOSIS — G43.019 INTRACTABLE MIGRAINE WITHOUT AURA AND WITHOUT STATUS MIGRAINOSUS: ICD-10-CM

## 2022-02-28 RX ORDER — ALLOPURINOL 300 MG/1
300 TABLET ORAL DAILY
Qty: 30 TABLET | Refills: 2 | Status: SHIPPED | OUTPATIENT
Start: 2022-02-28 | End: 2022-05-27 | Stop reason: SDUPTHER

## 2022-02-28 RX ORDER — PROPRANOLOL HCL 60 MG
60 CAPSULE, EXTENDED RELEASE 24HR ORAL DAILY
Qty: 90 CAPSULE | Refills: 1 | Status: SHIPPED | OUTPATIENT
Start: 2022-02-28 | End: 2022-08-25 | Stop reason: SDUPTHER

## 2022-02-28 NOTE — TELEPHONE ENCOUNTER
Rx Refill Note  Requested Prescriptions     Pending Prescriptions Disp Refills   • propranolol LA (INDERAL LA) 60 MG 24 hr capsule 90 capsule 1     Sig: Take 1 capsule by mouth Daily.   • allopurinol (Zyloprim) 300 MG tablet 30 tablet 2     Sig: Take 1 tablet by mouth Daily.      Last office visit with prescribing clinician: 1/18/2022      Next office visit with prescribing clinician: 3/8/2022            Guerita Del Rosario MA  02/28/22, 10:29 EST

## 2022-03-08 ENCOUNTER — OFFICE VISIT (OUTPATIENT)
Dept: FAMILY MEDICINE CLINIC | Facility: CLINIC | Age: 62
End: 2022-03-08

## 2022-03-08 VITALS
DIASTOLIC BLOOD PRESSURE: 70 MMHG | TEMPERATURE: 98.3 F | OXYGEN SATURATION: 95 % | HEART RATE: 87 BPM | SYSTOLIC BLOOD PRESSURE: 120 MMHG | WEIGHT: 235 LBS | HEIGHT: 62 IN | BODY MASS INDEX: 43.24 KG/M2

## 2022-03-08 DIAGNOSIS — F41.9 ANXIETY: ICD-10-CM

## 2022-03-08 DIAGNOSIS — F33.1 MODERATE EPISODE OF RECURRENT MAJOR DEPRESSIVE DISORDER: Primary | ICD-10-CM

## 2022-03-08 DIAGNOSIS — F51.8 ABNORMAL DREAMS: ICD-10-CM

## 2022-03-08 DIAGNOSIS — F51.01 PRIMARY INSOMNIA: ICD-10-CM

## 2022-03-08 PROCEDURE — 99214 OFFICE O/P EST MOD 30 MIN: CPT | Performed by: PHYSICIAN ASSISTANT

## 2022-03-08 NOTE — PROGRESS NOTES
Chief Complaint   Patient presents with   • Follow-up       HPI     Yamila Neff is a pleasant 61 y.o. female who is here for routine follow-up of depression, anxiety and insomnia.  Patient is doing better overall on Trintellix.  She was recently started on Abilify 2 mg daily and has not noticed a significant improvement in mood with this.  She has noticed very weird and vivid dreams which are upsetting to her.  She sleeps well most nights with amitriptyline 10 mg nightly.  She does notice that her anxiety is worse the night before work and she has difficulty sleeping.  She is considering quitting her job and looking into disability.  Of note, she was on prozac initially years ago and did very well with this medication except for sexual side effects.  Feels this medication helped her mood the most.    Past Medical History:   Diagnosis Date   • Anemia    • Arthritis    • Carpal tunnel syndrome 2012   • Degenerative joint disease    • Depression    • Depression    • Elevated cholesterol    • Elevated liver enzymes    • Fatty liver    • HL (hearing loss)    • Hypertension    • Kidney disorder     one kidney   • Palpitations    • Polycystic ovaries    • Psoriasis    • PVC (premature ventricular contraction)     occasional pvc's   • Sleep apnea     cpap at home   • Wears glasses        Past Surgical History:   Procedure Laterality Date   • BREAST BIOPSY Right     PAPILLOMA DR BELL   •  SECTION      x 3   • CHOLECYSTECTOMY  2013   • COLONOSCOPY     • HYSTERECTOMY  2006    complete   • JOINT REPLACEMENT Left     left knee   • OOPHORECTOMY     • TOTAL KNEE ARTHROPLASTY Right 3/9/2021    Procedure: TOTAL KNEE ARTHROPLASTY RIGHT;  Surgeon: Mateo Keith MD;  Location: [facility];  Service: Orthopedics;  Laterality: Right;   • TUBAL ABDOMINAL LIGATION         Family History   Problem Relation Age of Onset   • Diabetes Mother    • Lymphoma Mother    • Hypertension Mother    • No Known Problems Father   "  • Hypertension Brother    • Hypertension Brother    • Heart disease Maternal Grandmother    • Heart disease Maternal Grandfather    • Heart attack Maternal Grandfather    • Colon cancer Maternal Aunt    • Breast cancer Neg Hx    • Ovarian cancer Neg Hx        Social History     Socioeconomic History   • Marital status:    Tobacco Use   • Smoking status: Never Smoker   • Smokeless tobacco: Never Used   Vaping Use   • Vaping Use: Never used   Substance and Sexual Activity   • Alcohol use: No   • Drug use: No   • Sexual activity: Yes     Partners: Male     Comment:        Allergies   Allergen Reactions   • Celexa [Citalopram] Other (See Comments)     Jittery    • Lisinopril Cough       ROS  Review of Systems   Constitutional: Negative for chills and fever.   Musculoskeletal: Positive for arthralgias.   Psychiatric/Behavioral: Positive for agitation, sleep disturbance, depressed mood and stress. Negative for self-injury and suicidal ideas. The patient is nervous/anxious.        Vitals:    03/08/22 1041   BP: 120/70   BP Location: Left arm   Patient Position: Sitting   Cuff Size: Large Adult   Pulse: 87   Temp: 98.3 °F (36.8 °C)   SpO2: 95%   Weight: 107 kg (235 lb)   Height: 157.5 cm (62.01\")   PainSc:   4     Body mass index is 42.97 kg/m².    Current Outpatient Medications on File Prior to Visit   Medication Sig Dispense Refill   • allopurinol (Zyloprim) 300 MG tablet Take 1 tablet by mouth Daily. 30 tablet 2   • amitriptyline (ELAVIL) 10 MG tablet Take 1 tablet by mouth Every Night. 90 tablet 0   • amLODIPine (NORVASC) 10 MG tablet Take 1 tablet by mouth Daily. 90 tablet 1   • atorvastatin (LIPITOR) 20 MG tablet Take 1 tablet by mouth Every Night. 90 tablet 3   • cholecalciferol (VITAMIN D3) 1000 units tablet Take 2,000 Units by mouth Daily.     • clobetasol (TEMOVATE) 0.05 % external solution Apply topically to the itchy, scaly areas on scalp as directed 2 (two) times a day. 25 mL 3   • " hydrocortisone 2.5 % cream Apply  topically to the affected areas on abdomen as directed Every Evening until resolved. 28.35 g 3   • hydrOXYzine (ATARAX) 50 MG tablet Take 1 tablet by mouth At Night As Needed for Anxiety. 90 tablet 1   • ketoconazole (NIZORAL) 2 % shampoo Use as a shampo to the scalp and face 3 (Three) Times a Week. Leave in for 5 minutes and rinse. 120 mL 3   • losartan (Cozaar) 100 MG tablet Take 1 tablet by mouth Daily. 90 tablet 1   • Magnesium 250 MG tablet Take 1 tablet by mouth Daily.     • metFORMIN ER (GLUCOPHAGE-XR) 500 MG 24 hr tablet Take 2 tablets by mouth 2 (Two) Times a Day. 360 tablet 1   • nystatin-triamcinolone (MYCOLOG II) 080826-5.1 UNIT/GM-% cream Apply 1 application topically to the appropriate area sparingly as directed 3 (Three) Times a Day As Needed. 15 g 5   • nystatin-triamcinolone (MYCOLOG II) 967830-7.1 UNIT/GM-% cream Apply a small amount to affected area 3 (Three) times a day as needed 30 g 5   • omega-3 acid ethyl esters (LOVAZA) 1 g capsule Take 2 capsules by mouth 2 (Two) Times a Day. 360 capsule 1   • ondansetron ODT (Zofran ODT) 4 MG disintegrating tablet Place 1 tablet on the tongue Every 8 (Eight) Hours As Needed for Nausea or Vomiting. 14 tablet 0   • Probiotic Product (PROBIOTIC DAILY PO) Take  by mouth Daily.     • propranolol LA (INDERAL LA) 60 MG 24 hr capsule Take 1 capsule by mouth Daily. 90 capsule 1   • Vitamin E 400 units tablet Take 800 Units by mouth Daily.     • Vortioxetine HBr (Trintellix) 20 MG tablet Take 20 mg by mouth Daily. 90 tablet 1   • [DISCONTINUED] ARIPiprazole (Abilify) 2 MG tablet Take 1 tablet by mouth Daily. 30 tablet 1     No current facility-administered medications on file prior to visit.       Results for orders placed or performed in visit on 12/14/21   Uric Acid    Specimen: Blood   Result Value Ref Range    Uric Acid 4.4 2.4 - 5.7 mg/dL       PE    Physical Exam  Vitals reviewed.   Constitutional:       General: She is not in  acute distress.     Appearance: Normal appearance. She is well-developed. She is morbidly obese. She is not ill-appearing or diaphoretic.   HENT:      Head: Normocephalic and atraumatic.   Eyes:      Extraocular Movements: Extraocular movements intact.      Conjunctiva/sclera: Conjunctivae normal.   Pulmonary:      Effort: No respiratory distress.   Musculoskeletal:         General: Normal range of motion.      Cervical back: Normal range of motion.   Neurological:      General: No focal deficit present.      Mental Status: She is alert.   Psychiatric:         Attention and Perception: Attention and perception normal. She is attentive.         Mood and Affect: Mood is anxious and depressed.         Speech: Speech normal.         Behavior: Behavior normal. Behavior is cooperative.         Thought Content: Thought content normal.         Cognition and Memory: Cognition and memory normal.         Judgment: Judgment normal.         A/P    Diagnoses and all orders for this visit:    1. Moderate episode of recurrent major depressive disorder (HCC) (Primary)  Stable currently with Trintellix 20 mg daily.  Better than when she was on Pristiq.  Has not noticed improvement with addition of Abilify.  Having very vivid dreams.  Stop Abilify and see if dreams improve.  May consider Prozac instead of Trintellix in the future given good response years ago.  She is not worried about sexual side effects at this time.    2. Anxiety  Ongoing.  Mostly related to work.  Considering quitting and filing for disability, has consulted a .    3. Primary insomnia  Sleeps well with amitriptyline 10 mg nightly.  She has difficulty sleeping when she knows she has to work the next day.    4. Abnormal dreams  This recently started and appears to correlate with the addition of Abilify.  We will hold and discontinue Abilify to see if her dreams improve.  Could potentially be related to the Trintellix.       Plan of care reviewed with patient  at the conclusion of today's visit. Education was provided regarding diagnosis, management and any prescribed or recommended OTC medications.  Patient verbalizes understanding of and agreement with management plan.    Return in about 3 months (around 6/8/2022) for Recheck, mood/liver.     Daly Archibald PA-C

## 2022-03-10 DIAGNOSIS — E78.1 HYPERTRIGLYCERIDEMIA: ICD-10-CM

## 2022-03-10 DIAGNOSIS — E78.2 MIXED HYPERLIPIDEMIA: Primary | ICD-10-CM

## 2022-03-10 DIAGNOSIS — R11.14 BILIOUS VOMITING WITH NAUSEA: ICD-10-CM

## 2022-03-10 RX ORDER — ICOSAPENT ETHYL 1000 MG/1
2 CAPSULE ORAL 2 TIMES DAILY WITH MEALS
Qty: 360 CAPSULE | Refills: 3 | Status: SHIPPED | OUTPATIENT
Start: 2022-03-10

## 2022-03-10 RX ORDER — ONDANSETRON 4 MG/1
4 TABLET, ORALLY DISINTEGRATING ORAL EVERY 8 HOURS PRN
Qty: 14 TABLET | Refills: 0 | Status: SHIPPED | OUTPATIENT
Start: 2022-03-10 | End: 2023-01-24 | Stop reason: SDUPTHER

## 2022-03-10 NOTE — TELEPHONE ENCOUNTER
Rx Refill Note  Requested Prescriptions     Pending Prescriptions Disp Refills   • ondansetron ODT (Zofran ODT) 4 MG disintegrating tablet 14 tablet 0     Sig: Place 1 tablet on the tongue Every 8 (Eight) Hours As Needed for Nausea or Vomiting.      Last office visit with prescribing clinician: 3/8/2022      Next office visit with prescribing clinician: 6/13/2022            Shameka Vernon MA  03/10/22, 13:03 EST

## 2022-03-16 ENCOUNTER — OFFICE VISIT (OUTPATIENT)
Dept: ORTHOPEDIC SURGERY | Facility: CLINIC | Age: 62
End: 2022-03-16

## 2022-03-16 VITALS
SYSTOLIC BLOOD PRESSURE: 118 MMHG | WEIGHT: 235.89 LBS | HEIGHT: 62 IN | BODY MASS INDEX: 43.41 KG/M2 | DIASTOLIC BLOOD PRESSURE: 75 MMHG

## 2022-03-16 DIAGNOSIS — Z96.651 S/P TOTAL KNEE ARTHROPLASTY, RIGHT: Primary | ICD-10-CM

## 2022-03-16 DIAGNOSIS — Z09 POSTOPERATIVE EXAMINATION: ICD-10-CM

## 2022-03-16 PROCEDURE — 99212 OFFICE O/P EST SF 10 MIN: CPT | Performed by: ORTHOPAEDIC SURGERY

## 2022-03-16 ASSESSMENT — KOOS JR
KOOS JR SCORE: 100
KOOS JR SCORE: 0

## 2022-03-16 NOTE — PROGRESS NOTES
[facility] Note    Subjective     Chief Complaint   Patient presents with   • Follow-up     10 month follow up - 1 year S/P total knee arthroplasty, right DOS 03.09.2021        HPI    It has been 10  month(s) since Ms. Neff's last visit. She returns to clinic today for follow-up of right knee arthroplasty. The issue has been ongoing for 1 year(s). She rates her pain a 1/10 on the pain scale. Previous/current treatments: physical therapy. Current symptoms: none. The pain is worse with none. Overall, she is doing better.  99% improvement compared to her preoperative symptoms.  Fully ambulatory without external aids.    I have reviewed the following portions of the patient's history and agree with: History of Present Illness and Review of Systems    Patient Active Problem List   Diagnosis   • Severe obstructive sleep apnea   • Degenerative joint disease   • Depression   • Psoriasis   • Congenital single kidney   • Hepatic steatosis   • Essential hypertension   • Intractable migraine without aura and without status migrainosus   • Mixed hyperlipidemia   • Anxiety   • Primary insomnia   • Impaired glucose tolerance   • Iron deficiency   • Class 3 severe obesity due to excess calories with serious comorbidity and body mass index (BMI) of 40.0 to 44.9 in adult (HCC)   • Primary osteoarthritis involving multiple joints   • Primary osteoarthritis of right knee   • Obesity   • S/P total knee arthroplasty, right   • Prediabetes   • NGOC on CPAP     Past Medical History:   Diagnosis Date   • Anemia    • Arthritis    • Carpal tunnel syndrome 04/09/2012   • Degenerative joint disease    • Depression    • Depression    • Elevated cholesterol    • Elevated liver enzymes    • Fatty liver    • HL (hearing loss)    • Hypertension    • Kidney disorder     one kidney   • Palpitations    • Polycystic ovaries    • Psoriasis    • PVC (premature ventricular contraction)     occasional pvc's   • Sleep apnea     cpap  at home   • Wears glasses       Past Surgical History:   Procedure Laterality Date   • BREAST BIOPSY Right     PAPILLOMA DR BELL   •  SECTION      x 3   • CHOLECYSTECTOMY  2013   • COLONOSCOPY     • HYSTERECTOMY  2006    complete   • JOINT REPLACEMENT Left 2011    left knee   • OOPHORECTOMY     • TOTAL KNEE ARTHROPLASTY Right 3/9/2021    Procedure: TOTAL KNEE ARTHROPLASTY RIGHT;  Surgeon: Mateo Keith MD;  Location: [facility];  Service: Orthopedics;  Laterality: Right;   • TUBAL ABDOMINAL LIGATION        Family History   Problem Relation Age of Onset   • Diabetes Mother    • Lymphoma Mother    • Hypertension Mother    • No Known Problems Father    • Hypertension Brother    • Hypertension Brother    • Heart disease Maternal Grandmother    • Heart disease Maternal Grandfather    • Heart attack Maternal Grandfather    • Colon cancer Maternal Aunt    • Breast cancer Neg Hx    • Ovarian cancer Neg Hx      Social History     Socioeconomic History   • Marital status:    Tobacco Use   • Smoking status: Never Smoker   • Smokeless tobacco: Never Used   Vaping Use   • Vaping Use: Never used   Substance and Sexual Activity   • Alcohol use: No   • Drug use: No   • Sexual activity: Yes     Partners: Male     Comment:       Current Outpatient Medications on File Prior to Visit   Medication Sig Dispense Refill   • allopurinol (Zyloprim) 300 MG tablet Take 1 tablet by mouth Daily. 30 tablet 2   • amitriptyline (ELAVIL) 10 MG tablet Take 1 tablet by mouth Every Night. 90 tablet 0   • amLODIPine (NORVASC) 10 MG tablet Take 1 tablet by mouth Daily. 90 tablet 1   • atorvastatin (LIPITOR) 20 MG tablet Take 1 tablet by mouth Every Night. 90 tablet 3   • cholecalciferol (VITAMIN D3) 1000 units tablet Take 2,000 Units by mouth Daily.     • clobetasol (TEMOVATE) 0.05 % external solution Apply topically to the itchy, scaly areas on scalp as directed 2 (two) times a day. 25 mL 3   • hydrocortisone 2.5 % cream Apply   topically to the affected areas on abdomen as directed Every Evening until resolved. 28.35 g 3   • hydrOXYzine (ATARAX) 50 MG tablet Take 1 tablet by mouth At Night As Needed for Anxiety. 90 tablet 1   • icosapent ethyl (Vascepa) 1 g capsule capsule Take 2 grams by mouth 2 (Two) Times a Day With Meals. 360 capsule 3   • ketoconazole (NIZORAL) 2 % shampoo Use as a shampo to the scalp and face 3 (Three) Times a Week. Leave in for 5 minutes and rinse. 120 mL 3   • losartan (Cozaar) 100 MG tablet Take 1 tablet by mouth Daily. 90 tablet 1   • Magnesium 250 MG tablet Take 1 tablet by mouth Daily.     • metFORMIN ER (GLUCOPHAGE-XR) 500 MG 24 hr tablet Take 2 tablets by mouth 2 (Two) Times a Day. 360 tablet 1   • nystatin-triamcinolone (MYCOLOG II) 856120-4.1 UNIT/GM-% cream Apply 1 application topically to the appropriate area sparingly as directed 3 (Three) Times a Day As Needed. 15 g 5   • nystatin-triamcinolone (MYCOLOG II) 641118-0.1 UNIT/GM-% cream Apply a small amount to affected area 3 (Three) times a day as needed 30 g 5   • ondansetron ODT (Zofran ODT) 4 MG disintegrating tablet Place 1 tablet on the tongue Every 8 (Eight) Hours As Needed for Nausea or Vomiting. 14 tablet 0   • Probiotic Product (PROBIOTIC DAILY PO) Take  by mouth Daily.     • propranolol LA (INDERAL LA) 60 MG 24 hr capsule Take 1 capsule by mouth Daily. 90 capsule 1   • Vitamin E 400 units tablet Take 800 Units by mouth Daily.     • Vortioxetine HBr (Trintellix) 20 MG tablet Take 20 mg by mouth Daily. 90 tablet 1     No current facility-administered medications on file prior to visit.      Allergies   Allergen Reactions   • Celexa [Citalopram] Other (See Comments)     Jittery    • Lisinopril Cough        Review of Systems   Constitutional: Negative for activity change, appetite change, chills, diaphoresis, fatigue, fever and unexpected weight change.   HENT: Negative for congestion, dental problem, drooling, ear discharge, ear pain, facial  "swelling, hearing loss, mouth sores, nosebleeds, postnasal drip, rhinorrhea, sinus pressure, sneezing, sore throat, tinnitus, trouble swallowing and voice change.    Eyes: Negative for photophobia, pain, discharge, redness, itching and visual disturbance.   Respiratory: Negative for apnea, cough, choking, chest tightness, shortness of breath, wheezing and stridor.    Cardiovascular: Negative for chest pain, palpitations and leg swelling.   Gastrointestinal: Negative for abdominal distention, abdominal pain, anal bleeding, blood in stool, constipation, diarrhea, nausea, rectal pain and vomiting.   Endocrine: Negative for cold intolerance, heat intolerance, polydipsia, polyphagia and polyuria.   Genitourinary: Negative for decreased urine volume, difficulty urinating, dysuria, enuresis, flank pain, frequency, genital sores, hematuria and urgency.   Musculoskeletal: Positive for arthralgias. Negative for back pain, gait problem, joint swelling, myalgias, neck pain and neck stiffness.   Skin: Negative for color change, pallor, rash and wound.   Allergic/Immunologic: Negative for environmental allergies, food allergies and immunocompromised state.   Neurological: Negative for dizziness, tremors, seizures, syncope, facial asymmetry, speech difficulty, weakness, light-headedness, numbness and headaches.   Hematological: Negative for adenopathy. Does not bruise/bleed easily.   Psychiatric/Behavioral: Negative for agitation, behavioral problems, confusion, decreased concentration, dysphoric mood, hallucinations, self-injury, sleep disturbance and suicidal ideas. The patient is not nervous/anxious and is not hyperactive.         Objective      Physical Exam  /75   Ht 157.5 cm (62.01\")   Wt 107 kg (235 lb 14.3 oz)   BMI 43.13 kg/m²     Body mass index is 43.13 kg/m².    General:   Mental Status:  Alert   Appearance: Cooperative, in no acute distress   Build and Nutrition: Obese by BMI female   Orientation: Alert and " oriented to person, place and time   Posture: Normal   Gait: Nonantalgic/normal    Integument:   Right knee: Wound is well-healed with no signs of infection    Lower Extremities:   Right Knee:    Tenderness:  None    Effusion:  None    Swelling: None    Crepitus:  None    Range of motion:  Extension: 0°       Flexion: 135°  Instability:  No varus laxity, no valgus laxity, negative anterior drawer  Deformities:  None      Imaging/Studies  Imaging Results (Last 24 Hours)     Procedure Component Value Units Date/Time    XR Knee 3+ View With Benicia Right [[id number]] Resulted: 03/16/22 0911     Updated: 03/16/22 0911    Narrative:      Right Knee Radiographs  Indication: status-post right total knee arthroplasty  Views: AP, lateral, and sunrise views of the right knee    Comparison: no change compared to prior study, 3/31/2021    Findings:   The components are well aligned, with no signs of loosening or failure.            Assessment and Plan     Diagnoses and all orders for this visit:    1. S/P total knee arthroplasty, right (Primary)  -     XR Knee 3+ View With Benicia Right    2. Postoperative examination        1. S/P total knee arthroplasty, right    2. Postoperative examination        I reviewed my findings with the patient.  Her right total knee arthroplasty is functioning well, and she is pleased with results.  I will see her back in 4 years, which will be a 5-year checkup with x-rays.  I will see her back sooner for any problems.    Return in about 4 years (around 3/16/2026) for Recheck with X-Rays.      Mateo Keith MD  03/16/22  09:27 EDT

## 2022-03-22 DIAGNOSIS — K76.0 HEPATIC STEATOSIS: Primary | ICD-10-CM

## 2022-04-22 ENCOUNTER — HOSPITAL ENCOUNTER (OUTPATIENT)
Dept: ULTRASOUND IMAGING | Facility: HOSPITAL | Age: 62
Discharge: HOME | End: 2022-04-22
Admitting: PHYSICIAN ASSISTANT

## 2022-04-22 DIAGNOSIS — K76.0 HEPATIC STEATOSIS: ICD-10-CM

## 2022-04-22 PROCEDURE — 76705 ECHO EXAM OF ABDOMEN: CPT

## 2022-04-29 ENCOUNTER — HOSPITAL ENCOUNTER (OUTPATIENT)
Dept: BONE DENSITY | Facility: HOSPITAL | Age: 62
Discharge: HOME | End: 2022-04-29
Admitting: PHYSICIAN ASSISTANT

## 2022-04-29 PROCEDURE — 77080 DXA BONE DENSITY AXIAL: CPT

## 2022-05-13 DIAGNOSIS — F51.01 PRIMARY INSOMNIA: ICD-10-CM

## 2022-05-13 DIAGNOSIS — I10 ESSENTIAL HYPERTENSION: ICD-10-CM

## 2022-05-13 DIAGNOSIS — G43.019 INTRACTABLE MIGRAINE WITHOUT AURA AND WITHOUT STATUS MIGRAINOSUS: ICD-10-CM

## 2022-05-13 RX ORDER — AMLODIPINE BESYLATE 10 MG/1
10 TABLET ORAL DAILY
Qty: 90 TABLET | Refills: 1 | Status: SHIPPED | OUTPATIENT
Start: 2022-05-13 | End: 2022-07-28

## 2022-05-13 RX ORDER — AMITRIPTYLINE HYDROCHLORIDE 10 MG/1
10 TABLET, FILM COATED ORAL NIGHTLY
Qty: 90 TABLET | Refills: 0 | Status: SHIPPED | OUTPATIENT
Start: 2022-05-13 | End: 2022-08-12 | Stop reason: SDUPTHER

## 2022-05-13 RX ORDER — LOSARTAN POTASSIUM 100 MG/1
100 TABLET ORAL DAILY
Qty: 90 TABLET | Refills: 1 | Status: SHIPPED | OUTPATIENT
Start: 2022-05-13 | End: 2022-11-07 | Stop reason: SDUPTHER

## 2022-05-13 NOTE — TELEPHONE ENCOUNTER
Rx Refill Note  Requested Prescriptions     Pending Prescriptions Disp Refills   • amitriptyline (ELAVIL) 10 MG tablet 90 tablet 0     Sig: Take 1 tablet by mouth Every Night.   • amLODIPine (NORVASC) 10 MG tablet 90 tablet 1     Sig: Take 1 tablet by mouth Daily.   • losartan (Cozaar) 100 MG tablet 90 tablet 1     Sig: Take 1 tablet by mouth Daily.      Last office visit with prescribing clinician: 3/8/2022      Next office visit with prescribing clinician: 6/13/2022            Raj Cai MA  05/13/22, 13:08 EDT

## 2022-05-27 DIAGNOSIS — M1A.0710 IDIOPATHIC CHRONIC GOUT OF RIGHT FOOT WITHOUT TOPHUS: ICD-10-CM

## 2022-05-31 RX ORDER — ALLOPURINOL 300 MG/1
300 TABLET ORAL DAILY
Qty: 30 TABLET | Refills: 2 | Status: SHIPPED | OUTPATIENT
Start: 2022-05-31 | End: 2022-08-25 | Stop reason: SDUPTHER

## 2022-05-31 NOTE — TELEPHONE ENCOUNTER
Rx Refill Note  Requested Prescriptions     Pending Prescriptions Disp Refills   • allopurinol (Zyloprim) 300 MG tablet 30 tablet 2     Sig: Take 1 tablet by mouth Daily.      Last office visit with prescribing clinician: 3/8/2022      Next office visit with prescribing clinician: 6/13/2022            Guerita Del Rosario MA  05/31/22, 10:24 EDT

## 2022-06-10 DIAGNOSIS — R73.02 IMPAIRED GLUCOSE TOLERANCE: ICD-10-CM

## 2022-06-11 RX ORDER — METFORMIN HYDROCHLORIDE 500 MG/1
1000 TABLET, EXTENDED RELEASE ORAL 2 TIMES DAILY
Qty: 360 TABLET | Refills: 1 | Status: SHIPPED | OUTPATIENT
Start: 2022-06-11 | End: 2022-12-05 | Stop reason: SDUPTHER

## 2022-06-11 NOTE — TELEPHONE ENCOUNTER
Rx Refill Note  Requested Prescriptions     Pending Prescriptions Disp Refills   • metFORMIN ER (GLUCOPHAGE-XR) 500 MG 24 hr tablet 360 tablet 1     Sig: Take 2 tablets by mouth 2 (Two) Times a Day.      Last office visit with prescribing clinician: 3/8/2022      Next office visit with prescribing clinician: 6/13/2022            Shameka Vernon MA  06/11/22, 11:03 EDT

## 2022-06-13 ENCOUNTER — LAB (OUTPATIENT)
Dept: LAB | Facility: HOSPITAL | Age: 62
End: 2022-06-13

## 2022-06-13 ENCOUNTER — OFFICE VISIT (OUTPATIENT)
Dept: FAMILY MEDICINE CLINIC | Facility: CLINIC | Age: 62
End: 2022-06-13

## 2022-06-13 VITALS
TEMPERATURE: 98.7 F | SYSTOLIC BLOOD PRESSURE: 126 MMHG | OXYGEN SATURATION: 96 % | HEIGHT: 62 IN | WEIGHT: 231.6 LBS | HEART RATE: 80 BPM | DIASTOLIC BLOOD PRESSURE: 76 MMHG | BODY MASS INDEX: 42.62 KG/M2

## 2022-06-13 DIAGNOSIS — I10 ESSENTIAL HYPERTENSION: Primary | ICD-10-CM

## 2022-06-13 DIAGNOSIS — F41.9 ANXIETY: ICD-10-CM

## 2022-06-13 DIAGNOSIS — E83.52 HYPERCALCEMIA: ICD-10-CM

## 2022-06-13 DIAGNOSIS — E55.9 VITAMIN D DEFICIENCY: ICD-10-CM

## 2022-06-13 DIAGNOSIS — G43.019 INTRACTABLE MIGRAINE WITHOUT AURA AND WITHOUT STATUS MIGRAINOSUS: ICD-10-CM

## 2022-06-13 DIAGNOSIS — I10 ESSENTIAL HYPERTENSION: ICD-10-CM

## 2022-06-13 DIAGNOSIS — F33.0 MILD EPISODE OF RECURRENT MAJOR DEPRESSIVE DISORDER: ICD-10-CM

## 2022-06-13 DIAGNOSIS — K76.0 HEPATIC STEATOSIS: Primary | ICD-10-CM

## 2022-06-13 DIAGNOSIS — Z23 NEED FOR COVID-19 VACCINE: ICD-10-CM

## 2022-06-13 PROBLEM — M76.61 TENDONITIS, ACHILLES, RIGHT: Status: ACTIVE | Noted: 2022-06-13

## 2022-06-13 LAB
25(OH)D3 SERPL-MCNC: 54.8 NG/ML (ref 30–100)
ALBUMIN SERPL-MCNC: 5.5 G/DL (ref 3.5–5.2)
ALBUMIN/GLOB SERPL: 2.2 G/DL
ALP SERPL-CCNC: 103 U/L (ref 39–117)
ALT SERPL W P-5'-P-CCNC: 115 U/L (ref 1–33)
ANION GAP SERPL CALCULATED.3IONS-SCNC: 17.6 MMOL/L (ref 5–15)
AST SERPL-CCNC: 105 U/L (ref 1–32)
BILIRUB SERPL-MCNC: 0.5 MG/DL (ref 0–1.2)
BUN SERPL-MCNC: 25 MG/DL (ref 8–23)
BUN/CREAT SERPL: 23.1 (ref 7–25)
CALCIUM SPEC-SCNC: 10.5 MG/DL (ref 8.6–10.5)
CHLORIDE SERPL-SCNC: 101 MMOL/L (ref 98–107)
CO2 SERPL-SCNC: 20.4 MMOL/L (ref 22–29)
CREAT SERPL-MCNC: 1.08 MG/DL (ref 0.57–1)
EGFRCR SERPLBLD CKD-EPI 2021: 58.9 ML/MIN/1.73
GLOBULIN UR ELPH-MCNC: 2.5 GM/DL
GLUCOSE SERPL-MCNC: 100 MG/DL (ref 65–99)
POTASSIUM SERPL-SCNC: 4.6 MMOL/L (ref 3.5–5.2)
PROT SERPL-MCNC: 8 G/DL (ref 6–8.5)
SODIUM SERPL-SCNC: 139 MMOL/L (ref 136–145)

## 2022-06-13 PROCEDURE — 99214 OFFICE O/P EST MOD 30 MIN: CPT | Performed by: PHYSICIAN ASSISTANT

## 2022-06-13 PROCEDURE — 82306 VITAMIN D 25 HYDROXY: CPT

## 2022-06-13 PROCEDURE — 0054A COVID-19 (PFIZER) 12+ YRS: CPT | Performed by: PHYSICIAN ASSISTANT

## 2022-06-13 PROCEDURE — 91305 COVID-19 (PFIZER) 12+ YRS: CPT | Performed by: PHYSICIAN ASSISTANT

## 2022-06-13 PROCEDURE — 80053 COMPREHEN METABOLIC PANEL: CPT

## 2022-06-13 RX ORDER — FLUOXETINE HYDROCHLORIDE 20 MG/1
CAPSULE ORAL
Qty: 42 CAPSULE | Refills: 0 | Status: SHIPPED | OUTPATIENT
Start: 2022-06-13 | End: 2022-07-21 | Stop reason: SDUPTHER

## 2022-06-13 NOTE — PROGRESS NOTES
Chief Complaint   Patient presents with   • Follow-up     3 Month   • Anxiety   • Liver Follow-up     Recheck   • Medication Problem     Trintellix       HPI     Yamila Neff is a pleasant 61 y.o. female who is here for routine follow-up of hypertension, hypercalcemia, migraine, depression and anxiety.    Patient's blood pressure is stable and well-controlled today.  She is compliant on all medications.  She is not on calcium supplements.  Taking vitamin D daily.    Patient's recent liver ultrasound is stable with no nodules or masses.      She reports improvement in mood and anxiety since quitting her job.  She is on trintellix 20 mg daily but insurance will not cover this.  Previously did well on prozac.  Has tried various other medications without significant benefit.    Past Medical History:   Diagnosis Date   • Anemia    • Arthritis    • Carpal tunnel syndrome 2012   • Degenerative joint disease    • Depression    • Depression    • Elevated cholesterol    • Elevated liver enzymes    • Fatty liver    • HL (hearing loss)    • Hypertension    • Kidney disorder     one kidney   • Palpitations    • Polycystic ovaries    • Psoriasis    • PVC (premature ventricular contraction)     occasional pvc's   • Sleep apnea     cpap at home   • Wears glasses        Past Surgical History:   Procedure Laterality Date   • BREAST BIOPSY Right     PAPILLOMA DR BELL   •  SECTION      x 3   • CHOLECYSTECTOMY  2013   • COLONOSCOPY     • HYSTERECTOMY  2006    complete   • JOINT REPLACEMENT Left     left knee   • OOPHORECTOMY     • TOTAL KNEE ARTHROPLASTY Right 3/9/2021    Procedure: TOTAL KNEE ARTHROPLASTY RIGHT;  Surgeon: Mateo Keith MD;  Location: [facility];  Service: Orthopedics;  Laterality: Right;   • TUBAL ABDOMINAL LIGATION         Family History   Problem Relation Age of Onset   • Diabetes Mother    • Lymphoma Mother    • Hypertension Mother    • No Known Problems Father    • Hypertension Brother    •  "Hypertension Brother    • Heart disease Maternal Grandmother    • Heart disease Maternal Grandfather    • Heart attack Maternal Grandfather    • Colon cancer Maternal Aunt    • Breast cancer Neg Hx    • Ovarian cancer Neg Hx        Social History     Socioeconomic History   • Marital status:    Tobacco Use   • Smoking status: Never Smoker   • Smokeless tobacco: Never Used   Vaping Use   • Vaping Use: Never used   Substance and Sexual Activity   • Alcohol use: No   • Drug use: No   • Sexual activity: Yes     Partners: Male     Comment:        Allergies   Allergen Reactions   • Celexa [Citalopram] Other (See Comments)     Jittery    • Lisinopril Cough       ROS  Review of Systems   Constitutional: Positive for fatigue. Negative for chills and fever.   Respiratory: Negative for cough, shortness of breath and wheezing.    Cardiovascular: Negative for chest pain, palpitations and leg swelling.   Neurological: Negative for dizziness and headache.   Psychiatric/Behavioral: Positive for depressed mood and stress. Negative for agitation, self-injury and suicidal ideas. The patient is nervous/anxious.        Vitals:    06/13/22 0950   BP: 126/76   BP Location: Left arm   Patient Position: Sitting   Cuff Size: Large Adult   Pulse: 80   Temp: 98.7 °F (37.1 °C)   SpO2: 96%   Weight: 105 kg (231 lb 9.6 oz)   Height: 157.5 cm (62.01\")   PainSc: 0-No pain     Body mass index is 42.35 kg/m².    Current Outpatient Medications on File Prior to Visit   Medication Sig Dispense Refill   • allopurinol (Zyloprim) 300 MG tablet Take 1 tablet by mouth Daily. 30 tablet 2   • amitriptyline (ELAVIL) 10 MG tablet Take 1 tablet by mouth Every Night. 90 tablet 0   • amLODIPine (NORVASC) 10 MG tablet Take 1 tablet by mouth Daily. 90 tablet 1   • atorvastatin (LIPITOR) 20 MG tablet Take 1 tablet by mouth Every Night. 90 tablet 3   • cholecalciferol (VITAMIN D3) 1000 units tablet Take 2,000 Units by mouth Daily.     • clobetasol " (TEMOVATE) 0.05 % external solution Apply topically to the itchy, scaly areas on scalp as directed 2 (two) times a day. 25 mL 3   • gabapentin (Neurontin) 100 MG capsule Take 1 capsule by mouth every night at bedtime. 30 capsule 0   • hydrocortisone 2.5 % cream Apply  topically to the affected areas on abdomen as directed Every Evening until resolved. 28.35 g 3   • hydrOXYzine (ATARAX) 50 MG tablet Take 1 tablet by mouth At Night As Needed for Anxiety. 90 tablet 1   • icosapent ethyl (Vascepa) 1 g capsule capsule Take 2 grams by mouth 2 (Two) Times a Day With Meals. 360 capsule 3   • ketoconazole (NIZORAL) 2 % shampoo Use as a shampo to the scalp and face 3 (Three) Times a Week. Leave in for 5 minutes and rinse. 120 mL 3   • losartan (Cozaar) 100 MG tablet Take 1 tablet by mouth Daily. 90 tablet 1   • Magnesium 250 MG tablet Take 1 tablet by mouth Daily.     • metFORMIN ER (GLUCOPHAGE-XR) 500 MG 24 hr tablet Take 2 tablets by mouth 2 (Two) Times a Day. 360 tablet 1   • nystatin-triamcinolone (MYCOLOG II) 993533-6.1 UNIT/GM-% cream Apply 1 application topically to the appropriate area sparingly as directed 3 (Three) Times a Day As Needed. 15 g 5   • nystatin-triamcinolone (MYCOLOG II) 600279-9.1 UNIT/GM-% cream Apply a small amount to affected area 3 (Three) times a day as needed 30 g 5   • ondansetron ODT (Zofran ODT) 4 MG disintegrating tablet Place 1 tablet on the tongue Every 8 (Eight) Hours As Needed for Nausea or Vomiting. 14 tablet 0   • Probiotic Product (PROBIOTIC DAILY PO) Take  by mouth Daily.     • propranolol LA (INDERAL LA) 60 MG 24 hr capsule Take 1 capsule by mouth Daily. 90 capsule 1   • Vitamin E 400 units tablet Take 800 Units by mouth Daily.     • [DISCONTINUED] Vortioxetine HBr (Trintellix) 20 MG tablet Take 20 mg by mouth Daily. 90 tablet 1     No current facility-administered medications on file prior to visit.       Results for orders placed or performed in visit on 12/14/21   Uric Acid     Specimen: Blood   Result Value Ref Range    Uric Acid 4.4 2.4 - 5.7 mg/dL       PE    Physical Exam  Vitals reviewed.   Constitutional:       General: She is not in acute distress.     Appearance: Normal appearance. She is well-developed. She is morbidly obese. She is not ill-appearing or diaphoretic.   HENT:      Head: Normocephalic and atraumatic.   Eyes:      Extraocular Movements: Extraocular movements intact.      Conjunctiva/sclera: Conjunctivae normal.   Pulmonary:      Effort: No respiratory distress.   Musculoskeletal:         General: Normal range of motion.      Cervical back: Normal range of motion.      Right lower leg: No edema.      Left lower leg: No edema.   Skin:     General: Skin is warm.      Findings: No erythema or rash.   Neurological:      General: No focal deficit present.      Mental Status: She is alert.   Psychiatric:         Attention and Perception: Attention and perception normal. She is attentive.         Mood and Affect: Mood is anxious and depressed.         Speech: Speech normal.         Behavior: Behavior normal. Behavior is cooperative.         Thought Content: Thought content normal.         Cognition and Memory: Cognition and memory normal.         Judgment: Judgment normal.         A/P    Diagnoses and all orders for this visit:    1. Essential hypertension (Primary)  -     Comprehensive Metabolic Panel; Future  Stable, well-controlled.  Compliant on medication.    2. Hypercalcemia  -     Comprehensive Metabolic Panel; Future  Not on any calcium supplements.  Will repeat CMP to evaluate calcium.    3. Intractable migraine without aura and without status migrainosus  Stable currently.    4. Mild episode of recurrent major depressive disorder (HCC)  -     FLUoxetine (PROzac) 20 MG capsule; Take 1 capsule (20 mg) by mouth daily for 2 weeks, then increase to 2 capsules (40 mg) daily  Dispense: 42 capsule; Refill: 0    5. Anxiety  -     FLUoxetine (PROzac) 20 MG capsule; Take 1  capsule (20 mg) by mouth daily for 2 weeks, then increase to 2 capsules (40 mg) daily  Dispense: 42 capsule; Refill: 0  Improved since she stopped working.  Insurance will not cover Trintellix.  Has taken multiple medications in the past without benefit.  Prozac was first medication and this worked well.  Take Trintellix 20 mg every other day for 7-10 days and stop.  Start prozac 20 mg daily for 2 weeks and then increase to 40 mg daily.  Return in 4-6 weeks.  Call sooner if she has concerns with medication changes.    6. Vitamin D deficiency  -     Vitamin D 25 Hydroxy; Future    7. Need for COVID-19 vaccine  -     COVID-19 Vaccine (Pfizer) Gray Cap         Plan of care reviewed with patient at the conclusion of today's visit. Education was provided regarding diagnosis, management and any prescribed or recommended OTC medications.  Patient verbalizes understanding of and agreement with management plan.    Return in about 6 weeks (around 7/25/2022) for Recheck, depression/anxiety.     Daly Archibald PA-C

## 2022-06-13 NOTE — PATIENT INSTRUCTIONS
Reduce trintellix to 20 mg every other day for 14 days.  Start prozac 20 mg daily for 2 weeks, then increase to 40 mg daily.

## 2022-07-21 DIAGNOSIS — F41.9 ANXIETY: ICD-10-CM

## 2022-07-21 DIAGNOSIS — F33.0 MILD EPISODE OF RECURRENT MAJOR DEPRESSIVE DISORDER: ICD-10-CM

## 2022-07-21 RX ORDER — FLUOXETINE HYDROCHLORIDE 40 MG/1
40 CAPSULE ORAL DAILY
Qty: 30 CAPSULE | Refills: 2 | Status: SHIPPED | OUTPATIENT
Start: 2022-07-21 | End: 2022-10-10 | Stop reason: SDUPTHER

## 2022-07-21 NOTE — TELEPHONE ENCOUNTER
Rx Refill Note  Requested Prescriptions     Pending Prescriptions Disp Refills   • FLUoxetine (PROzac) 20 MG capsule 42 capsule 0     Sig: Take 1 capsule (20 mg) by mouth daily for 2 weeks, then increase to 2 capsules (40 mg) daily      Last office visit with prescribing clinician: 6/13/2022      Next office visit with prescribing clinician: 7/28/2022            Shameka Vernon MA  07/21/22, 14:14 EDT

## 2022-07-28 ENCOUNTER — OFFICE VISIT (OUTPATIENT)
Dept: FAMILY MEDICINE CLINIC | Facility: CLINIC | Age: 62
End: 2022-07-28

## 2022-07-28 VITALS
TEMPERATURE: 98.2 F | DIASTOLIC BLOOD PRESSURE: 66 MMHG | BODY MASS INDEX: 42.4 KG/M2 | WEIGHT: 230.4 LBS | OXYGEN SATURATION: 96 % | HEART RATE: 67 BPM | SYSTOLIC BLOOD PRESSURE: 118 MMHG | HEIGHT: 62 IN

## 2022-07-28 DIAGNOSIS — M79.89 LEG SWELLING: ICD-10-CM

## 2022-07-28 DIAGNOSIS — E66.01 CLASS 3 SEVERE OBESITY DUE TO EXCESS CALORIES WITH SERIOUS COMORBIDITY AND BODY MASS INDEX (BMI) OF 40.0 TO 44.9 IN ADULT: ICD-10-CM

## 2022-07-28 DIAGNOSIS — F41.9 ANXIETY: ICD-10-CM

## 2022-07-28 DIAGNOSIS — R73.03 PREDIABETES: ICD-10-CM

## 2022-07-28 DIAGNOSIS — K76.0 HEPATIC STEATOSIS: ICD-10-CM

## 2022-07-28 DIAGNOSIS — I10 ESSENTIAL HYPERTENSION: Primary | ICD-10-CM

## 2022-07-28 DIAGNOSIS — F33.42 RECURRENT MAJOR DEPRESSIVE DISORDER, IN FULL REMISSION: ICD-10-CM

## 2022-07-28 PROCEDURE — 99214 OFFICE O/P EST MOD 30 MIN: CPT | Performed by: PHYSICIAN ASSISTANT

## 2022-07-28 RX ORDER — AMLODIPINE BESYLATE 5 MG/1
5 TABLET ORAL DAILY
Qty: 90 TABLET | Refills: 1 | Status: SHIPPED | OUTPATIENT
Start: 2022-07-28

## 2022-07-28 RX ORDER — AMLODIPINE BESYLATE 5 MG/1
5 TABLET ORAL DAILY
COMMUNITY
End: 2022-07-28 | Stop reason: SDUPTHER

## 2022-07-28 NOTE — PROGRESS NOTES
Chief Complaint   Patient presents with   • Anxiety     Pt. States since her last visit she has been doing better   • Depression       HPI     Yamila Neff is a pleasant 61 y.o. female who is here for routine follow-up of hypertension, Gray, prediabetes, obesity, anxiety and depression.  She is doing very well overall with her mood and anxiety.  She recently retired and thinks this has helped significantly.  She is compliant on Prozac 40 mg daily and is getting good benefit with the medication.  She denies any adverse effects.    We reviewed her recent lab work and ultrasound showing diffuse hepatic steatosis.  She has an upcoming appointment with Dr. Polk to discuss this further.  Encouraged her to lose weight with diet and exercise.    Her blood pressure is stable and well-controlled today.  She does note more bilateral ankle swelling at night.  This always resolves in the morning.  She is currently on amlodipine 10 mg daily.    Past Medical History:   Diagnosis Date   • Anemia    • Arthritis    • Carpal tunnel syndrome 2012   • Degenerative joint disease    • Depression    • Depression    • Elevated cholesterol    • Elevated liver enzymes    • Fatty liver    • HL (hearing loss)    • Hypertension    • Kidney disorder     one kidney   • Palpitations    • Polycystic ovaries    • Psoriasis    • PVC (premature ventricular contraction)     occasional pvc's   • Sleep apnea     cpap at home   • Wears glasses        Past Surgical History:   Procedure Laterality Date   • BREAST BIOPSY Right     PAPILLOMA DR BELL   •  SECTION      x 3   • CHOLECYSTECTOMY     • COLONOSCOPY     • HYSTERECTOMY  2006    complete   • JOINT REPLACEMENT Left     left knee   • OOPHORECTOMY     • TOTAL KNEE ARTHROPLASTY Right 3/9/2021    Procedure: TOTAL KNEE ARTHROPLASTY RIGHT;  Surgeon: Mateo Keith MD;  Location: [facility];  Service: Orthopedics;  Laterality: Right;   • TUBAL ABDOMINAL LIGATION         Family  "History   Problem Relation Age of Onset   • Diabetes Mother    • Lymphoma Mother    • Hypertension Mother    • No Known Problems Father    • Hypertension Brother    • Hypertension Brother    • Heart disease Maternal Grandmother    • Heart disease Maternal Grandfather    • Heart attack Maternal Grandfather    • Colon cancer Maternal Aunt    • Breast cancer Neg Hx    • Ovarian cancer Neg Hx        Social History     Socioeconomic History   • Marital status:    Tobacco Use   • Smoking status: Never Smoker   • Smokeless tobacco: Never Used   Vaping Use   • Vaping Use: Never used   Substance and Sexual Activity   • Alcohol use: No   • Drug use: No   • Sexual activity: Yes     Partners: Male     Comment:        Allergies   Allergen Reactions   • Celexa [Citalopram] Other (See Comments)     Jittery    • Lisinopril Cough       ROS  Review of Systems   Constitutional: Negative for chills, diaphoresis, fatigue and fever.   HENT: Negative for congestion, postnasal drip and rhinorrhea.    Respiratory: Negative for cough, shortness of breath and wheezing.    Cardiovascular: Positive for leg swelling. Negative for chest pain.   Gastrointestinal: Negative for abdominal pain.   Neurological: Negative for dizziness and headache.   Psychiatric/Behavioral: Negative for self-injury, sleep disturbance, suicidal ideas, depressed mood and stress. The patient is not nervous/anxious.        Vitals:    07/28/22 1104   BP: 118/66   BP Location: Right arm   Patient Position: Sitting   Cuff Size: Large Adult   Pulse: 67   Temp: 98.2 °F (36.8 °C)   SpO2: 96%   Weight: 105 kg (230 lb 6.4 oz)   Height: 157.5 cm (62.01\")   PainSc: 0-No pain     Body mass index is 42.13 kg/m².    Current Outpatient Medications on File Prior to Visit   Medication Sig Dispense Refill   • allopurinol (Zyloprim) 300 MG tablet Take 1 tablet by mouth Daily. 30 tablet 2   • amitriptyline (ELAVIL) 10 MG tablet Take 1 tablet by mouth Every Night. 90 tablet 0 "   • atorvastatin (LIPITOR) 20 MG tablet Take 1 tablet by mouth Every Night. 90 tablet 3   • cholecalciferol (VITAMIN D3) 1000 units tablet Take 2,000 Units by mouth Daily.     • clobetasol (TEMOVATE) 0.05 % external solution Apply topically to the itchy, scaly areas on scalp as directed 2 (two) times a day. 25 mL 3   • clobetasol (TEMOVATE) 0.05 % external solution Apply 1 application topically to the appropriate area as directed 2 (Two) Times a Day, to itchy, scaly areas on scalp. 25 mL 3   • FLUoxetine (PROzac) 40 MG capsule Take 1 capsule by mouth Daily. 30 capsule 2   • gabapentin (Neurontin) 100 MG capsule Take 1 capsule by mouth every night at bedtime. 30 capsule 0   • hydrocortisone 2.5 % cream Apply  topically to the affected areas on abdomen as directed Every Evening until resolved. 28.35 g 3   • hydrocortisone 2.5 % cream Apply 1 application topically to the appropriate area on abdomen until resolved every evening 28.35 g 3   • hydrOXYzine (ATARAX) 50 MG tablet Take 1 tablet by mouth At Night As Needed for Anxiety. 90 tablet 1   • icosapent ethyl (Vascepa) 1 g capsule capsule Take 2 grams by mouth 2 (Two) Times a Day With Meals. 360 capsule 3   • ketoconazole (NIZORAL) 2 % cream Apply every morning to underarms until resolved. 30 g 3   • ketoconazole (NIZORAL) 2 % shampoo Use as a shampoo to scalp and face 3 times a week. Leave in for 5 minutes and rinse. 120 mL 3   • losartan (Cozaar) 100 MG tablet Take 1 tablet by mouth Daily. 90 tablet 1   • Magnesium 250 MG tablet Take 1 tablet by mouth Daily.     • metFORMIN ER (GLUCOPHAGE-XR) 500 MG 24 hr tablet Take 2 tablets by mouth 2 (Two) Times a Day. 360 tablet 1   • metroNIDAZOLE (MetroCream) 0.75 % cream Apply twice daily to affected areas around mouth. 45 g 3   • nystatin-triamcinolone (MYCOLOG II) 336533-3.1 UNIT/GM-% cream Apply 1 application topically to the appropriate area sparingly as directed 3 (Three) Times a Day As Needed. 15 g 5   •  nystatin-triamcinolone (MYCOLOG II) 226917-4.1 UNIT/GM-% cream Apply a small amount to affected area 3 (Three) times a day as needed 30 g 5   • ondansetron ODT (Zofran ODT) 4 MG disintegrating tablet Place 1 tablet on the tongue Every 8 (Eight) Hours As Needed for Nausea or Vomiting. 14 tablet 0   • Probiotic Product (PROBIOTIC DAILY PO) Take  by mouth Daily.     • propranolol LA (INDERAL LA) 60 MG 24 hr capsule Take 1 capsule by mouth Daily. 90 capsule 1   • Vitamin E 400 units tablet Take 800 Units by mouth Daily.     • [DISCONTINUED] amLODIPine (NORVASC) 10 MG tablet Take 1 tablet by mouth Daily. 90 tablet 1   • [DISCONTINUED] amLODIPine (NORVASC) 5 MG tablet Take 5 mg by mouth Daily.       No current facility-administered medications on file prior to visit.       Results for orders placed or performed in visit on 06/13/22   Comprehensive Metabolic Panel    Specimen: Blood   Result Value Ref Range    Glucose 100 (H) 65 - 99 mg/dL    BUN 25 (H) 8 - 23 mg/dL    Creatinine 1.08 (H) 0.57 - 1.00 mg/dL    Sodium 139 136 - 145 mmol/L    Potassium 4.6 3.5 - 5.2 mmol/L    Chloride 101 98 - 107 mmol/L    CO2 20.4 (L) 22.0 - 29.0 mmol/L    Calcium 10.5 8.6 - 10.5 mg/dL    Total Protein 8.0 6.0 - 8.5 g/dL    Albumin 5.50 (H) 3.50 - 5.20 g/dL    ALT (SGPT) 115 (H) 1 - 33 U/L    AST (SGOT) 105 (H) 1 - 32 U/L    Alkaline Phosphatase 103 39 - 117 U/L    Total Bilirubin 0.5 0.0 - 1.2 mg/dL    Globulin 2.5 gm/dL    A/G Ratio 2.2 g/dL    BUN/Creatinine Ratio 23.1 7.0 - 25.0    Anion Gap 17.6 (H) 5.0 - 15.0 mmol/L    eGFR 58.9 (L) >60.0 mL/min/1.73   Vitamin D 25 Hydroxy    Specimen: Blood   Result Value Ref Range    25 Hydroxy, Vitamin D 54.8 30.0 - 100.0 ng/ml       PE    Physical Exam  Vitals reviewed.   Constitutional:       General: She is not in acute distress.     Appearance: Normal appearance. She is well-developed. She is morbidly obese. She is not ill-appearing or diaphoretic.   HENT:      Head: Normocephalic and  atraumatic.   Eyes:      Extraocular Movements: Extraocular movements intact.      Conjunctiva/sclera: Conjunctivae normal.   Cardiovascular:      Rate and Rhythm: Normal rate and regular rhythm.      Heart sounds: Normal heart sounds.   Pulmonary:      Effort: No respiratory distress.   Musculoskeletal:         General: Normal range of motion.      Cervical back: Normal range of motion.      Right lower leg: No edema.      Left lower leg: No edema.   Skin:     General: Skin is warm.      Findings: No erythema or rash.   Neurological:      General: No focal deficit present.      Mental Status: She is alert.   Psychiatric:         Attention and Perception: She is attentive.         Mood and Affect: Mood normal.         Speech: Speech normal.         Behavior: Behavior normal. Behavior is cooperative.         Thought Content: Thought content normal.         Judgment: Judgment normal.         A/P    Diagnoses and all orders for this visit:    1. Essential hypertension (Primary)  -     amLODIPine (NORVASC) 5 MG tablet; Take 1 tablet by mouth Daily.  Dispense: 90 tablet; Refill: 1  Blood pressure is very well-controlled.  Will reduce amlodipine due to ankle swelling.  Patient to monitor at home and call if elevated.    2. Leg swelling  Worse recently in the evenings.  Improves after sleeping.  Trial reduction in amlodipine to 5 mg nightly.  Avoid diuretics at this time due to CKD.    3. Class 3 severe obesity due to excess calories with serious comorbidity and body mass index (BMI) of 40.0 to 44.9 in adult ([facility])  Encouraged weight loss with diet and exercise.    4. Hepatic steatosis  Reviewed recent labs and ultrasound.  Has upcoming appointment with Dr. Polk.    5. Prediabetes  Has always stayed below 6%.  On metformin 1000 mg twice daily.    6. Anxiety  7. Recurrent major depressive disorder, in full remission ([facility])  She is currently on Prozac 40 mg daily.  She is doing very well on this medication and declines  increasing dose.  She also retired recently and feels this has helped with her overall mood and anxiety.         Plan of care reviewed with patient at the conclusion of today's visit. Education was provided regarding diagnosis, management and any prescribed or recommended OTC medications.  Patient verbalizes understanding of and agreement with management plan.    Return in about 4 months (around 11/15/2022) for Annual physical.     Daly Archibald PA-C

## 2022-08-05 ENCOUNTER — OFFICE VISIT (OUTPATIENT)
Dept: ORTHOPEDIC SURGERY | Facility: CLINIC | Age: 62
End: 2022-08-05

## 2022-08-05 VITALS
DIASTOLIC BLOOD PRESSURE: 70 MMHG | BODY MASS INDEX: 42.6 KG/M2 | HEIGHT: 62 IN | SYSTOLIC BLOOD PRESSURE: 118 MMHG | WEIGHT: 231.48 LBS

## 2022-08-05 DIAGNOSIS — M76.61 ACHILLES TENDINITIS OF RIGHT LOWER EXTREMITY: Primary | ICD-10-CM

## 2022-08-05 DIAGNOSIS — E11.9 TYPE 2 DIABETES MELLITUS WITHOUT COMPLICATION, WITHOUT LONG-TERM CURRENT USE OF INSULIN: ICD-10-CM

## 2022-08-05 PROCEDURE — 99213 OFFICE O/P EST LOW 20 MIN: CPT | Performed by: ORTHOPAEDIC SURGERY

## 2022-08-05 NOTE — PROGRESS NOTES
ESTABLISHED PATIENT    Patient: Yamila Neff  : 1960    Primary Care Provider: Daly Archibald PA-C    Requesting Provider: As above    Follow-up (8 month recheck - Achilles tendinitis of right lower extremity)      History    Chief Complaint: Right heel pain    History of Present Illness: She returns for follow-up of right insertional Achilles tendinitis.  Even though she has been on allopurinol and her uric acid level is normal it still has not resolved the Achilles pain.  She does not think it hurts enough for surgery, but it is very bothersome.    Current Outpatient Medications on File Prior to Visit   Medication Sig Dispense Refill   • allopurinol (Zyloprim) 300 MG tablet Take 1 tablet by mouth Daily. 30 tablet 2   • amitriptyline (ELAVIL) 10 MG tablet Take 1 tablet by mouth Every Night. 90 tablet 0   • amLODIPine (NORVASC) 5 MG tablet Take 1 tablet by mouth Daily. 90 tablet 1   • atorvastatin (LIPITOR) 20 MG tablet Take 1 tablet by mouth Every Night. 90 tablet 3   • cholecalciferol (VITAMIN D3) 1000 units tablet Take 2,000 Units by mouth Daily.     • clobetasol (TEMOVATE) 0.05 % external solution Apply topically to the itchy, scaly areas on scalp as directed 2 (two) times a day. 25 mL 3   • clobetasol (TEMOVATE) 0.05 % external solution Apply 1 application topically to the appropriate area as directed 2 (Two) Times a Day, to itchy, scaly areas on scalp. 25 mL 3   • FLUoxetine (PROzac) 40 MG capsule Take 1 capsule by mouth Daily. 30 capsule 2   • gabapentin (Neurontin) 100 MG capsule Take 1 capsule by mouth every night at bedtime. 30 capsule 0   • hydrocortisone 2.5 % cream Apply  topically to the affected areas on abdomen as directed Every Evening until resolved. 28.35 g 3   • hydrocortisone 2.5 % cream Apply 1 application topically to the appropriate area on abdomen until resolved every evening 28.35 g 3   • hydrOXYzine (ATARAX) 50 MG tablet Take 1 tablet by mouth At Night As Needed  for Anxiety. 90 tablet 1   • icosapent ethyl (Vascepa) 1 g capsule capsule Take 2 grams by mouth 2 (Two) Times a Day With Meals. 360 capsule 3   • ketoconazole (NIZORAL) 2 % cream Apply every morning to underarms until resolved. 30 g 3   • ketoconazole (NIZORAL) 2 % shampoo Use as a shampoo to scalp and face 3 times a week. Leave in for 5 minutes and rinse. 120 mL 3   • losartan (Cozaar) 100 MG tablet Take 1 tablet by mouth Daily. 90 tablet 1   • Magnesium 250 MG tablet Take 1 tablet by mouth Daily.     • metFORMIN ER (GLUCOPHAGE-XR) 500 MG 24 hr tablet Take 2 tablets by mouth 2 (Two) Times a Day. 360 tablet 1   • metroNIDAZOLE (MetroCream) 0.75 % cream Apply twice daily to affected areas around mouth. 45 g 3   • nystatin-triamcinolone (MYCOLOG II) 116092-7.1 UNIT/GM-% cream Apply 1 application topically to the appropriate area sparingly as directed 3 (Three) Times a Day As Needed. 15 g 5   • nystatin-triamcinolone (MYCOLOG II) 657815-3.1 UNIT/GM-% cream Apply a small amount to affected area 3 (Three) times a day as needed 30 g 5   • ondansetron ODT (Zofran ODT) 4 MG disintegrating tablet Place 1 tablet on the tongue Every 8 (Eight) Hours As Needed for Nausea or Vomiting. 14 tablet 0   • Probiotic Product (PROBIOTIC DAILY PO) Take  by mouth Daily.     • propranolol LA (INDERAL LA) 60 MG 24 hr capsule Take 1 capsule by mouth Daily. 90 capsule 1   • Vitamin E 400 units tablet Take 800 Units by mouth Daily.       No current facility-administered medications on file prior to visit.      Allergies   Allergen Reactions   • Celexa [Citalopram] Other (See Comments)     Jittery    • Lisinopril Cough      Past Medical History:   Diagnosis Date   • Anemia    • Arthritis    • Carpal tunnel syndrome 04/09/2012   • Degenerative joint disease    • Depression    • Depression    • Elevated cholesterol    • Elevated liver enzymes    • Fatty liver    • HL (hearing loss)    • Hypertension    • Kidney disorder     one kidney   •  Palpitations    • Polycystic ovaries    • Psoriasis    • PVC (premature ventricular contraction)     occasional pvc's   • Sleep apnea     cpap at home   • Wears glasses      Past Surgical History:   Procedure Laterality Date   • BREAST BIOPSY Right     PAPILLOMA DR BELL   •  SECTION      x 3   • CHOLECYSTECTOMY     • COLONOSCOPY     • HYSTERECTOMY      complete   • JOINT REPLACEMENT Left 2011    left knee   • OOPHORECTOMY     • TOTAL KNEE ARTHROPLASTY Right 3/9/2021    Procedure: TOTAL KNEE ARTHROPLASTY RIGHT;  Surgeon: Mateo Keith MD;  Location: [facility];  Service: Orthopedics;  Laterality: Right;   • TUBAL ABDOMINAL LIGATION       Family History   Problem Relation Age of Onset   • Diabetes Mother    • Lymphoma Mother    • Hypertension Mother    • No Known Problems Father    • Hypertension Brother    • Hypertension Brother    • Heart disease Maternal Grandmother    • Heart disease Maternal Grandfather    • Heart attack Maternal Grandfather    • Colon cancer Maternal Aunt    • Breast cancer Neg Hx    • Ovarian cancer Neg Hx       Social History     Socioeconomic History   • Marital status:    Tobacco Use   • Smoking status: Never Smoker   • Smokeless tobacco: Never Used   Vaping Use   • Vaping Use: Never used   Substance and Sexual Activity   • Alcohol use: No   • Drug use: No   • Sexual activity: Yes     Partners: Male     Comment:         Review of Systems   Constitutional: Negative.    HENT: Negative.    Eyes: Negative.    Respiratory: Negative.    Cardiovascular: Negative.    Gastrointestinal: Negative.    Endocrine: Negative.    Genitourinary: Negative.    Musculoskeletal: Positive for arthralgias.   Skin: Negative.    Allergic/Immunologic: Negative.    Neurological: Negative.    Hematological: Negative.    Psychiatric/Behavioral: Negative.        The following portions of the patient's history were reviewed and updated as appropriate: allergies, current medications, past  "family history, past medical history, past social history, past surgical history and problem list.    Physical Exam:   /70   Ht 157.5 cm (62.01\")   Wt 105 kg (231 lb 7.7 oz)   BMI 42.33 kg/m²   GENERAL: Body habitus: morbidly obese    Tender at the insertion of the right Achilles, mild thickening of the bursa, tenderness intact    Medical Decision Making    Data Review:   none    Assessment/Plan/Diagnosis/Treatment Options:   1. Achilles tendinitis of right lower extremity  Chronic insertional Achilles tendinitis.  Even with improvement in the uric acid level it has not resolved the tendinitis.  She does not think it hurts enough for surgery.  She does not think physical therapy.  We discussed casting.  She lives alone so driving would be difficult.  She promises to be very compliant with wearing the boot, she is going to remove it to bathe and drive, she must wear it at night.  I will see her again in 8 weeks x-ray of the foot at that time    2. Type 2 diabetes mellitus without complication, without long-term current use of insulin ([facility])  Well-controlled A1c 5.7 in November 2021        Pam Phoenix MD                      "

## 2022-08-12 DIAGNOSIS — G43.019 INTRACTABLE MIGRAINE WITHOUT AURA AND WITHOUT STATUS MIGRAINOSUS: ICD-10-CM

## 2022-08-12 DIAGNOSIS — F51.01 PRIMARY INSOMNIA: ICD-10-CM

## 2022-08-12 RX ORDER — AMITRIPTYLINE HYDROCHLORIDE 10 MG/1
10 TABLET, FILM COATED ORAL NIGHTLY
Qty: 90 TABLET | Refills: 0 | Status: SHIPPED | OUTPATIENT
Start: 2022-08-12 | End: 2022-11-07 | Stop reason: SDUPTHER

## 2022-08-12 NOTE — TELEPHONE ENCOUNTER
Rx Refill Note  Requested Prescriptions     Pending Prescriptions Disp Refills   • amitriptyline (ELAVIL) 10 MG tablet 90 tablet 0     Sig: Take 1 tablet by mouth Every Night.      Last office visit with prescribing clinician: 7/28/2022      Next office visit with prescribing clinician: 11/28/2022            Guerita Del Rosario MA  08/12/22, 13:45 EDT

## 2022-08-25 DIAGNOSIS — M1A.0710 IDIOPATHIC CHRONIC GOUT OF RIGHT FOOT WITHOUT TOPHUS: ICD-10-CM

## 2022-08-25 DIAGNOSIS — G43.019 INTRACTABLE MIGRAINE WITHOUT AURA AND WITHOUT STATUS MIGRAINOSUS: ICD-10-CM

## 2022-08-26 RX ORDER — ALLOPURINOL 300 MG/1
300 TABLET ORAL DAILY
Qty: 30 TABLET | Refills: 2 | Status: SHIPPED | OUTPATIENT
Start: 2022-08-26 | End: 2022-11-28

## 2022-08-26 RX ORDER — PROPRANOLOL HCL 60 MG
60 CAPSULE, EXTENDED RELEASE 24HR ORAL DAILY
Qty: 90 CAPSULE | Refills: 1 | Status: SHIPPED | OUTPATIENT
Start: 2022-08-26 | End: 2023-02-21 | Stop reason: SDUPTHER

## 2022-08-26 NOTE — TELEPHONE ENCOUNTER
Rx Refill Note  Requested Prescriptions     Pending Prescriptions Disp Refills   • propranolol LA (INDERAL LA) 60 MG 24 hr capsule 90 capsule 1     Sig: Take 1 capsule by mouth Daily.   • allopurinol (Zyloprim) 300 MG tablet 30 tablet 2     Sig: Take 1 tablet by mouth Daily.      Last office visit with prescribing clinician: 7/28/2022      Next office visit with prescribing clinician: 11/28/2022            Raj Cai MA  08/26/22, 08:16 EDT

## 2022-09-06 ENCOUNTER — OFFICE VISIT (OUTPATIENT)
Dept: GASTROENTEROLOGY | Facility: CLINIC | Age: 62
End: 2022-09-06

## 2022-09-06 ENCOUNTER — LAB (OUTPATIENT)
Dept: LAB | Facility: HOSPITAL | Age: 62
End: 2022-09-06

## 2022-09-06 VITALS
SYSTOLIC BLOOD PRESSURE: 117 MMHG | DIASTOLIC BLOOD PRESSURE: 66 MMHG | BODY MASS INDEX: 42.14 KG/M2 | HEIGHT: 62 IN | WEIGHT: 229 LBS | HEART RATE: 76 BPM

## 2022-09-06 DIAGNOSIS — K75.81 NASH (NONALCOHOLIC STEATOHEPATITIS): ICD-10-CM

## 2022-09-06 DIAGNOSIS — K74.02 HEPATIC FIBROSIS, STAGE 3: ICD-10-CM

## 2022-09-06 DIAGNOSIS — R79.89 ELEVATED LIVER FUNCTION TESTS: Primary | ICD-10-CM

## 2022-09-06 DIAGNOSIS — R79.89 ELEVATED LIVER FUNCTION TESTS: ICD-10-CM

## 2022-09-06 DIAGNOSIS — K76.0 HEPATIC STEATOSIS: ICD-10-CM

## 2022-09-06 PROCEDURE — 86708 HEPATITIS A ANTIBODY: CPT

## 2022-09-06 PROCEDURE — 86235 NUCLEAR ANTIGEN ANTIBODY: CPT

## 2022-09-06 PROCEDURE — 80053 COMPREHEN METABOLIC PANEL: CPT

## 2022-09-06 PROCEDURE — 82977 ASSAY OF GGT: CPT

## 2022-09-06 PROCEDURE — 86231 EMA EACH IG CLASS: CPT

## 2022-09-06 PROCEDURE — 86038 ANTINUCLEAR ANTIBODIES: CPT

## 2022-09-06 PROCEDURE — 99204 OFFICE O/P NEW MOD 45 MIN: CPT | Performed by: INTERNAL MEDICINE

## 2022-09-06 PROCEDURE — 86704 HEP B CORE ANTIBODY TOTAL: CPT

## 2022-09-06 PROCEDURE — 86381 MITOCHONDRIAL ANTIBODY EACH: CPT

## 2022-09-06 PROCEDURE — 82728 ASSAY OF FERRITIN: CPT

## 2022-09-06 PROCEDURE — 86258 DGP ANTIBODY EACH IG CLASS: CPT

## 2022-09-06 PROCEDURE — 83540 ASSAY OF IRON: CPT

## 2022-09-06 PROCEDURE — 84466 ASSAY OF TRANSFERRIN: CPT

## 2022-09-06 PROCEDURE — 86015 ACTIN ANTIBODY EACH: CPT

## 2022-09-06 PROCEDURE — 86803 HEPATITIS C AB TEST: CPT

## 2022-09-06 PROCEDURE — 82105 ALPHA-FETOPROTEIN SERUM: CPT

## 2022-09-06 PROCEDURE — 87340 HEPATITIS B SURFACE AG IA: CPT

## 2022-09-06 PROCEDURE — 86225 DNA ANTIBODY NATIVE: CPT

## 2022-09-06 PROCEDURE — 82784 ASSAY IGA/IGD/IGG/IGM EACH: CPT

## 2022-09-06 PROCEDURE — 86364 TISS TRNSGLTMNASE EA IG CLAS: CPT

## 2022-09-06 PROCEDURE — 86706 HEP B SURFACE ANTIBODY: CPT

## 2022-09-06 PROCEDURE — 83516 IMMUNOASSAY NONANTIBODY: CPT

## 2022-09-06 PROCEDURE — 86376 MICROSOMAL ANTIBODY EACH: CPT

## 2022-09-06 PROCEDURE — 82390 ASSAY OF CERULOPLASMIN: CPT

## 2022-09-06 PROCEDURE — 82103 ALPHA-1-ANTITRYPSIN TOTAL: CPT

## 2022-09-06 PROCEDURE — 82104 ALPHA-1-ANTITRYPSIN PHENO: CPT

## 2022-09-06 NOTE — PROGRESS NOTES
PCP:  Daly Archibald PA-C Montgomery, Christen A, PA-C  [address] [place] [address]    Chief Complaint   Patient presents with   • Elevated Hepatic Enzymes        HPI   Patient is a 61-year-old female here with elevated liver chemistries which has been a problem for her she thinks for the past 5 to 6 years.  She has been found to have fatty liver both on imaging studies as well as blood work and on liver biopsy.  She had a liver biopsy on 2/8/2018.  This showed severe fatty change, steatohepatitis, and stage III-IV fibrosis.  This was thought most likely to be related to steatohepatitis.  Interestingly, her POTTER Fibrosure predicted F0 fibrosis.  It did put direct marked steatosis and probable steatohepatitis.  This was done 11/12/2021.  She does not drink alcohol.  She is a nurse.  She has had no transfusions.  There is no family history of liver disease.  She has no history of IV drug use.  She has no history of homemade tattoos.  She did lose about 50 pounds and her liver chemistries improved but she is gaining that back.  She is postcholecystectomy.  On 6/13/2022 her ALT was 115, , bilirubin 0.5, alk phos 103.    Allergies   Allergen Reactions   • Celexa [Citalopram] Other (See Comments)     Jittery    • Lisinopril Cough          Current Outpatient Medications:   •  allopurinol (Zyloprim) 300 MG tablet, Take 1 tablet by mouth Daily., Disp: 30 tablet, Rfl: 2  •  amitriptyline (ELAVIL) 10 MG tablet, Take 1 tablet by mouth Every Night., Disp: 90 tablet, Rfl: 0  •  amLODIPine (NORVASC) 5 MG tablet, Take 1 tablet by mouth Daily., Disp: 90 tablet, Rfl: 1  •  atorvastatin (LIPITOR) 20 MG tablet, Take 1 tablet by mouth Every Night., Disp: 90 tablet, Rfl: 3  •  cholecalciferol (VITAMIN D3) 1000 units tablet, Take 2,000 Units by mouth Daily., Disp: , Rfl:   •  clobetasol (TEMOVATE) 0.05 % external solution, Apply topically to the itchy, scaly areas on scalp as directed 2 (two) times a  day., Disp: 25 mL, Rfl: 3  •  clobetasol (TEMOVATE) 0.05 % external solution, Apply 1 application topically to the appropriate area as directed 2 (Two) Times a Day, to itchy, scaly areas on scalp., Disp: 25 mL, Rfl: 3  •  FLUoxetine (PROzac) 40 MG capsule, Take 1 capsule by mouth Daily., Disp: 30 capsule, Rfl: 2  •  gabapentin (Neurontin) 100 MG capsule, Take 1 capsule by mouth every night at bedtime., Disp: 30 capsule, Rfl: 0  •  hydrocortisone 2.5 % cream, Apply  topically to the affected areas on abdomen as directed Every Evening until resolved., Disp: 28.35 g, Rfl: 3  •  hydrocortisone 2.5 % cream, Apply 1 application topically to the appropriate area on abdomen until resolved every evening, Disp: 28.35 g, Rfl: 3  •  hydrOXYzine (ATARAX) 50 MG tablet, Take 1 tablet by mouth At Night As Needed for Anxiety., Disp: 90 tablet, Rfl: 1  •  icosapent ethyl (Vascepa) 1 g capsule capsule, Take 2 grams by mouth 2 (Two) Times a Day With Meals., Disp: 360 capsule, Rfl: 3  •  ketoconazole (NIZORAL) 2 % cream, Apply every morning to underarms until resolved., Disp: 30 g, Rfl: 3  •  ketoconazole (NIZORAL) 2 % shampoo, Use as a shampoo to scalp and face 3 times a week. Leave in for 5 minutes and rinse., Disp: 120 mL, Rfl: 3  •  losartan (Cozaar) 100 MG tablet, Take 1 tablet by mouth Daily., Disp: 90 tablet, Rfl: 1  •  Magnesium 250 MG tablet, Take 1 tablet by mouth Daily., Disp: , Rfl:   •  metFORMIN ER (GLUCOPHAGE-XR) 500 MG 24 hr tablet, Take 2 tablets by mouth 2 (Two) Times a Day., Disp: 360 tablet, Rfl: 1  •  metroNIDAZOLE (MetroCream) 0.75 % cream, Apply twice daily to affected areas around mouth., Disp: 45 g, Rfl: 3  •  nystatin-triamcinolone (MYCOLOG II) 716955-1.1 UNIT/GM-% cream, Apply 1 application topically to the appropriate area sparingly as directed 3 (Three) Times a Day As Needed., Disp: 15 g, Rfl: 5  •  nystatin-triamcinolone (MYCOLOG II) 395768-8.1 UNIT/GM-% cream, Apply a small amount to affected area 3  (Three) times a day as needed, Disp: 30 g, Rfl: 5  •  ondansetron ODT (Zofran ODT) 4 MG disintegrating tablet, Place 1 tablet on the tongue Every 8 (Eight) Hours As Needed for Nausea or Vomiting., Disp: 14 tablet, Rfl: 0  •  Probiotic Product (PROBIOTIC DAILY PO), Take  by mouth Daily., Disp: , Rfl:   •  propranolol LA (INDERAL LA) 60 MG 24 hr capsule, Take 1 capsule by mouth Daily., Disp: 90 capsule, Rfl: 1  •  Vitamin E 400 units tablet, Take 800 Units by mouth Daily., Disp: , Rfl:      Past Medical History:   Diagnosis Date   • Anemia    • Arthritis    • Carpal tunnel syndrome 2012   • Degenerative joint disease    • Depression    • Depression    • Elevated cholesterol    • Elevated liver enzymes    • Fatty liver    • HL (hearing loss)    • Hypertension    • Kidney disorder     one kidney   • Palpitations    • Polycystic ovaries    • Psoriasis    • PVC (premature ventricular contraction)     occasional pvc's   • Sleep apnea     cpap at home   • Wears glasses        Past Surgical History:   Procedure Laterality Date   • BREAST BIOPSY Right     PAPILLOMA DR BELL   •  SECTION      x 3   • CHOLECYSTECTOMY  2013   • COLONOSCOPY     • HYSTERECTOMY  2006    complete   • JOINT REPLACEMENT Left     left knee   • OOPHORECTOMY     • TOTAL KNEE ARTHROPLASTY Right 3/9/2021    Procedure: TOTAL KNEE ARTHROPLASTY RIGHT;  Surgeon: Mateo Keith MD;  Location: [facility];  Service: Orthopedics;  Laterality: Right;   • TUBAL ABDOMINAL LIGATION          Social History     Socioeconomic History   • Marital status:    Tobacco Use   • Smoking status: Never Smoker   • Smokeless tobacco: Never Used   Vaping Use   • Vaping Use: Never used   Substance and Sexual Activity   • Alcohol use: No   • Drug use: No   • Sexual activity: Yes     Partners: Male     Comment:         Family History   Problem Relation Age of Onset   • Diabetes Mother    • Lymphoma Mother    • Hypertension Mother    • No Known Problems  Father    • Hypertension Brother    • Hypertension Brother    • Heart disease Maternal Grandmother    • Heart disease Maternal Grandfather    • Heart attack Maternal Grandfather    • Colon cancer Maternal Aunt    • Breast cancer Neg Hx    • Ovarian cancer Neg Hx         Review of Systems     Vitals:    09/06/22 1504   BP: 117/66   Pulse: 76        Physical Exam   General Appearance: Alert, in no acute distress   Head: Normocephalic, without obvious abnormality, atraumatic   Eyes: Lids and lashes normal, conjunctivae and sclerae normal, no icterus, no pallor, corneas clear, PERRLA   Ears: Ears appear intact with no abnormalities noted   Extremities: Moves all extremities well, no edema, no cyanosis, no redness   Skin: No bleeding, bruising or rash, no stigmata of liver disease.  There was no palmar erythema.  There was no spider angiomas.   Neurologic: Cranial nerves 2 - 12 grossly intact, no focal deficits, no sign of hepatic encephalopathy.         Diagnoses and all orders for this visit:    1. Elevated liver function tests (Primary)  -     Celiac Comprehensive Panel; Future  -     Gamma GT; Future  -     Comprehensive Metabolic Panel; Future  -     Mitochondrial Antibodies, M2; Future  -     Hepatitis C Antibody; Future  -     Hepatitis B Surface Antigen; Future  -     Hepatitis B Surface Antibody; Future  -     Hepatitis B Core Antibody, Total; Future  -     Hepatitis A Antibody, Total; Future  -     Iron; Future  -     Soluble Liver Ag (IgG Ab); Future  -     Transferrin Saturation; Future  -     Ferritin; Future  -     Ceruloplasmin; Future  -     Alpha - 1 - Antitrypsin Phenotype; Future  -     Anti-Smooth Muscle Antibody Titer; Future  -     Anti-microsomal Antibody; Future  -     ANGEL by IFA, Reflex 9-biomarkers profile; Future  -     AFP Tumor Marker; Future    2. Hepatic steatosis    3. POTTER (nonalcoholic steatohepatitis)    4. Hepatic fibrosis, stage 3    Impressions and plan #1 elevated liver chemistries:  Most likely these are related to her fatty liver.  She did have an ultrasound done 4/22/2022.  This did show steatosis.  She had a biopsy done on 2/16/2018 which showed severe fatty change and steatohepatitis with stage III-IV fibrosis.  She has had some serologies in the past for autoimmune disease as well as primary biliary cirrhosis.  Her ferritin levels were normal in the past.  I would like to do a full work-up to be sure there is not any other contributing factors.  We will check for hereditary diseases such as alpha-1 antitrypsin deficiency, hemochromatosis, and Miguel Ángel's disease.  I will recheck autoimmune markers.  We will check for hepatitis B and C.  We will check her status for hepatitis A as well.  We will check an alpha-fetoprotein level.  She is likely best to get an upper endoscopy as she had stage III-IV fibrosis back in 2018.  I would like to be sure she has no evidence of varices.  She also has some sulfur type burps which almost sounds like gastroparesis.  We could evaluate the stomach at that time as well.  I will get a baseline alpha-fetoprotein level.  We talked about fatty liver extensively.  We talked about modest weight reduction which would be in the range of 10% of body weight.  In her case that would be in the range of 23 pounds.  She needs to keep her cholesterol under control if possible.  She does have prediabetes and she will keep her blood sugars under control as well.  We will see her back in follow-up.    William Polk MD

## 2022-09-07 LAB
ALBUMIN SERPL-MCNC: 4.6 G/DL (ref 3.5–5.2)
ALBUMIN/GLOB SERPL: 1.8 G/DL
ALP SERPL-CCNC: 95 U/L (ref 39–117)
ALPHA-FETOPROTEIN: 2.92 NG/ML (ref 0–8.3)
ALT SERPL W P-5'-P-CCNC: 132 U/L (ref 1–33)
ANION GAP SERPL CALCULATED.3IONS-SCNC: 12.1 MMOL/L (ref 5–15)
AST SERPL-CCNC: 132 U/L (ref 1–32)
BILIRUB SERPL-MCNC: 0.4 MG/DL (ref 0–1.2)
BUN SERPL-MCNC: 12 MG/DL (ref 8–23)
BUN/CREAT SERPL: 16 (ref 7–25)
CALCIUM SPEC-SCNC: 9.5 MG/DL (ref 8.6–10.5)
CERULOPLASMIN SERPL-MCNC: 23 MG/DL (ref 19–39)
CHLORIDE SERPL-SCNC: 105 MMOL/L (ref 98–107)
CO2 SERPL-SCNC: 23.9 MMOL/L (ref 22–29)
CREAT SERPL-MCNC: 0.75 MG/DL (ref 0.57–1)
EGFRCR SERPLBLD CKD-EPI 2021: 91.3 ML/MIN/1.73
FERRITIN SERPL-MCNC: 201 NG/ML (ref 13–150)
GGT SERPL-CCNC: 203 U/L (ref 5–36)
GLOBULIN UR ELPH-MCNC: 2.5 GM/DL
GLUCOSE SERPL-MCNC: 117 MG/DL (ref 65–99)
HBV SURFACE AB SER RIA-ACNC: REACTIVE
HBV SURFACE AG SERPL QL IA: NORMAL
HCV AB SER DONR QL: NORMAL
IRON 24H UR-MRATE: 100 MCG/DL (ref 37–145)
POTASSIUM SERPL-SCNC: 4.4 MMOL/L (ref 3.5–5.2)
PROT SERPL-MCNC: 7.1 G/DL (ref 6–8.5)
SODIUM SERPL-SCNC: 141 MMOL/L (ref 136–145)

## 2022-09-08 LAB — HAV AB SER QL IA: POSITIVE

## 2022-09-09 LAB
ANA HOMOGEN TITR SER: NORMAL {TITER}
ANA NUCLEOLAR TITR SER: NORMAL {TITER}
ANA SER QL IF: POSITIVE
CENTROMERE B AB SER-ACNC: <0.2 AI (ref 0–0.9)
CHROMATIN AB SERPL-ACNC: <0.2 AI (ref 0–0.9)
DSDNA AB SER-ACNC: <1 IU/ML (ref 0–9)
ENA JO1 AB SER-ACNC: <0.2 AI (ref 0–0.9)
ENA RNP AB SER-ACNC: <0.2 AI (ref 0–0.9)
ENA SCL70 AB SER-ACNC: <0.2 AI (ref 0–0.9)
ENA SM AB SER-ACNC: <0.2 AI (ref 0–0.9)
ENA SS-A AB SER-ACNC: <0.2 AI (ref 0–0.9)
ENA SS-B AB SER-ACNC: <0.2 AI (ref 0–0.9)
ENDOMYSIUM IGA SER QL: NEGATIVE
GLIADIN PEPTIDE IGA SER-ACNC: 7 UNITS (ref 0–19)
GLIADIN PEPTIDE IGG SER-ACNC: 2 UNITS (ref 0–19)
HBV CORE AB SERPL QL IA: NEGATIVE
IGA SERPL-MCNC: 154 MG/DL (ref 87–352)
LABORATORY COMMENT REPORT: ABNORMAL
LKM-1 AB SER-ACNC: 1.3 UNITS (ref 0–20)
Lab: NORMAL
Lab: NORMAL
MITOCHONDRIA M2 IGG SER-ACNC: <20 UNITS (ref 0–20)
SMA IGG SER-ACNC: 4 UNITS (ref 0–19)
TTG IGA SER-ACNC: <2 U/ML (ref 0–3)
TTG IGG SER-ACNC: <2 U/ML (ref 0–5)

## 2022-09-12 ENCOUNTER — TELEPHONE (OUTPATIENT)
Dept: ORTHOPEDIC SURGERY | Facility: CLINIC | Age: 62
End: 2022-09-12

## 2022-09-12 NOTE — TELEPHONE ENCOUNTER
Caller: Yamila Neff    Relationship to patient: Self    Best call back number:     Patient is needing: UNABLE TO WARM TRANSFER.  PATIENTS BOOT IS NOT WORKING PROPERLY.  SAYS THE DIAL ON THE SIDE WONT WORK.  PLEASE CONTACT PATIENT

## 2022-09-13 LAB
A1AT PHENOTYP SERPL IFE: NORMAL
A1AT SERPL-MCNC: 133 MG/DL (ref 101–187)
SOLUBLE LIVER IGG SER IA-ACNC: 0.8 UNITS (ref 0–20)

## 2022-09-15 LAB
IRON SATN MFR SERPL: 23 % SATURATION
IRON SERPL-MCNC: 107 UG/DL
TRANSFERRIN SERPL-MCNC: 327 MG/DL

## 2022-09-30 ENCOUNTER — TELEPHONE (OUTPATIENT)
Dept: ORTHOPEDIC SURGERY | Facility: CLINIC | Age: 62
End: 2022-09-30

## 2022-09-30 NOTE — TELEPHONE ENCOUNTER
Caller: Yamila Neff    Relationship: Self    Best call back number: [phone]    What is the best time to reach you: ANY    What was the call regarding: BOOT PT HAS HAD SINCE AUGUST 4, 2022 WILL NO LONGER INFLATE. PLEASE CALL PT AND ADVISE.     Do you require a callback: YES

## 2022-09-30 NOTE — TELEPHONE ENCOUNTER
Spoke with pt to see if she had tried turning the dial and seeing if either side would inflate but she reports it does not so I told her she could come in Monday to see Adenike for a boot exchange. She was agreeable to this.    LUCY Trevino

## 2022-10-10 ENCOUNTER — OFFICE VISIT (OUTPATIENT)
Dept: ORTHOPEDIC SURGERY | Facility: CLINIC | Age: 62
End: 2022-10-10

## 2022-10-10 VITALS
WEIGHT: 220.2 LBS | DIASTOLIC BLOOD PRESSURE: 84 MMHG | SYSTOLIC BLOOD PRESSURE: 128 MMHG | HEIGHT: 62 IN | BODY MASS INDEX: 40.52 KG/M2

## 2022-10-10 DIAGNOSIS — F33.0 MILD EPISODE OF RECURRENT MAJOR DEPRESSIVE DISORDER: ICD-10-CM

## 2022-10-10 DIAGNOSIS — M76.61 ACHILLES TENDINITIS OF RIGHT LOWER EXTREMITY: Primary | ICD-10-CM

## 2022-10-10 DIAGNOSIS — F41.9 ANXIETY: ICD-10-CM

## 2022-10-10 PROCEDURE — 99212 OFFICE O/P EST SF 10 MIN: CPT | Performed by: ORTHOPAEDIC SURGERY

## 2022-10-10 RX ORDER — FLUOXETINE HYDROCHLORIDE 40 MG/1
80 CAPSULE ORAL DAILY
Qty: 180 CAPSULE | Refills: 1 | Status: SHIPPED | OUTPATIENT
Start: 2022-10-10

## 2022-10-10 RX ORDER — FLUOXETINE HYDROCHLORIDE 40 MG/1
40 CAPSULE ORAL DAILY
Qty: 30 CAPSULE | Refills: 2 | Status: SHIPPED | OUTPATIENT
Start: 2022-10-10 | End: 2022-10-10 | Stop reason: SDUPTHER

## 2022-10-10 NOTE — PROGRESS NOTES
ESTABLISHED PATIENT    Patient: Yamila Neff  : 1960    Primary Care Provider: Daly Archibald PA-C    Requesting Provider: As above    Follow-up (9 week follow up --- Achilles tendinitis of right lower extremity)      History    Chief Complaint: She is here for follow-up of her Achilles tendinitis    History of Present Illness: She is here for follow-up of her right insertional Achilles tendinitis.  She has been very good about wearing the boot as a cast.  She reports that overall she is much improved.  She still has some burning pain if she steps a certain way getting out of the shower, but otherwise she feels much better.    Current Outpatient Medications on File Prior to Visit   Medication Sig Dispense Refill   • allopurinol (Zyloprim) 300 MG tablet Take 1 tablet by mouth Daily. 30 tablet 2   • amitriptyline (ELAVIL) 10 MG tablet Take 1 tablet by mouth Every Night. 90 tablet 0   • amLODIPine (NORVASC) 5 MG tablet Take 1 tablet by mouth Daily. 90 tablet 1   • atorvastatin (LIPITOR) 20 MG tablet Take 1 tablet by mouth Every Night. 90 tablet 3   • cholecalciferol (VITAMIN D3) 1000 units tablet Take 2,000 Units by mouth Daily.     • clobetasol (TEMOVATE) 0.05 % external solution Apply topically to the itchy, scaly areas on scalp as directed 2 (two) times a day. 25 mL 3   • clobetasol (TEMOVATE) 0.05 % external solution Apply 1 application topically to the appropriate area as directed 2 (Two) Times a Day, to itchy, scaly areas on scalp. 25 mL 3   • gabapentin (Neurontin) 100 MG capsule Take 1 capsule by mouth every night at bedtime. 30 capsule 0   • hydrocortisone 2.5 % cream Apply 1 application topically to the appropriate area on abdomen until resolved every evening 28.35 g 3   • hydrOXYzine (ATARAX) 50 MG tablet Take 1 tablet by mouth At Night As Needed for Anxiety. 90 tablet 1   • icosapent ethyl (Vascepa) 1 g capsule capsule Take 2 grams by mouth 2 (Two) Times a Day With Meals. 360  capsule 3   • ketoconazole (NIZORAL) 2 % cream Apply every morning to underarms until resolved. 30 g 3   • ketoconazole (NIZORAL) 2 % shampoo Use as a shampoo to scalp and face 3 times a week. Leave in for 5 minutes and rinse. 120 mL 3   • losartan (Cozaar) 100 MG tablet Take 1 tablet by mouth Daily. 90 tablet 1   • Magnesium 250 MG tablet Take 1 tablet by mouth Daily.     • metFORMIN ER (GLUCOPHAGE-XR) 500 MG 24 hr tablet Take 2 tablets by mouth 2 (Two) Times a Day. 360 tablet 1   • metroNIDAZOLE (MetroCream) 0.75 % cream Apply twice daily to affected areas around mouth. 45 g 3   • nystatin-triamcinolone (MYCOLOG II) 783185-4.1 UNIT/GM-% cream Apply 1 application topically to the appropriate area sparingly as directed 3 (Three) Times a Day As Needed. 15 g 5   • nystatin-triamcinolone (MYCOLOG II) 783757-7.1 UNIT/GM-% cream Apply a small amount to affected area 3 (Three) times a day as needed 30 g 5   • ondansetron ODT (Zofran ODT) 4 MG disintegrating tablet Place 1 tablet on the tongue Every 8 (Eight) Hours As Needed for Nausea or Vomiting. 14 tablet 0   • Probiotic Product (PROBIOTIC DAILY PO) Take  by mouth Daily.     • propranolol LA (INDERAL LA) 60 MG 24 hr capsule Take 1 capsule by mouth Daily. 90 capsule 1   • Vitamin E 400 units tablet Take 800 Units by mouth Daily.     • [DISCONTINUED] FLUoxetine (PROzac) 40 MG capsule Take 1 capsule by mouth Daily. 30 capsule 2   • [DISCONTINUED] hydrocortisone 2.5 % cream Apply  topically to the affected areas on abdomen as directed Every Evening until resolved. 28.35 g 3     No current facility-administered medications on file prior to visit.      Allergies   Allergen Reactions   • Celexa [Citalopram] Other (See Comments)     Jittery    • Lisinopril Cough      Past Medical History:   Diagnosis Date   • Anemia    • Arthritis    • Carpal tunnel syndrome 04/09/2012   • Degenerative joint disease    • Depression    • Depression    • Elevated cholesterol    • Elevated liver  enzymes    • Fatty liver    • HL (hearing loss)    • Hypertension    • Kidney disorder     one kidney   • Palpitations    • Polycystic ovaries    • Psoriasis    • PVC (premature ventricular contraction)     occasional pvc's   • Sleep apnea     cpap at home   • Wears glasses      Past Surgical History:   Procedure Laterality Date   • BREAST BIOPSY Right     PAPILLOMA DR BELL   •  SECTION      x 3   • CHOLECYSTECTOMY  2013   • COLONOSCOPY     • HYSTERECTOMY      complete   • JOINT REPLACEMENT Left 2011    left knee   • OOPHORECTOMY     • TOTAL KNEE ARTHROPLASTY Right 3/9/2021    Procedure: TOTAL KNEE ARTHROPLASTY RIGHT;  Surgeon: Mateo Keith MD;  Location: [facility];  Service: Orthopedics;  Laterality: Right;   • TUBAL ABDOMINAL LIGATION       Family History   Problem Relation Age of Onset   • Diabetes Mother    • Lymphoma Mother    • Hypertension Mother    • No Known Problems Father    • Hypertension Brother    • Hypertension Brother    • Heart disease Maternal Grandmother    • Heart disease Maternal Grandfather    • Heart attack Maternal Grandfather    • Colon cancer Maternal Aunt    • Breast cancer Neg Hx    • Ovarian cancer Neg Hx       Social History     Socioeconomic History   • Marital status:    Tobacco Use   • Smoking status: Never   • Smokeless tobacco: Never   Vaping Use   • Vaping Use: Never used   Substance and Sexual Activity   • Alcohol use: No   • Drug use: No   • Sexual activity: Yes     Partners: Male     Comment:         Review of Systems   Constitutional: Negative.    Eyes: Positive for itching.   Respiratory: Positive for apnea.    Cardiovascular: Negative.    Gastrointestinal: Negative.    Endocrine: Negative.    Genitourinary: Negative.    Musculoskeletal: Positive for arthralgias and back pain.   Skin: Negative.    Allergic/Immunologic: Positive for environmental allergies.   Neurological: Positive for headaches.   Hematological: Negative.   "  Psychiatric/Behavioral: Negative.    All other systems reviewed and are negative.      The following portions of the patient's history were reviewed and updated as appropriate: allergies, current medications, past family history, past medical history, past social history, past surgical history and problem list.    Physical Exam:   /84   Ht 157.5 cm (62.01\")   Wt 99.9 kg (220 lb 3.2 oz)   BMI 40.26 kg/m²   GENERAL: Body habitus: morbidly obese    Lower extremity edema: Left: none; Right: none    Gait: normal     Mental Status:  awake and alert; oriented to person, place, and time  MSK:  Right Achilles is not really tender today only very slightly on the medial corner of the insertion, there is no swelling, normal range of motion of the ankle  Medical Decision Making    Data Review:   ordered and reviewed x-rays today    Assessment/Plan/Diagnosis/Treatment Options:   1. Achilles tendinitis of right lower extremity  Overall she is much improved.  We went over the stretching again.  I explained I did not x-ray because we had not had one in 3 years.  She does have midfoot and great toe arthritis.  No change in the calcaneal osteophytes and I explained that the osteophytes are not the source of pain.  She will do her stretches and wear her night splint.  I will be happy to see her anytime.  No signs of any recurrent gout flare in the heel.  She reports good glucose control.  - XR Foot 2 View Right        Pam Phoenix MD                      "

## 2022-10-10 NOTE — TELEPHONE ENCOUNTER
Rx Refill Note  Requested Prescriptions     Pending Prescriptions Disp Refills   • FLUoxetine (PROzac) 40 MG capsule 30 capsule 2     Sig: Take 1 capsule by mouth Daily.      Last office visit with prescribing clinician: 7/28/2022      Next office visit with prescribing clinician: 11/28/2022   {TIP  Encounters:23}         Shameka Vernon MA  10/10/22, 10:04 EDT

## 2022-10-21 ENCOUNTER — OUTSIDE FACILITY SERVICE (OUTPATIENT)
Dept: GASTROENTEROLOGY | Facility: CLINIC | Age: 62
End: 2022-10-21

## 2022-10-21 PROCEDURE — 88305 TISSUE EXAM BY PATHOLOGIST: CPT | Performed by: INTERNAL MEDICINE

## 2022-10-21 PROCEDURE — 43239 EGD BIOPSY SINGLE/MULTIPLE: CPT | Performed by: INTERNAL MEDICINE

## 2022-10-24 ENCOUNTER — LAB REQUISITION (OUTPATIENT)
Dept: LAB | Facility: HOSPITAL | Age: 62
End: 2022-10-24

## 2022-10-24 DIAGNOSIS — R79.89 OTHER SPECIFIED ABNORMAL FINDINGS OF BLOOD CHEMISTRY: ICD-10-CM

## 2022-10-24 DIAGNOSIS — K29.70 GASTRITIS, UNSPECIFIED, WITHOUT BLEEDING: ICD-10-CM

## 2022-10-25 LAB
CYTO UR: NORMAL
LAB AP CASE REPORT: NORMAL
LAB AP CLINICAL INFORMATION: NORMAL
PATH REPORT.FINAL DX SPEC: NORMAL
PATH REPORT.GROSS SPEC: NORMAL

## 2022-10-26 ENCOUNTER — TRANSCRIBE ORDERS (OUTPATIENT)
Dept: ADMINISTRATIVE | Facility: HOSPITAL | Age: 62
End: 2022-10-26

## 2022-10-26 DIAGNOSIS — Z12.31 VISIT FOR SCREENING MAMMOGRAM: Primary | ICD-10-CM

## 2022-11-03 ENCOUNTER — HOSPITAL ENCOUNTER (EMERGENCY)
Facility: HOSPITAL | Age: 62
Discharge: HOME | End: 2022-11-03
Attending: EMERGENCY MEDICINE | Admitting: EMERGENCY MEDICINE

## 2022-11-03 ENCOUNTER — TELEPHONE (OUTPATIENT)
Dept: ORTHOPEDIC SURGERY | Facility: CLINIC | Age: 62
End: 2022-11-03

## 2022-11-03 ENCOUNTER — APPOINTMENT (OUTPATIENT)
Dept: GENERAL RADIOLOGY | Facility: HOSPITAL | Age: 62
End: 2022-11-03

## 2022-11-03 VITALS
WEIGHT: 221 LBS | HEIGHT: 62 IN | SYSTOLIC BLOOD PRESSURE: 101 MMHG | OXYGEN SATURATION: 97 % | TEMPERATURE: 99.1 F | RESPIRATION RATE: 16 BRPM | BODY MASS INDEX: 40.67 KG/M2 | HEART RATE: 80 BPM | DIASTOLIC BLOOD PRESSURE: 50 MMHG

## 2022-11-03 DIAGNOSIS — N30.00 ACUTE CYSTITIS WITHOUT HEMATURIA: ICD-10-CM

## 2022-11-03 DIAGNOSIS — M25.562 ARTHRALGIA OF LEFT KNEE: Primary | ICD-10-CM

## 2022-11-03 LAB
ALBUMIN SERPL-MCNC: 4.7 G/DL (ref 3.5–5.2)
ALBUMIN/GLOB SERPL: 1.6 G/DL
ALP SERPL-CCNC: 100 U/L (ref 39–117)
ALT SERPL W P-5'-P-CCNC: 76 U/L (ref 1–33)
ANION GAP SERPL CALCULATED.3IONS-SCNC: 15 MMOL/L (ref 5–15)
AST SERPL-CCNC: 81 U/L (ref 1–32)
BACTERIA UR QL AUTO: ABNORMAL /HPF
BASOPHILS # BLD AUTO: 0.07 10*3/MM3 (ref 0–0.2)
BASOPHILS NFR BLD AUTO: 0.6 % (ref 0–1.5)
BILIRUB SERPL-MCNC: 0.6 MG/DL (ref 0–1.2)
BILIRUB UR QL STRIP: NEGATIVE
BUN SERPL-MCNC: 17 MG/DL (ref 8–23)
BUN/CREAT SERPL: 17.5 (ref 7–25)
CALCIUM SPEC-SCNC: 9.9 MG/DL (ref 8.6–10.5)
CHLORIDE SERPL-SCNC: 101 MMOL/L (ref 98–107)
CLARITY UR: ABNORMAL
CO2 SERPL-SCNC: 21 MMOL/L (ref 22–29)
COLOR UR: YELLOW
CREAT SERPL-MCNC: 0.97 MG/DL (ref 0.57–1)
CRP SERPL-MCNC: 0.37 MG/DL (ref 0–0.5)
D-LACTATE SERPL-SCNC: 2.5 MMOL/L (ref 0.5–2)
D-LACTATE SERPL-SCNC: 2.9 MMOL/L (ref 0.5–2)
DEPRECATED RDW RBC AUTO: 44.6 FL (ref 37–54)
EGFRCR SERPLBLD CKD-EPI 2021: 67 ML/MIN/1.73
EOSINOPHIL # BLD AUTO: 0.28 10*3/MM3 (ref 0–0.4)
EOSINOPHIL NFR BLD AUTO: 2.6 % (ref 0.3–6.2)
ERYTHROCYTE [DISTWIDTH] IN BLOOD BY AUTOMATED COUNT: 13.4 % (ref 12.3–15.4)
ERYTHROCYTE [SEDIMENTATION RATE] IN BLOOD: 7 MM/HR (ref 0–30)
FLUAV SUBTYP SPEC NAA+PROBE: NOT DETECTED
FLUBV RNA ISLT QL NAA+PROBE: NOT DETECTED
GLOBULIN UR ELPH-MCNC: 2.9 GM/DL
GLUCOSE SERPL-MCNC: 81 MG/DL (ref 65–99)
GLUCOSE UR STRIP-MCNC: NEGATIVE MG/DL
HCT VFR BLD AUTO: 39.7 % (ref 34–46.6)
HGB BLD-MCNC: 13 G/DL (ref 12–15.9)
HGB UR QL STRIP.AUTO: NEGATIVE
HYALINE CASTS UR QL AUTO: ABNORMAL /LPF
IMM GRANULOCYTES # BLD AUTO: 0.04 10*3/MM3 (ref 0–0.05)
IMM GRANULOCYTES NFR BLD AUTO: 0.4 % (ref 0–0.5)
KETONES UR QL STRIP: ABNORMAL
LEUKOCYTE ESTERASE UR QL STRIP.AUTO: ABNORMAL
LYMPHOCYTES # BLD AUTO: 2.32 10*3/MM3 (ref 0.7–3.1)
LYMPHOCYTES NFR BLD AUTO: 21.2 % (ref 19.6–45.3)
MCH RBC QN AUTO: 29.7 PG (ref 26.6–33)
MCHC RBC AUTO-ENTMCNC: 32.7 G/DL (ref 31.5–35.7)
MCV RBC AUTO: 90.6 FL (ref 79–97)
MONOCYTES # BLD AUTO: 0.67 10*3/MM3 (ref 0.1–0.9)
MONOCYTES NFR BLD AUTO: 6.1 % (ref 5–12)
NEUTROPHILS NFR BLD AUTO: 69.1 % (ref 42.7–76)
NEUTROPHILS NFR BLD AUTO: 7.56 10*3/MM3 (ref 1.7–7)
NITRITE UR QL STRIP: NEGATIVE
NRBC BLD AUTO-RTO: 0 /100 WBC (ref 0–0.2)
PH UR STRIP.AUTO: 5.5 [PH] (ref 5–8)
PLATELET # BLD AUTO: 271 10*3/MM3 (ref 140–450)
PMV BLD AUTO: 10 FL (ref 6–12)
POTASSIUM SERPL-SCNC: 4.7 MMOL/L (ref 3.5–5.2)
PROT SERPL-MCNC: 7.6 G/DL (ref 6–8.5)
PROT UR QL STRIP: ABNORMAL
RBC # BLD AUTO: 4.38 10*6/MM3 (ref 3.77–5.28)
RBC # UR STRIP: ABNORMAL /HPF
REF LAB TEST METHOD: ABNORMAL
SARS-COV-2 RNA PNL SPEC NAA+PROBE: NOT DETECTED
SODIUM SERPL-SCNC: 137 MMOL/L (ref 136–145)
SP GR UR STRIP: 1.02 (ref 1–1.03)
SQUAMOUS #/AREA URNS HPF: ABNORMAL /HPF
URATE SERPL-MCNC: 3.5 MG/DL (ref 2.4–5.7)
UROBILINOGEN UR QL STRIP: ABNORMAL
WBC # UR STRIP: ABNORMAL /HPF
WBC NRBC COR # BLD: 10.94 10*3/MM3 (ref 3.4–10.8)

## 2022-11-03 PROCEDURE — P9612 CATHETERIZE FOR URINE SPEC: HCPCS

## 2022-11-03 PROCEDURE — 73560 X-RAY EXAM OF KNEE 1 OR 2: CPT

## 2022-11-03 PROCEDURE — 96374 THER/PROPH/DIAG INJ IV PUSH: CPT

## 2022-11-03 PROCEDURE — 85025 COMPLETE CBC W/AUTO DIFF WBC: CPT | Performed by: EMERGENCY MEDICINE

## 2022-11-03 PROCEDURE — 25010000002 MORPHINE PER 10 MG: Performed by: EMERGENCY MEDICINE

## 2022-11-03 PROCEDURE — 85652 RBC SED RATE AUTOMATED: CPT | Performed by: EMERGENCY MEDICINE

## 2022-11-03 PROCEDURE — 83605 ASSAY OF LACTIC ACID: CPT | Performed by: EMERGENCY MEDICINE

## 2022-11-03 PROCEDURE — 81001 URINALYSIS AUTO W/SCOPE: CPT | Performed by: EMERGENCY MEDICINE

## 2022-11-03 PROCEDURE — 87040 BLOOD CULTURE FOR BACTERIA: CPT | Performed by: EMERGENCY MEDICINE

## 2022-11-03 PROCEDURE — 84550 ASSAY OF BLOOD/URIC ACID: CPT | Performed by: EMERGENCY MEDICINE

## 2022-11-03 PROCEDURE — 80053 COMPREHEN METABOLIC PANEL: CPT | Performed by: EMERGENCY MEDICINE

## 2022-11-03 PROCEDURE — 36415 COLL VENOUS BLD VENIPUNCTURE: CPT

## 2022-11-03 PROCEDURE — 99284 EMERGENCY DEPT VISIT MOD MDM: CPT

## 2022-11-03 PROCEDURE — 86140 C-REACTIVE PROTEIN: CPT | Performed by: EMERGENCY MEDICINE

## 2022-11-03 PROCEDURE — 87636 SARSCOV2 & INF A&B AMP PRB: CPT | Performed by: EMERGENCY MEDICINE

## 2022-11-03 RX ORDER — CEPHALEXIN 250 MG/1
500 CAPSULE ORAL ONCE
Status: COMPLETED | OUTPATIENT
Start: 2022-11-03 | End: 2022-11-03

## 2022-11-03 RX ORDER — ACETAMINOPHEN 500 MG
1000 TABLET ORAL ONCE
Status: COMPLETED | OUTPATIENT
Start: 2022-11-03 | End: 2022-11-03

## 2022-11-03 RX ORDER — CEFUROXIME AXETIL 500 MG/1
500 TABLET ORAL 2 TIMES DAILY
Qty: 14 TABLET | Refills: 0 | Status: SHIPPED | OUTPATIENT
Start: 2022-11-03 | End: 2022-11-28

## 2022-11-03 RX ORDER — MORPHINE SULFATE 4 MG/ML
4 INJECTION, SOLUTION INTRAMUSCULAR; INTRAVENOUS ONCE
Status: COMPLETED | OUTPATIENT
Start: 2022-11-03 | End: 2022-11-03

## 2022-11-03 RX ORDER — OXYCODONE HYDROCHLORIDE AND ACETAMINOPHEN 5; 325 MG/1; MG/1
1 TABLET ORAL EVERY 6 HOURS PRN
Qty: 12 TABLET | Refills: 0 | Status: SHIPPED | OUTPATIENT
Start: 2022-11-03 | End: 2023-02-28

## 2022-11-03 RX ADMIN — ACETAMINOPHEN 1000 MG: 500 TABLET ORAL at 17:33

## 2022-11-03 RX ADMIN — MORPHINE SULFATE 4 MG: 4 INJECTION, SOLUTION INTRAMUSCULAR; INTRAVENOUS at 20:05

## 2022-11-03 RX ADMIN — CEPHALEXIN 500 MG: 250 CAPSULE ORAL at 22:00

## 2022-11-03 NOTE — TELEPHONE ENCOUNTER
Pt. Called. Dr. Clements had replaced her left knee back inn 2011.    She then saw Dr. Keith for her right knee about a year and a half ago.    She also has seen Dr. Fairchild for her foot.    She states she is having issues with her left knee as of right now.    She is stating her left knee is warm to the touch.    Would like to speak to nurse, please advise.

## 2022-11-04 NOTE — ED PROVIDER NOTES
Subjective   History of Present Illness  61-year-old female, with a past medical history of hypertension diabetes and bilateral total knee replacement, presenting after left knee pain for some time without injury, but increased pain today, and associated with low-grade subjective temperature.  No cough, or other source of recent illness, no chest pain abdominal pain sore throat noted.  Pain is noticed is moderate to severe, worsened with walking and range of motion.        Review of Systems   All other systems reviewed and are negative.      Past Medical History:   Diagnosis Date   • Anemia    • Arthritis    • Carpal tunnel syndrome 2012   • Degenerative joint disease    • Depression    • Depression    • Elevated cholesterol    • Elevated liver enzymes    • Fatty liver    • HL (hearing loss)    • Hypertension    • Kidney disorder     one kidney   • Palpitations    • Polycystic ovaries    • Psoriasis    • PVC (premature ventricular contraction)     occasional pvc's   • Sleep apnea     cpap at home   • Wears glasses        Allergies   Allergen Reactions   • Celexa [Citalopram] Other (See Comments)     Jittery    • Lisinopril Cough       Past Surgical History:   Procedure Laterality Date   • BREAST BIOPSY Right     PAPILLOMA DR BELL   •  SECTION      x 3   • CHOLECYSTECTOMY  2013   • COLONOSCOPY     • HYSTERECTOMY  2006    complete   • JOINT REPLACEMENT Left     left knee   • OOPHORECTOMY     • TOTAL KNEE ARTHROPLASTY Right 3/9/2021    Procedure: TOTAL KNEE ARTHROPLASTY RIGHT;  Surgeon: Mateo Keith MD;  Location: [facility];  Service: Orthopedics;  Laterality: Right;   • TUBAL ABDOMINAL LIGATION         Family History   Problem Relation Age of Onset   • Diabetes Mother    • Lymphoma Mother    • Hypertension Mother    • No Known Problems Father    • Hypertension Brother    • Hypertension Brother    • Heart disease Maternal Grandmother    • Heart disease Maternal Grandfather    • Heart attack Maternal  Grandfather    • Colon cancer Maternal Aunt    • Breast cancer Neg Hx    • Ovarian cancer Neg Hx        Social History     Socioeconomic History   • Marital status:    Tobacco Use   • Smoking status: Never   • Smokeless tobacco: Never   Vaping Use   • Vaping Use: Never used   Substance and Sexual Activity   • Alcohol use: No   • Drug use: No   • Sexual activity: Yes     Partners: Male     Comment:            Objective   Physical Exam  Vitals and nursing note reviewed.   Constitutional:       Appearance: Normal appearance.   HENT:      Head: Normocephalic.      Right Ear: Tympanic membrane normal.      Nose: Nose normal.      Mouth/Throat:      Mouth: Mucous membranes are moist.   Eyes:      Pupils: Pupils are equal, round, and reactive to light.   Cardiovascular:      Rate and Rhythm: Normal rate.   Pulmonary:      Effort: Pulmonary effort is normal.   Abdominal:      General: Abdomen is flat.   Musculoskeletal:         General: Normal range of motion.      Cervical back: Normal range of motion.   Skin:     General: Skin is warm.      Capillary Refill: Capillary refill takes less than 2 seconds.      Comments: No cellulitis or warmth around the knee.   Neurological:      General: No focal deficit present.      Mental Status: She is alert.   Psychiatric:         Mood and Affect: Mood normal.         Procedures           ED Course  ED Course as of 11/04/22 0011   Thu Nov 03, 2022 2050 Discussed arthritis and office follow up with Dr Guillen, on call for [facility]  [TA]      ED Course User Index  [TA] Yvon Fenton MD                                           MDM  Number of Diagnoses or Management Options  Acute cystitis without hematuria: new and does not require workup  Arthralgia of left knee: new and requires workup  Diagnosis management comments: Initial evaluation in triage, I did order triage test and indicated to Ms. Aishwarya that further evaluation and reassessment would be  necessary.    The knee x-ray shows no fracture, or large loose body, some mention of joint effusion on the radiology report, white blood cell count is 10.5, ESR and CRP, however are negative.    She did report some continued pain, diffuse and in the knee, we discussed analgesics before this I discussed risk and benefit of arthrocentesis for diagnostic purposes with her.    A diagnostic left knee arthrocentesis was done, after informed consent, with sterile technique, using a 22-gauge spinal needle, however there was no fluid obtained from the arthrocentesis for diagnostic purposes.  I did discuss arthralgia, and orthopedic consultation with our on-call orthopedist, and discussed outpatient consult with this with Ms. Neff with agreement.    Later a urinalysis which was ordered did return showing significant white blood cells, and concerning for UTI.  I do not see any clinical findings of sepsis, urosepsis or pyelonephritis however I do think that she has febrile illness, UTI, and associated arthralgias.  I discussed treatment with a cephalosporin for her with this as she has had prior tendinopathy's and want to avoid fluoroquinolones, did recommend follow-up with her primary care physician and/or orthopedist if the joint pain continues.       Amount and/or Complexity of Data Reviewed  Clinical lab tests: ordered and reviewed  Tests in the radiology section of CPT®: ordered and reviewed  Decide to obtain previous medical records or to obtain history from someone other than the patient: yes    Patient Progress  Patient progress: stable      Final diagnoses:   Arthralgia of left knee   Acute cystitis without hematuria       ED Disposition  ED Disposition     ED Disposition   Discharge    Condition   Stable    Comment   --             Mateo Keith MD  [address] [place], [place] [id number] [phone]      Call in the am, we spoke with  about office follow-up for consultation of the left  knee pain    Daly Archibald PA-C  [address] [address]  [phone]      For follow up with UTI if necessary         Medication List      New Prescriptions    cefuroxime 500 MG tablet  Commonly known as: CEFTIN  Take 1 tablet by mouth 2 (Two) Times a Day.     oxyCODONE-acetaminophen 5-325 MG per tablet  Commonly known as: PERCOCET  Take 1 tablet by mouth Every 6 (Six) Hours As Needed for Moderate Pain.           Where to Get Your Medications      These medications were sent to [facility] - [address], [place] [address]    Hours: 7:00 AM-5:30 PM M-F, 8:00 AM-4:30 PM Sat-Sun Phone: [phone]   · cefuroxime 500 MG tablet  · oxyCODONE-acetaminophen 5-325 MG per tablet          Yvon Fenton MD  11/04/22 0011

## 2022-11-07 ENCOUNTER — OFFICE VISIT (OUTPATIENT)
Dept: ORTHOPEDIC SURGERY | Facility: CLINIC | Age: 62
End: 2022-11-07

## 2022-11-07 VITALS
HEIGHT: 62 IN | WEIGHT: 221 LBS | SYSTOLIC BLOOD PRESSURE: 127 MMHG | DIASTOLIC BLOOD PRESSURE: 77 MMHG | BODY MASS INDEX: 40.67 KG/M2

## 2022-11-07 DIAGNOSIS — I10 ESSENTIAL HYPERTENSION: ICD-10-CM

## 2022-11-07 DIAGNOSIS — G43.019 INTRACTABLE MIGRAINE WITHOUT AURA AND WITHOUT STATUS MIGRAINOSUS: ICD-10-CM

## 2022-11-07 DIAGNOSIS — F51.01 PRIMARY INSOMNIA: ICD-10-CM

## 2022-11-07 DIAGNOSIS — Z96.652 STATUS POST TOTAL LEFT KNEE REPLACEMENT: Primary | ICD-10-CM

## 2022-11-07 PROCEDURE — 99213 OFFICE O/P EST LOW 20 MIN: CPT | Performed by: ORTHOPAEDIC SURGERY

## 2022-11-07 RX ORDER — AMITRIPTYLINE HYDROCHLORIDE 10 MG/1
10 TABLET, FILM COATED ORAL NIGHTLY
Qty: 90 TABLET | Refills: 0 | Status: SHIPPED | OUTPATIENT
Start: 2022-11-07 | End: 2023-02-06 | Stop reason: SDUPTHER

## 2022-11-07 RX ORDER — LOSARTAN POTASSIUM 100 MG/1
100 TABLET ORAL DAILY
Qty: 90 TABLET | Refills: 1 | Status: SHIPPED | OUTPATIENT
Start: 2022-11-07

## 2022-11-07 NOTE — TELEPHONE ENCOUNTER
Rx Refill Note  Requested Prescriptions     Pending Prescriptions Disp Refills   • losartan (Cozaar) 100 MG tablet 90 tablet 1     Sig: Take 1 tablet by mouth Daily.   • amitriptyline (ELAVIL) 10 MG tablet 90 tablet 0     Sig: Take 1 tablet by mouth Every Night.      Last office visit with prescribing clinician: 7/28/2022      Next office visit with prescribing clinician:11/28/2022}  Patricia Fried MA  11/07/22, 09:38 EST

## 2022-11-07 NOTE — PROGRESS NOTES
[facility] [place] [facility] Note    Subjective     Chief Complaint   Patient presents with   • Left Knee - Pain     ED visit 11.03.2022- Hx of TKA by Dr. Clements 2011        HPI    It has been 8  month(s) since Ms. Neff's last visit. She returns to clinic today for new evaluation and treatment of left knee pain after ED visit 11/3/22. The issue has been ongoing for 4 day(s). She rates her pain a 1/10 on the pain scale. Previous/current treatments: antibiotics. Current symptoms: mild pain. The pain is worse with nothing. Overall, she is doing better.  She feels much better today than the onset of her pain on Thursday, with no traumatic episode.    Seen by Dr. Fenton in the emergency room, and evaluated with inflammatory markers, which were normal, followed by a knee aspiration, with a dry tap.    I have reviewed the following portions of the patient's history and agree with: History of Present Illness and Review of Systems    Patient Active Problem List   Diagnosis   • Severe obstructive sleep apnea   • Degenerative joint disease   • Depression   • Psoriasis   • Congenital single kidney   • Hepatic steatosis   • Essential hypertension   • Intractable migraine without aura and without status migrainosus   • Mixed hyperlipidemia   • Anxiety   • Primary insomnia   • Impaired glucose tolerance   • Iron deficiency   • Class 3 severe obesity due to excess calories with serious comorbidity and body mass index (BMI) of 40.0 to 44.9 in adult (HCC)   • Primary osteoarthritis involving multiple joints   • Primary osteoarthritis of right knee   • Obesity   • S/P total knee arthroplasty, right   • Prediabetes   • NGOC on CPAP   • Tendonitis, Achilles, right     Past Medical History:   Diagnosis Date   • Anemia    • Arthritis    • Carpal tunnel syndrome 04/09/2012   • Degenerative joint disease    • Depression    • Depression    • Elevated cholesterol    • Elevated liver enzymes    • Fatty liver    • HL (hearing loss)     • Hypertension    • Kidney disorder     one kidney   • Palpitations    • Polycystic ovaries    • Psoriasis    • PVC (premature ventricular contraction)     occasional pvc's   • Sleep apnea     cpap at home   • Wears glasses       Past Surgical History:   Procedure Laterality Date   • BREAST BIOPSY Right     PAPILLOMA DR BELL   •  SECTION      x 3   • CHOLECYSTECTOMY  2013   • COLONOSCOPY     • HYSTERECTOMY  2006    complete   • JOINT REPLACEMENT Left 2011    left knee   • OOPHORECTOMY     • TOTAL KNEE ARTHROPLASTY Right 3/9/2021    Procedure: TOTAL KNEE ARTHROPLASTY RIGHT;  Surgeon: Mateo Keith MD;  Location: [facility];  Service: Orthopedics;  Laterality: Right;   • TUBAL ABDOMINAL LIGATION        Family History   Problem Relation Age of Onset   • Diabetes Mother    • Lymphoma Mother    • Hypertension Mother    • No Known Problems Father    • Hypertension Brother    • Hypertension Brother    • Heart disease Maternal Grandmother    • Heart disease Maternal Grandfather    • Heart attack Maternal Grandfather    • Colon cancer Maternal Aunt    • Breast cancer Neg Hx    • Ovarian cancer Neg Hx      Social History     Socioeconomic History   • Marital status:    Tobacco Use   • Smoking status: Never   • Smokeless tobacco: Never   Vaping Use   • Vaping Use: Never used   Substance and Sexual Activity   • Alcohol use: No   • Drug use: No   • Sexual activity: Yes     Partners: Male     Comment:       Current Outpatient Medications on File Prior to Visit   Medication Sig Dispense Refill   • allopurinol (Zyloprim) 300 MG tablet Take 1 tablet by mouth Daily. 30 tablet 2   • amLODIPine (NORVASC) 5 MG tablet Take 1 tablet by mouth Daily. 90 tablet 1   • atorvastatin (LIPITOR) 20 MG tablet Take 1 tablet by mouth Every Night. 90 tablet 3   • cefuroxime (CEFTIN) 500 MG tablet Take 1 tablet by mouth 2 (Two) Times a Day. 14 tablet 0   • cholecalciferol (VITAMIN D3) 1000 units tablet Take 2,000 Units by mouth  Daily.     • clobetasol (TEMOVATE) 0.05 % external solution Apply topically to the itchy, scaly areas on scalp as directed 2 (two) times a day. 25 mL 3   • clobetasol (TEMOVATE) 0.05 % external solution Apply 1 application topically to the appropriate area as directed 2 (Two) Times a Day, to itchy, scaly areas on scalp. 25 mL 3   • FLUoxetine (PROzac) 40 MG capsule Take 2 capsules by mouth Daily. 180 capsule 1   • gabapentin (Neurontin) 100 MG capsule Take 1 capsule by mouth every night at bedtime. 30 capsule 0   • hydrocortisone 2.5 % cream Apply 1 application topically to the appropriate area on abdomen until resolved every evening 28.35 g 3   • hydrOXYzine (ATARAX) 50 MG tablet Take 1 tablet by mouth At Night As Needed for Anxiety. 90 tablet 1   • icosapent ethyl (Vascepa) 1 g capsule capsule Take 2 grams by mouth 2 (Two) Times a Day With Meals. 360 capsule 3   • ketoconazole (NIZORAL) 2 % cream Apply every morning to underarms until resolved. 30 g 3   • ketoconazole (NIZORAL) 2 % shampoo Use as a shampoo to scalp and face 3 times a week. Leave in/on for 5 minutes and rinse. 120 mL 3   • Magnesium 250 MG tablet Take 1 tablet by mouth Daily.     • metFORMIN ER (GLUCOPHAGE-XR) 500 MG 24 hr tablet Take 2 tablets by mouth 2 (Two) Times a Day. 360 tablet 1   • metroNIDAZOLE (MetroCream) 0.75 % cream Apply twice daily to affected areas around mouth. 45 g 3   • nystatin-triamcinolone (MYCOLOG II) 900778-4.1 UNIT/GM-% cream Apply 1 application topically to the appropriate area sparingly as directed 3 (Three) Times a Day As Needed. 15 g 5   • nystatin-triamcinolone (MYCOLOG II) 536876-2.1 UNIT/GM-% cream Apply a small amount to affected area 3 (Three) times a day as needed 30 g 5   • ondansetron ODT (Zofran ODT) 4 MG disintegrating tablet Place 1 tablet on the tongue Every 8 (Eight) Hours As Needed for Nausea or Vomiting. 14 tablet 0   • oxyCODONE-acetaminophen (PERCOCET) 5-325 MG per tablet Take 1 tablet by mouth Every 6  (Six) Hours As Needed for Moderate Pain. 12 tablet 0   • pantoprazole (PROTONIX) 40 MG EC tablet Take 1 tablet by mouth Daily. 30 tablet 4   • Probiotic Product (PROBIOTIC DAILY PO) Take  by mouth Daily.     • propranolol LA (INDERAL LA) 60 MG 24 hr capsule Take 1 capsule by mouth Daily. 90 capsule 1   • Vitamin E 400 units tablet Take 800 Units by mouth Daily.     • [DISCONTINUED] amitriptyline (ELAVIL) 10 MG tablet Take 1 tablet by mouth Every Night. 90 tablet 0   • [DISCONTINUED] losartan (Cozaar) 100 MG tablet Take 1 tablet by mouth Daily. 90 tablet 1     No current facility-administered medications on file prior to visit.      Allergies   Allergen Reactions   • Celexa [Citalopram] Other (See Comments)     Jittery    • Lisinopril Cough        Review of Systems   Constitutional: Negative for activity change, appetite change, chills, diaphoresis, fatigue, fever and unexpected weight change.   HENT: Negative for congestion, dental problem, drooling, ear discharge, ear pain, facial swelling, hearing loss, mouth sores, nosebleeds, postnasal drip, rhinorrhea, sinus pressure, sneezing, sore throat, tinnitus, trouble swallowing and voice change.    Eyes: Negative for photophobia, pain, discharge, redness, itching and visual disturbance.   Respiratory: Negative for apnea, cough, choking, chest tightness, shortness of breath, wheezing and stridor.    Cardiovascular: Negative for chest pain, palpitations and leg swelling.   Gastrointestinal: Negative for abdominal distention, abdominal pain, anal bleeding, blood in stool, constipation, diarrhea, nausea, rectal pain and vomiting.   Endocrine: Negative for cold intolerance, heat intolerance, polydipsia, polyphagia and polyuria.   Genitourinary: Negative for decreased urine volume, difficulty urinating, dysuria, enuresis, flank pain, frequency, genital sores, hematuria and urgency.   Musculoskeletal: Positive for arthralgias. Negative for back pain, gait problem, joint  "swelling, myalgias, neck pain and neck stiffness.   Skin: Negative for color change, pallor, rash and wound.   Allergic/Immunologic: Negative for environmental allergies, food allergies and immunocompromised state.   Neurological: Negative for dizziness, tremors, seizures, syncope, facial asymmetry, speech difficulty, weakness, light-headedness, numbness and headaches.   Hematological: Negative for adenopathy. Does not bruise/bleed easily.   Psychiatric/Behavioral: Negative for agitation, behavioral problems, confusion, decreased concentration, dysphoric mood, hallucinations, self-injury, sleep disturbance and suicidal ideas. The patient is not nervous/anxious and is not hyperactive.         Objective      Physical Exam  /77   Ht 157.5 cm (62.01\")   Wt 100 kg (221 lb)   BMI 40.41 kg/m²     Body mass index is 40.41 kg/m².    General:   Mental Status:  Alert   Appearance: Cooperative, in no acute distress   Build and Nutrition: Obese by BMI female   Orientation: Alert and oriented to person, place and time   Posture: Normal   Gait: Nonantalgic/normal    Integument:   Left knee: Wound is well-healed with no signs of infection, small bruise anteromedially from recent needlestick    Lower Extremities:   Left Knee:    Tenderness:  None    Effusion:  None    Swelling: None    Crepitus:  None    Range of motion:  Extension: 0°       Flexion: 120°  Instability:  No varus laxity, no valgus laxity, negative anterior drawer  Deformities:  None      Imaging/Studies  Imaging Results (Last 24 Hours)     ** No results found for the last 24 hours. **        DATE OF EXAM:  11/3/2022 5:55 PM     PROCEDURE:  XR KNEE 1 OR 2 VW LEFT-     INDICATIONS:  pain total knee arthroplasty 11 years ago     COMPARISON:  None.     FINDINGS:  Cemented total knee arthroplasty. Osteopenia. No periprosthetic fracture  or loosening. Small joint effusion. Ossified body in the anterior  lateral joint space measuring about 1.6 cm maximally.   "   IMPRESSION:  Left total knee arthroplasty without complication. Small joint effusion.  Osteopenia. Ossified intra-articular body in the anterolateral joint  space.     This report was finalized on 11/3/2022 6:09 PM by Samantha Monteiro MD.    I reviewed the above imaging, and agree with findings.    Lab Results   Component Value Date    SEDRATE 7 11/03/2022    WBC 10.94 (H) 11/03/2022    CRP 0.37 11/03/2022       Assessment and Plan     Diagnoses and all orders for this visit:    1. Status post total left knee replacement (Primary)        1. Status post total left knee replacement        I reviewed my findings with the patient.  I am not sure what caused her acute onset of pain of her left knee, but it does not appear to be infected, with normal sed rate and CRP.  Knee has markedly improved.  I will see her back in 4 weeks to ensure continued improvement, but I will be happy to see her back sooner for any problems.    Return in about 4 weeks (around 12/5/2022).      Mateo Keith MD  11/07/22  13:40 EST

## 2022-11-08 LAB
BACTERIA SPEC AEROBE CULT: NORMAL
BACTERIA SPEC AEROBE CULT: NORMAL

## 2022-11-28 ENCOUNTER — LAB (OUTPATIENT)
Dept: LAB | Facility: HOSPITAL | Age: 62
End: 2022-11-28

## 2022-11-28 ENCOUNTER — OFFICE VISIT (OUTPATIENT)
Dept: FAMILY MEDICINE CLINIC | Facility: CLINIC | Age: 62
End: 2022-11-28

## 2022-11-28 VITALS
OXYGEN SATURATION: 96 % | BODY MASS INDEX: 40.7 KG/M2 | WEIGHT: 221.2 LBS | TEMPERATURE: 98.2 F | SYSTOLIC BLOOD PRESSURE: 110 MMHG | HEIGHT: 62 IN | DIASTOLIC BLOOD PRESSURE: 70 MMHG | HEART RATE: 78 BPM

## 2022-11-28 DIAGNOSIS — F33.42 RECURRENT MAJOR DEPRESSIVE DISORDER, IN FULL REMISSION: ICD-10-CM

## 2022-11-28 DIAGNOSIS — E11.65 TYPE 2 DIABETES MELLITUS WITH HYPERGLYCEMIA, WITHOUT LONG-TERM CURRENT USE OF INSULIN: ICD-10-CM

## 2022-11-28 DIAGNOSIS — Z13.220 SCREENING FOR CHOLESTEROL LEVEL: ICD-10-CM

## 2022-11-28 DIAGNOSIS — Z00.00 PHYSICAL EXAM, ANNUAL: Primary | ICD-10-CM

## 2022-11-28 DIAGNOSIS — E66.01 CLASS 3 SEVERE OBESITY DUE TO EXCESS CALORIES WITH SERIOUS COMORBIDITY AND BODY MASS INDEX (BMI) OF 40.0 TO 44.9 IN ADULT: ICD-10-CM

## 2022-11-28 DIAGNOSIS — Z13.29 SCREENING FOR THYROID DISORDER: ICD-10-CM

## 2022-11-28 DIAGNOSIS — F41.9 ANXIETY: ICD-10-CM

## 2022-11-28 DIAGNOSIS — G47.33 SEVERE OBSTRUCTIVE SLEEP APNEA: ICD-10-CM

## 2022-11-28 DIAGNOSIS — I10 ESSENTIAL HYPERTENSION: ICD-10-CM

## 2022-11-28 DIAGNOSIS — E78.2 MIXED HYPERLIPIDEMIA: ICD-10-CM

## 2022-11-28 DIAGNOSIS — Q60.0 CONGENITAL SINGLE KIDNEY: ICD-10-CM

## 2022-11-28 DIAGNOSIS — K76.0 HEPATIC STEATOSIS: ICD-10-CM

## 2022-11-28 LAB
ALBUMIN UR-MCNC: 1.2 MG/DL
CHOLEST SERPL-MCNC: 143 MG/DL (ref 0–200)
CREAT UR-MCNC: 154.3 MG/DL
EXPIRATION DATE: NORMAL
HBA1C MFR BLD: 5.4 %
HDLC SERPL-MCNC: 51 MG/DL (ref 40–60)
LDLC SERPL CALC-MCNC: 67 MG/DL (ref 0–100)
LDLC/HDLC SERPL: 1.23 {RATIO}
Lab: NORMAL
MICROALBUMIN/CREAT UR: 7.8 MG/G
TRIGL SERPL-MCNC: 146 MG/DL (ref 0–150)
TSH SERPL DL<=0.05 MIU/L-ACNC: 2.14 UIU/ML (ref 0.27–4.2)
VLDLC SERPL-MCNC: 25 MG/DL (ref 5–40)

## 2022-11-28 PROCEDURE — 99396 PREV VISIT EST AGE 40-64: CPT | Performed by: PHYSICIAN ASSISTANT

## 2022-11-28 PROCEDURE — 82570 ASSAY OF URINE CREATININE: CPT

## 2022-11-28 PROCEDURE — 82043 UR ALBUMIN QUANTITATIVE: CPT

## 2022-11-28 PROCEDURE — 83036 HEMOGLOBIN GLYCOSYLATED A1C: CPT | Performed by: PHYSICIAN ASSISTANT

## 2022-11-28 PROCEDURE — 84443 ASSAY THYROID STIM HORMONE: CPT

## 2022-11-28 PROCEDURE — 80061 LIPID PANEL: CPT

## 2022-11-28 RX ORDER — BLOOD-GLUCOSE METER
1 KIT MISCELLANEOUS ONCE
Qty: 1 KIT | Refills: 0 | Status: SHIPPED | OUTPATIENT
Start: 2022-11-28 | End: 2022-11-29

## 2022-11-28 RX ORDER — LANCETS 30 GAUGE
1 EACH MISCELLANEOUS 2 TIMES DAILY
Qty: 100 EACH | Refills: 3 | Status: SHIPPED | OUTPATIENT
Start: 2022-11-28 | End: 2023-03-24

## 2022-11-28 RX ORDER — TIRZEPATIDE 5 MG/.5ML
5 INJECTION, SOLUTION SUBCUTANEOUS
Qty: 6 ML | Refills: 0 | Status: SHIPPED | OUTPATIENT
Start: 2022-11-28 | End: 2022-12-22 | Stop reason: SDUPTHER

## 2022-11-28 NOTE — PROGRESS NOTES
Chief Complaint   Patient presents with   • Annual Exam     ED Visit 11/3/22  due to Arthralgia Left Knee   • Hypertension   • Diabetes     Last A1C  21  5.72       Yamila Neff is a very pleasant 61 y.o. female who is here for annual physical exam.  Patient presents for management of morbid obesity, hypertension, hyperlipidemia, diabetes type 2, hepatic steatosis, depression, anxiety and sleep apnea.    Patient is compliant on Prozac 80 mg daily.  She reports improvement in her depression and anxiety.  She feels that retiring from her job helped significantly with her anxiety and depression.  She is tolerating the medication well.    Blood pressure is stable and well-controlled.  She is compliant on all medications.    She is followed by gastroenterology for her hepatic steatosis.  She recently had an EGD which was unremarkable.    She has been on Trulicity in the past and tolerated this well.  It is unclear why she discontinued this.  She is still taking metformin.    Past Medical History:   Diagnosis Date   • Anemia    • Arthritis    • Carpal tunnel syndrome 2012   • Degenerative joint disease    • Depression    • Depression    • Elevated cholesterol    • Elevated liver enzymes    • Fatty liver    • HL (hearing loss)    • Hypertension    • Kidney disorder     one kidney   • Palpitations    • Polycystic ovaries    • Psoriasis    • PVC (premature ventricular contraction)     occasional pvc's   • Sleep apnea     cpap at home   • Wears glasses        Past Surgical History:   Procedure Laterality Date   • BREAST BIOPSY Right     PAPILLOMA DR BELL   •  SECTION      x 3   • CHOLECYSTECTOMY  2013   • COLONOSCOPY     • HYSTERECTOMY  2006    complete   • JOINT REPLACEMENT Left     left knee   • OOPHORECTOMY     • TOTAL KNEE ARTHROPLASTY Right 3/9/2021    Procedure: TOTAL KNEE ARTHROPLASTY RIGHT;  Surgeon: Mateo Keith MD;  Location: [facility];  Service: Orthopedics;  Laterality: Right;   •  TUBAL ABDOMINAL LIGATION         Family History   Problem Relation Age of Onset   • Diabetes Mother    • Lymphoma Mother    • Hypertension Mother    • No Known Problems Father    • Hypertension Brother    • Hypertension Brother    • Heart disease Maternal Grandmother    • Heart disease Maternal Grandfather    • Heart attack Maternal Grandfather    • Colon cancer Maternal Aunt    • Breast cancer Neg Hx    • Ovarian cancer Neg Hx        Social History     Socioeconomic History   • Marital status:    Tobacco Use   • Smoking status: Never   • Smokeless tobacco: Never   Vaping Use   • Vaping Use: Never used   Substance and Sexual Activity   • Alcohol use: No   • Drug use: No   • Sexual activity: Yes     Partners: Male     Comment:        Allergies   Allergen Reactions   • Celexa [Citalopram] Other (See Comments)     Jittery    • Lisinopril Cough       ROS  Review of Systems   Constitutional: Positive for fatigue. Negative for chills, diaphoresis and fever.   HENT: Negative for congestion, ear pain, hearing loss, postnasal drip, rhinorrhea and sore throat.    Eyes: Negative for blurred vision, pain and visual disturbance.   Respiratory: Negative for cough, shortness of breath and wheezing.    Cardiovascular: Negative for chest pain and leg swelling.   Gastrointestinal: Negative for abdominal pain, blood in stool, constipation, diarrhea, nausea, vomiting and indigestion.   Endocrine: Negative for polyuria.   Genitourinary: Negative for dysuria, flank pain and hematuria.   Musculoskeletal: Negative for arthralgias, gait problem and myalgias.   Skin: Negative for rash and skin lesions.   Neurological: Negative for dizziness and headache.   Psychiatric/Behavioral: Positive for depressed mood and stress. Negative for self-injury, sleep disturbance and suicidal ideas. The patient is nervous/anxious.        Vitals:    11/28/22 0822   BP: 110/70   BP Location: Left arm   Patient Position: Sitting   Cuff Size: Adult  "  Pulse: 78   Temp: 98.2 °F (36.8 °C)   TempSrc: Temporal   SpO2: 96%   Weight: 100 kg (221 lb 3.2 oz)   Height: 157.5 cm (62.01\")   PainSc: 0-No pain     Body mass index is 40.45 kg/m².    Class 3 Severe Obesity (BMI >=40). Obesity-related health conditions include the following: obstructive sleep apnea, hypertension, coronary heart disease, diabetes mellitus, dyslipidemias and GERD. Obesity is unchanged. BMI is is above average; BMI management plan is completed. We discussed portion control and increasing exercise.       Current Outpatient Medications on File Prior to Visit   Medication Sig Dispense Refill   • amitriptyline (ELAVIL) 10 MG tablet Take 1 tablet by mouth Every Night. 90 tablet 0   • amLODIPine (NORVASC) 5 MG tablet Take 1 tablet by mouth Daily. 90 tablet 1   • atorvastatin (LIPITOR) 20 MG tablet Take 1 tablet by mouth Every Night. 90 tablet 3   • cholecalciferol (VITAMIN D3) 1000 units tablet Take 2,000 Units by mouth Daily.     • clobetasol (TEMOVATE) 0.05 % external solution Apply topically to the itchy, scaly areas on scalp as directed 2 (two) times a day. 25 mL 3   • FLUoxetine (PROzac) 40 MG capsule Take 2 capsules by mouth Daily. 180 capsule 1   • hydrocortisone 2.5 % cream Apply 1 application topically to the appropriate area on abdomen until resolved every evening 28.35 g 3   • hydrOXYzine (ATARAX) 50 MG tablet Take 1 tablet by mouth At Night As Needed for Anxiety. 90 tablet 1   • icosapent ethyl (Vascepa) 1 g capsule capsule Take 2 grams by mouth 2 (Two) Times a Day With Meals. 360 capsule 3   • ketoconazole (NIZORAL) 2 % cream Apply every morning to underarms until resolved. 30 g 3   • ketoconazole (NIZORAL) 2 % shampoo Use as a shampoo to scalp and face 3 times a week. Leave in/on for 5 minutes and rinse. 120 mL 3   • losartan (Cozaar) 100 MG tablet Take 1 tablet by mouth Daily. 90 tablet 1   • Magnesium 250 MG tablet Take 1 tablet by mouth Daily.     • metFORMIN ER (GLUCOPHAGE-XR) 500 MG " 24 hr tablet Take 2 tablets by mouth 2 (Two) Times a Day. 360 tablet 1   • metroNIDAZOLE (MetroCream) 0.75 % cream Apply twice daily to affected areas around mouth. 45 g 3   • nystatin-triamcinolone (MYCOLOG II) 793672-1.1 UNIT/GM-% cream Apply 1 application topically to the appropriate area as directed 3 (Three) Times a Day As Needed. 15 g 5   • ondansetron ODT (Zofran ODT) 4 MG disintegrating tablet Place 1 tablet on the tongue Every 8 (Eight) Hours As Needed for Nausea or Vomiting. 14 tablet 0   • oxyCODONE-acetaminophen (PERCOCET) 5-325 MG per tablet Take 1 tablet by mouth Every 6 (Six) Hours As Needed for Moderate Pain. 12 tablet 0   • pantoprazole (PROTONIX) 40 MG EC tablet Take 1 tablet by mouth Daily. 30 tablet 4   • Probiotic Product (PROBIOTIC DAILY PO) Take  by mouth Daily.     • propranolol LA (INDERAL LA) 60 MG 24 hr capsule Take 1 capsule by mouth Daily. 90 capsule 1   • Vitamin E 400 units tablet Take 800 Units by mouth Daily.     • [DISCONTINUED] allopurinol (Zyloprim) 300 MG tablet Take 1 tablet by mouth Daily. 30 tablet 2   • [DISCONTINUED] cefuroxime (CEFTIN) 500 MG tablet Take 1 tablet by mouth 2 (Two) Times a Day. 14 tablet 0   • [DISCONTINUED] clobetasol (TEMOVATE) 0.05 % external solution Apply 1 application topically to the appropriate area as directed 2 (Two) Times a Day, to itchy, scaly areas on scalp. 25 mL 3   • [DISCONTINUED] gabapentin (Neurontin) 100 MG capsule Take 1 capsule by mouth every night at bedtime. 30 capsule 0   • [DISCONTINUED] nystatin-triamcinolone (MYCOLOG II) 282500-7.1 UNIT/GM-% cream Apply 1 application topically to the appropriate area sparingly as directed 3 (Three) Times a Day As Needed. 15 g 5   • [DISCONTINUED] nystatin-triamcinolone (MYCOLOG II) 246875-9.1 UNIT/GM-% cream Apply a small amount to affected area 3 (Three) times a day as needed 30 g 5     No current facility-administered medications on file prior to visit.       Results for orders placed or  performed in visit on 11/28/22   POC Glycosylated Hemoglobin (Hb A1C)    Specimen: Blood   Result Value Ref Range    Hemoglobin A1C 5.4 %    Lot Number [id number]     Expiration Date 9/14/24        PE    Physical Exam  Vitals reviewed.   Constitutional:       General: She is not in acute distress.     Appearance: Normal appearance. She is well-developed. She is morbidly obese. She is not ill-appearing or diaphoretic.   HENT:      Head: Normocephalic and atraumatic.      Right Ear: Hearing, tympanic membrane, ear canal and external ear normal.      Left Ear: Hearing, tympanic membrane, ear canal and external ear normal.      Nose: Nose normal.      Right Sinus: No maxillary sinus tenderness or frontal sinus tenderness.      Left Sinus: No maxillary sinus tenderness or frontal sinus tenderness.      Mouth/Throat:      Pharynx: Uvula midline.   Eyes:      General: Lids are normal.      Extraocular Movements: Extraocular movements intact.      Conjunctiva/sclera: Conjunctivae normal.   Neck:      Thyroid: No thyroid mass or thyromegaly.      Trachea: Trachea and phonation normal.   Cardiovascular:      Rate and Rhythm: Normal rate and regular rhythm.      Heart sounds: Normal heart sounds.   Pulmonary:      Effort: Pulmonary effort is normal.      Breath sounds: Normal breath sounds.   Abdominal:      General: Bowel sounds are normal. There is no distension.      Palpations: Abdomen is soft. Abdomen is not rigid.      Tenderness: There is no abdominal tenderness. There is no guarding.   Musculoskeletal:         General: Normal range of motion.      Cervical back: Normal range of motion.      Right lower leg: No edema.      Left lower leg: No edema.   Lymphadenopathy:      Cervical: No cervical adenopathy.      Right cervical: No superficial cervical adenopathy.     Left cervical: No superficial cervical adenopathy.   Skin:     General: Skin is warm.      Findings: No erythema or rash.      Nails: There is no clubbing.    Neurological:      Mental Status: She is alert and oriented to person, place, and time.      Coordination: Coordination normal.      Gait: Gait normal.      Deep Tendon Reflexes: Reflexes are normal and symmetric.      Comments: CN grossly intact   Psychiatric:         Attention and Perception: Attention and perception normal. She is attentive.         Mood and Affect: Affect normal. Mood is anxious.         Speech: Speech normal.         Behavior: Behavior normal. Behavior is cooperative.         Thought Content: Thought content normal.         Cognition and Memory: Cognition and memory normal.         Judgment: Judgment normal.         A/P    Diagnoses and all orders for this visit:    1. Physical exam, annual (Primary)  PE completed  Preventative labs ordered  Colonoscopy is up-to-date  Mammogram is up-to-date  Gynecologist-established  Dentist-goes regularly  Ophthalmologist-goes regularly  Dermatologist-denies any moles or lesions of concern  Vaccinations discussed    2. Class 3 severe obesity due to excess calories with serious comorbidity and body mass index (BMI) of 40.0 to 44.9 in adult ([facility])  Encouraged weight loss with diet and exercise.    3. Type 2 diabetes mellitus with hyperglycemia, without long-term current use of insulin ([facility])  -     POC Glycosylated Hemoglobin (Hb A1C)  -     Microalbumin / Creatinine Urine Ratio - Urine, Clean Catch; Future  -     Blood Glucose Monitoring Suppl (FreeStyle Lite) w/Device kit; Use as directed to test blood glucose  Dispense: 1 kit; Refill: 0  -     glucose blood test strip; Use to check glucose as directed 1-2 times a day.  Dispense: 100 each; Refill: 3  -     Lancets misc; Use as directed to check blood sugar 1-2 times daily.  Dispense: 100 each; Refill: 3  -     Tirzepatide (Mounjaro) 5 MG/0.5ML solution pen-injector; Inject 0.5 mL under the skin into the appropriate area as directed Every 7 (Seven) Days.  Dispense: 6 mL; Refill: 0  Hemoglobin AIC was 5.72,  hemoglobin AIC is 5.4% today.  She is compliant on metformin.  Trial Mounjaro.  Samples given.    4. Essential hypertension  Stable, well-controlled.  Compliant on medication.    5. Mixed hyperlipidemia  On atorvastatin 20 mg nightly.    6. Hepatic steatosis  EGD was unremarkable.  Followed by gastroenterology.    7. Recurrent major depressive disorder, in full remission (HCC)  8. Anxiety  Stable on Prozac.  Has noticed improvement in mood and anxiety for the better after retiring.    9. Congenital single kidney  Pending labs.    10. Severe obstructive sleep apnea  Encouraged to wear CPAP nightly.    11. Screening for thyroid disorder  -     TSH Rfx On Abnormal To Free T4; Future    12. Screening for cholesterol level  -     Lipid Panel; Future         Plan of care reviewed with patient at the conclusion of today's visit. Education was provided regarding diet , nutrition , exercise, weight management, supplements, preventative screenings, vaccinations, hypertension, hyperlipidemia and diabetes diagnosis, management and any prescribed or recommended OTC medications.  Patient verbalizes understanding of and agreement with management plan.    Dictated Utilizing Dragon Dictation     Please note that portions of this note were completed with a voice recognition program.     Part of this note may be an electronic transcription/translation of spoken language to printed text using the Dragon Dictation System.    Return in about 3 months (around 2/28/2023) for Recheck, DM II.     Daly Archibald PA-C

## 2022-12-05 DIAGNOSIS — R73.02 IMPAIRED GLUCOSE TOLERANCE: ICD-10-CM

## 2022-12-06 RX ORDER — METFORMIN HYDROCHLORIDE 500 MG/1
1000 TABLET, EXTENDED RELEASE ORAL 2 TIMES DAILY
Qty: 360 TABLET | Refills: 1 | Status: SHIPPED | OUTPATIENT
Start: 2022-12-06

## 2022-12-08 ENCOUNTER — OFFICE VISIT (OUTPATIENT)
Dept: GASTROENTEROLOGY | Facility: CLINIC | Age: 62
End: 2022-12-08

## 2022-12-08 VITALS — HEIGHT: 62 IN | BODY MASS INDEX: 40.3 KG/M2 | WEIGHT: 219 LBS

## 2022-12-08 DIAGNOSIS — K91.89 POSTCHOLECYSTECTOMY DIARRHEA: ICD-10-CM

## 2022-12-08 DIAGNOSIS — R79.89 ELEVATED LIVER FUNCTION TESTS: ICD-10-CM

## 2022-12-08 DIAGNOSIS — K21.9 GASTROESOPHAGEAL REFLUX DISEASE WITHOUT ESOPHAGITIS: ICD-10-CM

## 2022-12-08 DIAGNOSIS — K76.0 HEPATIC STEATOSIS: Primary | ICD-10-CM

## 2022-12-08 DIAGNOSIS — R19.7 POSTCHOLECYSTECTOMY DIARRHEA: ICD-10-CM

## 2022-12-08 PROCEDURE — 99214 OFFICE O/P EST MOD 30 MIN: CPT | Performed by: INTERNAL MEDICINE

## 2022-12-08 RX ORDER — MONTELUKAST SODIUM 4 MG/1
2 TABLET, CHEWABLE ORAL EVERY MORNING
Qty: 180 TABLET | Refills: 3 | Status: SHIPPED | OUTPATIENT
Start: 2022-12-08

## 2022-12-08 NOTE — PROGRESS NOTES
PCP:  Daly Archibald PA-C     No referring provider defined for this encounter.    Chief Complaint   Patient presents with   • Elevated Hepatic Enzymes     Follow up elevated LFT's        HPI   The patient is a 61-year-old here with elevated liver chemistries.  Her most recent liver chemistries performed on 11/3/2022 showed an AST of 81, ALT of 76, alk phos of 100, and bilirubin 0.6.  This was somewhat improved from her previous evaluations.  She has lost a small amount of weight given a new diabetes medicine she was started on.  Her appetite seems to be less.  She has stage III-IV fibrosis on liver biopsy in 2018.  She also has some symptoms suggesting an element of gastroparesis and reflux.  She is doing much better on the proton pump inhibitor.  She also has reduced the roughage intake.  She had a colonoscopy on 8/28/2018 by Dr. Bear which showed diverticulosis and internal hemorrhoids.  She had a normal upper endoscopy without varices or portal hypertensive change 10/21/2022.  She does have postprandial diarrhea and has had her gallbladder out.    Allergies   Allergen Reactions   • Celexa [Citalopram] Other (See Comments)     Jittery    • Lisinopril Cough          Current Outpatient Medications:   •  amitriptyline (ELAVIL) 10 MG tablet, Take 1 tablet by mouth Every Night., Disp: 90 tablet, Rfl: 0  •  amLODIPine (NORVASC) 5 MG tablet, Take 1 tablet by mouth Daily., Disp: 90 tablet, Rfl: 1  •  atorvastatin (LIPITOR) 20 MG tablet, Take 1 tablet by mouth Every Night., Disp: 90 tablet, Rfl: 3  •  cholecalciferol (VITAMIN D3) 1000 units tablet, Take 2,000 Units by mouth Daily., Disp: , Rfl:   •  clobetasol (TEMOVATE) 0.05 % external solution, Apply topically to the itchy, scaly areas on scalp as directed 2 (two) times a day., Disp: 25 mL, Rfl: 3  •  colestipol (Colestid) 1 g tablet, Take 2 tablets by mouth every morning.  **Take 2 hours separate from other medications, Disp: 180 tablet, Rfl: 3  •   FLUoxetine (PROzac) 40 MG capsule, Take 2 capsules by mouth Daily., Disp: 180 capsule, Rfl: 1  •  glucose blood test strip, Use to check glucose as directed 1-2 times a day., Disp: 100 each, Rfl: 3  •  hydrocortisone 2.5 % cream, Apply 1 application topically to the appropriate area on abdomen until resolved every evening, Disp: 28.35 g, Rfl: 3  •  hydrOXYzine (ATARAX) 50 MG tablet, Take 1 tablet by mouth At Night As Needed for Anxiety., Disp: 90 tablet, Rfl: 1  •  icosapent ethyl (Vascepa) 1 g capsule capsule, Take 2 grams by mouth 2 (Two) Times a Day With Meals., Disp: 360 capsule, Rfl: 3  •  ketoconazole (NIZORAL) 2 % cream, Apply every morning to underarms until resolved., Disp: 30 g, Rfl: 3  •  ketoconazole (NIZORAL) 2 % shampoo, Use as a shampoo to scalp and face 3 times a week. Leave in/on for 5 minutes and rinse., Disp: 120 mL, Rfl: 3  •  Lancets misc, Use as directed to check blood sugar 1-2 times daily., Disp: 100 each, Rfl: 3  •  losartan (Cozaar) 100 MG tablet, Take 1 tablet by mouth Daily., Disp: 90 tablet, Rfl: 1  •  Magnesium 250 MG tablet, Take 1 tablet by mouth Daily., Disp: , Rfl:   •  metFORMIN ER (GLUCOPHAGE-XR) 500 MG 24 hr tablet, Take 2 tablets by mouth 2 (Two) Times a Day., Disp: 360 tablet, Rfl: 1  •  metroNIDAZOLE (MetroCream) 0.75 % cream, Apply twice daily to affected areas around mouth., Disp: 45 g, Rfl: 3  •  nystatin-triamcinolone (MYCOLOG II) 644835-6.1 UNIT/GM-% cream, Apply 1 application topically to the appropriate area as directed 3 (Three) Times a Day As Needed., Disp: 15 g, Rfl: 5  •  ondansetron ODT (Zofran ODT) 4 MG disintegrating tablet, Place 1 tablet on the tongue Every 8 (Eight) Hours As Needed for Nausea or Vomiting., Disp: 14 tablet, Rfl: 0  •  oxyCODONE-acetaminophen (PERCOCET) 5-325 MG per tablet, Take 1 tablet by mouth Every 6 (Six) Hours As Needed for Moderate Pain., Disp: 12 tablet, Rfl: 0  •  pantoprazole (PROTONIX) 40 MG EC tablet, Take 1 tablet by mouth Daily.,  Disp: 30 tablet, Rfl: 4  •  Probiotic Product (PROBIOTIC DAILY PO), Take  by mouth Daily., Disp: , Rfl:   •  propranolol LA (INDERAL LA) 60 MG 24 hr capsule, Take 1 capsule by mouth Daily., Disp: 90 capsule, Rfl: 1  •  Tirzepatide (Mounjaro) 5 MG/0.5ML solution pen-injector, Inject 0.5 mL under the skin into the appropriate area as directed Every 7 (Seven) Days., Disp: 6 mL, Rfl: 0  •  Vitamin E 400 units tablet, Take 800 Units by mouth Daily., Disp: , Rfl:      Past Medical History:   Diagnosis Date   • Anemia    • Arthritis    • Carpal tunnel syndrome 2012   • Degenerative joint disease    • Depression    • Depression    • Elevated cholesterol    • Elevated liver enzymes    • Fatty liver    • HL (hearing loss)    • Hypertension    • Kidney disorder     one kidney   • Palpitations    • Polycystic ovaries    • Psoriasis    • PVC (premature ventricular contraction)     occasional pvc's   • Sleep apnea     cpap at home   • Wears glasses        Past Surgical History:   Procedure Laterality Date   • BREAST BIOPSY Right     PAPILLOMA DR BELL   •  SECTION      x 3   • CHOLECYSTECTOMY  2013   • COLONOSCOPY     • HYSTERECTOMY  2006    complete   • JOINT REPLACEMENT Left     left knee   • OOPHORECTOMY     • TOTAL KNEE ARTHROPLASTY Right 3/9/2021    Procedure: TOTAL KNEE ARTHROPLASTY RIGHT;  Surgeon: Mateo Keith MD;  Location: [facility];  Service: Orthopedics;  Laterality: Right;   • TUBAL ABDOMINAL LIGATION          Social History     Socioeconomic History   • Marital status:    Tobacco Use   • Smoking status: Never   • Smokeless tobacco: Never   Vaping Use   • Vaping Use: Never used   Substance and Sexual Activity   • Alcohol use: No   • Drug use: No   • Sexual activity: Yes     Partners: Male     Comment:         Family History   Problem Relation Age of Onset   • Diabetes Mother    • Lymphoma Mother    • Hypertension Mother    • No Known Problems Father    • Hypertension Brother    •  Hypertension Brother    • Heart disease Maternal Grandmother    • Heart disease Maternal Grandfather    • Heart attack Maternal Grandfather    • Colon cancer Maternal Aunt    • Breast cancer Neg Hx    • Ovarian cancer Neg Hx         Review of Systems     There were no vitals filed for this visit.     Physical Exam   General Appearance: Alert, in no acute distress   Head: Normocephalic, without obvious abnormality, atraumatic   Eyes: Lids and lashes normal, conjunctivae and sclerae normal, no icterus, no pallor, corneas clear, PERRLA   Ears: Ears appear intact with no abnormalities noted   Extremities: Moves all extremities well, no edema, no cyanosis, no redness   Skin: No bleeding, bruising or rash   Neurologic: Cranial nerves 2 - 12 grossly intact, no focal deficits         Diagnoses and all orders for this visit:    1. Hepatic steatosis (Primary)    2. Elevated liver function tests    3. Gastroesophageal reflux disease without esophagitis    4. Postcholecystectomy diarrhea    Other orders  -     colestipol (Colestid) 1 g tablet; Take 2 tablets by mouth every morning.  **Take 2 hours separate from other medications  Dispense: 180 tablet; Refill: 3    Impressions and plan #1 hepatic steatosis and elevated liver chemistries: We again talked about modest weight reduction.  Hopefully we can get some weight reduction and things will even normalized with better degree.    #2 gastroesophageal reflux disease: He seems to be doing better at the moment on a proton pump inhibitor.  She does note when she eats roughage she has more troubles and she does have some sulfur burps which make 1 think there may be an element of gastroparesis as well.  We discussed that at length.    #3 postcholecystectomy diarrhea: Suspect she has an element of postcholecystectomy diarrhea with diarrhea very soon after eating.  We are going to try some Colestid empirically.  She will take 2 tablets in the morning.  If it is working too well she will  discontinue that and then restart it later at 1 tablet a day.  She could take it twice a day theoretically if necessary.  She needs to separate it from her other medicines by about 2 hours.  We talked about the mechanism of action.  The nice thing about the Colestid is either it works or does not.  She will call us in a week or 2 and give us an update.  We will see her back in follow-up.    William Polk MD

## 2022-12-16 ENCOUNTER — HOSPITAL ENCOUNTER (OUTPATIENT)
Dept: MAMMOGRAPHY | Facility: HOSPITAL | Age: 62
Discharge: HOME | End: 2022-12-16
Admitting: SURGERY

## 2022-12-16 DIAGNOSIS — Z12.31 VISIT FOR SCREENING MAMMOGRAM: ICD-10-CM

## 2022-12-16 PROCEDURE — 77067 SCR MAMMO BI INCL CAD: CPT | Performed by: RADIOLOGY

## 2022-12-16 PROCEDURE — 77063 BREAST TOMOSYNTHESIS BI: CPT | Performed by: RADIOLOGY

## 2022-12-16 PROCEDURE — 77063 BREAST TOMOSYNTHESIS BI: CPT

## 2022-12-16 PROCEDURE — 77067 SCR MAMMO BI INCL CAD: CPT

## 2022-12-22 DIAGNOSIS — E11.65 TYPE 2 DIABETES MELLITUS WITH HYPERGLYCEMIA, WITHOUT LONG-TERM CURRENT USE OF INSULIN: ICD-10-CM

## 2022-12-22 RX ORDER — TIRZEPATIDE 5 MG/.5ML
5 INJECTION, SOLUTION SUBCUTANEOUS
Qty: 6 ML | Refills: 0 | Status: SHIPPED | OUTPATIENT
Start: 2022-12-22 | End: 2023-02-28

## 2022-12-22 NOTE — TELEPHONE ENCOUNTER
Rx Refill Note  Requested Prescriptions     Pending Prescriptions Disp Refills   • Tirzepatide (Mounjaro) 5 MG/0.5ML solution pen-injector 6 mL 0     Sig: Inject 0.5 mL under the skin into the appropriate area as directed Every 7 (Seven) Days.      Last office visit with prescribing clinician: 11/28/2022   Last telemedicine visit with prescribing clinician: 2/28/2023   Next office visit with prescribing clinician: 2/28/2023   {    Enedina Kaur MA  12/22/22, 08:44 EST

## 2023-01-24 DIAGNOSIS — R11.14 BILIOUS VOMITING WITH NAUSEA: ICD-10-CM

## 2023-01-25 RX ORDER — ONDANSETRON 4 MG/1
4 TABLET, ORALLY DISINTEGRATING ORAL EVERY 8 HOURS PRN
Qty: 14 TABLET | Refills: 0 | Status: SHIPPED | OUTPATIENT
Start: 2023-01-25

## 2023-02-03 ENCOUNTER — HOSPITAL ENCOUNTER (OUTPATIENT)
Dept: MAMMOGRAPHY | Facility: HOSPITAL | Age: 63
Discharge: HOME | End: 2023-02-03
Payer: COMMERCIAL

## 2023-02-03 ENCOUNTER — HOSPITAL ENCOUNTER (OUTPATIENT)
Dept: ULTRASOUND IMAGING | Facility: HOSPITAL | Age: 63
Discharge: HOME | End: 2023-02-03
Payer: COMMERCIAL

## 2023-02-03 DIAGNOSIS — R92.8 ABNORMAL MAMMOGRAM: ICD-10-CM

## 2023-02-03 PROCEDURE — 77065 DX MAMMO INCL CAD UNI: CPT | Performed by: RADIOLOGY

## 2023-02-03 PROCEDURE — 77061 BREAST TOMOSYNTHESIS UNI: CPT | Performed by: RADIOLOGY

## 2023-02-03 PROCEDURE — G0279 TOMOSYNTHESIS, MAMMO: HCPCS

## 2023-02-03 PROCEDURE — 77065 DX MAMMO INCL CAD UNI: CPT

## 2023-02-03 PROCEDURE — 76642 ULTRASOUND BREAST LIMITED: CPT | Performed by: RADIOLOGY

## 2023-02-03 PROCEDURE — 76642 ULTRASOUND BREAST LIMITED: CPT

## 2023-02-06 ENCOUNTER — TRANSCRIBE ORDERS (OUTPATIENT)
Dept: MAMMOGRAPHY | Facility: HOSPITAL | Age: 63
End: 2023-02-06
Payer: COMMERCIAL

## 2023-02-06 DIAGNOSIS — R92.8 ABNORMAL MAMMOGRAM: Primary | ICD-10-CM

## 2023-02-06 DIAGNOSIS — G43.019 INTRACTABLE MIGRAINE WITHOUT AURA AND WITHOUT STATUS MIGRAINOSUS: ICD-10-CM

## 2023-02-06 DIAGNOSIS — F51.01 PRIMARY INSOMNIA: ICD-10-CM

## 2023-02-07 RX ORDER — AMITRIPTYLINE HYDROCHLORIDE 10 MG/1
10 TABLET, FILM COATED ORAL NIGHTLY
Qty: 90 TABLET | Refills: 0 | Status: SHIPPED | OUTPATIENT
Start: 2023-02-07

## 2023-02-07 NOTE — TELEPHONE ENCOUNTER
Rx Refill Note  Requested Prescriptions     Pending Prescriptions Disp Refills   • amitriptyline (ELAVIL) 10 MG tablet 90 tablet 0     Sig: Take 1 tablet by mouth Every Night.      Last office visit with prescribing clinician: 11/28/2022   Last telemedicine visit with prescribing clinician: 2/28/2023   Next office visit with prescribing clinician: 2/28/2023                         Would you like a call back once the refill request has been completed: [] Yes [] No    If the office needs to give you a call back, can they leave a voicemail: [] Yes [] No    Shameka Vernon MA  02/07/23, 09:12 EST

## 2023-02-20 ENCOUNTER — HOSPITAL ENCOUNTER (OUTPATIENT)
Dept: MAMMOGRAPHY | Facility: HOSPITAL | Age: 63
Discharge: HOME | End: 2023-02-20
Payer: COMMERCIAL

## 2023-02-20 DIAGNOSIS — R92.8 ABNORMAL MAMMOGRAM: ICD-10-CM

## 2023-02-20 PROCEDURE — 77065 DX MAMMO INCL CAD UNI: CPT | Performed by: RADIOLOGY

## 2023-02-20 PROCEDURE — 19081 BX BREAST 1ST LESION STRTCTC: CPT | Performed by: RADIOLOGY

## 2023-02-20 PROCEDURE — 88360 TUMOR IMMUNOHISTOCHEM/MANUAL: CPT | Performed by: SURGERY

## 2023-02-20 PROCEDURE — 0 LIDOCAINE 1 % SOLUTION: Performed by: RADIOLOGY

## 2023-02-20 PROCEDURE — 88360 TUMOR IMMUNOHISTOCHEM/MANUAL: CPT

## 2023-02-20 PROCEDURE — 88305 TISSUE EXAM BY PATHOLOGIST: CPT | Performed by: SURGERY

## 2023-02-20 PROCEDURE — A4648 IMPLANTABLE TISSUE MARKER: HCPCS

## 2023-02-20 RX ORDER — LIDOCAINE HYDROCHLORIDE AND EPINEPHRINE 10; 10 MG/ML; UG/ML
5 INJECTION, SOLUTION INFILTRATION; PERINEURAL ONCE
Status: COMPLETED | OUTPATIENT
Start: 2023-02-20 | End: 2023-02-20

## 2023-02-20 RX ORDER — LIDOCAINE HYDROCHLORIDE 10 MG/ML
5 INJECTION, SOLUTION INFILTRATION; PERINEURAL ONCE
Status: COMPLETED | OUTPATIENT
Start: 2023-02-20 | End: 2023-02-20

## 2023-02-20 RX ADMIN — LIDOCAINE HYDROCHLORIDE,EPINEPHRINE BITARTRATE 5 ML: 10; .01 INJECTION, SOLUTION INFILTRATION; PERINEURAL at 11:16

## 2023-02-20 RX ADMIN — Medication 2.5 ML: at 11:15

## 2023-02-20 NOTE — PROGRESS NOTES
Alert and orientated. Denies discomfort, no active bleeding, steri-strips not visualized, gauze dressing intact. Cold packs given. Verbalizes and demonstrates understanding of post-care instructions, written copy given.

## 2023-02-21 DIAGNOSIS — G43.019 INTRACTABLE MIGRAINE WITHOUT AURA AND WITHOUT STATUS MIGRAINOSUS: ICD-10-CM

## 2023-02-21 DIAGNOSIS — E78.2 MIXED HYPERLIPIDEMIA: ICD-10-CM

## 2023-02-21 RX ORDER — ATORVASTATIN CALCIUM 20 MG/1
20 TABLET, FILM COATED ORAL NIGHTLY
Qty: 90 TABLET | Refills: 1 | Status: SHIPPED | OUTPATIENT
Start: 2023-02-21

## 2023-02-21 RX ORDER — PROPRANOLOL HCL 60 MG
60 CAPSULE, EXTENDED RELEASE 24HR ORAL DAILY
Qty: 90 CAPSULE | Refills: 1 | Status: SHIPPED | OUTPATIENT
Start: 2023-02-21

## 2023-02-21 NOTE — TELEPHONE ENCOUNTER
Rx Refill Note  Requested Prescriptions     Pending Prescriptions Disp Refills   • atorvastatin (LIPITOR) 20 MG tablet 90 tablet 3     Sig: Take 1 tablet by mouth Every Night.   • propranolol LA (INDERAL LA) 60 MG 24 hr capsule 90 capsule 1     Sig: Take 1 capsule by mouth Daily.      Last office visit with prescribing clinician: 11/28/2022   Last telemedicine visit with prescribing clinician: 2/28/2023   Next office visit with prescribing clinician: 2/28/2023                         Would you like a call back once the refill request has been completed: [] Yes [] No    If the office needs to give you a call back, can they leave a voicemail: [] Yes [] No    Shameka Vernon MA  02/21/23, 12:48 EST

## 2023-02-22 ENCOUNTER — TELEPHONE (OUTPATIENT)
Dept: MAMMOGRAPHY | Facility: HOSPITAL | Age: 63
End: 2023-02-22
Payer: COMMERCIAL

## 2023-02-22 ENCOUNTER — TELEPHONE (OUTPATIENT)
Dept: FAMILY MEDICINE CLINIC | Facility: CLINIC | Age: 63
End: 2023-02-22
Payer: COMMERCIAL

## 2023-02-22 LAB
CYTO UR: NORMAL
LAB AP CASE REPORT: NORMAL
LAB AP CLINICAL INFORMATION: NORMAL
LAB AP DIAGNOSIS COMMENT: NORMAL
LAB AP SPECIAL STAINS: NORMAL
PATH REPORT.FINAL DX SPEC: NORMAL
PATH REPORT.GROSS SPEC: NORMAL

## 2023-02-22 NOTE — TELEPHONE ENCOUNTER
Patient notified of pathology results and recommendation. Verbalizes understanding. Denies discomfort. Denies signs and symptoms of infection.     Patient previously requested surgical consult with Dr Lizama. Patient notified of appointment on 3/8/23 @ 1100. Patient verbalized understanding.    Patient given office contact & location information. Told to bring photo ID, list of prescription & OTC medications and insurance information.     Reviewed what would be discussed at surgical consult visit, including detailed explanation of pathology report & imaging reports; treatment options & pros/cons, availability of Breast Nurse Navigator. Patient encouraged to call back or contact Breast Nurse Navigator, with any questions or concerns.     Breast cancer information packet offered and accepted.

## 2023-02-22 NOTE — TELEPHONE ENCOUNTER
Referring provider's office notified pathology returned as cancer & patient will be notified.

## 2023-02-22 NOTE — TELEPHONE ENCOUNTER
Spoke with patient.  She will call about getting in to see Dr. BELL sooner - she will inquire about wait list or getting imaging prior to appointment.    ----- Message from Lily Yepez RN sent at 2/22/2023 10:49 AM EST -----  Regarding: New Breast Cancer Diagnosis  Ms. Neff has been diagnosed with right breast cancer (Carcinoma with mucinous features, associated LG DCIS). I will be calling to notify her of the diagnosis & recommendation. She previously has requested Dr RIC Lizama for surgical consult. She has an appointment 3/8/23.    If you have any questions or concerns, please feel free to contact me.    Thank you,  TOSHIA PabloN, RN  Breast Imaging Nurse  Breast Imaging Services  [facility]

## 2023-02-23 ENCOUNTER — APPOINTMENT (OUTPATIENT)
Dept: GENERAL RADIOLOGY | Facility: HOSPITAL | Age: 63
End: 2023-02-23
Payer: COMMERCIAL

## 2023-02-23 ENCOUNTER — HOSPITAL ENCOUNTER (EMERGENCY)
Facility: HOSPITAL | Age: 63
Discharge: HOME | End: 2023-02-23
Attending: EMERGENCY MEDICINE | Admitting: EMERGENCY MEDICINE
Payer: COMMERCIAL

## 2023-02-23 ENCOUNTER — PATIENT MESSAGE (OUTPATIENT)
Dept: FAMILY MEDICINE CLINIC | Facility: CLINIC | Age: 63
End: 2023-02-23
Payer: COMMERCIAL

## 2023-02-23 VITALS
OXYGEN SATURATION: 93 % | HEART RATE: 94 BPM | DIASTOLIC BLOOD PRESSURE: 73 MMHG | SYSTOLIC BLOOD PRESSURE: 114 MMHG | TEMPERATURE: 99.9 F | WEIGHT: 200 LBS | HEIGHT: 62 IN | RESPIRATION RATE: 18 BRPM | BODY MASS INDEX: 36.8 KG/M2

## 2023-02-23 DIAGNOSIS — Z85.3 HISTORY OF BREAST CANCER IN ADULTHOOD: ICD-10-CM

## 2023-02-23 DIAGNOSIS — Z86.39 HISTORY OF DIABETES MELLITUS: ICD-10-CM

## 2023-02-23 DIAGNOSIS — Z86.39 HISTORY OF HYPERLIPIDEMIA: ICD-10-CM

## 2023-02-23 DIAGNOSIS — Z86.79 HISTORY OF HYPERTENSION: ICD-10-CM

## 2023-02-23 DIAGNOSIS — Z86.59 HISTORY OF DEPRESSION: ICD-10-CM

## 2023-02-23 DIAGNOSIS — R06.02 SHORTNESS OF BREATH: ICD-10-CM

## 2023-02-23 DIAGNOSIS — J10.1 INFLUENZA A: Primary | ICD-10-CM

## 2023-02-23 DIAGNOSIS — R05.9 COUGH, UNSPECIFIED TYPE: ICD-10-CM

## 2023-02-23 LAB
ALBUMIN SERPL-MCNC: 4.3 G/DL (ref 3.5–5.2)
ALBUMIN/GLOB SERPL: 1.4 G/DL
ALP SERPL-CCNC: 99 U/L (ref 39–117)
ALT SERPL W P-5'-P-CCNC: 88 U/L (ref 1–33)
ANION GAP SERPL CALCULATED.3IONS-SCNC: 14 MMOL/L (ref 5–15)
AST SERPL-CCNC: 86 U/L (ref 1–32)
BASOPHILS # BLD AUTO: 0.02 10*3/MM3 (ref 0–0.2)
BASOPHILS NFR BLD AUTO: 0.2 % (ref 0–1.5)
BILIRUB SERPL-MCNC: 0.4 MG/DL (ref 0–1.2)
BUN SERPL-MCNC: 21 MG/DL (ref 8–23)
BUN/CREAT SERPL: 20.2 (ref 7–25)
CALCIUM SPEC-SCNC: 9.4 MG/DL (ref 8.6–10.5)
CHLORIDE SERPL-SCNC: 99 MMOL/L (ref 98–107)
CO2 SERPL-SCNC: 23 MMOL/L (ref 22–29)
CREAT SERPL-MCNC: 1.04 MG/DL (ref 0.57–1)
DEPRECATED RDW RBC AUTO: 40.9 FL (ref 37–54)
EGFRCR SERPLBLD CKD-EPI 2021: 61.3 ML/MIN/1.73
EOSINOPHIL # BLD AUTO: 0.01 10*3/MM3 (ref 0–0.4)
EOSINOPHIL NFR BLD AUTO: 0.1 % (ref 0.3–6.2)
ERYTHROCYTE [DISTWIDTH] IN BLOOD BY AUTOMATED COUNT: 12.9 % (ref 12.3–15.4)
FLUAV RNA RESP QL NAA+PROBE: DETECTED
FLUBV RNA RESP QL NAA+PROBE: NOT DETECTED
GLOBULIN UR ELPH-MCNC: 3 GM/DL
GLUCOSE SERPL-MCNC: 130 MG/DL (ref 65–99)
HCT VFR BLD AUTO: 38.5 % (ref 34–46.6)
HGB BLD-MCNC: 12.7 G/DL (ref 12–15.9)
HOLD SPECIMEN: NORMAL
IMM GRANULOCYTES # BLD AUTO: 0.01 10*3/MM3 (ref 0–0.05)
IMM GRANULOCYTES NFR BLD AUTO: 0.1 % (ref 0–0.5)
LYMPHOCYTES # BLD AUTO: 1.32 10*3/MM3 (ref 0.7–3.1)
LYMPHOCYTES NFR BLD AUTO: 15.7 % (ref 19.6–45.3)
MCH RBC QN AUTO: 28.5 PG (ref 26.6–33)
MCHC RBC AUTO-ENTMCNC: 33 G/DL (ref 31.5–35.7)
MCV RBC AUTO: 86.5 FL (ref 79–97)
MONOCYTES # BLD AUTO: 0.96 10*3/MM3 (ref 0.1–0.9)
MONOCYTES NFR BLD AUTO: 11.4 % (ref 5–12)
NEUTROPHILS NFR BLD AUTO: 6.1 10*3/MM3 (ref 1.7–7)
NEUTROPHILS NFR BLD AUTO: 72.5 % (ref 42.7–76)
NRBC BLD AUTO-RTO: 0 /100 WBC (ref 0–0.2)
NT-PROBNP SERPL-MCNC: 165.2 PG/ML (ref 0–900)
PLATELET # BLD AUTO: 200 10*3/MM3 (ref 140–450)
PMV BLD AUTO: 10.5 FL (ref 6–12)
POTASSIUM SERPL-SCNC: 4 MMOL/L (ref 3.5–5.2)
PROT SERPL-MCNC: 7.3 G/DL (ref 6–8.5)
QT INTERVAL: 362 MS
QTC INTERVAL: 450 MS
RBC # BLD AUTO: 4.45 10*6/MM3 (ref 3.77–5.28)
RSV RNA NPH QL NAA+NON-PROBE: NOT DETECTED
SARS-COV-2 RNA RESP QL NAA+PROBE: NOT DETECTED
SODIUM SERPL-SCNC: 136 MMOL/L (ref 136–145)
TROPONIN T SERPL HS-MCNC: <6 NG/L
WBC NRBC COR # BLD: 8.42 10*3/MM3 (ref 3.4–10.8)
WHOLE BLOOD HOLD COAG: NORMAL
WHOLE BLOOD HOLD SPECIMEN: NORMAL

## 2023-02-23 PROCEDURE — 85025 COMPLETE CBC W/AUTO DIFF WBC: CPT

## 2023-02-23 PROCEDURE — 87637 SARSCOV2&INF A&B&RSV AMP PRB: CPT | Performed by: PHYSICIAN ASSISTANT

## 2023-02-23 PROCEDURE — 83880 ASSAY OF NATRIURETIC PEPTIDE: CPT

## 2023-02-23 PROCEDURE — 84484 ASSAY OF TROPONIN QUANT: CPT

## 2023-02-23 PROCEDURE — 99283 EMERGENCY DEPT VISIT LOW MDM: CPT

## 2023-02-23 PROCEDURE — 80053 COMPREHEN METABOLIC PANEL: CPT

## 2023-02-23 PROCEDURE — 71045 X-RAY EXAM CHEST 1 VIEW: CPT

## 2023-02-23 PROCEDURE — 93005 ELECTROCARDIOGRAM TRACING: CPT

## 2023-02-23 PROCEDURE — 93005 ELECTROCARDIOGRAM TRACING: CPT | Performed by: EMERGENCY MEDICINE

## 2023-02-23 RX ORDER — SODIUM CHLORIDE 0.9 % (FLUSH) 0.9 %
10 SYRINGE (ML) INJECTION AS NEEDED
Status: DISCONTINUED | OUTPATIENT
Start: 2023-02-23 | End: 2023-02-23 | Stop reason: HOSPADM

## 2023-02-23 RX ORDER — DEXTROMETHORPHAN HYDROBROMIDE AND PROMETHAZINE HYDROCHLORIDE 15; 6.25 MG/5ML; MG/5ML
5 SYRUP ORAL 4 TIMES DAILY PRN
Qty: 100 ML | Refills: 0 | Status: SHIPPED | OUTPATIENT
Start: 2023-02-23 | End: 2023-02-28 | Stop reason: SDUPTHER

## 2023-02-24 ENCOUNTER — PATIENT MESSAGE (OUTPATIENT)
Dept: FAMILY MEDICINE CLINIC | Facility: CLINIC | Age: 63
End: 2023-02-24
Payer: COMMERCIAL

## 2023-02-24 ENCOUNTER — TELEPHONE (OUTPATIENT)
Dept: FAMILY MEDICINE CLINIC | Facility: CLINIC | Age: 63
End: 2023-02-24
Payer: COMMERCIAL

## 2023-02-24 DIAGNOSIS — J40 BRONCHITIS: Primary | ICD-10-CM

## 2023-02-24 DIAGNOSIS — C50.911 MALIGNANT NEOPLASM OF RIGHT FEMALE BREAST, UNSPECIFIED ESTROGEN RECEPTOR STATUS, UNSPECIFIED SITE OF BREAST: Primary | ICD-10-CM

## 2023-02-24 RX ORDER — AZITHROMYCIN 250 MG/1
TABLET, FILM COATED ORAL
Qty: 6 TABLET | Refills: 0 | Status: SHIPPED | OUTPATIENT
Start: 2023-02-24 | End: 2023-02-28 | Stop reason: SINTOL

## 2023-02-24 NOTE — TELEPHONE ENCOUNTER
"Regarding: Mri  Contact: [phone]  ----- Message from Sary Mills MA sent at 2/24/2023  9:17 AM EST -----       ----- Message from Yamila Neff to Daly Archibald PA-C sent at 2/23/2023  4:38 PM -----   I'm really frustrated. Dr BELL's  office called me back today and said they could not order the MRI before he sees me since it's been too long since  he saw me and did my surgery. I'm technically a \"new patient' to him. She did say my primary care could order it. It's recommended on the pathology report.  Could you go ahead and order the MRI of both breasts? I would be so grateful. I was seen in the ER last night because I was having SOB and fever returned. I'm positive for influenza A.  .   "

## 2023-02-25 ENCOUNTER — TELEPHONE (OUTPATIENT)
Dept: FAMILY MEDICINE CLINIC | Facility: CLINIC | Age: 63
End: 2023-02-25
Payer: COMMERCIAL

## 2023-02-25 NOTE — TELEPHONE ENCOUNTER
Patient went to the ER and was diagnosed with FLU A, she's stated that this morning she woken up with a lot of drainage in her eyes and that they keep crusting over. She's asking if she could get a prescription for eye drops to help with that or if she needs to be seen before she can get the prescription.      Please advise

## 2023-02-27 NOTE — TELEPHONE ENCOUNTER
Please call patient and see if she is still having symptoms.  This is most likely a viral conjunctivitis given flu diagnosis.  Treatment would not be recommended unless we suspected bacterial infection.

## 2023-02-27 NOTE — TELEPHONE ENCOUNTER
Contacted patient to notify. Verbalized understanding   She has upcoming appt scheduled with PCP to discuss further

## 2023-02-28 ENCOUNTER — OFFICE VISIT (OUTPATIENT)
Dept: FAMILY MEDICINE CLINIC | Facility: CLINIC | Age: 63
End: 2023-02-28
Payer: COMMERCIAL

## 2023-02-28 VITALS
SYSTOLIC BLOOD PRESSURE: 102 MMHG | BODY MASS INDEX: 36.8 KG/M2 | OXYGEN SATURATION: 95 % | DIASTOLIC BLOOD PRESSURE: 62 MMHG | HEIGHT: 62 IN | HEART RATE: 74 BPM | WEIGHT: 200 LBS

## 2023-02-28 DIAGNOSIS — E11.65 TYPE 2 DIABETES MELLITUS WITH HYPERGLYCEMIA, WITHOUT LONG-TERM CURRENT USE OF INSULIN: Primary | ICD-10-CM

## 2023-02-28 DIAGNOSIS — F33.42 RECURRENT MAJOR DEPRESSIVE DISORDER, IN FULL REMISSION: ICD-10-CM

## 2023-02-28 DIAGNOSIS — K76.0 HEPATIC STEATOSIS: ICD-10-CM

## 2023-02-28 DIAGNOSIS — C50.911 MALIGNANT NEOPLASM OF RIGHT FEMALE BREAST, UNSPECIFIED ESTROGEN RECEPTOR STATUS, UNSPECIFIED SITE OF BREAST: ICD-10-CM

## 2023-02-28 DIAGNOSIS — E66.01 CLASS 2 SEVERE OBESITY DUE TO EXCESS CALORIES WITH SERIOUS COMORBIDITY AND BODY MASS INDEX (BMI) OF 36.0 TO 36.9 IN ADULT: ICD-10-CM

## 2023-02-28 DIAGNOSIS — I10 ESSENTIAL HYPERTENSION: ICD-10-CM

## 2023-02-28 PROBLEM — Z96.651 S/P TOTAL KNEE ARTHROPLASTY, RIGHT: Status: RESOLVED | Noted: 2021-03-09 | Resolved: 2023-02-28

## 2023-02-28 PROBLEM — E66.812 CLASS 2 SEVERE OBESITY DUE TO EXCESS CALORIES WITH SERIOUS COMORBIDITY AND BODY MASS INDEX (BMI) OF 36.0 TO 36.9 IN ADULT: Status: ACTIVE | Noted: 2020-02-19

## 2023-02-28 LAB
EXPIRATION DATE: NORMAL
HBA1C MFR BLD: 5.5 %
Lab: NORMAL

## 2023-02-28 PROCEDURE — 83036 HEMOGLOBIN GLYCOSYLATED A1C: CPT | Performed by: PHYSICIAN ASSISTANT

## 2023-02-28 PROCEDURE — 99214 OFFICE O/P EST MOD 30 MIN: CPT | Performed by: PHYSICIAN ASSISTANT

## 2023-02-28 RX ORDER — DOXYCYCLINE HYCLATE 100 MG/1
100 CAPSULE ORAL 2 TIMES DAILY
COMMUNITY
Start: 2023-02-25 | End: 2023-03-24

## 2023-02-28 RX ORDER — PREDNISONE 10 MG/1
10 TABLET ORAL TAKE AS DIRECTED
COMMUNITY
Start: 2023-02-25 | End: 2023-03-24

## 2023-02-28 RX ORDER — DEXTROMETHORPHAN HYDROBROMIDE AND PROMETHAZINE HYDROCHLORIDE 15; 6.25 MG/5ML; MG/5ML
5 SYRUP ORAL 4 TIMES DAILY PRN
Qty: 240 ML | Refills: 0 | Status: SHIPPED | OUTPATIENT
Start: 2023-02-28

## 2023-02-28 RX ORDER — TIRZEPATIDE 7.5 MG/.5ML
7.5 INJECTION, SOLUTION SUBCUTANEOUS
Qty: 6 ML | Refills: 0 | Status: SHIPPED | OUTPATIENT
Start: 2023-02-28

## 2023-02-28 NOTE — PROGRESS NOTES
Chief Complaint   Patient presents with   • Diabetes     F/u   • Cough     Diagnosed with flu A wednesday       Yamila Neff is a pleasant 62 y.o. female who is here for routine follow-up of diabetes type 2, obesity, hypertension, breast cancer and flu.  Patient was recently diagnosed with flu.  She is currently on steroids and doxycycline.  She states that her symptoms are improving overall compared to initial onset.  She needs a refill of her cough syrup.  She has recently been diagnosed with breast cancer.  She has an MRI scheduled for this Thursday.  She has a follow-up with surgeon, Dr. BELL, next Wednesday.    She is here today for routine follow-up of diabetes type 2 and obesity.  Patient is currently on Mounjaro 5 mg once a week.  She is tolerating the medication well and denies any adverse effects.  She has lost over 20 pounds since starting the medication.  Her hemoglobin A1c has improved as well.    Her blood pressure is low today.  She is asymptomatic.      Past Medical History:   Diagnosis Date   • Anemia    • Arthritis    • Carpal tunnel syndrome 2012   • Degenerative joint disease    • Depression    • Depression    • Elevated cholesterol    • Elevated liver enzymes    • Fatty liver    • HL (hearing loss)    • Hypertension    • Kidney disorder     one kidney   • Malignant neoplasm of right female breast (HCC) 2023   • Palpitations    • Polycystic ovaries    • Psoriasis    • PVC (premature ventricular contraction)     occasional pvc's   • S/P total knee arthroplasty, right 3/9/2021   • Sleep apnea     cpap at home   • Wears glasses        Past Surgical History:   Procedure Laterality Date   • BREAST BIOPSY Right     PAPILLOMA DR BELL   •  SECTION      x 3   • CHOLECYSTECTOMY  2013   • COLONOSCOPY     • HYSTERECTOMY  2006    complete   • JOINT REPLACEMENT Left     left knee   • OOPHORECTOMY     • TOTAL KNEE ARTHROPLASTY Right 2021    Procedure: TOTAL KNEE ARTHROPLASTY  "RIGHT;  Surgeon: Mateo Keith MD;  Location: [facility];  Service: Orthopedics;  Laterality: Right;   • TUBAL ABDOMINAL LIGATION         Family History   Problem Relation Age of Onset   • Diabetes Mother    • Lymphoma Mother    • Hypertension Mother    • No Known Problems Father    • Hypertension Brother    • Hypertension Brother    • Heart disease Maternal Grandmother    • Heart disease Maternal Grandfather    • Heart attack Maternal Grandfather    • Colon cancer Maternal Aunt    • Breast cancer Neg Hx    • Ovarian cancer Neg Hx        Social History     Socioeconomic History   • Marital status:    Tobacco Use   • Smoking status: Never   • Smokeless tobacco: Never   Vaping Use   • Vaping Use: Never used   Substance and Sexual Activity   • Alcohol use: No   • Drug use: No   • Sexual activity: Yes     Partners: Male     Comment:        Allergies   Allergen Reactions   • Celexa [Citalopram] Other (See Comments)     Jittery    • Lisinopril Cough       ROS  Review of Systems   Constitutional: Positive for fatigue. Negative for chills, diaphoresis and fever.   HENT: Positive for congestion and ear pain. Negative for postnasal drip, rhinorrhea and sore throat.    Eyes: Negative for visual disturbance.   Respiratory: Positive for cough. Negative for shortness of breath and wheezing.    Cardiovascular: Negative for chest pain and leg swelling.   Endocrine: Negative for polyuria.   Genitourinary: Positive for breast lump. Negative for breast pain.   Neurological: Negative for dizziness and headache.   Psychiatric/Behavioral: Positive for stress. Negative for self-injury, sleep disturbance, suicidal ideas and depressed mood. The patient is not nervous/anxious.        Vitals:    02/28/23 0905   BP: 102/62   Pulse: 74   SpO2: 95%   Weight: 90.7 kg (200 lb)   Height: 157.5 cm (62\")     Body mass index is 36.58 kg/m².    Current Outpatient Medications on File Prior to Visit   Medication Sig Dispense Refill   • " amitriptyline (ELAVIL) 10 MG tablet Take 1 tablet by mouth Every Night. 90 tablet 0   • amLODIPine (NORVASC) 5 MG tablet Take 1 tablet by mouth Daily. 90 tablet 1   • atorvastatin (LIPITOR) 20 MG tablet Take 1 tablet by mouth Every Night. 90 tablet 1   • cholecalciferol (VITAMIN D3) 1000 units tablet Take 2 tablets by mouth Daily.     • colestipol (Colestid) 1 g tablet Take 2 tablets by mouth every morning.  **Take 2 hours separate from other medications 180 tablet 3   • doxycycline (VIBRAMYCIN) 100 MG capsule Take 1 capsule by mouth 2 (Two) Times a Day. X 10 days     • FLUoxetine (PROzac) 40 MG capsule Take 2 capsules by mouth Daily. 180 capsule 1   • glucose blood test strip Use to check glucose as directed 1-2 times a day. 100 each 3   • hydrocortisone 2.5 % cream Apply 1 application topically to the appropriate area on abdomen until resolved every evening 28.35 g 3   • hydrOXYzine (ATARAX) 50 MG tablet Take 1 tablet by mouth At Night As Needed for Anxiety. 90 tablet 1   • icosapent ethyl (Vascepa) 1 g capsule capsule Take 2 grams by mouth 2 (Two) Times a Day With Meals. 360 capsule 3   • ketoconazole (NIZORAL) 2 % cream Apply every morning to underarms until resolved. 30 g 3   • ketoconazole (NIZORAL) 2 % shampoo Use as a shampoo to scalp and face 3 times a week. Leave in/on for 5 minutes and rinse. 120 mL 3   • Lancets misc Use as directed to check blood sugar 1-2 times daily. 100 each 3   • losartan (Cozaar) 100 MG tablet Take 1 tablet by mouth Daily. 90 tablet 1   • Magnesium 250 MG tablet Take 1 tablet by mouth Daily.     • metFORMIN ER (GLUCOPHAGE-XR) 500 MG 24 hr tablet Take 2 tablets by mouth 2 (Two) Times a Day. 360 tablet 1   • metroNIDAZOLE (MetroCream) 0.75 % cream Apply twice daily to affected areas around mouth. 45 g 3   • nystatin-triamcinolone (MYCOLOG II) 219671-2.1 UNIT/GM-% cream Apply 1 application topically to the appropriate area as directed 3 (Three) Times a Day As Needed. 15 g 5   •  ondansetron ODT (ZOFRAN-ODT) 4 MG disintegrating tablet Place 1 tablet on the tongue Every 8 (Eight) Hours As Needed for Nausea or Vomiting. 14 tablet 0   • pantoprazole (PROTONIX) 40 MG EC tablet Take 1 tablet by mouth Daily. 30 tablet 4   • predniSONE (DELTASONE) 10 MG (21) dose pack Take 10 mg by mouth Take As Directed.     • Probiotic Product (PROBIOTIC DAILY PO) Take  by mouth Daily.     • promethazine-dextromethorphan (PROMETHAZINE-DM) 6.25-15 MG/5ML syrup Take 5 mL by mouth 4 (Four) Times a Day As Needed for Cough for up to 5 days. 100 mL 0   • propranolol LA (INDERAL LA) 60 MG 24 hr capsule Take 1 capsule by mouth Daily. 90 capsule 1   • [DISCONTINUED] clobetasol (TEMOVATE) 0.05 % external solution Apply 1 application topically to the appropriate area as directed 2 (Two) Times a Day to itchy, scaly areas on scalp. 25 mL 3   • [DISCONTINUED] Tirzepatide (Mounjaro) 5 MG/0.5ML solution pen-injector Inject 0.5 mL under the skin into the appropriate area as directed Every 7 (Seven) Days. 6 mL 0   • clobetasol (TEMOVATE) 0.05 % external solution Apply topically to the itchy, scaly areas on scalp as directed 2 (two) times a day. 25 mL 3   • [DISCONTINUED] azithromycin (Zithromax Z-Shadi) 250 MG tablet Take 2 tablets by mouth the first day, then take 1 tablet daily for 4 days. 6 tablet 0   • [DISCONTINUED] gabapentin (Neurontin) 100 MG capsule Take 1 capsule by mouth every night at bedtime for 1 week 7 capsule 0   • [DISCONTINUED] meloxicam (Mobic) 7.5 MG tablet Take 1 tablet by mouth daily with food regardless of pain level for 1 week. Then take 1 tablet daily only as needed for pain 14 tablet 0   • [DISCONTINUED] oxyCODONE-acetaminophen (PERCOCET) 5-325 MG per tablet Take 1 tablet by mouth Every 6 (Six) Hours As Needed for Moderate Pain. 12 tablet 0   • [DISCONTINUED] traMADol (ULTRAM) 50 MG tablet Take 1 tablet by mouth Every 4 to 6 hours as needed for severe post surgical pain 7 tablet 0   • [DISCONTINUED] Vitamin  E 400 units tablet Take 800 Units by mouth Daily.       No current facility-administered medications on file prior to visit.       Results for orders placed or performed in visit on 02/28/23   POC Glycosylated Hemoglobin (Hb A1C)    Specimen: Blood   Result Value Ref Range    Hemoglobin A1C 5.5 %    Lot Number [id number]     Expiration Date 11/21/24        PE    Physical Exam  Vitals reviewed.   Constitutional:       General: She is not in acute distress.     Appearance: Normal appearance. She is well-developed. She is ill-appearing. She is not diaphoretic.   HENT:      Head: Normocephalic and atraumatic.      Right Ear: External ear normal. Decreased hearing noted. Tenderness present. A middle ear effusion is present. Tympanic membrane is injected.      Left Ear: External ear normal. Decreased hearing noted. Tenderness present. A middle ear effusion is present. Tympanic membrane is injected.      Mouth/Throat:      Pharynx: No posterior oropharyngeal erythema.   Eyes:      Extraocular Movements: Extraocular movements intact.      Conjunctiva/sclera: Conjunctivae normal.   Cardiovascular:      Rate and Rhythm: Normal rate and regular rhythm.      Heart sounds: Normal heart sounds.   Pulmonary:      Effort: No respiratory distress.   Chest:       Musculoskeletal:         General: Normal range of motion.      Cervical back: Normal range of motion.      Right lower leg: No edema.      Left lower leg: No edema.   Skin:     General: Skin is warm.      Findings: No erythema or rash.   Neurological:      General: No focal deficit present.      Mental Status: She is alert.   Psychiatric:         Attention and Perception: Attention and perception normal. She is attentive.         Mood and Affect: Mood normal. Affect is flat.         Speech: Speech normal.         Behavior: Behavior normal. Behavior is cooperative.         Thought Content: Thought content normal.         Cognition and Memory: Cognition and memory normal.          Judgment: Judgment normal.         A/P    Diagnoses and all orders for this visit:    1. Type 2 diabetes mellitus with hyperglycemia, without long-term current use of insulin (HCC) (Primary)  -     POC Glycosylated Hemoglobin (Hb A1C)  -     Tirzepatide (Mounjaro) 7.5 MG/0.5ML solution pen-injector; Inject 0.5 mL under the skin into the appropriate area as directed Every 7 (Seven) Days.  Dispense: 6 mL; Refill: 0  Patient's hemoglobin A1c is 5.5%.  Patient is tolerating Sarai well.  She denies any adverse effects with the medication.  We will go ahead and increase to 7.5 mg once weekly.  Return in 3 months.    2. Essential hypertension  Blood pressure is low today.  Patient is asymptomatic.  Encouraged good hydration.    3. Class 2 severe obesity due to excess calories with serious comorbidity and body mass index (BMI) of 36.0 to 36.9 in adult (HCC)  Patient has lost over 20 pounds in the last few weeks.  Encouraged to continue working on healthy eating and exercise.    4. Hepatic steatosis  Patient's liver enzymes are stable.  Reviewed recent lab work from ED visit.    5. Recurrent major depressive disorder, in full remission (HCC)  Patient has recently been diagnosed with breast cancer.  She denies any worsening depression or anxiety at this time.  She is compliant on her medication.    6. Malignant neoplasm of right female breast, unspecified estrogen receptor status, unspecified site of breast (HCC)  Upcoming MRI this week and follow-up with general surgeon next Wednesday.         Plan of care reviewed with patient at the conclusion of today's visit. Education was provided regarding diagnosis, management and any prescribed or recommended OTC medications.  Patient verbalizes understanding of and agreement with management plan.    Dictated Utilizing Dragon Dictation     Please note that portions of this note were completed with a voice recognition program.     Part of this note may be an electronic  transcription/translation of spoken language to printed text using the Dragon Dictation System.    Return in about 3 months (around 5/28/2023) for Recheck, weight.     Daly Archibald PA-C

## 2023-03-02 ENCOUNTER — HOSPITAL ENCOUNTER (OUTPATIENT)
Dept: MRI IMAGING | Facility: HOSPITAL | Age: 63
Discharge: HOME | End: 2023-03-02
Admitting: PHYSICIAN ASSISTANT
Payer: COMMERCIAL

## 2023-03-02 DIAGNOSIS — C50.911 MALIGNANT NEOPLASM OF RIGHT FEMALE BREAST, UNSPECIFIED ESTROGEN RECEPTOR STATUS, UNSPECIFIED SITE OF BREAST: ICD-10-CM

## 2023-03-02 PROCEDURE — 0 GADOBENATE DIMEGLUMINE 529 MG/ML SOLUTION: Performed by: PHYSICIAN ASSISTANT

## 2023-03-02 PROCEDURE — 77049 MRI BREAST C-+ W/CAD BI: CPT | Performed by: RADIOLOGY

## 2023-03-02 PROCEDURE — 77049 MRI BREAST C-+ W/CAD BI: CPT

## 2023-03-02 PROCEDURE — A9577 INJ MULTIHANCE: HCPCS | Performed by: PHYSICIAN ASSISTANT

## 2023-03-02 RX ADMIN — GADOBENATE DIMEGLUMINE 19 ML: 529 INJECTION, SOLUTION INTRAVENOUS at 15:41

## 2023-03-05 NOTE — ED PROVIDER NOTES
EMERGENCY DEPARTMENT ENCOUNTER    Pt Name: Yamila Neff  MRN: [id number]  Pt :   1960  Room Number:  10/10  Date of encounter:  2023  PCP: Daly Archibald PA-C  ED Provider: SIOMARA Gardner    Historian: Patient    HPI:  Chief Complaint:     Context: Yamila Neff is a 62 y.o. female who presents to the ED c/o flu-like symptoms for the last week. Patient shares that she has been experiencing fatigue, cough, generalized body aches, fever and chills. She reports that she has not been feeling well for approximately the last week. She has been treating her symptoms with over the counter medications which so  HPI     REVIEW OF SYSTEMS  A chief complaint appropriate review of systems was completed and is negative except as noted in the HPI.     PAST MEDICAL HISTORY  Past Medical History:   Diagnosis Date   • Anemia    • Arthritis    • Carpal tunnel syndrome 2012   • Degenerative joint disease    • Depression    • Depression    • Elevated cholesterol    • Elevated liver enzymes    • Fatty liver    • HL (hearing loss)    • Hypertension    • Kidney disorder     one kidney   • Malignant neoplasm of right female breast (HCC) 2023   • Palpitations    • Polycystic ovaries    • Psoriasis    • PVC (premature ventricular contraction)     occasional pvc's   • S/P total knee arthroplasty, right 3/9/2021   • Sleep apnea     cpap at home   • Wears glasses        PAST SURGICAL HISTORY  Past Surgical History:   Procedure Laterality Date   • BREAST BIOPSY Right     PAPILLOMA DR BELL   •  SECTION      x 3   • CHOLECYSTECTOMY  2013   • COLONOSCOPY     • HYSTERECTOMY  2006    complete   • JOINT REPLACEMENT Left     left knee   • OOPHORECTOMY     • TOTAL KNEE ARTHROPLASTY Right 2021    Procedure: TOTAL KNEE ARTHROPLASTY RIGHT;  Surgeon: Mateo Keith MD;  Location: [facility];  Service: Orthopedics;  Laterality: Right;   • TUBAL ABDOMINAL LIGATION         FAMILY  HISTORY  Family History   Problem Relation Age of Onset   • Diabetes Mother    • Lymphoma Mother    • Hypertension Mother    • No Known Problems Father    • Hypertension Brother    • Hypertension Brother    • Heart disease Maternal Grandmother    • Heart disease Maternal Grandfather    • Heart attack Maternal Grandfather    • Colon cancer Maternal Aunt    • Breast cancer Neg Hx    • Ovarian cancer Neg Hx        SOCIAL HISTORY  Social History     Socioeconomic History   • Marital status:    Tobacco Use   • Smoking status: Never   • Smokeless tobacco: Never   Vaping Use   • Vaping Use: Never used   Substance and Sexual Activity   • Alcohol use: No   • Drug use: No   • Sexual activity: Yes     Partners: Male     Comment:        ALLERGIES  Celexa [citalopram] and Lisinopril    PHYSICAL EXAM  Physical Exam  GENERAL:   Appears in no acute distress. Ill appearing on exam.   HENT: Nares patent. Nasal congestion  EYES: No scleral icterus.  CV: Regular rhythm, regular rate.  RESPIRATORY: Normal effort.  No audible wheezes, rales or rhonchi.  ABDOMEN: Soft, nontender  MUSCULOSKELETAL: No deformities.   NEURO: Alert, moves all extremities, follows commands.  SKIN: Warm, dry, no rash visualized.    I have reviewed the triage vital signs and nursing notes.    Physical Exam     LAB RESULTS  Results for orders placed or performed during the hospital encounter of 02/23/23   COVID-19, FLU A/B, RSV PCR - Swab, Nasopharynx    Specimen: Nasopharynx; Swab   Result Value Ref Range    COVID19 Not Detected Not Detected - Ref. Range    Influenza A PCR Detected (A) Not Detected    Influenza B PCR Not Detected Not Detected    RSV, PCR Not Detected Not Detected   Comprehensive Metabolic Panel    Specimen: Blood   Result Value Ref Range    Glucose 130 (H) 65 - 99 mg/dL    BUN 21 8 - 23 mg/dL    Creatinine 1.04 (H) 0.57 - 1.00 mg/dL    Sodium 136 136 - 145 mmol/L    Potassium 4.0 3.5 - 5.2 mmol/L    Chloride 99 98 - 107 mmol/L     CO2 23.0 22.0 - 29.0 mmol/L    Calcium 9.4 8.6 - 10.5 mg/dL    Total Protein 7.3 6.0 - 8.5 g/dL    Albumin 4.3 3.5 - 5.2 g/dL    ALT (SGPT) 88 (H) 1 - 33 U/L    AST (SGOT) 86 (H) 1 - 32 U/L    Alkaline Phosphatase 99 39 - 117 U/L    Total Bilirubin 0.4 0.0 - 1.2 mg/dL    Globulin 3.0 gm/dL    A/G Ratio 1.4 g/dL    BUN/Creatinine Ratio 20.2 7.0 - 25.0    Anion Gap 14.0 5.0 - 15.0 mmol/L    eGFR 61.3 >60.0 mL/min/1.73   BNP    Specimen: Blood   Result Value Ref Range    proBNP 165.2 0.0 - 900.0 pg/mL   Single High Sensitivity Troponin T    Specimen: Blood   Result Value Ref Range    HS Troponin T <6 <10 ng/L   CBC Auto Differential    Specimen: Blood   Result Value Ref Range    WBC 8.42 3.40 - 10.80 10*3/mm3    RBC 4.45 3.77 - 5.28 10*6/mm3    Hemoglobin 12.7 12.0 - 15.9 g/dL    Hematocrit 38.5 34.0 - 46.6 %    MCV 86.5 79.0 - 97.0 fL    MCH 28.5 26.6 - 33.0 pg    MCHC 33.0 31.5 - 35.7 g/dL    RDW 12.9 12.3 - 15.4 %    RDW-SD 40.9 37.0 - 54.0 fl    MPV 10.5 6.0 - 12.0 fL    Platelets 200 140 - 450 10*3/mm3    Neutrophil % 72.5 42.7 - 76.0 %    Lymphocyte % 15.7 (L) 19.6 - 45.3 %    Monocyte % 11.4 5.0 - 12.0 %    Eosinophil % 0.1 (L) 0.3 - 6.2 %    Basophil % 0.2 0.0 - 1.5 %    Immature Grans % 0.1 0.0 - 0.5 %    Neutrophils, Absolute 6.10 1.70 - 7.00 10*3/mm3    Lymphocytes, Absolute 1.32 0.70 - 3.10 10*3/mm3    Monocytes, Absolute 0.96 (H) 0.10 - 0.90 10*3/mm3    Eosinophils, Absolute 0.01 0.00 - 0.40 10*3/mm3    Basophils, Absolute 0.02 0.00 - 0.20 10*3/mm3    Immature Grans, Absolute 0.01 0.00 - 0.05 10*3/mm3    nRBC 0.0 0.0 - 0.2 /100 WBC   ECG 12 Lead ED Triage Standing Order; SOA   Result Value Ref Range    QT Interval 362 ms    QTC Interval 450 ms   Green Top (Gel)   Result Value Ref Range    Extra Tube Hold for add-ons.    Lavender Top   Result Value Ref Range    Extra Tube hold for add-on    Gold Top - SST   Result Value Ref Range    Extra Tube Hold for add-ons.    Gray Top   Result Value Ref Range    Extra  Tube Hold for add-ons.    Light Blue Top   Result Value Ref Range    Extra Tube Hold for add-ons.        If labs were ordered, I independently reviewed the results and considered them in treating the patient.    RADIOLOGY  XR Chest 1 View   Final Result      Mild streaky left basilar opacities suggest atelectasis. No radiographic evidence of acute cardiopulmonary process.      Electronically signed by:  Singh Serrano D.O.     2/23/2023 12:18 AM Mountain Time        [x] Radiologist's Report Reviewed:  I ordered and independently reviewed the above noted radiographic studies.  See radiologist's dictation for official interpretation.      PROCEDURES    Procedures    ECG 12 Lead ED Triage Standing Order; SOA   Final Result   Test Reason : ED Triage Standing Order~   Blood Pressure :   */*   mmHG   Vent. Rate :  93 BPM     Atrial Rate :  93 BPM      P-R Int : 168 ms          QRS Dur :  90 ms       QT Int : 362 ms       P-R-T Axes :  20 -13 -16 degrees      QTc Int : 450 ms      Normal sinus rhythm   Possible Left atrial enlargement   Anterolateral infarct , age undetermined   Abnormal ECG   When compared with ECG of 25-FEB-2021 14:22,   Significant changes have occurred   Confirmed by NELSON CASTRO ([id number]) on 2/23/2023 9:17:53 AM      Referred By: EDM           Confirmed By: NELSON CASTRO          MEDICATIONS GIVEN IN ER    Medications - No data to display    MEDICAL DECISION MAKING, PROGRESS, and CONSULTS   Medical Decision Making  Cough, unspecified type: complicated acute illness or injury  History of breast cancer in adulthood: chronic illness or injury  History of depression: chronic illness or injury  History of diabetes mellitus: chronic illness or injury  History of hyperlipidemia: chronic illness or injury  History of hypertension: chronic illness or injury  Influenza A: complicated acute illness or injury  Shortness of breath: complicated acute illness or injury  Amount and/or Complexity of Data  Reviewed  Labs: ordered. Decision-making details documented in ED Course.  Radiology: ordered.  ECG/medicine tests: ordered.          All labs have been independently reviewed by me.  All radiology studies have been reviewed by me and the radiologist dictating the report.  All EKG's have been independently viewed by me.    [] Discussed with radiology regarding test interpretation:    Discussion below represents my analysis of pertinent findings related to patient's condition, differential diagnosis, treatment plan and final disposition.    Differential diagnosis:  The differential diagnosis associated with the patient's presentation includes: Viral upper respiratory infection, bacterial pneumonia, sinusitis, allergic rhinitis, influenza, COVID-19, ACS, CHF or COPD exacerbations, pneumothorax.    Additional sources  Discussed/ obtained information from independent historians:   [] Spouse  [] Parent  [] Family member  [] Friend  [] EMS   [] Other:  External (non-ED) record review:   [] Inpatient record:   [] Office record:   [] Outpatient record:   [] Prior Outpatient labs:   [] Prior Outpatient radiology:   [] Primary Care record:   [] Outside ED record:   [x] Other: History of breast cancer, GERD,   Patient's care impacted by:   [x] Diabetes  [x] Hypertension  [x] Hyperlipidemia  [] Hypothyroidism   [] Coronary Artery Disease   [] COPD   [] Cancer   [] Tobacco Abuse   [] Substance Abuse    [] Anxiety   [x] Depression   [] Other:   Care significantly affected by Social Determinants of Health (housing and economic circumstances, unemployment)    [] Yes     [x] No   If yes, Patient's care significantly limited by  Social Determinants of Health including:   [] Inadequate housing   [] Low income   [] Alcoholism and drug addiction in family   [] Problems related to primary support group   [] Unemployment   [] Problems related to employment   [] Other Social Determinants of Health:     Shared decision making: I had a  discussion with the patient/family regarding diagnosis, diagnostic results, treatment plan, and medications.  The patient/family indicated understanding of these instructions.  I spent adequate time at the bedside preceding discharge necessary to personally discuss the aftercare instructions, giving patient education, providing explanations of the results of our evaluations/findings, and my decision making to assure that the patient/family understand the plan of care.  Time was allotted to answer questions at that time and throughout the ED course.  Emphasis was placed on timely follow-up after discharge.  I also discussed the potential for the development of an acute emergent condition requiring further evaluation, admission, or even surgical intervention. I discussed that we found nothing during the visit today indicating the need for further workup, admission, or the presence of an unstable medical condition.  I encouraged the patient to return to the emergency department immediately for ANY concerns, worsening, new complaints, or if symptoms persist and unable to seek follow-up in a timely fashion.  The patient/family expressed understanding and agreement with this plan.  The patient will follow-up with primary care for reevaluation.     Orders placed during this visit:  Orders Placed This Encounter   Procedures   • COVID PRE-OP / PRE-PROCEDURE SCREENING ORDER (NO ISOLATION) - Swab, Nasopharynx   • COVID-19, FLU A/B, RSV PCR - Swab, Nasopharynx   • XR Chest 1 View   • Gilbert Draw   • Comprehensive Metabolic Panel   • BNP   • Single High Sensitivity Troponin T   • CBC Auto Differential   • ECG 12 Lead ED Triage Standing Order; SOA   • CBC & Differential   • Green Top (Gel)   • Lavender Top   • Gold Top - SST   • Gray Top   • Light Blue Top       I considered prescription management  with:   [] Pain medication  [x] Antiviral  [x] Antibiotic   [] Other:   Rationale: no bacterial infectious source requiring  antibiotics, onset of symptoms greater than recommended for Tamiflu    ED Course:    ED Course as of 03/05/23 1515   Thu Feb 23, 2023   0354 Influenza A PCR(!): Detected [JG]   0357 Patient presents with symptoms suspicious for likely viral upper respiratory infection. Differential includes bacterial pneumonia, sinusitis, allergic rhinitis, influenza and COVID-19. Do not suspect underlying cardiopulmonary process. I considered, but think unlikely, dangerous causes of this patient's symptoms to include ACS, CHF or COPD exacerbations, pneumonia, pneumothorax.Labs obtained and reviewed demonstrate no acute or emergent abnormalities.   Imaging of chest demonstrated no acute cardiopulmonary process.  EKG without evidence of acute ischemic changes.  Nasopharyngeal swab POSITIVE for Influenza A. Patient is afebrile, vital signs stable, nontoxic appearing with oxygen saturation of 93% on room air. Patient not in need of emergent medical intervention. Will be discharged home with symptomatic care. Patient provided reassurance. Discharged with PCP follow up.   [JG]      ED Course User Index  [JG] Darrion Pedroza PA            DIAGNOSIS  Final diagnoses:   Influenza A   Shortness of breath   Cough, unspecified type   History of diabetes mellitus   History of hypertension   History of hyperlipidemia   History of breast cancer in adulthood   History of depression       DISPOSITION    ED Disposition     ED Disposition   Discharge    Condition   Stable    Comment   --             Please note that portions of this document were completed with voice recognition software.      Darrion Pedroza PA  03/05/23 1515

## 2023-03-06 ENCOUNTER — TELEPHONE (OUTPATIENT)
Dept: FAMILY MEDICINE CLINIC | Facility: CLINIC | Age: 63
End: 2023-03-06
Payer: COMMERCIAL

## 2023-03-06 NOTE — TELEPHONE ENCOUNTER
Spoke with patient.  She is feeling better from flu.  Has appointment with Dr. BELL this Wednesday.  She has seen MRI breast radiology results and will review with Dr. BELL on Wednesday.

## 2023-03-07 ENCOUNTER — TRANSCRIBE ORDERS (OUTPATIENT)
Dept: PHYSICAL THERAPY | Facility: HOSPITAL | Age: 63
End: 2023-03-07
Payer: COMMERCIAL

## 2023-03-07 DIAGNOSIS — C50.911 MALIGNANT NEOPLASM OF RIGHT FEMALE BREAST, UNSPECIFIED ESTROGEN RECEPTOR STATUS, UNSPECIFIED SITE OF BREAST: Primary | ICD-10-CM

## 2023-03-08 ENCOUNTER — PATIENT OUTREACH (OUTPATIENT)
Dept: ONCOLOGY | Facility: CLINIC | Age: 63
End: 2023-03-08
Payer: COMMERCIAL

## 2023-03-08 ENCOUNTER — HOSPITAL ENCOUNTER (OUTPATIENT)
Dept: PHYSICAL THERAPY | Facility: HOSPITAL | Age: 63
Setting detail: THERAPIES SERIES
Discharge: HOME | End: 2023-03-08
Payer: COMMERCIAL

## 2023-03-08 VITALS — HEIGHT: 62 IN | BODY MASS INDEX: 36.44 KG/M2 | WEIGHT: 198 LBS

## 2023-03-08 DIAGNOSIS — C50.911 MALIGNANT NEOPLASM OF RIGHT FEMALE BREAST, UNSPECIFIED ESTROGEN RECEPTOR STATUS, UNSPECIFIED SITE OF BREAST: Primary | ICD-10-CM

## 2023-03-08 PROCEDURE — 97162 PT EVAL MOD COMPLEX 30 MIN: CPT

## 2023-03-08 PROCEDURE — 93702 BIS XTRACELL FLUID ANALYSIS: CPT

## 2023-03-08 NOTE — THERAPY DISCHARGE NOTE
Outpatient Physical Therapy Lymphedema Initial Evaluation & Discharge  [facility]     Patient Name: Yamila Neff  : 1960  MRN: [id number]  Today's Date: 3/8/2023      Visit Date: 2023    Visit Dx:    ICD-10-CM ICD-9-CM   1. Malignant neoplasm of right female breast, unspecified estrogen receptor status, unspecified site of breast (HCC)  C50.911 174.9       Patient Active Problem List   Diagnosis   • Degenerative joint disease   • Depression   • Psoriasis   • Congenital single kidney   • Hepatic steatosis   • Essential hypertension   • Intractable migraine without aura and without status migrainosus   • Mixed hyperlipidemia   • Anxiety   • Primary insomnia   • Impaired glucose tolerance   • Iron deficiency   • Class 2 severe obesity due to excess calories with serious comorbidity and body mass index (BMI) of 36.0 to 36.9 in adult (HCC)   • Primary osteoarthritis involving multiple joints   • NGOC on CPAP   • Tendonitis, Achilles, right   • Malignant neoplasm of right female breast (HCC)        Past Medical History:   Diagnosis Date   • Anemia    • Arthritis    • Carpal tunnel syndrome 2012   • Degenerative joint disease    • Depression    • Depression    • Elevated cholesterol    • Elevated liver enzymes    • Fatty liver    • HL (hearing loss)    • Hypertension    • Kidney disorder     one kidney   • Malignant neoplasm of right female breast (HCC) 2023   • Palpitations    • Polycystic ovaries    • Psoriasis    • PVC (premature ventricular contraction)     occasional pvc's   • S/P total knee arthroplasty, right 3/9/2021   • Sleep apnea     cpap at home   • Wears glasses         Past Surgical History:   Procedure Laterality Date   • BREAST BIOPSY Right     PAPILLOMA DR BELL   •  SECTION      x 3   • CHOLECYSTECTOMY  2013   • COLONOSCOPY     • HYSTERECTOMY  2006    complete   • JOINT REPLACEMENT Left     left knee   • OOPHORECTOMY     • TOTAL KNEE ARTHROPLASTY Right  03/09/2021    Procedure: TOTAL KNEE ARTHROPLASTY RIGHT;  Surgeon: Mateo Keith MD;  Location: [facility];  Service: Orthopedics;  Laterality: Right;   • TUBAL ABDOMINAL LIGATION              Lymphedema     Row Name 03/08/23 1200             Subjective Pain    Able to rate subjective pain? yes  -KG      Pre-Treatment Pain Level 0  -KG      Post-Treatment Pain Level 0  -KG         Subjective Comments    Subjective Comments Pt is 61 yo female with R sided BrCA with upcoming bilateral mastectomy and SNLB.  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  Currently has no physical impairment in UEs  -KG         Lymphedema Assessment    Lymphedema Assessment Comments RUE at risk  -KG         Posture/Observations    Posture- WNL Posture is WNL  -KG         General ROM    GENERAL ROM COMMENTS BUE WNL  -KG         MMT (Manual Muscle Testing)    General MMT Comments BUE WNL  -KG         Lymphedema Edema Assessment    Edema Assessment Comment currently no UE edema present  -KG         Skin Changes/Observations    Location/Assessment Upper Quadrant;Lower Extremity  -KG      Upper Quadrant Conditions bilateral:;normal  -KG      Upper Quadrant Color/Pigment bilateral:;normal  -KG      Lower Extremity Conditions bilateral:;normal  -KG      Lower Extremity Color/Pigment bilateral:;normal  -KG         Lymphedema Sensation    Lymphedema Sensation Comments normal  -KG         L-Dex Bioimpedence Screening    L-Dex Measurement Extremity RUE  -KG      L-Dex Patient Position Standing  -KG      L-Dex UE Dominate Side Right  -KG      L-Dex UE At Risk Side Right  -KG      L-Dex UE Baseline Score -5  -KG      L-Dex UE Comment The patient had SOZO measurement which I reviewed today. The score is in normal limits, see scanned to EMR. Bioimpedance spectroscopy helps identify the onset of lymphedema in an arm or leg before patients experience noticeable swelling. Research has shown that the early  "detection of lymphedema using L-Dex combined with treatment can reduce progression to chronic lymphedema by 95% in breast cancer patients. Whenever possible, patients are tested for baseline L-Dex score before cancer treatment begins and then are reassessed during regular follow-up visits using the SOZO device. Otherwise, this can be started postoperatively and continued during regular follow-up visits. If the patient’s L-Dex score increases above normal levels, that is a sign that lymphedema is developing and a referral is made to occupational or physical therapy for further evaluation and early compression treatment. Lymphedema assessment with the SOZO L-Dex score is recommended to be done every 3 months for the first 3 years and then every 6 months for years 4 and 5 followed by annually afterwards  -KG      Skeletal Muscle Mass (%) 20.6 %  -KG      Hy-dex -18.7  -KG      Height 157.5 cm (62\")  -KG      Weight 89.8 kg (198 lb)  -KG      BMI (Calculated) 36.2  -KG            User Key  (r) = Recorded By, (t) = Taken By, (c) = Cosigned By    Initials Name Provider Type    Madelyn Foster Physical Therapist                                   Therapy Education  Education Details: Pt edu on prehab evaluation assessments including bioimpedance. Pt was provided w/ HABS materials to learn more about lymphedema, exercise, etc.  Given: Symptoms/condition management, Posture/body mechanics  Program: New  How Provided: Verbal  Provided to: Patient  Level of Understanding: Verbalized     OP Exercises     Row Name 03/08/23 1200             Subjective Comments    Subjective Comments Pt is 63 yo female with R sided BrCA with upcoming bilateral mastectomy and SNLB.  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  Currently has no physical impairment in UEs  -KG         Subjective Pain    Able to rate subjective pain? yes  -KG      Pre-Treatment Pain Level 0  -KG      Post-Treatment " Pain Level 0  -KG            User Key  (r) = Recorded By, (t) = Taken By, (c) = Cosigned By    Initials Name Provider Type    TORSTEN Madelyn Mayorga Physical Therapist                               PT OP Goals     Row Name 03/08/23 1200          PT Short Term Goals    STG Date to Achieve 04/07/23  -KG     STG 1 Goal 1: Pt demonstrates awareness of post-operative movement restrictions and HEP to facilitate lymphatic regeneration and reduce the risk of seroma formation, axillary web syndrome, and lymphedema while ensuring shoulder joint mobility.  -KG     STG 1 Progress Met  -KG     STG 2 goal 2: pt demonstrates understeanding of post-operative basic lymphedema precautions  -KG     STG 2 Progress Met  -KG           User Key  (r) = Recorded By, (t) = Taken By, (c) = Cosigned By    Initials Name Provider Type    TORSTEN Madelyn Mayorga Physical Therapist                 PT Assessment/Plan     Row Name 03/08/23 1400          PT Assessment    Assessment Comments Ms. Neff presents to PT pre-operatively for planned BrCA surgery scheduled in a couple weeks. Baseline ROM, postural, and bioimpedance measurements were taken today to be compared to measurements retaken 3-4 weeks post surgery. At  that time, any reduced movement, decline in function, or postural issues will be addressed with skilled care and new goals will be established. Personal risk factors for lymphedema post-operatively for the R* upper extremity and trunk quadrant were also assessed today and basic lymphedema precautions were discussed. A more detailed discussion regarding personal lymphedema risk factors will take place post-operatively once the number of lymph nodes removed and the plan for further medical care is known  -KG     Please refer to paper survey for additional self-reported information Yes  -KG     Rehab Potential Good  -KG     Patient/caregiver participated in establishment of treatment plan and goals Yes  -KG     Patient would benefit from  skilled therapy intervention Yes  -KG        PT Plan    Planned CPT's? PT EVAL MOD COMPLELITY: 72788;PT BIS XTRACELL FLUID ANALYSIS: 30401  -KG     Physical Therapy Interventions (Optional Details) postural re-education;patient/family education;ROM (Range of Motion);stretching  -KG     PT Plan Comments Ms. Neff may return to PT 3-4 weeks post operatively for re-evaluation measurements to be compared to measurements taken today, at her pre-operative evaluation. In addition, she will be examined for possible post-BrCA surgery sequelae such as axillary web syndrome, scar adhesions, edema, worsened posture, scapular winging, pain, and reduced ROM and function. At that time, a future plan and goals will be established and skilled care continued if indicated. Currently, Ms. Neff has been provided with additional information for healing after BrCA surgery in order to facilitate recovery and reduce the risk of post-operative sequelae  -KG           User Key  (r) = Recorded By, (t) = Taken By, (c) = Cosigned By    Initials Name Provider Type    KG Madelyn Mayorga Physical Therapist                 Outcome Measure Options: Quick DASH  Quick DASH  Open a tight or new jar.: Moderate Difficulty  Do heavy household chores (e.g., wash walls, wash floors): Moderate Difficulty  Carry a shopping bag or briefcase: No Difficulty  Wash your back: Mild Difficulty  Use a knife to cut food: No Difficulty  Recreational activities in which you take some force or impact through your arm, should or hand (e.g. golf, hammering, tennis, etc.): Mild Difficulty  During the past week, to what extent has your arm, shoulder, or hand problem interfered with your normal social activites with family, friends, neighbors or groups?: Not at all  During the past week, were you limited in your work or other regular daily activities as a result of your arm, shoulder or hand problem?: Not limited at all  Arm, Shoulder, or hand pain: None  Tingling (pins  and needles) in your arm, shoulder, or hand: None  During the past week, how much difficulty have you had sleeping because of the pain in your arm, shoulder or hand?: No difficulty  Number of Questions Answered: 11  Quick DASH Score: 13.64         Time Calculation:   Start Time: 1200     Therapy Charges for Today     Code Description Service Date Service Provider Modifiers Qty    85305003984 HC PT EVAL MOD COMPLEXITY 4 3/8/2023 Madelyn Mayorga GP 1    77705735141 HC PT BIS XTRACELL FLUID ANALYSIS 3/8/2023 Madelyn Mayorga  1          PT G-Codes  Outcome Measure Options: Quick DASH  Quick DASH Score: 13.64            Madelyn Mayorga  3/8/2023

## 2023-03-14 ENCOUNTER — TRANSCRIBE ORDERS (OUTPATIENT)
Dept: ADMINISTRATIVE | Facility: HOSPITAL | Age: 63
End: 2023-03-14
Payer: COMMERCIAL

## 2023-03-14 DIAGNOSIS — C50.411 MALIGNANT NEOPLASM OF UPPER-OUTER QUADRANT OF RIGHT FEMALE BREAST, UNSPECIFIED ESTROGEN RECEPTOR STATUS: Primary | ICD-10-CM

## 2023-03-16 ENCOUNTER — PATIENT MESSAGE (OUTPATIENT)
Dept: FAMILY MEDICINE CLINIC | Facility: CLINIC | Age: 63
End: 2023-03-16
Payer: COMMERCIAL

## 2023-03-24 ENCOUNTER — PRE-ADMISSION TESTING (OUTPATIENT)
Dept: PREADMISSION TESTING | Facility: HOSPITAL | Age: 63
End: 2023-03-24
Payer: COMMERCIAL

## 2023-03-24 VITALS — WEIGHT: 195.44 LBS | HEIGHT: 62 IN | BODY MASS INDEX: 35.96 KG/M2

## 2023-03-24 LAB
ALBUMIN SERPL-MCNC: 4.7 G/DL (ref 3.5–5.2)
ALBUMIN/GLOB SERPL: 2 G/DL
ALP SERPL-CCNC: 76 U/L (ref 39–117)
ALT SERPL W P-5'-P-CCNC: 41 U/L (ref 1–33)
ANION GAP SERPL CALCULATED.3IONS-SCNC: 12 MMOL/L (ref 5–15)
AST SERPL-CCNC: 47 U/L (ref 1–32)
BILIRUB SERPL-MCNC: 0.6 MG/DL (ref 0–1.2)
BUN SERPL-MCNC: 15 MG/DL (ref 8–23)
BUN/CREAT SERPL: 14.7 (ref 7–25)
CALCIUM SPEC-SCNC: 10.1 MG/DL (ref 8.6–10.5)
CHLORIDE SERPL-SCNC: 98 MMOL/L (ref 98–107)
CO2 SERPL-SCNC: 28 MMOL/L (ref 22–29)
CREAT SERPL-MCNC: 1.02 MG/DL (ref 0.57–1)
DEPRECATED RDW RBC AUTO: 42.5 FL (ref 37–54)
EGFRCR SERPLBLD CKD-EPI 2021: 62.7 ML/MIN/1.73
ERYTHROCYTE [DISTWIDTH] IN BLOOD BY AUTOMATED COUNT: 13.3 % (ref 12.3–15.4)
GLOBULIN UR ELPH-MCNC: 2.3 GM/DL
GLUCOSE SERPL-MCNC: 86 MG/DL (ref 65–99)
HCT VFR BLD AUTO: 38.4 % (ref 34–46.6)
HGB BLD-MCNC: 12.5 G/DL (ref 12–15.9)
MCH RBC QN AUTO: 28.5 PG (ref 26.6–33)
MCHC RBC AUTO-ENTMCNC: 32.6 G/DL (ref 31.5–35.7)
MCV RBC AUTO: 87.5 FL (ref 79–97)
PLATELET # BLD AUTO: 304 10*3/MM3 (ref 140–450)
PMV BLD AUTO: 9.8 FL (ref 6–12)
POTASSIUM SERPL-SCNC: 4.4 MMOL/L (ref 3.5–5.2)
PROT SERPL-MCNC: 7 G/DL (ref 6–8.5)
RBC # BLD AUTO: 4.39 10*6/MM3 (ref 3.77–5.28)
SODIUM SERPL-SCNC: 138 MMOL/L (ref 136–145)
WBC NRBC COR # BLD: 6.18 10*3/MM3 (ref 3.4–10.8)

## 2023-03-24 PROCEDURE — 36415 COLL VENOUS BLD VENIPUNCTURE: CPT

## 2023-03-24 PROCEDURE — 93010 ELECTROCARDIOGRAM REPORT: CPT | Performed by: INTERNAL MEDICINE

## 2023-03-24 PROCEDURE — 85027 COMPLETE CBC AUTOMATED: CPT

## 2023-03-24 PROCEDURE — 80053 COMPREHEN METABOLIC PANEL: CPT

## 2023-03-24 PROCEDURE — 93005 ELECTROCARDIOGRAM TRACING: CPT

## 2023-03-24 NOTE — PAT
An arrival time for procedure was not provided during PAT visit. If patient had any questions or concerns about their arrival time, they were instructed to contact their surgeon/physician.  Additionally, if the patient referred to an arrival time that was acquired from their my chart account, patient was encouraged to verify that time with their surgeon/physician. Arrival times are NOT provided in Pre Admission Testing Department.    Patient viewed general PAT education video as instructed in their preoperative information received from their surgeon.  Patient stated the general PAT education video was viewed in its entirety and survey completed.  Copies of PAT general education handouts (Incentive Spirometry, Meds to Beds Program, Patient Belongings, Pre-op skin preparation instructions, Blood Glucose testing, Visitor policy, Surgery FAQ, Code H) distributed to patient if not printed. Education related to the PAT pass and skin preparation for surgery (if applicable) completed in PAT as a reinforcement to PAT education video. Patient instructed to return PAT pass provided today as well as completed skin preparation sheet (if applicable) on the day of procedure.     Additionally if patient had not viewed video yet but intended to view it at home or in our waiting area, then referred them to the handout with QR code/link provided during PAT visit.  Instructed patient to complete survey after viewing the video in its entirety.  Encouraged patient/family to read PAT general education handouts thoroughly and notify PAT staff with any questions or concerns. Patient verbalized understanding of all information and priority content.    Patient to apply Chlorhexadine wipes  to surgical area (as instructed) the night before procedure and the AM of procedure. Wipes provided.    Patient instructed to drink 20 ounces of Gatorade and it needs to be completed 1 hour (for Main OR patients) or 2 hours (scheduled  section &  [facility] patients) before given arrival time for procedure (NO RED Gatorade)    Patient verbalized understanding.    Patient denies any current skin issues.     Per Anesthesia Request, patient instructed not to take their ACE/ARB medications on the AM of surgery.    EKG obtained.

## 2023-03-26 LAB
QT INTERVAL: 398 MS
QTC INTERVAL: 444 MS

## 2023-03-28 ENCOUNTER — ANESTHESIA EVENT CONVERTED (OUTPATIENT)
Dept: ANESTHESIOLOGY | Facility: HOSPITAL | Age: 63
End: 2023-03-28
Payer: COMMERCIAL

## 2023-03-28 ENCOUNTER — ANESTHESIA EVENT (OUTPATIENT)
Dept: PERIOP | Facility: HOSPITAL | Age: 63
End: 2023-03-28
Payer: COMMERCIAL

## 2023-03-28 ENCOUNTER — HOSPITAL ENCOUNTER (OUTPATIENT)
Facility: HOSPITAL | Age: 63
Discharge: HOME | End: 2023-03-29
Attending: SURGERY | Admitting: SURGERY
Payer: COMMERCIAL

## 2023-03-28 ENCOUNTER — ANESTHESIA (OUTPATIENT)
Dept: PERIOP | Facility: HOSPITAL | Age: 63
End: 2023-03-28
Payer: COMMERCIAL

## 2023-03-28 ENCOUNTER — HOSPITAL ENCOUNTER (OUTPATIENT)
Dept: NUCLEAR MEDICINE | Facility: HOSPITAL | Age: 63
Discharge: HOME | End: 2023-03-28
Payer: COMMERCIAL

## 2023-03-28 DIAGNOSIS — C50.411 MALIGNANT NEOPLASM OF UPPER-OUTER QUADRANT OF RIGHT FEMALE BREAST: Primary | ICD-10-CM

## 2023-03-28 DIAGNOSIS — C50.411 MALIGNANT NEOPLASM OF UPPER-OUTER QUADRANT OF RIGHT FEMALE BREAST, UNSPECIFIED ESTROGEN RECEPTOR STATUS: ICD-10-CM

## 2023-03-28 LAB
GLUCOSE BLDC GLUCOMTR-MCNC: 100 MG/DL (ref 70–130)
GLUCOSE BLDC GLUCOMTR-MCNC: 116 MG/DL (ref 70–130)
GLUCOSE BLDC GLUCOMTR-MCNC: 89 MG/DL (ref 70–130)

## 2023-03-28 PROCEDURE — 82962 GLUCOSE BLOOD TEST: CPT

## 2023-03-28 PROCEDURE — 0 TECHNETIUM FILTERED SULFUR COLLOID: Performed by: SURGERY

## 2023-03-28 PROCEDURE — 94660 CPAP INITIATION&MGMT: CPT

## 2023-03-28 PROCEDURE — 19303 MAST SIMPLE COMPLETE: CPT

## 2023-03-28 PROCEDURE — 25010000002 FENTANYL CITRATE (PF) 100 MCG/2ML SOLUTION

## 2023-03-28 PROCEDURE — 25010000002 PROPOFOL 10 MG/ML EMULSION

## 2023-03-28 PROCEDURE — 25010000002 CEFAZOLIN IN DEXTROSE 2-4 GM/100ML-% SOLUTION: Performed by: SURGERY

## 2023-03-28 PROCEDURE — 25010000002 DEXAMETHASONE PER 1 MG

## 2023-03-28 PROCEDURE — 88307 TISSUE EXAM BY PATHOLOGIST: CPT | Performed by: SURGERY

## 2023-03-28 PROCEDURE — 94799 UNLISTED PULMONARY SVC/PX: CPT

## 2023-03-28 PROCEDURE — 25010000002 DEXAMETHASONE SODIUM PHOSPHATE 10 MG/ML SOLUTION

## 2023-03-28 PROCEDURE — 38792 RA TRACER ID OF SENTINL NODE: CPT

## 2023-03-28 PROCEDURE — 88331 PATH CONSLTJ SURG 1 BLK 1SPC: CPT | Performed by: SURGERY

## 2023-03-28 PROCEDURE — A9541 TC99M SULFUR COLLOID: HCPCS | Performed by: SURGERY

## 2023-03-28 PROCEDURE — 25010000002 ONDANSETRON PER 1 MG

## 2023-03-28 RX ORDER — MAGNESIUM HYDROXIDE 1200 MG/15ML
LIQUID ORAL AS NEEDED
Status: DISCONTINUED | OUTPATIENT
Start: 2023-03-28 | End: 2023-03-28 | Stop reason: HOSPADM

## 2023-03-28 RX ORDER — ATORVASTATIN CALCIUM 20 MG/1
20 TABLET, FILM COATED ORAL NIGHTLY
Status: DISCONTINUED | OUTPATIENT
Start: 2023-03-28 | End: 2023-03-29 | Stop reason: HOSPADM

## 2023-03-28 RX ORDER — LIDOCAINE HYDROCHLORIDE 10 MG/ML
0.5 INJECTION, SOLUTION EPIDURAL; INFILTRATION; INTRACAUDAL; PERINEURAL ONCE AS NEEDED
Status: COMPLETED | OUTPATIENT
Start: 2023-03-28 | End: 2023-03-28

## 2023-03-28 RX ORDER — INSULIN LISPRO 100 [IU]/ML
0-14 INJECTION, SOLUTION INTRAVENOUS; SUBCUTANEOUS
Status: DISCONTINUED | OUTPATIENT
Start: 2023-03-28 | End: 2023-03-29 | Stop reason: HOSPADM

## 2023-03-28 RX ORDER — DIPHENHYDRAMINE HYDROCHLORIDE 50 MG/ML
12.5 INJECTION INTRAMUSCULAR; INTRAVENOUS EVERY 6 HOURS PRN
Status: DISCONTINUED | OUTPATIENT
Start: 2023-03-28 | End: 2023-03-29 | Stop reason: HOSPADM

## 2023-03-28 RX ORDER — MELOXICAM 15 MG/1
15 TABLET ORAL ONCE
Status: COMPLETED | OUTPATIENT
Start: 2023-03-28 | End: 2023-03-28

## 2023-03-28 RX ORDER — IBUPROFEN 600 MG/1
1 TABLET ORAL
Status: DISCONTINUED | OUTPATIENT
Start: 2023-03-28 | End: 2023-03-29 | Stop reason: HOSPADM

## 2023-03-28 RX ORDER — BUPIVACAINE HYDROCHLORIDE 2.5 MG/ML
INJECTION, SOLUTION EPIDURAL; INFILTRATION; INTRACAUDAL
Status: COMPLETED | OUTPATIENT
Start: 2023-03-28 | End: 2023-03-28

## 2023-03-28 RX ORDER — PHENYLEPHRINE HCL IN 0.9% NACL 1 MG/10 ML
SYRINGE (ML) INTRAVENOUS AS NEEDED
Status: DISCONTINUED | OUTPATIENT
Start: 2023-03-28 | End: 2023-03-28 | Stop reason: SURG

## 2023-03-28 RX ORDER — PROMETHAZINE HYDROCHLORIDE 12.5 MG/1
6.25 TABLET ORAL EVERY 6 HOURS PRN
Status: DISCONTINUED | OUTPATIENT
Start: 2023-03-28 | End: 2023-03-29 | Stop reason: HOSPADM

## 2023-03-28 RX ORDER — LOSARTAN POTASSIUM 50 MG/1
100 TABLET ORAL DAILY
Status: DISCONTINUED | OUTPATIENT
Start: 2023-03-28 | End: 2023-03-29 | Stop reason: HOSPADM

## 2023-03-28 RX ORDER — HYDROXYZINE HYDROCHLORIDE 25 MG/1
50 TABLET, FILM COATED ORAL NIGHTLY PRN
Status: DISCONTINUED | OUTPATIENT
Start: 2023-03-28 | End: 2023-03-29 | Stop reason: HOSPADM

## 2023-03-28 RX ORDER — EPHEDRINE SULFATE 50 MG/ML
INJECTION INTRAVENOUS AS NEEDED
Status: DISCONTINUED | OUTPATIENT
Start: 2023-03-28 | End: 2023-03-28 | Stop reason: SURG

## 2023-03-28 RX ORDER — SODIUM CHLORIDE 9 MG/ML
40 INJECTION, SOLUTION INTRAVENOUS AS NEEDED
Status: DISCONTINUED | OUTPATIENT
Start: 2023-03-28 | End: 2023-03-28 | Stop reason: HOSPADM

## 2023-03-28 RX ORDER — DEXAMETHASONE SODIUM PHOSPHATE 4 MG/ML
INJECTION, SOLUTION INTRA-ARTICULAR; INTRALESIONAL; INTRAMUSCULAR; INTRAVENOUS; SOFT TISSUE AS NEEDED
Status: DISCONTINUED | OUTPATIENT
Start: 2023-03-28 | End: 2023-03-28 | Stop reason: SURG

## 2023-03-28 RX ORDER — LIDOCAINE HYDROCHLORIDE 10 MG/ML
INJECTION, SOLUTION EPIDURAL; INFILTRATION; INTRACAUDAL; PERINEURAL AS NEEDED
Status: DISCONTINUED | OUTPATIENT
Start: 2023-03-28 | End: 2023-03-28 | Stop reason: SURG

## 2023-03-28 RX ORDER — SODIUM CHLORIDE 0.9 % (FLUSH) 0.9 %
10 SYRINGE (ML) INJECTION EVERY 12 HOURS SCHEDULED
Status: DISCONTINUED | OUTPATIENT
Start: 2023-03-28 | End: 2023-03-28 | Stop reason: HOSPADM

## 2023-03-28 RX ORDER — PROMETHAZINE HYDROCHLORIDE 12.5 MG/1
6.25 SUPPOSITORY RECTAL EVERY 6 HOURS PRN
Status: DISCONTINUED | OUTPATIENT
Start: 2023-03-28 | End: 2023-03-29 | Stop reason: HOSPADM

## 2023-03-28 RX ORDER — FENTANYL CITRATE 50 UG/ML
INJECTION, SOLUTION INTRAMUSCULAR; INTRAVENOUS AS NEEDED
Status: DISCONTINUED | OUTPATIENT
Start: 2023-03-28 | End: 2023-03-28 | Stop reason: SURG

## 2023-03-28 RX ORDER — ONDANSETRON 2 MG/ML
4 INJECTION INTRAMUSCULAR; INTRAVENOUS ONCE AS NEEDED
Status: DISCONTINUED | OUTPATIENT
Start: 2023-03-28 | End: 2023-03-28 | Stop reason: HOSPADM

## 2023-03-28 RX ORDER — SODIUM CHLORIDE 0.9 % (FLUSH) 0.9 %
10 SYRINGE (ML) INJECTION AS NEEDED
Status: DISCONTINUED | OUTPATIENT
Start: 2023-03-28 | End: 2023-03-28 | Stop reason: HOSPADM

## 2023-03-28 RX ORDER — SODIUM CHLORIDE 0.9 % (FLUSH) 0.9 %
3-10 SYRINGE (ML) INJECTION AS NEEDED
Status: DISCONTINUED | OUTPATIENT
Start: 2023-03-28 | End: 2023-03-28 | Stop reason: HOSPADM

## 2023-03-28 RX ORDER — SCOLOPAMINE TRANSDERMAL SYSTEM 1 MG/1
1 PATCH, EXTENDED RELEASE TRANSDERMAL CONTINUOUS
Status: DISCONTINUED | OUTPATIENT
Start: 2023-03-28 | End: 2023-03-29 | Stop reason: HOSPADM

## 2023-03-28 RX ORDER — INSULIN LISPRO 100 [IU]/ML
0-7 INJECTION, SOLUTION INTRAVENOUS; SUBCUTANEOUS
Status: DISCONTINUED | OUTPATIENT
Start: 2023-03-28 | End: 2023-03-28 | Stop reason: HOSPADM

## 2023-03-28 RX ORDER — SODIUM CHLORIDE, SODIUM LACTATE, POTASSIUM CHLORIDE, CALCIUM CHLORIDE 600; 310; 30; 20 MG/100ML; MG/100ML; MG/100ML; MG/100ML
9 INJECTION, SOLUTION INTRAVENOUS CONTINUOUS PRN
Status: DISCONTINUED | OUTPATIENT
Start: 2023-03-28 | End: 2023-03-29 | Stop reason: HOSPADM

## 2023-03-28 RX ORDER — MIDAZOLAM HYDROCHLORIDE 1 MG/ML
1 INJECTION INTRAMUSCULAR; INTRAVENOUS
Status: DISCONTINUED | OUTPATIENT
Start: 2023-03-28 | End: 2023-03-28 | Stop reason: HOSPADM

## 2023-03-28 RX ORDER — NALOXONE HCL 0.4 MG/ML
0.4 VIAL (ML) INJECTION AS NEEDED
Status: DISCONTINUED | OUTPATIENT
Start: 2023-03-28 | End: 2023-03-28 | Stop reason: HOSPADM

## 2023-03-28 RX ORDER — PANTOPRAZOLE SODIUM 40 MG/1
40 TABLET, DELAYED RELEASE ORAL
Status: DISCONTINUED | OUTPATIENT
Start: 2023-03-29 | End: 2023-03-29 | Stop reason: HOSPADM

## 2023-03-28 RX ORDER — GLYCOPYRROLATE 0.2 MG/ML
INJECTION INTRAMUSCULAR; INTRAVENOUS AS NEEDED
Status: DISCONTINUED | OUTPATIENT
Start: 2023-03-28 | End: 2023-03-28 | Stop reason: SURG

## 2023-03-28 RX ORDER — IPRATROPIUM BROMIDE AND ALBUTEROL SULFATE 2.5; .5 MG/3ML; MG/3ML
3 SOLUTION RESPIRATORY (INHALATION) ONCE AS NEEDED
Status: DISCONTINUED | OUTPATIENT
Start: 2023-03-28 | End: 2023-03-28 | Stop reason: HOSPADM

## 2023-03-28 RX ORDER — ENALAPRILAT 2.5 MG/2ML
1.25 INJECTION INTRAVENOUS EVERY 6 HOURS PRN
Status: DISCONTINUED | OUTPATIENT
Start: 2023-03-28 | End: 2023-03-28

## 2023-03-28 RX ORDER — MELOXICAM 15 MG/1
15 TABLET ORAL DAILY
Status: DISCONTINUED | OUTPATIENT
Start: 2023-03-29 | End: 2023-03-29 | Stop reason: HOSPADM

## 2023-03-28 RX ORDER — DEXTROSE MONOHYDRATE 25 G/50ML
25 INJECTION, SOLUTION INTRAVENOUS
Status: DISCONTINUED | OUTPATIENT
Start: 2023-03-28 | End: 2023-03-29 | Stop reason: HOSPADM

## 2023-03-28 RX ORDER — FAMOTIDINE 20 MG/1
20 TABLET, FILM COATED ORAL
Status: COMPLETED | OUTPATIENT
Start: 2023-03-28 | End: 2023-03-28

## 2023-03-28 RX ORDER — ROCURONIUM BROMIDE 10 MG/ML
INJECTION, SOLUTION INTRAVENOUS AS NEEDED
Status: DISCONTINUED | OUTPATIENT
Start: 2023-03-28 | End: 2023-03-28 | Stop reason: SURG

## 2023-03-28 RX ORDER — PANTOPRAZOLE SODIUM 40 MG/1
40 TABLET, DELAYED RELEASE ORAL DAILY
Qty: 90 TABLET | Refills: 3 | Status: SHIPPED | OUTPATIENT
Start: 2023-03-28

## 2023-03-28 RX ORDER — PREGABALIN 75 MG/1
75 CAPSULE ORAL ONCE
Status: COMPLETED | OUTPATIENT
Start: 2023-03-28 | End: 2023-03-28

## 2023-03-28 RX ORDER — DROPERIDOL 2.5 MG/ML
0.62 INJECTION, SOLUTION INTRAMUSCULAR; INTRAVENOUS
Status: DISCONTINUED | OUTPATIENT
Start: 2023-03-28 | End: 2023-03-28 | Stop reason: HOSPADM

## 2023-03-28 RX ORDER — FLUOXETINE HYDROCHLORIDE 20 MG/1
80 CAPSULE ORAL NIGHTLY
Status: DISCONTINUED | OUTPATIENT
Start: 2023-03-28 | End: 2023-03-29 | Stop reason: HOSPADM

## 2023-03-28 RX ORDER — PROMETHAZINE HYDROCHLORIDE 25 MG/1
25 SUPPOSITORY RECTAL ONCE AS NEEDED
Status: DISCONTINUED | OUTPATIENT
Start: 2023-03-28 | End: 2023-03-28 | Stop reason: HOSPADM

## 2023-03-28 RX ORDER — NICOTINE POLACRILEX 4 MG
15 LOZENGE BUCCAL
Status: DISCONTINUED | OUTPATIENT
Start: 2023-03-28 | End: 2023-03-29 | Stop reason: HOSPADM

## 2023-03-28 RX ORDER — PROPOFOL 10 MG/ML
VIAL (ML) INTRAVENOUS AS NEEDED
Status: DISCONTINUED | OUTPATIENT
Start: 2023-03-28 | End: 2023-03-28 | Stop reason: SURG

## 2023-03-28 RX ORDER — HYDROCODONE BITARTRATE AND ACETAMINOPHEN 5; 325 MG/1; MG/1
1 TABLET ORAL ONCE AS NEEDED
Status: DISCONTINUED | OUTPATIENT
Start: 2023-03-28 | End: 2023-03-28 | Stop reason: HOSPADM

## 2023-03-28 RX ORDER — ONDANSETRON 2 MG/ML
INJECTION INTRAMUSCULAR; INTRAVENOUS AS NEEDED
Status: DISCONTINUED | OUTPATIENT
Start: 2023-03-28 | End: 2023-03-28 | Stop reason: SURG

## 2023-03-28 RX ORDER — HYDROMORPHONE HYDROCHLORIDE 1 MG/ML
0.3 INJECTION, SOLUTION INTRAMUSCULAR; INTRAVENOUS; SUBCUTANEOUS
Status: DISCONTINUED | OUTPATIENT
Start: 2023-03-28 | End: 2023-03-29 | Stop reason: HOSPADM

## 2023-03-28 RX ORDER — CEFAZOLIN SODIUM 2 G/100ML
2 INJECTION, SOLUTION INTRAVENOUS ONCE
Status: COMPLETED | OUTPATIENT
Start: 2023-03-28 | End: 2023-03-28

## 2023-03-28 RX ORDER — PROMETHAZINE HYDROCHLORIDE 25 MG/1
25 TABLET ORAL ONCE AS NEEDED
Status: DISCONTINUED | OUTPATIENT
Start: 2023-03-28 | End: 2023-03-28 | Stop reason: HOSPADM

## 2023-03-28 RX ORDER — NALOXONE HCL 0.4 MG/ML
0.1 VIAL (ML) INJECTION
Status: DISCONTINUED | OUTPATIENT
Start: 2023-03-28 | End: 2023-03-29 | Stop reason: HOSPADM

## 2023-03-28 RX ORDER — CEFAZOLIN SODIUM 2 G/100ML
2 INJECTION, SOLUTION INTRAVENOUS EVERY 8 HOURS
Status: COMPLETED | OUTPATIENT
Start: 2023-03-28 | End: 2023-03-29

## 2023-03-28 RX ORDER — ACETAMINOPHEN 500 MG
1000 TABLET ORAL EVERY 6 HOURS
Status: DISCONTINUED | OUTPATIENT
Start: 2023-03-28 | End: 2023-03-29 | Stop reason: HOSPADM

## 2023-03-28 RX ORDER — ONDANSETRON 2 MG/ML
4 INJECTION INTRAMUSCULAR; INTRAVENOUS EVERY 6 HOURS PRN
Status: DISCONTINUED | OUTPATIENT
Start: 2023-03-28 | End: 2023-03-29 | Stop reason: HOSPADM

## 2023-03-28 RX ORDER — DEXAMETHASONE SODIUM PHOSPHATE 10 MG/ML
INJECTION, SOLUTION INTRAMUSCULAR; INTRAVENOUS
Status: COMPLETED | OUTPATIENT
Start: 2023-03-28 | End: 2023-03-28

## 2023-03-28 RX ORDER — SODIUM CHLORIDE 9 MG/ML
50 INJECTION, SOLUTION INTRAVENOUS CONTINUOUS
Status: DISCONTINUED | OUTPATIENT
Start: 2023-03-28 | End: 2023-03-29 | Stop reason: HOSPADM

## 2023-03-28 RX ORDER — FENTANYL CITRATE 50 UG/ML
50 INJECTION, SOLUTION INTRAMUSCULAR; INTRAVENOUS
Status: DISCONTINUED | OUTPATIENT
Start: 2023-03-28 | End: 2023-03-28 | Stop reason: HOSPADM

## 2023-03-28 RX ORDER — AMITRIPTYLINE HYDROCHLORIDE 10 MG/1
10 TABLET, FILM COATED ORAL NIGHTLY
Status: DISCONTINUED | OUTPATIENT
Start: 2023-03-28 | End: 2023-03-29 | Stop reason: HOSPADM

## 2023-03-28 RX ORDER — DROPERIDOL 2.5 MG/ML
0.62 INJECTION, SOLUTION INTRAMUSCULAR; INTRAVENOUS ONCE AS NEEDED
Status: DISCONTINUED | OUTPATIENT
Start: 2023-03-28 | End: 2023-03-28 | Stop reason: HOSPADM

## 2023-03-28 RX ORDER — ACETAMINOPHEN 500 MG
1000 TABLET ORAL ONCE
Status: COMPLETED | OUTPATIENT
Start: 2023-03-28 | End: 2023-03-28

## 2023-03-28 RX ORDER — AMLODIPINE BESYLATE 5 MG/1
5 TABLET ORAL DAILY
Status: DISCONTINUED | OUTPATIENT
Start: 2023-03-28 | End: 2023-03-29 | Stop reason: HOSPADM

## 2023-03-28 RX ORDER — CHOLESTYRAMINE LIGHT 4 G/5.7G
1 POWDER, FOR SUSPENSION ORAL DAILY
Status: DISCONTINUED | OUTPATIENT
Start: 2023-03-28 | End: 2023-03-29 | Stop reason: HOSPADM

## 2023-03-28 RX ORDER — OXYCODONE HYDROCHLORIDE 5 MG/1
5 TABLET ORAL EVERY 4 HOURS PRN
Status: DISCONTINUED | OUTPATIENT
Start: 2023-03-28 | End: 2023-03-29 | Stop reason: HOSPADM

## 2023-03-28 RX ORDER — PROPRANOLOL HCL 60 MG
60 CAPSULE, EXTENDED RELEASE 24HR ORAL NIGHTLY
Status: DISCONTINUED | OUTPATIENT
Start: 2023-03-28 | End: 2023-03-29 | Stop reason: HOSPADM

## 2023-03-28 RX ORDER — SODIUM CHLORIDE 0.9 % (FLUSH) 0.9 %
3 SYRINGE (ML) INJECTION EVERY 12 HOURS SCHEDULED
Status: DISCONTINUED | OUTPATIENT
Start: 2023-03-28 | End: 2023-03-28 | Stop reason: HOSPADM

## 2023-03-28 RX ORDER — LABETALOL HYDROCHLORIDE 5 MG/ML
5 INJECTION, SOLUTION INTRAVENOUS
Status: DISCONTINUED | OUTPATIENT
Start: 2023-03-28 | End: 2023-03-28 | Stop reason: HOSPADM

## 2023-03-28 RX ADMIN — Medication 100 MCG: at 11:24

## 2023-03-28 RX ADMIN — Medication 200 MCG: at 12:36

## 2023-03-28 RX ADMIN — AMLODIPINE BESYLATE 5 MG: 5 TABLET ORAL at 15:34

## 2023-03-28 RX ADMIN — Medication 100 MCG: at 11:06

## 2023-03-28 RX ADMIN — EPHEDRINE SULFATE 10 MG: 50 INJECTION INTRAVENOUS at 10:18

## 2023-03-28 RX ADMIN — ACETAMINOPHEN 1000 MG: 500 TABLET, FILM COATED ORAL at 15:34

## 2023-03-28 RX ADMIN — PROPRANOLOL HYDROCHLORIDE 60 MG: 60 CAPSULE, EXTENDED RELEASE ORAL at 21:59

## 2023-03-28 RX ADMIN — Medication 200 MCG: at 12:21

## 2023-03-28 RX ADMIN — PROPOFOL 200 MG: 10 INJECTION, EMULSION INTRAVENOUS at 09:55

## 2023-03-28 RX ADMIN — EPHEDRINE SULFATE 15 MG: 50 INJECTION INTRAVENOUS at 12:12

## 2023-03-28 RX ADMIN — CEFAZOLIN SODIUM 2 G: 2 INJECTION, SOLUTION INTRAVENOUS at 10:06

## 2023-03-28 RX ADMIN — OXYCODONE HYDROCHLORIDE 5 MG: 5 TABLET ORAL at 18:05

## 2023-03-28 RX ADMIN — FENTANYL CITRATE 100 MCG: 50 INJECTION, SOLUTION INTRAMUSCULAR; INTRAVENOUS at 09:55

## 2023-03-28 RX ADMIN — LOSARTAN POTASSIUM 100 MG: 50 TABLET, FILM COATED ORAL at 15:34

## 2023-03-28 RX ADMIN — Medication 200 MCG: at 12:03

## 2023-03-28 RX ADMIN — EPHEDRINE SULFATE 10 MG: 50 INJECTION INTRAVENOUS at 10:26

## 2023-03-28 RX ADMIN — SCOPALAMINE 1 PATCH: 1 PATCH, EXTENDED RELEASE TRANSDERMAL at 07:55

## 2023-03-28 RX ADMIN — Medication 100 MCG: at 11:51

## 2023-03-28 RX ADMIN — SUGAMMADEX 200 MG: 100 INJECTION, SOLUTION INTRAVENOUS at 12:55

## 2023-03-28 RX ADMIN — ACETAMINOPHEN 1000 MG: 500 TABLET ORAL at 07:56

## 2023-03-28 RX ADMIN — Medication 100 MCG: at 11:57

## 2023-03-28 RX ADMIN — ROCURONIUM BROMIDE 40 MG: 10 INJECTION, SOLUTION INTRAVENOUS at 09:55

## 2023-03-28 RX ADMIN — TECHNETIUM TC 99M SULFUR COLLOID 1 DOSE: KIT at 11:19

## 2023-03-28 RX ADMIN — EPHEDRINE SULFATE 5 MG: 50 INJECTION INTRAVENOUS at 10:40

## 2023-03-28 RX ADMIN — ONDANSETRON 4 MG: 2 INJECTION INTRAMUSCULAR; INTRAVENOUS at 12:17

## 2023-03-28 RX ADMIN — Medication 100 MCG: at 10:45

## 2023-03-28 RX ADMIN — Medication 2 G: at 18:05

## 2023-03-28 RX ADMIN — PREGABALIN 75 MG: 75 CAPSULE ORAL at 07:56

## 2023-03-28 RX ADMIN — ATORVASTATIN CALCIUM 20 MG: 20 TABLET, FILM COATED ORAL at 21:55

## 2023-03-28 RX ADMIN — FAMOTIDINE 20 MG: 20 TABLET ORAL at 07:56

## 2023-03-28 RX ADMIN — Medication 100 MCG: at 11:12

## 2023-03-28 RX ADMIN — Medication 100 MCG: at 12:15

## 2023-03-28 RX ADMIN — Medication 100 MCG: at 10:52

## 2023-03-28 RX ADMIN — EPHEDRINE SULFATE 5 MG: 50 INJECTION INTRAVENOUS at 12:15

## 2023-03-28 RX ADMIN — LIDOCAINE HYDROCHLORIDE 0.5 ML: 10 INJECTION, SOLUTION EPIDURAL; INFILTRATION; INTRACAUDAL; PERINEURAL at 07:55

## 2023-03-28 RX ADMIN — Medication 100 MCG: at 11:46

## 2023-03-28 RX ADMIN — ACETAMINOPHEN 1000 MG: 500 TABLET, FILM COATED ORAL at 21:55

## 2023-03-28 RX ADMIN — EPHEDRINE SULFATE 5 MG: 50 INJECTION INTRAVENOUS at 10:13

## 2023-03-28 RX ADMIN — PROPOFOL 20 MG: 10 INJECTION, EMULSION INTRAVENOUS at 11:34

## 2023-03-28 RX ADMIN — OXYCODONE HYDROCHLORIDE 5 MG: 5 TABLET ORAL at 22:00

## 2023-03-28 RX ADMIN — DEXAMETHASONE SODIUM PHOSPHATE 4 MG: 10 INJECTION, SOLUTION INTRAMUSCULAR; INTRAVENOUS at 09:58

## 2023-03-28 RX ADMIN — AMITRIPTYLINE HYDROCHLORIDE 10 MG: 10 TABLET, FILM COATED ORAL at 21:55

## 2023-03-28 RX ADMIN — FLUOXETINE 80 MG: 20 CAPSULE ORAL at 21:55

## 2023-03-28 RX ADMIN — SODIUM CHLORIDE 50 ML/HR: 9 INJECTION, SOLUTION INTRAVENOUS at 14:33

## 2023-03-28 RX ADMIN — BUPIVACAINE HYDROCHLORIDE 60 ML: 2.5 INJECTION, SOLUTION EPIDURAL; INFILTRATION; INTRACAUDAL; PERINEURAL at 09:58

## 2023-03-28 RX ADMIN — DEXAMETHASONE SODIUM PHOSPHATE 8 MG: 4 INJECTION, SOLUTION INTRAMUSCULAR; INTRAVENOUS at 10:08

## 2023-03-28 RX ADMIN — SODIUM CHLORIDE, POTASSIUM CHLORIDE, SODIUM LACTATE AND CALCIUM CHLORIDE: 600; 310; 30; 20 INJECTION, SOLUTION INTRAVENOUS at 10:46

## 2023-03-28 RX ADMIN — SODIUM CHLORIDE, POTASSIUM CHLORIDE, SODIUM LACTATE AND CALCIUM CHLORIDE 9 ML/HR: 600; 310; 30; 20 INJECTION, SOLUTION INTRAVENOUS at 07:54

## 2023-03-28 RX ADMIN — MELOXICAM 15 MG: 15 TABLET ORAL at 07:56

## 2023-03-28 RX ADMIN — ROCURONIUM BROMIDE 10 MG: 10 INJECTION, SOLUTION INTRAVENOUS at 10:44

## 2023-03-28 RX ADMIN — LIDOCAINE HYDROCHLORIDE 50 MG: 10 INJECTION, SOLUTION EPIDURAL; INFILTRATION; INTRACAUDAL; PERINEURAL at 09:55

## 2023-03-28 RX ADMIN — GLYCOPYRROLATE 0.2 MCG: 0.2 INJECTION INTRAMUSCULAR; INTRAVENOUS at 10:34

## 2023-03-28 NOTE — ANESTHESIA PROCEDURE NOTES
Peripheral Block    Pre-sedation assessment completed: 3/28/2023 9:51 AM    Patient reassessed immediately prior to procedure    Patient location during procedure: OR  Start time: 3/28/2023 9:58 AM  Reason for block: at surgeon's request and post-op pain management  Performed by  CRNA/CAA: Raul Charles, AMOS  Preanesthetic Checklist  Completed: patient identified, IV checked, site marked, risks and benefits discussed, surgical consent, monitors and equipment checked, pre-op evaluation and timeout performed  Prep:  Pt Position: supine  Sterile barriers:cap, gloves, mask and washed/disinfected hands  Prep: ChloraPrep  Patient monitoring: blood pressure monitoring, continuous pulse oximetry and EKG  Procedure  Performed under: general  Guidance:ultrasound guided and landmark technique  Images:still images obtained, printed/placed on chart    Laterality:Bilateral  Block Type:PECS I and PECS II  Injection Technique:single-shot  Needle Type:short-bevel  Needle Gauge:20 G  Resistance on Injection: none    Medications Used: dexamethasone sodium phosphate injection - Injection   4 mg - 3/28/2023 9:58:00 AM  bupivacaine PF (MARCAINE) 0.25 % injection - Injection   60 mL - 3/28/2023 9:58:00 AM      Medications  Preservative Free Saline:10ml  Comment:Block Injection:  Total volume of LA divided between Right and Left sided blocks         Post Assessment  Injection Assessment: negative aspiration for heme, incremental injection and no paresthesia on injection  Patient Tolerance:comfortable throughout block  Complications:no  Additional Notes  Interpectoral-Pectoserratus Plane   A high-frequency linear transducer, with sterile cover, was placed medial to the coracoid process in the paramedian sagittal plane. The transducer was moved caudally to the 4th rib and rotated slightly to allow an in-plane needle trajectory from medial to lateral. Pectoralis Major Muscle (PMM), Pectoralis Minor Muscle (PmM), Thoracoacromial Artery,  "Ribs, and Pleura were identified under ultrasound. The insertion site was prepped in sterile fashion and then localized with 2-5 ml of 1% Lidocaine. Using ultrasound-guidance, a 20-gauge B-Conteh 4\" Ultraplex 360 non-stimulating echogenic needle was advanced in plane until the tip of the needle was in the fascial plane between the PMM and PmM, lateral to the Thoracoacromial Artery. 1-3ml of preservative free normal saline was used to hydro-dissect the fascial planes. After the fascial plane was verified, 10ml local anesthetic (LA) was injected for Interpectoral fascial plane block. The needle was continued along the same path to the level of the 4th rib below PmM.  Initially preservative free normal saline was used to confirm needle position and then 20 ml of LA was injected for Pectoserratus fascial plane block. Aspiration every 5 ml to prevent intravascular injection. Injection was completed with negative aspiration of blood and negative intravascular injection. Injection pressures were normal with minimal resistance.             "

## 2023-03-28 NOTE — PLAN OF CARE
Goal Outcome Evaluation:  Plan of Care Reviewed With: patient        Progress: improving  Outcome Evaluation: VSS, pain managed, no c/o, voids spontaneously and without difficulty, tolerating diet, will continue to monitor

## 2023-03-28 NOTE — H&P
Pre-Op H&P  Yamila Neff  [id number]  1960      Chief complaint: Right breast cancer      Subjective:  Patient is a 62 y.o.female presents for scheduled surgery by Dr. BELL. She anticipates a RIGHT MASTECTOMY WITH RIGHT SENTINEL NODE BIOPSY, LEFT PROPHYLATIC MASTECTOMY today. She had abnormal mammogram followed by core needle breast bx. 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       Review of Systems:  Constitutional-- No fever, chills or sweats. No fatigue.  CV-- No chest pain, palpitation or syncope. +HTN, HLD  Resp-- No SOB, cough, hemoptysis. +NGOC on cpap   Skin--No rashes or lesions      Allergies:   Allergies   Allergen Reactions   • Lisinopril Cough   • Celexa [Citalopram] Other (See Comments)     Jittery          Home Meds:  Medications Prior to Admission   Medication Sig Dispense Refill Last Dose   • amitriptyline (ELAVIL) 10 MG tablet Take 1 tablet by mouth Every Night. 90 tablet 0 3/27/2023 at 2100   • amLODIPine (NORVASC) 5 MG tablet Take 1 tablet by mouth Daily. 90 tablet 1 3/27/2023 at 0800   • atorvastatin (LIPITOR) 20 MG tablet Take 1 tablet by mouth Every Night. 90 tablet 1 3/27/2023 at 2100   • cholecalciferol (VITAMIN D3) 1000 units tablet Take 2 tablets by mouth Daily.   3/27/2023 at 0545   • clobetasol (TEMOVATE) 0.05 % external solution Apply topically to the itchy, scaly areas on scalp as directed 2 (two) times a day. 25 mL 3 3/27/2023 at 2100   • colestipol (Colestid) 1 g tablet Take 2 tablets by mouth every morning.  **Take 2 hours separate from other medications 180 tablet 3 3/27/2023 at 0800   • FLUoxetine (PROzac) 40 MG capsule Take 2 capsules by mouth Daily. 180 capsule 1 3/27/2023 at 2100   • hydrocortisone 2.5 % cream Apply 1 application topically to the appropriate area on abdomen until resolved every evening 28.35 g 3 Past Week   • hydrOXYzine (ATARAX) 50 MG tablet Take 1 tablet by mouth At Night As Needed for Anxiety. 90 tablet 1 3/27/2023 at 2100   •  ketoconazole (NIZORAL) 2 % cream Apply every morning to underarms until resolved. 30 g 3 3/27/2023 at 2100   • ketoconazole (NIZORAL) 2 % shampoo Use as a shampoo to scalp and face 3 times a week. Leave in/on for 5 minutes and rinse. 120 mL 3 3/28/2023 at 0400   • losartan (Cozaar) 100 MG tablet Take 1 tablet by mouth Daily. 90 tablet 1 3/27/2023 at 0800   • Magnesium 250 MG tablet Take 1 tablet by mouth Daily.   3/27/2023 at 2100   • metFORMIN ER (GLUCOPHAGE-XR) 500 MG 24 hr tablet Take 2 tablets by mouth 2 (Two) Times a Day. 360 tablet 1 3/27/2023   • metroNIDAZOLE (MetroCream) 0.75 % cream Apply twice daily to affected areas around mouth. 45 g 3 Past Month   • nystatin-triamcinolone (MYCOLOG II) 078326-0.1 UNIT/GM-% cream Apply 1 application topically to the appropriate area as directed 3 (Three) Times a Day As Needed. 15 g 5 Past Week   • ondansetron ODT (ZOFRAN-ODT) 4 MG disintegrating tablet Place 1 tablet on the tongue Every 8 (Eight) Hours As Needed for Nausea or Vomiting. 14 tablet 0 Past Month   • pantoprazole (PROTONIX) 40 MG EC tablet Take 1 tablet by mouth Daily. 30 tablet 4 3/28/2023 at 0545   • Probiotic Product (PROBIOTIC DAILY PO) Take  by mouth Daily.   3/27/2023 at 2100   • promethazine-dextromethorphan (PROMETHAZINE-DM) 6.25-15 MG/5ML syrup Take 5 mL by mouth 4 (Four) Times a Day As Needed for Cough. 240 mL 0 Past Week   • propranolol LA (INDERAL LA) 60 MG 24 hr capsule Take 1 capsule by mouth Daily. 90 capsule 1 3/27/2023 at 2100   • Tirzepatide (Mounjaro) 7.5 MG/0.5ML solution pen-injector Inject 0.5 mL under the skin into the appropriate area as directed Every 7 (Seven) Days. 6 mL 0 3/27/2023 at 2100   • icosapent ethyl (Vascepa) 1 g capsule capsule Take 2 grams by mouth 2 (Two) Times a Day With Meals. 360 capsule 3 3/24/2023         PMH:   Past Medical History:   Diagnosis Date   • Anemia    • Arthritis    • Carpal tunnel syndrome 04/09/2012   • Degenerative joint disease    • Depression    •  "Depression    • Diabetes mellitus (HCC)    • Elevated cholesterol    • Elevated liver enzymes    • Fatty liver    • Hearing aid worn    • HL (hearing loss)    • Hypertension    • Kidney disorder     one kidney   • Malignant neoplasm of right female breast (HCC) 2023   • Palpitations    • Polycystic ovaries    • Psoriasis    • PVC (premature ventricular contraction)     occasional pvc's   • S/P total knee arthroplasty, right 2021   • Sleep apnea     cpap at home   • Wears glasses      PSH:    Past Surgical History:   Procedure Laterality Date   • BREAST BIOPSY Right     PAPILLOMA DR BELL   •  SECTION      x 3   • CHOLECYSTECTOMY  2013   • COLONOSCOPY     • HYSTERECTOMY  2006    complete   • JOINT REPLACEMENT Left 2011    left knee   • OOPHORECTOMY     • TOTAL KNEE ARTHROPLASTY Right 2021    Procedure: TOTAL KNEE ARTHROPLASTY RIGHT;  Surgeon: Mateo Keith MD;  Location: [facility];  Service: Orthopedics;  Laterality: Right;   • TUBAL ABDOMINAL LIGATION         Immunization History:  Influenza:   Pneumococcal: UTD  Tetanus: UTD  Covid : x4    Social History:   Tobacco:   Social History     Tobacco Use   Smoking Status Never   Smokeless Tobacco Never      Alcohol:     Social History     Substance and Sexual Activity   Alcohol Use No         Physical Exam:/76 (BP Location: Left arm, Patient Position: Lying)   Pulse 77   Temp 97.9 °F (36.6 °C) (Temporal)   Resp 18   Ht 157.5 cm (62.01\")   Wt 88.6 kg (195 lb 5.2 oz)   SpO2 98%   BMI 35.72 kg/m²       General Appearance:    Alert, cooperative, no distress, appears stated age   Head:    Normocephalic, without obvious abnormality, atraumatic   Lungs:     Clear to auscultation bilaterally, respirations unlabored    Heart:   Regular rate and rhythm, S1 and S2 normal    Abdomen:    Soft without tenderness   Extremities:   Extremities normal, atraumatic, no cyanosis or edema   Skin:   Skin color, texture, turgor normal, no rashes or " lesions   Neurologic:   Grossly intact     Results Review:     LABS:  Lab Results   Component Value Date    WBC 6.18 03/24/2023    HGB 12.5 03/24/2023    HCT 38.4 03/24/2023    MCV 87.5 03/24/2023     03/24/2023    NEUTROABS 6.10 02/23/2023    GLUCOSE 86 03/24/2023    BUN 15 03/24/2023    CREATININE 1.02 (H) 03/24/2023    EGFRIFNONA 59 (L) 11/12/2021     03/24/2023    K 4.4 03/24/2023    CL 98 03/24/2023    CO2 28.0 03/24/2023    MG 1.8 12/20/2016    CALCIUM 10.1 03/24/2023    ALBUMIN 4.7 03/24/2023    AST 47 (H) 03/24/2023    ALT 41 (H) 03/24/2023    BILITOT 0.6 03/24/2023       RADIOLOGY:  3/2/23 MRI breast:  FINDINGS: There are scattered fibroglandular tissues. There is minimal  background contrast enhancement of the fibroglandular tissue. Contrast  within the heart is indicative of an adequate contrast bolus.     There is a 2.4 cm enhancing bilobed mass within the upper outer quadrant  of the right breast with signal void artifact in the anterior lobe  related to postbiopsy clip placement consistent with the known  biopsy-proven malignancy. There is nonmass enhancement located 0.8 cm  posterior to the posterior margin of the bilobed mass, a 2.0 centimeter  extended margin posteriorly would include this area.  However, just  superior, medial and posterior to the bilobed mass is a 0.8 cm enhancing  nodule which is considered suspicious, best demonstrated on axial  postcontrast image 41. This mass is located roughly 2.5 cm from the  posterior superior margin of the dominant mass.      There is no suspicious area of contrast enhancement or suspicious  enhancing mass on the left.     There is no adenopathy.     IMPRESSION:  Enhancing 2.4 cm bilobed mass within the upper outer  quadrant of the right breast with posterior extending nonmass  enhancement and a subcentimeter enhancing mass suspicious for a  satellite lesion within the upper outer quadrant of the right breast. No  suspicious enhancement on the  left, no adenopathy.     BI-RADS CATEGORY: 4, SUSPICIOUS     RECOMMENDATIONS: If the patient is considering breast conservation,  biopsy of at least the more posteriorly located suspicious satellite  mass should be considered. A second look ultrasound could be considered  with subsequent ultrasound-guided core needle biopsy. If a correlate is  not identified, then MRI guided biopsy.    Clinical Information    Mass of UOQ right breast   Final Diagnosis   RIGHT BREAST, UPPER OUTER QUADRANT, STEREOTACTIC GUIDED BIOPSY:  Carcinoma with mucinous features.  Associated ductal carcinoma in situ, low-grade, cribriform and micropapillary pattern.  Estrogen receptor progesterone receptor strongly positive by immunohistochemistry.  HER2/cuco negative by immunohistochemistry (0+).  See comment.   Electronically signed by Pancho Hernandez MD on 2/22/2023 at 0911   Comment    There is an invasive carcinoma consisting of groups of cells floating in mucin.  Should a subsequent excision show no other component, this would be consistent with a pure mucinous carcinoma.  In addition, an in situ component is present with cribriform and micropapillary growth pattern with low-grade nuclei.   Gross Description    1. Breast, Right.  Received in formalin labeled mass of UOQ of right breast is a right affirm stereotactic biopsy consisting of a 2.5 x 2.5 x 0.4 cm aggregate of fibroadipose breast tissue fragments, submitted entirely in block 1A.  Time in formalin: 1123 on 2/20/2023.  Cold ischemic time is less than 60 minutes and total time in formalin is greater than 6 and less than 72 hours.  LDP      Special Stains    IMMUNOHISTOCHEMICAL RESULTS ([facility]):  Estrogen Receptor            RESULT: Positive        95%          3+ (INTENSITY SCORE)   Progesterone Receptor     RESULT: Positive        95%          3+ (INTENSITY SCORE)   Her2/Cuco oncoprotein       RESULT: Negative        0%          0+ (INTENSITY SCORE)         I reviewed the patient's  new clinical results.    Cancer Staging (if applicable)  Cancer Patient: _x_ yes __no __unknown; If yes, clinical stage II, T:2__ N:_0_M:_0_,       Impression: Stage II (T2, N0, M0) right breast DCIS and IDC; ER/NV+ HER2-      Plan: RIGHT MASTECTOMY WITH RIGHT SENTINEL NODE BIOPSY,   LEFT PROPHYLATIC MASTECTOMY      Reina Verdin, KAREN   3/28/2023   09:16 EDT

## 2023-03-28 NOTE — ANESTHESIA PREPROCEDURE EVALUATION
Anesthesia Evaluation     Patient summary reviewed and Nursing notes reviewed   no history of anesthetic complications:  NPO Solid Status: > 8 hours  NPO Liquid Status: > 2 hours           Airway   Mallampati: I  TM distance: >3 FB  Neck ROM: full  No difficulty expected  Comment: PT ON MONJARO  Dental - normal exam     Pulmonary    (+) sleep apnea on CPAP, decreased breath sounds,   Cardiovascular   Exercise tolerance: good (4-7 METS)    ECG reviewed  Rhythm: regular  Rate: normal    (+) hypertension well controlled 2 medications or greater, hyperlipidemia,       Neuro/Psych  (+) headaches, numbness, psychiatric history Anxiety and Depression,    GI/Hepatic/Renal/Endo    (+) obesity,   liver disease fatty liver disease, renal disease, diabetes mellitus type 2 well controlled,   (-) morbid obesity    ROS Comment: PT BORN WITH 1 KIDNEY    Musculoskeletal     Abdominal   (+) obese,     Abdomen: soft.   Substance History      OB/GYN          Other   arthritis,    history of cancer active                    Anesthesia Plan    ASA 3     general     intravenous induction     Anesthetic plan, risks, benefits, and alternatives have been provided, discussed and informed consent has been obtained with: patient.    Plan discussed with CRNA.        CODE STATUS:

## 2023-03-28 NOTE — ANESTHESIA POSTPROCEDURE EVALUATION
Patient: Yamila Neff    Procedure Summary     Date: 03/28/23 Room / [facility]:  [facility]    Anesthesia Start: 0952 Anesthesia Stop: 1306    Procedure: RIGHT MASTECTOMY WITH RIGHT SENTINEL NODE BIOPSY, LEFT PROPHYLATIC MASTECTOMY (Bilateral: Breast) Diagnosis: Malignant neoplasm of upper-outer quadrant of right female breast    Surgeons: Emi Lizama MD Provider: Michael Villalba MD    Anesthesia Type: general ASA Status: 3          Anesthesia Type: general    Vitals  No vitals data found for the desired time range.          Post Anesthesia Care and Evaluation    Patient location during evaluation: PACU  Patient participation: complete - patient participated  Level of consciousness: awake and alert  Pain management: adequate    Airway patency: patent  Anesthetic complications: No anesthetic complications  PONV Status: none  Cardiovascular status: hemodynamically stable and acceptable  Respiratory status: nonlabored ventilation, acceptable and nasal cannula  Hydration status: acceptable

## 2023-03-28 NOTE — OP NOTE
Operative Note    Yamila Neff  [id number]   1960     Date of Surgery:  3/28/2023    Pre-Operative Diagnosis: Right breast cancer in the upper outer quadrant    Post-Operative Diagnosis: Right breast cancer in the upper outer quadrant    Procedure: Right sentinel lymph node injection                       Right simple mastectomy                       Right deep subfascial sentinel lymph node biopsy                       Prophylactic left simple mastectomy    Anesthesia:  General     [place] Node Biopsy for Breast Cancer - Right  Operation performed with curative intent. Yes   Tracer(s) used to identify sentinel nodes in the upfront surgery (non-neoadjuvant) setting (select all that apply). Radioactive tracer   Tracer(s) used to identify sentinel nodes in the neoadjuvant setting (select all that apply). N/A   All nodes (colored or non-colored) present at the end of a dye-filled lymphatic channel were removed. N/A   All significantly radioactive nodes were removed. Yes   All palpably suspicious nodes were removed. N/A   Biopsy-proven positive nodes marked with clips prior to chemotherapy were identified and removed. N/A             Surgeon:  Emi Lizama MD    Circulator: Cintia Dill RN; Ignacia Gonzalez RN  Scrub Person: Dorene Nichols; Betzaida Asutin Jennifer, RN  Nursing Assistant: Janeen Graham PCT  Assistant: Brennon Phillip PA-C     Assistant: Brennon Phillip PA-C    Estimated Blood Loss: Very minimal    Findings: Right sentinel lymph node negative for macrometastases    Complications: None      Indication for Procedure: Ms. Neff is a pleasant 62-year-old lady who presented with abnormal right breast mammogram showing a focus of malignancy in the upper outer quadrant.  Breast MRI showed other suspicious foci.  She returns today for the above procedure with no plan for reconstruction.    Procedure: Patient was taken to the operating by anesthesia and placed  supine on the table.  Following induction of general endotracheal anesthesia, SCDs were placed.  She received 2 g of Kefzol.  Anesthesia placed ultrasound-guided pectoralis nerve blocks bilaterally.  536 mCi of radioactive technetium was injected in the right subareolar region.  The breasts, chest, axillas and upper arms were then prepped and draped in a sterile fashion.  Timeout was observed.  We proceeded with the prophylactic left simple mastectomy first which was done via a transverse elliptical incision, encompassing the areola and nipple complex.  A superior then inferior flaps were raised dissecting the breast tissue away from the flaps down to where the muscle was exposed, maintaining adequate thickness of the flaps.  The breast was then removed off of the underlying pectoralis major muscle taking the fascia along with the specimen.  Larger vessels of the breast were ligated with 3-0 silk suture.  The breast was removed as a whole, marked with a silk suture laterally, weighed and sent to pathology for permanent section.  The wound was irrigated with warm water with clear return of fluid noted.  We then proceeded with the right simple mastectomy which was done in a similar fashion.  Once in the axilla we identified a deep subfascial sentinel lymph node that had a high radioactive count.  The node was large.  It was excised and sent to pathology for frozen section and noted to be negative for macrometastases.  No other radioactivity or palpable nodes were appreciated in the axilla.  The breast was also removed as a whole, marked with a silk suture laterally, weighed and sent to pathology for permanent section.  The wound was irrigated with warm water with clear return of fluid noted.  15 Palestinian round Delroy-Ornelas drains were then placed percutaneously on either side and anchored to the skin with 2-0 silk suture.  The subcutaneous tissues were approximated with interrupted 3-0 Vicryl sutures.  Dogears were  appreciated laterally as such I proceeded by excising extra skin to approximate the wound better.  The skin incisions were covered with Exofin fusion Premiere dressings.  ABDs with an Ace bandage were then applied.  The patient tolerated procedure well with no complications.  She was extubated and taken to the recovery room in a stable condition.  Sponge count and needle count were correct at the end of the procedure.    Assistant: Brennon Phillip PA-C  was responsible for performing the following activities: Retraction, Suction, Closing and Placing Dressing and their skilled assistance was necessary for the success of this case.      Emi Lizama MD  03/28/23  13:03 EDT

## 2023-03-28 NOTE — BRIEF OP NOTE
BREAST MASTECTOMY WITH SENTINEL NODE BIOPSY  Progress Note    Yamila Neff  3/28/2023    Pre-op Diagnosis:   Right breast cancer in the upper outer quadrant       Post-Op Diagnosis Codes:     * Malignant neoplasm of upper-outer quadrant of right female breast [C50.411]    Procedure/CPT® Codes:        Procedure(s):  RIGHT MASTECTOMY WITH RIGHT SENTINEL NODE BIOPSY,  LEFT PROPHYLATIC MASTECTOMY        Surgeon(s):  Emi Lizama MD    Anesthesia: General    Staff:   Circulator: Cintia Dill RN; Ignacia Gonzalez RN  Scrub Person: Dorene Nichols; Betzaida Austin; Blanca Hernandez RN  Nursing Assistant: Janeen Graham PCT  Assistant: Brennon Phillip PA-C  Assistant: Brennon Phillip PA-C      Estimated Blood Loss: minimal    Urine Voided: * No values recorded between 3/28/2023  9:52 AM and 3/28/2023 12:51 PM *    Specimens:                Specimens     ID Source Type Tests Collected By Collected At Frozen?    A Breast, Left Tissue · TISSUE PATHOLOGY EXAM   Emi Lizama MD 3/28/23 1057 No    Description: stitch is lateral    B Gypsum Lymph Node Tissue · TISSUE PATHOLOGY EXAM   Emi Lizama MD 3/28/23 1025 Yes    Description: right sentinel node    C Breast, Right Tissue · TISSUE PATHOLOGY EXAM   Emi Lizama MD 3/28/23 1147 No    Description: stitch is lateral                Drains:   Closed/Suction Drain 1 Right Breast Bulb 15 Fr. (Active)       Closed/Suction Drain 1 Left Breast Bulb 15 Fr. (Active)       Findings: Right sentinel lymph node negative for macrometastases        Complications: None    Assistant: Brennon Phillip PA-C  was responsible for performing the following activities: Retraction, Suction, Closing and Placing Dressing and their skilled assistance was necessary for the success of this case.    Emi Lizama MD     Date: 3/28/2023  Time: 12:58 EDT

## 2023-03-28 NOTE — ANESTHESIA PROCEDURE NOTES
Airway  Urgency: elective    Date/Time: 3/28/2023 10:04 AM  Airway not difficult    General Information and Staff    Patient location during procedure: OR  CRNA/CAA: Karol De Jesus CRNA    Indications and Patient Condition  Indications for airway management: airway protection    Preoxygenated: yes  MILS not maintained throughout  Mask difficulty assessment: 1 - vent by mask    Final Airway Details  Final airway type: endotracheal airway      Successful airway: ETT  Cuffed: yes   Successful intubation technique: direct laryngoscopy  Facilitating devices/methods: intubating stylet  Endotracheal tube insertion site: oral  Blade: Davide  Blade size: 3  ETT size (mm): 7.0  Cormack-Lehane Classification: grade I - full view of glottis  Placement verified by: chest auscultation and capnometry   Inital cuff pressure (cm H2O): 8  Measured from: lips  ETT/EBT  to lips (cm): 20  Number of attempts at approach: 1  Assessment: lips, teeth, and gum same as pre-op and atraumatic intubation    Additional Comments  Negative epigastric sounds, Breath sound equal bilaterally with symmetric chest rise and fall

## 2023-03-29 VITALS
HEART RATE: 84 BPM | BODY MASS INDEX: 35.94 KG/M2 | OXYGEN SATURATION: 90 % | WEIGHT: 195.33 LBS | RESPIRATION RATE: 16 BRPM | DIASTOLIC BLOOD PRESSURE: 64 MMHG | SYSTOLIC BLOOD PRESSURE: 103 MMHG | HEIGHT: 62 IN | TEMPERATURE: 98.4 F

## 2023-03-29 LAB — GLUCOSE BLDC GLUCOMTR-MCNC: 127 MG/DL (ref 70–130)

## 2023-03-29 PROCEDURE — 82962 GLUCOSE BLOOD TEST: CPT

## 2023-03-29 PROCEDURE — 94660 CPAP INITIATION&MGMT: CPT

## 2023-03-29 PROCEDURE — 94799 UNLISTED PULMONARY SVC/PX: CPT

## 2023-03-29 PROCEDURE — 25010000002 CEFAZOLIN IN DEXTROSE 2-4 GM/100ML-% SOLUTION: Performed by: SURGERY

## 2023-03-29 RX ORDER — OXYCODONE HYDROCHLORIDE 5 MG/1
5 TABLET ORAL EVERY 8 HOURS PRN
Qty: 7 TABLET | Refills: 0 | Status: SHIPPED | OUTPATIENT
Start: 2023-03-29 | End: 2023-04-01

## 2023-03-29 RX ADMIN — PANTOPRAZOLE SODIUM 40 MG: 40 TABLET, DELAYED RELEASE ORAL at 05:55

## 2023-03-29 RX ADMIN — OXYCODONE HYDROCHLORIDE 5 MG: 5 TABLET ORAL at 02:15

## 2023-03-29 RX ADMIN — ACETAMINOPHEN 1000 MG: 500 TABLET, FILM COATED ORAL at 08:18

## 2023-03-29 RX ADMIN — Medication 2 G: at 02:15

## 2023-03-29 RX ADMIN — ACETAMINOPHEN 1000 MG: 500 TABLET, FILM COATED ORAL at 02:15

## 2023-03-29 RX ADMIN — MELOXICAM 15 MG: 15 TABLET ORAL at 08:18

## 2023-03-29 NOTE — PLAN OF CARE
Goal Outcome Evaluation:      Pt pleasant and cooperative, rested well overnight. Daughter at bedside, attentive to pt. REECE surgical site, dsg in plc- ABD pads and Ace wrap. L and R JIM drains in place. Drains leaking around insertion site- R worse than L, dsg reinforced. Pain meds given as ordered per pt req. IVF cont. Pt remains on RA, pt A&O. Will cont to monitor.

## 2023-03-29 NOTE — PROGRESS NOTES
"Yamila Neff  1960  [id number]    Surgery Progress Note    Date of visit: 3/29/2023    Subjective: Comfortable  Ambulating  Voiding well    Objective:    /64 (BP Location: Left arm, Patient Position: Sitting)   Pulse 84   Temp 98.4 °F (36.9 °C) (Temporal)   Resp 16   Ht 157.5 cm (62.01\")   Wt 88.6 kg (195 lb 5.2 oz)   SpO2 90%   BMI 35.72 kg/m²     Intake/Output Summary (Last 24 hours) at 3/29/2023 0748  Last data filed at 3/29/2023 0412  Gross per 24 hour   Intake 1720 ml   Output 1410 ml   Net 310 ml       CV: Regular rate and rhythm  L: normal air entry  BREAST: Bilateral mastectomy incisions are intact  Flaps are viable with no swelling  Adequate Delroy-Ornelas drainage      LABS:    Results from last 7 days   Lab Units 03/24/23  1110   WBC 10*3/mm3 6.18   HEMOGLOBIN g/dL 12.5   HEMATOCRIT % 38.4   PLATELETS 10*3/mm3 304     Results from last 7 days   Lab Units 03/24/23  1110   SODIUM mmol/L 138   POTASSIUM mmol/L 4.4   CHLORIDE mmol/L 98   CO2 mmol/L 28.0   BUN mg/dL 15   CREATININE mg/dL 1.02*   CALCIUM mg/dL 10.1   BILIRUBIN mg/dL 0.6   ALK PHOS U/L 76   ALT (SGPT) U/L 41*   AST (SGOT) U/L 47*   GLUCOSE mg/dL 86     Results from last 7 days   Lab Units 03/24/23  1110   SODIUM mmol/L 138   POTASSIUM mmol/L 4.4   CHLORIDE mmol/L 98   CO2 mmol/L 28.0   BUN mg/dL 15   CREATININE mg/dL 1.02*   GLUCOSE mg/dL 86   CALCIUM mg/dL 10.1     Lab Results   Lab Value Date/Time    LIPASE 51 06/17/2021 0850    LIPASE 69 (H) 01/27/2019 1413    LIPASE 47 11/25/2017 1644    LIPASE 112 (H) 12/18/2016 0657         Assessment/ Plan: Stable course status post bilateral mastectomies and right sentinel lymph node biopsy  Patient is progressing well  Instructions were given to her and her daughter  home today  Tylenol 1000 mg p.o. 3 times daily for 4 days  Oxycodone as needed  Control blood sugar closely   follow-up in the cancer clinic on 4/5/2023        Problem List Items Addressed This Visit        " Hematology and Neoplasia    * (Principal) Malignant neoplasm of upper-outer quadrant of right female breast (HCC)    Relevant Orders    Tissue Pathology Exam         Emi Lizama MD  3/29/2023  07:48 EDT

## 2023-03-30 LAB
CYTO UR: NORMAL
LAB AP CASE REPORT: NORMAL
LAB AP CLINICAL INFORMATION: NORMAL
Lab: NORMAL
PATH REPORT.FINAL DX SPEC: NORMAL
PATH REPORT.GROSS SPEC: NORMAL

## 2023-04-07 ENCOUNTER — TELEPHONE (OUTPATIENT)
Dept: ONCOLOGY | Facility: CLINIC | Age: 63
End: 2023-04-07
Payer: COMMERCIAL

## 2023-04-07 NOTE — TELEPHONE ENCOUNTER
Caller: Yamila Neff Oanh    Relationship: Self    Best call back number: [phone]    What is the best time to reach you:ANYTIME. LEAVE VM IF NEEDED.    Who are you requesting to speak with (clinical staff, provider,  specific staff member): SCHEDULING        What was the call regarding: PATIENT YAMILA CALLED IN TO R/S HER APPT. SHE SAID SHE CHECKED MYCHART AND HER ORIGINAL APPT FOR 4/12/23 AT 1PM WAS CHANGED TO 4/12/23 AT 3:30PM. SHE STATED SHE HAD ALREADY MADE PLANS WITH HER DAUGHTER REGARDING THIS APPOINTMENT AND SHE WANTED TO KEEP HER ORIGINAL TIME.    Do you require a callback: YES

## 2023-04-10 NOTE — TELEPHONE ENCOUNTER
Spoke with patient, she is aware of new appointment time with Dr. Snow at 3:30pm and that we need to see her after the surgeon.

## 2023-04-11 DIAGNOSIS — I10 ESSENTIAL HYPERTENSION: ICD-10-CM

## 2023-04-11 RX ORDER — AMLODIPINE BESYLATE 5 MG/1
5 TABLET ORAL DAILY
Qty: 90 TABLET | Refills: 1 | Status: SHIPPED | OUTPATIENT
Start: 2023-04-11

## 2023-04-11 NOTE — PROGRESS NOTES
"  Subjective     PROBLEM LIST:  1. 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  Pathology showed a low grade multifocal mucinous carcinoma, largest mass 2.4 cm, second focus 0.8 cm.  0/1 SLN involved.  2. Diabetes mellitus  3. Hypertension  4. Depression  5. Fatty liver   6. NGOC  7. Psoriasis    CHIEF COMPLAINT: breast cancer      HISTORY OF PRESENT ILLNESS:  The patient is a 62 y.o. female, referred for evaluation of a recently diagnosed breast cancer.    She had an abnormal screening mammogram in December.  About 7 weeks later she had the diagnostic test and a biopsy 2 weeks after that.  By the time the biopsy happened she was able to feel a mass in the breast.  She has undergone bilateral mastectomy.  Her drains are still in.    Medical history otherwise notable for fatty liver, solitary kidney, diabetes, and hypertension.  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    REVIEW OF SYSTEMS:  A 14 point review of systems was performed and is negative except as noted above.    Past Medical History:   Diagnosis Date   • Anemia    • Arthritis    • Carpal tunnel syndrome 04/09/2012   • Degenerative joint disease    • Depression    • Depression    • Diabetes mellitus    • Elevated cholesterol    • Elevated liver enzymes    • Fatty liver    • Hearing aid worn    • HL (hearing loss)    • Hypertension    • Kidney disorder     one kidney   • Malignant neoplasm of right female breast 02/28/2023   • Palpitations    • Polycystic ovaries    • Psoriasis    • PVC (premature ventricular contraction)     occasional pvc's   • S/P total knee arthroplasty, right 03/09/2021   • Sleep apnea     cpap at home   • Wears glasses                Objective     /74   Pulse 83   Temp 98.2 °F (36.8 °C)   Resp 16   Ht 157.5 cm (62\")   Wt 84.4 kg (186 lb)   SpO2 97%   BMI 34.02 kg/m²   Performance Status:  ECOG " score: 0           General: well appearing female in no acute distress  Neuro: alert and oriented  HEENT: sclerae anicteric, oropharynx clear  Extremities: no lower extremity edema  Skin: no rashes, lesions, bruising, or petechiae  Psych: mood and affect appropriate    Lab Results   Component Value Date    WBC 6.18 03/24/2023    HGB 12.5 03/24/2023    HCT 38.4 03/24/2023    MCV 87.5 03/24/2023     03/24/2023     Lab Results   Component Value Date    GLUCOSE 86 03/24/2023    BUN 15 03/24/2023    CREATININE 1.02 (H) 03/24/2023    EGFRIFNONA 59 (L) 11/12/2021    BCR 14.7 03/24/2023    K 4.4 03/24/2023    CO2 28.0 03/24/2023    CALCIUM 10.1 03/24/2023    PROTENTOTREF 5.8 (L) 12/17/2016    ALBUMIN 4.7 03/24/2023    LABIL2 0.9 12/17/2016    AST 47 (H) 03/24/2023    ALT 41 (H) 03/24/2023                 ASSESSMENT AND PLAN:     Yamila Neff is a 62 y.o. 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    She has had a bilateral mastectomy.  We discussed that mucinous carcinomas are a fairly uncommon type of breast cancer, but this is considered a very low risk type.  Chemotherapy is generally not indicated in this situation, and endocrine therapy is an option.  We discussed adjuvant endocrine therapy with an aromatase inhibitor.  We reviewed the potential side effects of anastrozole including hot flashes, vaginal dryness, joint pain, decrease in bone density, and mood or sleep disturbance.    She will try the anastrozole.  I would have a low threshold to discontinue that if she develops any significant toxicity.  She had a bone density scan April 2022 which showed normal bone density.    Follow-up in 2 months to see how she is tolerating this.  If she is doing well then we will plan to see her every 6 months.           A total greater than 60 mins minutes was spent in face to face patient time, examination, counseling, charting, reviewing test results, and reviewing  outside records.    Lizzy Snow MD    4/12/2023

## 2023-04-11 NOTE — TELEPHONE ENCOUNTER
Rx Refill Note  Requested Prescriptions     Pending Prescriptions Disp Refills   • amLODIPine (NORVASC) 5 MG tablet 90 tablet 1     Sig: Take 1 tablet by mouth Daily.      Last office visit with prescribing clinician: 2/28/2023   Last telemedicine visit with prescribing clinician: 5/31/2023   Next office visit with prescribing clinician: 5/31/2023                         Would you like a call back once the refill request has been completed: [] Yes [] No    If the office needs to give you a call back, can they leave a voicemail: [] Yes [] No    Guerita Del Rosario MA  04/11/23, 10:00 EDT

## 2023-04-12 ENCOUNTER — CONSULT (OUTPATIENT)
Dept: ONCOLOGY | Facility: CLINIC | Age: 63
End: 2023-04-12
Payer: COMMERCIAL

## 2023-04-12 VITALS
HEART RATE: 83 BPM | HEIGHT: 62 IN | SYSTOLIC BLOOD PRESSURE: 110 MMHG | DIASTOLIC BLOOD PRESSURE: 74 MMHG | WEIGHT: 186 LBS | TEMPERATURE: 98.2 F | RESPIRATION RATE: 16 BRPM | OXYGEN SATURATION: 97 % | BODY MASS INDEX: 34.23 KG/M2

## 2023-04-12 DIAGNOSIS — C50.911 MALIGNANT NEOPLASM OF RIGHT FEMALE BREAST, UNSPECIFIED ESTROGEN RECEPTOR STATUS, UNSPECIFIED SITE OF BREAST: Primary | ICD-10-CM

## 2023-04-12 PROCEDURE — 99205 OFFICE O/P NEW HI 60 MIN: CPT | Performed by: INTERNAL MEDICINE

## 2023-04-12 RX ORDER — ANASTROZOLE 1 MG/1
1 TABLET ORAL DAILY
Qty: 30 TABLET | Refills: 11 | Status: SHIPPED | OUTPATIENT
Start: 2023-04-12

## 2023-04-12 NOTE — LETTER
April 12, 2023     Emi Lizama MD  [address]  [facility] [address]    Patient: Yamila Neff   YOB: 1960   Date of Visit: 4/12/2023       Dear Emi Lizama MD    Yamila Neff was in my office today. Below is a copy of my note.    If you have questions, please do not hesitate to call me. I look forward to following Yamila along with you.         Sincerely,        Lizzy Snow MD        CC: Daly Archibald PA-C      Subjective      PROBLEM LIST:  1. 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  Pathology showed a low grade multifocal mucinous carcinoma, largest mass 2.4 cm, second focus 0.8 cm.  0/1 SLN involved.  2. Diabetes mellitus  3. Hypertension  4. Depression  5. Fatty liver   6. NGOC  7. Psoriasis    CHIEF COMPLAINT: breast cancer      HISTORY OF PRESENT ILLNESS:  The patient is a 62 y.o. female, referred for evaluation of a recently diagnosed breast cancer.    She had an abnormal screening mammogram in December.  About 7 weeks later she had the diagnostic test and a biopsy 2 weeks after that.  By the time the biopsy happened she was able to feel a mass in the breast.  She has undergone bilateral mastectomy.  Her drains are still in.    Medical history otherwise notable for fatty liver, solitary kidney, diabetes, and hypertension.  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    REVIEW OF SYSTEMS:  A 14 point review of systems was performed and is negative except as noted above.    Past Medical History:   Diagnosis Date   • Anemia    • Arthritis    • Carpal tunnel syndrome 04/09/2012   • Degenerative joint disease    • Depression    • Depression    • Diabetes mellitus    • Elevated cholesterol    • Elevated liver enzymes    • Fatty liver    • Hearing aid worn    • HL (hearing loss)    • Hypertension    • Kidney disorder      "one kidney   • Malignant neoplasm of right female breast 02/28/2023   • Palpitations    • Polycystic ovaries    • Psoriasis    • PVC (premature ventricular contraction)     occasional pvc's   • S/P total knee arthroplasty, right 03/09/2021   • Sleep apnea     cpap at home   • Wears glasses                Objective      /74   Pulse 83   Temp 98.2 °F (36.8 °C)   Resp 16   Ht 157.5 cm (62\")   Wt 84.4 kg (186 lb)   SpO2 97%   BMI 34.02 kg/m²   Performance Status:  ECOG score: 0           General: well appearing female in no acute distress  Neuro: alert and oriented  HEENT: sclerae anicteric, oropharynx clear  Extremities: no lower extremity edema  Skin: no rashes, lesions, bruising, or petechiae  Psych: mood and affect appropriate    Lab Results   Component Value Date    WBC 6.18 03/24/2023    HGB 12.5 03/24/2023    HCT 38.4 03/24/2023    MCV 87.5 03/24/2023     03/24/2023     Lab Results   Component Value Date    GLUCOSE 86 03/24/2023    BUN 15 03/24/2023    CREATININE 1.02 (H) 03/24/2023    EGFRIFNONA 59 (L) 11/12/2021    BCR 14.7 03/24/2023    K 4.4 03/24/2023    CO2 28.0 03/24/2023    CALCIUM 10.1 03/24/2023    PROTENTOTREF 5.8 (L) 12/17/2016    ALBUMIN 4.7 03/24/2023    LABIL2 0.9 12/17/2016    AST 47 (H) 03/24/2023    ALT 41 (H) 03/24/2023                 ASSESSMENT AND PLAN:     Yamila Neff is a 62 y.o. 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    She has had a bilateral mastectomy.  We discussed that mucinous carcinomas are a fairly uncommon type of breast cancer, but this is considered a very low risk type.  Chemotherapy is generally not indicated in this situation, and endocrine therapy is an option.  We discussed adjuvant endocrine therapy with an aromatase inhibitor.  We reviewed the potential side effects of anastrozole including hot flashes, vaginal dryness, joint pain, decrease in bone density, and mood or sleep disturbance.    She " will try the anastrozole.  I would have a low threshold to discontinue that if she develops any significant toxicity.  She had a bone density scan April 2022 which showed normal bone density.    Follow-up in 2 months to see how she is tolerating this.  If she is doing well then we will plan to see her every 6 months.          A total greater than 60 mins minutes was spent in face to face patient time, examination, counseling, charting, reviewing test results, and reviewing outside records.    Lizzy Snow MD    4/12/2023

## 2023-05-02 DIAGNOSIS — E78.2 MIXED HYPERLIPIDEMIA: ICD-10-CM

## 2023-05-02 DIAGNOSIS — E78.1 HYPERTRIGLYCERIDEMIA: ICD-10-CM

## 2023-05-02 RX ORDER — ICOSAPENT ETHYL 1000 MG/1
2 CAPSULE ORAL 2 TIMES DAILY WITH MEALS
Qty: 360 CAPSULE | Refills: 1 | Status: SHIPPED | OUTPATIENT
Start: 2023-05-02

## 2023-05-02 NOTE — TELEPHONE ENCOUNTER
Rx Refill Note  Requested Prescriptions     Pending Prescriptions Disp Refills   • icosapent ethyl (Vascepa) 1 g capsule capsule 360 capsule 3     Sig: Take 2 grams by mouth 2 (Two) Times a Day With Meals.      Last office visit with prescribing clinician: 2/28/2023     Next office visit with prescribing clinician: 5/31/2023     Sharlene Maddox MA  05/02/23, 13:14 EDT

## 2023-05-19 DIAGNOSIS — F51.01 PRIMARY INSOMNIA: ICD-10-CM

## 2023-05-19 DIAGNOSIS — G43.019 INTRACTABLE MIGRAINE WITHOUT AURA AND WITHOUT STATUS MIGRAINOSUS: ICD-10-CM

## 2023-05-19 RX ORDER — AMITRIPTYLINE HYDROCHLORIDE 10 MG/1
10 TABLET, FILM COATED ORAL NIGHTLY
Qty: 90 TABLET | Refills: 0 | Status: SHIPPED | OUTPATIENT
Start: 2023-05-19

## 2023-05-19 NOTE — TELEPHONE ENCOUNTER
Rx Refill Note  Requested Prescriptions     Pending Prescriptions Disp Refills   • amitriptyline (ELAVIL) 10 MG tablet 90 tablet 0     Sig: Take 1 tablet by mouth Every Night.      Last office visit with prescribing clinician: 2/28/2023   Last telemedicine visit with prescribing clinician: 5/2/2023   Next office visit with prescribing clinician: 5/31/2023                         Would you like a call back once the refill request has been completed: [] Yes [] No    If the office needs to give you a call back, can they leave a voicemail: [] Yes [] No    Guerita Del Rosario MA  05/19/23, 09:30 EDT

## 2023-05-31 ENCOUNTER — OFFICE VISIT (OUTPATIENT)
Dept: FAMILY MEDICINE CLINIC | Facility: CLINIC | Age: 63
End: 2023-05-31

## 2023-05-31 VITALS
WEIGHT: 181 LBS | SYSTOLIC BLOOD PRESSURE: 118 MMHG | DIASTOLIC BLOOD PRESSURE: 70 MMHG | OXYGEN SATURATION: 94 % | HEIGHT: 62 IN | HEART RATE: 76 BPM | BODY MASS INDEX: 33.31 KG/M2

## 2023-05-31 DIAGNOSIS — F41.9 ANXIETY: ICD-10-CM

## 2023-05-31 DIAGNOSIS — I10 ESSENTIAL HYPERTENSION: ICD-10-CM

## 2023-05-31 DIAGNOSIS — E11.65 TYPE 2 DIABETES MELLITUS WITH HYPERGLYCEMIA, WITHOUT LONG-TERM CURRENT USE OF INSULIN: Primary | ICD-10-CM

## 2023-05-31 DIAGNOSIS — F33.0 MILD EPISODE OF RECURRENT MAJOR DEPRESSIVE DISORDER: ICD-10-CM

## 2023-05-31 DIAGNOSIS — E78.2 MIXED HYPERLIPIDEMIA: ICD-10-CM

## 2023-05-31 DIAGNOSIS — C50.411 MALIGNANT NEOPLASM OF UPPER-OUTER QUADRANT OF RIGHT FEMALE BREAST, UNSPECIFIED ESTROGEN RECEPTOR STATUS: ICD-10-CM

## 2023-05-31 LAB
EXPIRATION DATE: NORMAL
HBA1C MFR BLD: 5 %
Lab: NORMAL

## 2023-05-31 RX ORDER — FLUOXETINE HYDROCHLORIDE 40 MG/1
80 CAPSULE ORAL DAILY
Qty: 180 CAPSULE | Refills: 1 | Status: SHIPPED | OUTPATIENT
Start: 2023-05-31

## 2023-05-31 RX ORDER — TIRZEPATIDE 10 MG/.5ML
10 INJECTION, SOLUTION SUBCUTANEOUS
Qty: 6 ML | Refills: 0 | Status: SHIPPED | OUTPATIENT
Start: 2023-05-31

## 2023-05-31 RX ORDER — ATORVASTATIN CALCIUM 20 MG/1
20 TABLET, FILM COATED ORAL NIGHTLY
Qty: 90 TABLET | Refills: 1 | Status: SHIPPED | OUTPATIENT
Start: 2023-05-31

## 2023-05-31 RX ORDER — LOSARTAN POTASSIUM 100 MG/1
100 TABLET ORAL DAILY
Qty: 90 TABLET | Refills: 1 | Status: SHIPPED | OUTPATIENT
Start: 2023-05-31

## 2023-05-31 NOTE — PROGRESS NOTES
Chief Complaint   Patient presents with   • Type 2 diabetes mellitus with hyperglycemia     3 month f/u        Yamila Neff is a pleasant 62 y.o. female who is here for routine follow-up of diabetes type 2, hypertension, hyperlipidemia, depression, anxiety and breast cancer.  Patient is doing well overall.  Her mood and anxiety are both well controlled and stable today.  She recently had a bilateral mastectomy by Dr. BELL.  She is recovering well overall.  She does not require radiation or chemotherapy for her cancer.  She has been started on Arimidex 1 mg daily which she is tolerating well so far.  She is currently on Mounjaro 7.5 mg once a week.  She is tolerating this well and feels that it is helping with her weight loss.  Her diabetes are very well controlled on this medication.  She denies any issues with hypoglycemia.  She is still taking metformin 1000 mg twice daily.  Her blood pressure is stable and well-controlled.    Past Medical History:   Diagnosis Date   • Anemia    • Arthritis    • Carpal tunnel syndrome 2012   • Degenerative joint disease    • Depression    • Depression    • Diabetes mellitus    • Elevated cholesterol    • Elevated liver enzymes    • Fatty liver    • Hearing aid worn    • HL (hearing loss)    • Hypertension    • Kidney disorder     one kidney   • Malignant neoplasm of right female breast 2023   • Palpitations    • Polycystic ovaries    • Psoriasis    • PVC (premature ventricular contraction)     occasional pvc's   • S/P total knee arthroplasty, right 2021   • Sleep apnea     cpap at home   • Wears glasses        Past Surgical History:   Procedure Laterality Date   • BREAST BIOPSY Right     PAPILLOMA DR BELL   •  SECTION      x 3   • CHOLECYSTECTOMY  2013   • COLONOSCOPY     • HYSTERECTOMY      complete   • JOINT REPLACEMENT Left     left knee   • MASTECTOMY W/ SENTINEL NODE BIOPSY Bilateral 2023    Procedure: MASTECTOMY WITH SENTINEL NODE  BIOPSY RIGHT, LEFT PROPHYLATIC MASTECTOMY;  Surgeon: Emi Lizama MD;  Location:  [facility];  Service: General;  Laterality: Bilateral;   • OOPHORECTOMY     • TOTAL ABDOMINAL HYSTERECTOMY WITH SALPINGO OOPHORECTOMY     • TOTAL KNEE ARTHROPLASTY Right 2021    Procedure: TOTAL KNEE ARTHROPLASTY RIGHT;  Surgeon: Mateo Keith MD;  Location:  [facility];  Service: Orthopedics;  Laterality: Right;   • TUBAL ABDOMINAL LIGATION         Family History   Problem Relation Age of Onset   • Diabetes Mother         Type 2   • Lymphoma Mother    • Hypertension Mother    • Cancer Mother         Nonhodgkins Lymphoma   • Hearing loss Mother         Hearing Aids   • No Known Problems Father    • Hypertension Brother    • Arthritis Brother    • Hypertension Brother    • Colon cancer Maternal Aunt    • Heart disease Maternal Grandmother    • Arthritis Maternal Grandmother            • Diabetes Maternal Grandmother         Type 2-    • Heart disease Maternal Grandfather    • Heart attack Maternal Grandfather    • Diabetes Maternal Aunt         Type 2   • Hypertension Maternal Aunt    • Diabetes Daughter         Type 1   • Hypertension Brother    • Breast cancer Neg Hx    • Ovarian cancer Neg Hx        Social History     Socioeconomic History   • Marital status:    Tobacco Use   • Smoking status: Never   • Smokeless tobacco: Never   Vaping Use   • Vaping Use: Never used   Substance and Sexual Activity   • Alcohol use: No   • Drug use: No   • Sexual activity: Not Currently     Partners: Male     Birth control/protection: Surgical     Comment:        Allergies   Allergen Reactions   • Lisinopril Cough   • Celexa [Citalopram] Other (See Comments)     Jittery        ROS  Review of Systems   Constitutional: Negative for chills and fever.   Eyes: Negative for visual disturbance.   Respiratory: Negative for cough, shortness of breath and wheezing.    Cardiovascular: Negative for chest pain and  "palpitations.   Endocrine: Negative for polydipsia, polyphagia and polyuria.   Genitourinary: Negative for dysuria.   Musculoskeletal: Negative for arthralgias.   Neurological: Negative for dizziness, light-headedness and headache.   Psychiatric/Behavioral: Positive for stress. Negative for depressed mood. The patient is not nervous/anxious.        Vitals:    05/31/23 0955   BP: 118/70   Pulse: 76   SpO2: 94%   Weight: 82.1 kg (181 lb)   Height: 157.5 cm (62\")     Body mass index is 33.11 kg/m².    Current Outpatient Medications on File Prior to Visit   Medication Sig Dispense Refill   • amitriptyline (ELAVIL) 10 MG tablet Take 1 tablet by mouth Every Night. 90 tablet 0   • amLODIPine (NORVASC) 5 MG tablet Take 1 tablet by mouth Daily. 90 tablet 1   • anastrozole (ARIMIDEX) 1 MG tablet Take 1 tablet by mouth Daily. 30 tablet 11   • cholecalciferol (VITAMIN D3) 1000 units tablet Take 2 tablets by mouth Daily.     • clobetasol (TEMOVATE) 0.05 % external solution Apply topically to the itchy, scaly areas on scalp as directed 2 (two) times a day. 25 mL 3   • colestipol (Colestid) 1 g tablet Take 2 tablets by mouth every morning.  **Take 2 hours separate from other medications 180 tablet 3   • hydrocortisone 2.5 % cream Apply 1 application topically to the appropriate area on abdomen until resolved every evening 28.35 g 3   • hydrOXYzine (ATARAX) 50 MG tablet Take 1 tablet by mouth At Night As Needed for Anxiety. 90 tablet 1   • icosapent ethyl (Vascepa) 1 g capsule capsule Take 2 capsules by mouth 2 (Two) Times a Day With Meals. 360 capsule 1   • ketoconazole (NIZORAL) 2 % cream Apply every morning to underarms until resolved. 30 g 3   • ketoconazole (NIZORAL) 2 % shampoo Use as a shampoo to scalp and face 3 times a week. Leave in/on for 5 minutes and rinse. 120 mL 3   • Magnesium 250 MG tablet Take 1 tablet by mouth Daily.     • metroNIDAZOLE (MetroCream) 0.75 % cream Apply twice daily to affected areas around mouth. " 45 g 3   • mupirocin (BACTROBAN) 2 % ointment Apply a thin film topically to the appropriate area as directed. 22 g 2   • ondansetron ODT (ZOFRAN-ODT) 4 MG disintegrating tablet Place 1 tablet on the tongue Every 8 (Eight) Hours As Needed for Nausea or Vomiting. 14 tablet 0   • pantoprazole (PROTONIX) 40 MG EC tablet Take 1 tablet by mouth Daily. 90 tablet 3   • Probiotic Product (PROBIOTIC DAILY PO) Take  by mouth Daily.     • propranolol LA (INDERAL LA) 60 MG 24 hr capsule Take 1 capsule by mouth Daily. 90 capsule 1   • [DISCONTINUED] atorvastatin (LIPITOR) 20 MG tablet Take 1 tablet by mouth Every Night. 90 tablet 1   • [DISCONTINUED] FLUoxetine (PROzac) 40 MG capsule Take 2 capsules by mouth Daily. 180 capsule 1   • [DISCONTINUED] losartan (Cozaar) 100 MG tablet Take 1 tablet by mouth Daily. 90 tablet 1   • [DISCONTINUED] metFORMIN ER (GLUCOPHAGE-XR) 500 MG 24 hr tablet Take 2 tablets by mouth 2 (Two) Times a Day. 360 tablet 1   • [DISCONTINUED] nystatin-triamcinolone (MYCOLOG II) 507122-1.1 UNIT/GM-% cream Apply 1 application topically to the appropriate area as directed 3 (Three) Times a Day As Needed. 15 g 5   • [DISCONTINUED] promethazine-dextromethorphan (PROMETHAZINE-DM) 6.25-15 MG/5ML syrup Take 5 mL by mouth 4 (Four) Times a Day As Needed for Cough. 240 mL 0   • [DISCONTINUED] Tirzepatide (Mounjaro) 7.5 MG/0.5ML solution pen-injector Inject 0.5 mL under the skin into the appropriate area as directed Every 7 (Seven) Days. 6 mL 0     No current facility-administered medications on file prior to visit.       Results for orders placed or performed in visit on 05/31/23   POC Glycosylated Hemoglobin (Hb A1C)    Specimen: Blood   Result Value Ref Range    Hemoglobin A1C 5.0 %    Lot Number [id number]     Expiration Date 01/31/2025        PE    Physical Exam  Vitals reviewed.   Constitutional:       General: She is not in acute distress.     Appearance: Normal appearance. She is well-developed. She is obese. She  is not ill-appearing or diaphoretic.   HENT:      Head: Normocephalic and atraumatic.   Eyes:      Extraocular Movements: Extraocular movements intact.      Conjunctiva/sclera: Conjunctivae normal.   Cardiovascular:      Rate and Rhythm: Normal rate and regular rhythm.      Heart sounds: Normal heart sounds.   Pulmonary:      Effort: Pulmonary effort is normal.      Breath sounds: Normal breath sounds.   Musculoskeletal:         General: Normal range of motion.      Cervical back: Normal range of motion.      Right lower leg: No edema.      Left lower leg: No edema.   Skin:     General: Skin is warm.      Findings: No erythema or rash.   Neurological:      General: No focal deficit present.      Mental Status: She is alert.   Psychiatric:         Attention and Perception: Attention and perception normal. She is attentive.         Mood and Affect: Mood and affect normal.         Speech: Speech normal.         Behavior: Behavior normal. Behavior is cooperative.         Thought Content: Thought content normal.         Cognition and Memory: Cognition and memory normal.         Judgment: Judgment normal.         A/P    Diagnoses and all orders for this visit:    1. Type 2 diabetes mellitus with hyperglycemia, without long-term current use of insulin (Primary)  -     POC Glycosylated Hemoglobin (Hb A1C)  -     Tirzepatide (Mounjaro) 10 MG/0.5ML solution pen-injector; Inject 0.5 mL under the skin into the appropriate area as directed Every 7 (Seven) Days.  Dispense: 6 mL; Refill: 0  Hemoglobin AIC is 5% today.  She is tolerating medications well and denies any symptoms of hypoglycemia.  Okay to stop metformin.  Will increase Mounjaro to 10 mg weekly.  Return in 3 months.    2. Essential hypertension  -     losartan (Cozaar) 100 MG tablet; Take 1 tablet by mouth Daily.  Dispense: 90 tablet; Refill: 1  Stable, well-controlled.  Compliant on medication.    3. Mixed hyperlipidemia  -     atorvastatin (LIPITOR) 20 MG tablet;  Take 1 tablet by mouth Every Night.  Dispense: 90 tablet; Refill: 1    4. Mild episode of recurrent major depressive disorder  5. Anxiety  -     FLUoxetine (PROzac) 40 MG capsule; Take 2 capsules by mouth Daily.  Dispense: 180 capsule; Refill: 1  Stable mood and anxiety with current medication, Prozac 80 mg daily.    6. Malignant neoplasm of upper-outer quadrant of right female breast, unspecified estrogen receptor status  Does not require radiation or chemotherapy.  Established with oncology and following regularly.  Recovering well s/p bilateral mastectomy by Dr. BELL.  On Arimidex 1 mg daily and tolerating well.  Bone scan up-to-date.       Plan of care reviewed with patient at the conclusion of today's visit. Education was provided regarding diagnosis, management and any prescribed or recommended OTC medications.  Patient verbalizes understanding of and agreement with management plan.    Dictated Utilizing Dragon Dictation     Please note that portions of this note were completed with a voice recognition program.     Part of this note may be an electronic transcription/translation of spoken language to printed text using the Dragon Dictation System.    Return in about 3 months (around 8/31/2023) for Recheck, DM/HTN.     Daly Archibald PA-C  Answers for HPI/ROS submitted by the patient on 5/30/2023  Please describe your symptoms.: Follow up on Mounjaro. Type 2 DM  Have you had these symptoms before?: Yes  How long have you been having these symptoms?: Greater than 2 weeks  What is the primary reason for your visit?: Other

## 2023-06-08 ENCOUNTER — OFFICE VISIT (OUTPATIENT)
Dept: GASTROENTEROLOGY | Facility: CLINIC | Age: 63
End: 2023-06-08
Payer: COMMERCIAL

## 2023-06-08 VITALS
HEIGHT: 62 IN | SYSTOLIC BLOOD PRESSURE: 120 MMHG | WEIGHT: 183 LBS | HEART RATE: 74 BPM | DIASTOLIC BLOOD PRESSURE: 76 MMHG | BODY MASS INDEX: 33.68 KG/M2

## 2023-06-08 DIAGNOSIS — K76.0 HEPATIC STEATOSIS: Primary | ICD-10-CM

## 2023-06-08 DIAGNOSIS — K75.81 NASH (NONALCOHOLIC STEATOHEPATITIS): ICD-10-CM

## 2023-06-08 DIAGNOSIS — R79.89 ELEVATED LIVER FUNCTION TESTS: ICD-10-CM

## 2023-06-08 DIAGNOSIS — K91.89 POSTCHOLECYSTECTOMY DIARRHEA: ICD-10-CM

## 2023-06-08 DIAGNOSIS — K21.9 GASTROESOPHAGEAL REFLUX DISEASE WITHOUT ESOPHAGITIS: ICD-10-CM

## 2023-06-08 DIAGNOSIS — R19.7 POSTCHOLECYSTECTOMY DIARRHEA: ICD-10-CM

## 2023-06-08 DIAGNOSIS — K74.02 HEPATIC FIBROSIS, STAGE 3: ICD-10-CM

## 2023-06-08 NOTE — LETTER
June 8, 2023       No Recipients    Patient: Yamila Neff   YOB: 1960   Date of Visit: 6/8/2023       Dear Dr. Steele Recipients:    Thank you for referring Yamila Neff to me for evaluation. Below are the relevant portions of my assessment and plan of care.    If you have questions, please do not hesitate to call me. I look forward to following Yamila along with you.         Sincerely,        William Polk MD        CC:   No Recipients    William Polk MD  06/08/23 1428  Sign when Signing Visit  PCP:  Daly Archibald PA-C     No referring provider defined for this encounter.    Chief Complaint   Patient presents with   • Follow-up     6 month follow up for hepatic steatosis and elevated liver function tests        HPI   The patient is a 62-year-old female who seems to be doing well from my perspective.  She did have breast cancer and had a early stage breast cancer removed.  She had a mastectomy.  She is doing well from that standpoint.  Her most recent liver chemistries were improved.  On 3/24/2023 her albumin was 4.7, ALT 41, AST 47, and bilirubin 0.6.  She has no family history of colon polyps or cancers in first-degree relatives.  She did have a biopsy done back in 2018 and still which showed stage III-IV fibrosis.  Her reflux seems to be under good control.  Her diarrhea is greatly improved on the Colestid.  She only needs to take 1/day.  She has had some modest weight reduction as well.She is going to continue modest weight reduction.  Her last upper endoscopy was 10/21/2022.  No varices were seen.  Dr. Bear looked at her last colonoscopy 8/28/2018.  There was diverticulosis and internal hemorrhoids but no evidence of colon polyps or cancers.  She has no family history of colon polyps or cancers.      Allergies   Allergen Reactions   • Lisinopril Cough   • Celexa [Citalopram] Other (See Comments)     Jittery           Current Outpatient Medications:   •   amitriptyline (ELAVIL) 10 MG tablet, Take 1 tablet by mouth Every Night., Disp: 90 tablet, Rfl: 0  •  amLODIPine (NORVASC) 5 MG tablet, Take 1 tablet by mouth Daily., Disp: 90 tablet, Rfl: 1  •  anastrozole (ARIMIDEX) 1 MG tablet, Take 1 tablet by mouth Daily., Disp: 30 tablet, Rfl: 11  •  atorvastatin (LIPITOR) 20 MG tablet, Take 1 tablet by mouth Every Night., Disp: 90 tablet, Rfl: 1  •  cholecalciferol (VITAMIN D3) 1000 units tablet, Take 2 tablets by mouth Daily., Disp: , Rfl:   •  clobetasol (TEMOVATE) 0.05 % external solution, Apply topically to the itchy, scaly areas on scalp as directed 2 (two) times a day., Disp: 25 mL, Rfl: 3  •  colestipol (Colestid) 1 g tablet, Take 2 tablets by mouth every morning.  **Take 2 hours separate from other medications, Disp: 180 tablet, Rfl: 3  •  FLUoxetine (PROzac) 40 MG capsule, Take 2 capsules by mouth Daily., Disp: 180 capsule, Rfl: 1  •  hydrocortisone 2.5 % cream, Apply 1 application topically to the appropriate area on abdomen until resolved every evening, Disp: 28.35 g, Rfl: 3  •  hydrOXYzine (ATARAX) 50 MG tablet, Take 1 tablet by mouth At Night As Needed for Anxiety., Disp: 90 tablet, Rfl: 1  •  icosapent ethyl (Vascepa) 1 g capsule capsule, Take 2 capsules by mouth 2 (Two) Times a Day With Meals., Disp: 360 capsule, Rfl: 1  •  ketoconazole (NIZORAL) 2 % cream, Apply every morning to underarms until resolved., Disp: 30 g, Rfl: 3  •  ketoconazole (NIZORAL) 2 % shampoo, Use as a shampoo to scalp and face 3 times a week. Leave in/on for 5 minutes and rinse., Disp: 120 mL, Rfl: 3  •  losartan (Cozaar) 100 MG tablet, Take 1 tablet by mouth Daily., Disp: 90 tablet, Rfl: 1  •  Magnesium 250 MG tablet, Take 1 tablet by mouth Daily., Disp: , Rfl:   •  metroNIDAZOLE (MetroCream) 0.75 % cream, Apply twice daily to affected areas around mouth., Disp: 45 g, Rfl: 3  •  mupirocin (BACTROBAN) 2 % ointment, Apply a thin film topically to the appropriate area as directed., Disp: 22  g, Rfl: 2  •  ondansetron ODT (ZOFRAN-ODT) 4 MG disintegrating tablet, Place 1 tablet on the tongue Every 8 (Eight) Hours As Needed for Nausea or Vomiting., Disp: 14 tablet, Rfl: 0  •  pantoprazole (PROTONIX) 40 MG EC tablet, Take 1 tablet by mouth Daily., Disp: 90 tablet, Rfl: 3  •  Probiotic Product (PROBIOTIC DAILY PO), Take  by mouth Daily., Disp: , Rfl:   •  propranolol LA (INDERAL LA) 60 MG 24 hr capsule, Take 1 capsule by mouth Daily., Disp: 90 capsule, Rfl: 1  •  Tirzepatide (Mounjaro) 10 MG/0.5ML solution pen-injector, Inject 0.5 mL under the skin into the appropriate area as directed Every 7 (Seven) Days., Disp: 6 mL, Rfl: 0     Past Medical History:   Diagnosis Date   • Anemia    • Arthritis    • Carpal tunnel syndrome 2012   • Degenerative joint disease    • Depression    • Depression    • Diabetes mellitus    • Elevated cholesterol    • Elevated liver enzymes    • Fatty liver    • Hearing aid worn    • HL (hearing loss)    • Hypertension    • Kidney disorder     one kidney   • Malignant neoplasm of right female breast 2023   • Palpitations    • Polycystic ovaries    • Psoriasis    • PVC (premature ventricular contraction)     occasional pvc's   • S/P total knee arthroplasty, right 2021   • Sleep apnea     cpap at home   • Wears glasses        Past Surgical History:   Procedure Laterality Date   • BREAST BIOPSY Right     PAPILLOMA DR BELL   •  SECTION      x 3   • CHOLECYSTECTOMY  2013   • COLONOSCOPY     • HYSTERECTOMY      complete   • JOINT REPLACEMENT Left     left knee   • MASTECTOMY W/ SENTINEL NODE BIOPSY Bilateral 2023    Procedure: MASTECTOMY WITH SENTINEL NODE BIOPSY RIGHT, LEFT PROPHYLATIC MASTECTOMY;  Surgeon: Emi Lizama MD;  Location: [facility];  Service: General;  Laterality: Bilateral;   • OOPHORECTOMY     • TOTAL ABDOMINAL HYSTERECTOMY WITH SALPINGO OOPHORECTOMY     • TOTAL KNEE ARTHROPLASTY Right 2021    Procedure: TOTAL KNEE  ARTHROPLASTY RIGHT;  Surgeon: Mateo Keith MD;  Location: [facility];  Service: Orthopedics;  Laterality: Right;   • TUBAL ABDOMINAL LIGATION          Social History     Socioeconomic History   • Marital status:    Tobacco Use   • Smoking status: Never   • Smokeless tobacco: Never   Vaping Use   • Vaping Use: Never used   Substance and Sexual Activity   • Alcohol use: No   • Drug use: No   • Sexual activity: Not Currently     Partners: Male     Birth control/protection: Surgical     Comment:         Family History   Problem Relation Age of Onset   • Diabetes Mother         Type 2   • Lymphoma Mother    • Hypertension Mother    • Cancer Mother         Nonhodgkins Lymphoma   • Hearing loss Mother         Hearing Aids   • No Known Problems Father    • Hypertension Brother    • Arthritis Brother    • Hypertension Brother    • Colon cancer Maternal Aunt    • Heart disease Maternal Grandmother    • Arthritis Maternal Grandmother            • Diabetes Maternal Grandmother         Type 2-    • Heart disease Maternal Grandfather    • Heart attack Maternal Grandfather    • Diabetes Maternal Aunt         Type 2   • Hypertension Maternal Aunt    • Diabetes Daughter         Type 1   • Hypertension Brother    • Breast cancer Neg Hx    • Ovarian cancer Neg Hx         Review of Systems     Vitals:    23 1352   BP: 120/76   Pulse: 74        Physical Exam  Constitutional:       General: She is not in acute distress.     Appearance: Normal appearance. She is not ill-appearing.   Neurological:      Mental Status: She is alert.        Diagnoses and all orders for this visit:    1. Hepatic steatosis (Primary)  -     US Liver; Future    2. Elevated liver function tests  -     US Liver; Future    3. POTTER (nonalcoholic steatohepatitis)  -     US Liver; Future  -     AFP Tumor Marker    4. Gastroesophageal reflux disease without esophagitis    5. Postcholecystectomy diarrhea    6. Hepatic fibrosis,  stage 3    Impressions and plan #1 hepatic steatosis with stage III-IV fibrosis: She has done modest weight reduction and her liver chemistries appear to have improved.  She has no sign of encephalopathy.  I am going to repeat an alpha-fetoprotein and an ultrasound to screen for hepatocellular carcinoma.      #2 gastroesophageal reflux disease: She seems to be well controlled from that standpoint organ to continue her medication.  She is on pantoprazole.    #3 postcholecystectomy diarrhea: She is on the Colestid but only needing 1/day.  This seems to have made a drastic improvement in her symptoms symptoms.                   William Polk MD

## 2023-06-08 NOTE — PROGRESS NOTES
PCP:  Daly Archibald PA-C     No referring provider defined for this encounter.    Chief Complaint   Patient presents with    Follow-up     6 month follow up for hepatic steatosis and elevated liver function tests        HPI   The patient is a 62-year-old female who seems to be doing well from my perspective.  She did have breast cancer and had a early stage breast cancer removed.  She had a mastectomy.  She is doing well from that standpoint.  Her most recent liver chemistries were improved.  On 3/24/2023 her albumin was 4.7, ALT 41, AST 47, and bilirubin 0.6.  She has no family history of colon polyps or cancers in first-degree relatives.  She did have a biopsy done back in 2018 and still which showed stage III-IV fibrosis.  Her reflux seems to be under good control.  Her diarrhea is greatly improved on the Colestid.  She only needs to take 1/day.  She has had some modest weight reduction as well.She is going to continue modest weight reduction.  Her last upper endoscopy was 10/21/2022.  No varices were seen.  Dr. Bear looked at her last colonoscopy 8/28/2018.  There was diverticulosis and internal hemorrhoids but no evidence of colon polyps or cancers.  She has no family history of colon polyps or cancers.      Allergies   Allergen Reactions    Lisinopril Cough    Celexa [Citalopram] Other (See Comments)     Keturah           Current Outpatient Medications:     amitriptyline (ELAVIL) 10 MG tablet, Take 1 tablet by mouth Every Night., Disp: 90 tablet, Rfl: 0    amLODIPine (NORVASC) 5 MG tablet, Take 1 tablet by mouth Daily., Disp: 90 tablet, Rfl: 1    anastrozole (ARIMIDEX) 1 MG tablet, Take 1 tablet by mouth Daily., Disp: 30 tablet, Rfl: 11    atorvastatin (LIPITOR) 20 MG tablet, Take 1 tablet by mouth Every Night., Disp: 90 tablet, Rfl: 1    cholecalciferol (VITAMIN D3) 1000 units tablet, Take 2 tablets by mouth Daily., Disp: , Rfl:     clobetasol (TEMOVATE) 0.05 % external solution, Apply topically  to the itchy, scaly areas on scalp as directed 2 (two) times a day., Disp: 25 mL, Rfl: 3    colestipol (Colestid) 1 g tablet, Take 2 tablets by mouth every morning.  **Take 2 hours separate from other medications, Disp: 180 tablet, Rfl: 3    FLUoxetine (PROzac) 40 MG capsule, Take 2 capsules by mouth Daily., Disp: 180 capsule, Rfl: 1    hydrocortisone 2.5 % cream, Apply 1 application topically to the appropriate area on abdomen until resolved every evening, Disp: 28.35 g, Rfl: 3    hydrOXYzine (ATARAX) 50 MG tablet, Take 1 tablet by mouth At Night As Needed for Anxiety., Disp: 90 tablet, Rfl: 1    icosapent ethyl (Vascepa) 1 g capsule capsule, Take 2 capsules by mouth 2 (Two) Times a Day With Meals., Disp: 360 capsule, Rfl: 1    ketoconazole (NIZORAL) 2 % cream, Apply every morning to underarms until resolved., Disp: 30 g, Rfl: 3    ketoconazole (NIZORAL) 2 % shampoo, Use as a shampoo to scalp and face 3 times a week. Leave in/on for 5 minutes and rinse., Disp: 120 mL, Rfl: 3    losartan (Cozaar) 100 MG tablet, Take 1 tablet by mouth Daily., Disp: 90 tablet, Rfl: 1    Magnesium 250 MG tablet, Take 1 tablet by mouth Daily., Disp: , Rfl:     metroNIDAZOLE (MetroCream) 0.75 % cream, Apply twice daily to affected areas around mouth., Disp: 45 g, Rfl: 3    mupirocin (BACTROBAN) 2 % ointment, Apply a thin film topically to the appropriate area as directed., Disp: 22 g, Rfl: 2    ondansetron ODT (ZOFRAN-ODT) 4 MG disintegrating tablet, Place 1 tablet on the tongue Every 8 (Eight) Hours As Needed for Nausea or Vomiting., Disp: 14 tablet, Rfl: 0    pantoprazole (PROTONIX) 40 MG EC tablet, Take 1 tablet by mouth Daily., Disp: 90 tablet, Rfl: 3    Probiotic Product (PROBIOTIC DAILY PO), Take  by mouth Daily., Disp: , Rfl:     propranolol LA (INDERAL LA) 60 MG 24 hr capsule, Take 1 capsule by mouth Daily., Disp: 90 capsule, Rfl: 1    Tirzepatide (Mounjaro) 10 MG/0.5ML solution pen-injector, Inject 0.5 mL under the skin into the  appropriate area as directed Every 7 (Seven) Days., Disp: 6 mL, Rfl: 0     Past Medical History:   Diagnosis Date    Anemia     Arthritis     Carpal tunnel syndrome 2012    Degenerative joint disease     Depression     Depression     Diabetes mellitus     Elevated cholesterol     Elevated liver enzymes     Fatty liver     Hearing aid worn     HL (hearing loss)     Hypertension     Kidney disorder     one kidney    Malignant neoplasm of right female breast 2023    Palpitations     Polycystic ovaries     Psoriasis     PVC (premature ventricular contraction)     occasional pvc's    S/P total knee arthroplasty, right 2021    Sleep apnea     cpap at home    Wears glasses        Past Surgical History:   Procedure Laterality Date    BREAST BIOPSY Right     PAPILLOMA DR BELL     SECTION      x 3    CHOLECYSTECTOMY  2013    COLONOSCOPY      HYSTERECTOMY  2006    complete    JOINT REPLACEMENT Left 2011    left knee    MASTECTOMY W/ SENTINEL NODE BIOPSY Bilateral 2023    Procedure: MASTECTOMY WITH SENTINEL NODE BIOPSY RIGHT, LEFT PROPHYLATIC MASTECTOMY;  Surgeon: Emi Lizama MD;  Location: [facility];  Service: General;  Laterality: Bilateral;    OOPHORECTOMY      TOTAL ABDOMINAL HYSTERECTOMY WITH SALPINGO OOPHORECTOMY      TOTAL KNEE ARTHROPLASTY Right 2021    Procedure: TOTAL KNEE ARTHROPLASTY RIGHT;  Surgeon: Mateo Keith MD;  Location: [facility];  Service: Orthopedics;  Laterality: Right;    TUBAL ABDOMINAL LIGATION          Social History     Socioeconomic History    Marital status:    Tobacco Use    Smoking status: Never    Smokeless tobacco: Never   Vaping Use    Vaping Use: Never used   Substance and Sexual Activity    Alcohol use: No    Drug use: No    Sexual activity: Not Currently     Partners: Male     Birth control/protection: Surgical     Comment:         Family History   Problem Relation Age of Onset    Diabetes Mother         Type 2    Lymphoma  Mother     Hypertension Mother     Cancer Mother         Nonhodgkins Lymphoma    Hearing loss Mother         Hearing Aids    No Known Problems Father     Hypertension Brother     Arthritis Brother     Hypertension Brother     Colon cancer Maternal Aunt     Heart disease Maternal Grandmother     Arthritis Maternal Grandmother             Diabetes Maternal Grandmother         Type 2-     Heart disease Maternal Grandfather     Heart attack Maternal Grandfather     Diabetes Maternal Aunt         Type 2    Hypertension Maternal Aunt     Diabetes Daughter         Type 1    Hypertension Brother     Breast cancer Neg Hx     Ovarian cancer Neg Hx         Review of Systems     Vitals:    23 1352   BP: 120/76   Pulse: 74        Physical Exam  Constitutional:       General: She is not in acute distress.     Appearance: Normal appearance. She is not ill-appearing.   Neurological:      Mental Status: She is alert.        Diagnoses and all orders for this visit:    1. Hepatic steatosis (Primary)  -     US Liver; Future    2. Elevated liver function tests  -     US Liver; Future    3. POTTER (nonalcoholic steatohepatitis)  -     US Liver; Future  -     AFP Tumor Marker    4. Gastroesophageal reflux disease without esophagitis    5. Postcholecystectomy diarrhea    6. Hepatic fibrosis, stage 3    Impressions and plan #1 hepatic steatosis with stage III-IV fibrosis: She has done modest weight reduction and her liver chemistries appear to have improved.  She has no sign of encephalopathy.  I am going to repeat an alpha-fetoprotein and an ultrasound to screen for hepatocellular carcinoma.      #2 gastroesophageal reflux disease: She seems to be well controlled from that standpoint organ to continue her medication.  She is on pantoprazole.    #3 postcholecystectomy diarrhea: She is on the Colestid but only needing 1/day.  This seems to have made a drastic improvement in her symptoms symptoms.                   William  Yazmin Polk MD

## 2023-06-14 ENCOUNTER — OFFICE VISIT (OUTPATIENT)
Dept: ONCOLOGY | Facility: CLINIC | Age: 63
End: 2023-06-14
Payer: COMMERCIAL

## 2023-06-14 VITALS
WEIGHT: 180.8 LBS | SYSTOLIC BLOOD PRESSURE: 119 MMHG | OXYGEN SATURATION: 97 % | HEART RATE: 71 BPM | HEIGHT: 62 IN | DIASTOLIC BLOOD PRESSURE: 80 MMHG | TEMPERATURE: 96.9 F | RESPIRATION RATE: 16 BRPM | BODY MASS INDEX: 33.27 KG/M2

## 2023-06-14 DIAGNOSIS — C50.411 MALIGNANT NEOPLASM OF UPPER-OUTER QUADRANT OF RIGHT BREAST IN FEMALE, ESTROGEN RECEPTOR POSITIVE: Primary | ICD-10-CM

## 2023-06-14 DIAGNOSIS — Z17.0 MALIGNANT NEOPLASM OF UPPER-OUTER QUADRANT OF RIGHT BREAST IN FEMALE, ESTROGEN RECEPTOR POSITIVE: Primary | ICD-10-CM

## 2023-07-28 ENCOUNTER — PRIOR AUTHORIZATION (OUTPATIENT)
Dept: FAMILY MEDICINE CLINIC | Facility: CLINIC | Age: 63
End: 2023-07-28
Payer: COMMERCIAL

## 2023-07-31 NOTE — TELEPHONE ENCOUNTER
(Key: [id number])  Med:Mounjaro 10MG/0.5ML pen-injectors  Status:sent to plan,awaiting determination   Created:07/31/2023

## 2023-08-16 ENCOUNTER — LAB (OUTPATIENT)
Dept: LAB | Facility: HOSPITAL | Age: 63
End: 2023-08-16
Payer: COMMERCIAL

## 2023-08-16 DIAGNOSIS — G43.019 INTRACTABLE MIGRAINE WITHOUT AURA AND WITHOUT STATUS MIGRAINOSUS: ICD-10-CM

## 2023-08-16 DIAGNOSIS — F51.01 PRIMARY INSOMNIA: ICD-10-CM

## 2023-08-16 LAB — ALPHA-FETOPROTEIN: 2.45 NG/ML (ref 0–8.3)

## 2023-08-16 PROCEDURE — 82105 ALPHA-FETOPROTEIN SERUM: CPT | Performed by: INTERNAL MEDICINE

## 2023-08-16 RX ORDER — AMITRIPTYLINE HYDROCHLORIDE 10 MG/1
10 TABLET, FILM COATED ORAL NIGHTLY
Qty: 90 TABLET | Refills: 0 | Status: SHIPPED | OUTPATIENT
Start: 2023-08-16

## 2023-08-16 NOTE — TELEPHONE ENCOUNTER
Rx Refill Note  Requested Prescriptions     Pending Prescriptions Disp Refills    amitriptyline (ELAVIL) 10 MG tablet 90 tablet 0     Sig: Take 1 tablet by mouth Every Night.      Last office visit with prescribing clinician: 5/31/2023   Last telemedicine visit with prescribing clinician: Visit date not found   Next office visit with prescribing clinician: 8/31/2023                         Would you like a call back once the refill request has been completed: [] Yes [] No    If the office needs to give you a call back, can they leave a voicemail: [] Yes [] No    Shameka Vernon MA  08/16/23, 15:04 EDT

## 2023-08-29 DIAGNOSIS — G43.019 INTRACTABLE MIGRAINE WITHOUT AURA AND WITHOUT STATUS MIGRAINOSUS: ICD-10-CM

## 2023-08-29 RX ORDER — PROPRANOLOL HCL 60 MG
60 CAPSULE, EXTENDED RELEASE 24HR ORAL DAILY
Qty: 90 CAPSULE | Refills: 0 | Status: SHIPPED | OUTPATIENT
Start: 2023-08-29

## 2023-08-31 ENCOUNTER — OFFICE VISIT (OUTPATIENT)
Dept: FAMILY MEDICINE CLINIC | Facility: CLINIC | Age: 63
End: 2023-08-31
Payer: COMMERCIAL

## 2023-08-31 VITALS
HEIGHT: 62 IN | DIASTOLIC BLOOD PRESSURE: 72 MMHG | BODY MASS INDEX: 31.94 KG/M2 | OXYGEN SATURATION: 96 % | WEIGHT: 173.6 LBS | HEART RATE: 69 BPM | SYSTOLIC BLOOD PRESSURE: 118 MMHG

## 2023-08-31 DIAGNOSIS — E11.65 TYPE 2 DIABETES MELLITUS WITH HYPERGLYCEMIA, WITHOUT LONG-TERM CURRENT USE OF INSULIN: ICD-10-CM

## 2023-08-31 DIAGNOSIS — Z17.0 MALIGNANT NEOPLASM OF UPPER-OUTER QUADRANT OF RIGHT BREAST IN FEMALE, ESTROGEN RECEPTOR POSITIVE: ICD-10-CM

## 2023-08-31 DIAGNOSIS — E11.65 TYPE 2 DIABETES MELLITUS WITH HYPERGLYCEMIA, WITHOUT LONG-TERM CURRENT USE OF INSULIN: Primary | ICD-10-CM

## 2023-08-31 DIAGNOSIS — I10 ESSENTIAL HYPERTENSION: ICD-10-CM

## 2023-08-31 DIAGNOSIS — E66.09 CLASS 1 OBESITY DUE TO EXCESS CALORIES WITH SERIOUS COMORBIDITY AND BODY MASS INDEX (BMI) OF 31.0 TO 31.9 IN ADULT: ICD-10-CM

## 2023-08-31 DIAGNOSIS — C50.411 MALIGNANT NEOPLASM OF UPPER-OUTER QUADRANT OF RIGHT BREAST IN FEMALE, ESTROGEN RECEPTOR POSITIVE: ICD-10-CM

## 2023-08-31 PROBLEM — E66.811 CLASS 1 OBESITY DUE TO EXCESS CALORIES WITH BODY MASS INDEX (BMI) OF 31.0 TO 31.9 IN ADULT: Status: ACTIVE | Noted: 2020-02-19

## 2023-08-31 LAB
EXPIRATION DATE: NORMAL
HBA1C MFR BLD: 5.4 %
Lab: NORMAL

## 2023-08-31 NOTE — PROGRESS NOTES
Chief Complaint   Patient presents with    Diabetes     3 MONTH FOLLOW UP       Yamila Neff is a pleasant 62 y.o. female who is here for routine follow-up of diabetes type 2, obesity, hypertension and breast cancer.  Patient is compliant on all medications.  She stopped Arimidex due to fatigue and joint pain.  She has been off of it for 2 weeks and is feeling better.  Oncology is aware.  Patient also stopped metformin and her diarrhea has resolved.  She is on Mounjaro 10 mg weekly and tolerating well.  She has lost an additional 7 lbs.  Blood pressure is stable and well-controlled.    Past Medical History:   Diagnosis Date    Anemia     Arthritis     Carpal tunnel syndrome 2012    Degenerative joint disease     Depression     Depression     Diabetes mellitus     Elevated cholesterol     Elevated liver enzymes     Fatty liver     Hearing aid worn     HL (hearing loss)     Hypertension     Kidney disorder     one kidney    Malignant neoplasm of right female breast 2023    Palpitations     Polycystic ovaries     Psoriasis     PVC (premature ventricular contraction)     occasional pvc's    S/P total knee arthroplasty, right 2021    Sleep apnea     cpap at home    Wears glasses        Past Surgical History:   Procedure Laterality Date    BREAST BIOPSY Right     PAPILLOMA DR BELL     SECTION      x 3    CHOLECYSTECTOMY  2013    COLONOSCOPY      HYSTERECTOMY  2006    complete    JOINT REPLACEMENT Left 2011    left knee    MASTECTOMY W/ SENTINEL NODE BIOPSY Bilateral 2023    Procedure: MASTECTOMY WITH SENTINEL NODE BIOPSY RIGHT, LEFT PROPHYLATIC MASTECTOMY;  Surgeon: Emi Lizama MD;  Location: [facility];  Service: General;  Laterality: Bilateral;    OOPHORECTOMY      TOTAL ABDOMINAL HYSTERECTOMY WITH SALPINGO OOPHORECTOMY      TOTAL KNEE ARTHROPLASTY Right 2021    Procedure: TOTAL KNEE ARTHROPLASTY RIGHT;  Surgeon: Mateo Keith MD;  Location: [facility];  Service:  "Orthopedics;  Laterality: Right;    TUBAL ABDOMINAL LIGATION         Family History   Problem Relation Age of Onset    Diabetes Mother         Type 2    Lymphoma Mother     Hypertension Mother     Cancer Mother         Nonhodgkins Lymphoma    Hearing loss Mother         Hearing Aids    No Known Problems Father     Hypertension Brother     Arthritis Brother     Hypertension Brother     Colon cancer Maternal Aunt     Heart disease Maternal Grandmother     Arthritis Maternal Grandmother             Diabetes Maternal Grandmother         Type 2-     Heart disease Maternal Grandfather     Heart attack Maternal Grandfather     Diabetes Maternal Aunt         Type 2    Hypertension Maternal Aunt     Diabetes Daughter         Type 1    Hypertension Brother     Breast cancer Neg Hx     Ovarian cancer Neg Hx        Social History     Socioeconomic History    Marital status:    Tobacco Use    Smoking status: Never    Smokeless tobacco: Never   Vaping Use    Vaping Use: Never used   Substance and Sexual Activity    Alcohol use: No    Drug use: No    Sexual activity: Not Currently     Partners: Male     Birth control/protection: Surgical     Comment:        Allergies   Allergen Reactions    Lisinopril Cough    Celexa [Citalopram] Other (See Comments)     Jittery        ROS  Review of Systems   Constitutional:  Negative for chills, fatigue and fever.   Eyes:  Negative for visual disturbance.   Respiratory:  Negative for cough, shortness of breath and wheezing.    Cardiovascular:  Negative for chest pain, palpitations and leg swelling.   Gastrointestinal:  Negative for diarrhea.   Endocrine: Negative for polyuria.   Neurological:  Negative for dizziness and headache.   Psychiatric/Behavioral:  Negative for depressed mood. The patient is not nervous/anxious.      Vitals:    23 0852   BP: 118/72   Pulse: 69   SpO2: 96%   Weight: 78.7 kg (173 lb 9.6 oz)   Height: 157.5 cm (62.01\")     Body mass " index is 31.74 kg/mý.    Current Outpatient Medications on File Prior to Visit   Medication Sig Dispense Refill    amitriptyline (ELAVIL) 10 MG tablet Take 1 tablet by mouth Every Night. 90 tablet 0    amLODIPine (NORVASC) 5 MG tablet Take 1 tablet by mouth Daily. 90 tablet 1    atorvastatin (LIPITOR) 20 MG tablet Take 1 tablet by mouth Every Night. 90 tablet 1    cholecalciferol (VITAMIN D3) 1000 units tablet Take 2 tablets by mouth Daily.      clobetasol (TEMOVATE) 0.05 % external solution Apply topically to the itchy, scaly areas on scalp as directed 2 (two) times a day. 25 mL 3    clobetasol (TEMOVATE) 0.05 % external solution Apply to itchy, scaly areas on scalp topically as directed 2 (Two) Times a Day. 25 mL 3    colestipol (Colestid) 1 g tablet Take 2 tablets by mouth every morning.  **Take 2 hours separate from other medications 180 tablet 3    FLUoxetine (PROzac) 40 MG capsule Take 2 capsules by mouth Daily. 180 capsule 1    hydrocortisone 2.5 % cream Apply 1 application topically to the appropriate area on abdomen until resolved every evening 28.35 g 3    hydrocortisone 2.5 % ointment Apply 1 application  topically to the affected area(s) as directed 2 (Two) Times a Day. 20 g 4    hydrOXYzine (ATARAX) 50 MG tablet Take 1 tablet by mouth At Night As Needed for Anxiety. 90 tablet 1    icosapent ethyl (Vascepa) 1 g capsule capsule Take 2 capsules by mouth 2 (Two) Times a Day With Meals. 360 capsule 1    ketoconazole (NIZORAL) 2 % cream Apply every morning to underarms until resolved. 30 g 3    ketoconazole (NIZORAL) 2 % shampoo Use as a shampoo to scalp and face 3 times a week. Leave in for 5 minutes and rinse. 120 mL 3    losartan (Cozaar) 100 MG tablet Take 1 tablet by mouth Daily. 90 tablet 1    Magnesium 250 MG tablet Take 1 tablet by mouth Daily.      metroNIDAZOLE (MetroCream) 0.75 % cream Apply twice daily to affected areas around mouth. 45 g 3    mupirocin (BACTROBAN) 2 % ointment Apply a thin film  topically to the appropriate area as directed. 22 g 2    ondansetron ODT (ZOFRAN-ODT) 4 MG disintegrating tablet Place 1 tablet on the tongue Every 8 (Eight) Hours As Needed for Nausea or Vomiting. 14 tablet 0    pantoprazole (PROTONIX) 40 MG EC tablet Take 1 tablet by mouth Daily. 90 tablet 3    Probiotic Product (PROBIOTIC DAILY PO) Take  by mouth Daily.      propranolol LA (INDERAL LA) 60 MG 24 hr capsule Take 1 capsule by mouth Daily. 90 capsule 0    silver sulfadiazine (Silvadene) 1 % cream Apply 1 application topically to the appropriate area as directed 2 (Two) Times a Day. Apply a 1.5 mm thickness. 25 g 0    Tirzepatide (Mounjaro) 10 MG/0.5ML solution pen-injector Inject 0.5 mL under the skin into the appropriate area as directed Every 7 (Seven) Days. 6 mL 0    [DISCONTINUED] anastrozole (ARIMIDEX) 1 MG tablet Take 1 tablet by mouth Daily. 30 tablet 11     No current facility-administered medications on file prior to visit.       Results for orders placed or performed in visit on 08/31/23   POC Glycosylated Hemoglobin (Hb A1C)    Specimen: Blood   Result Value Ref Range    Hemoglobin A1C 5.4 %    Lot Number [id number]     Expiration Date 04/13/2024        PE    Physical Exam  Vitals reviewed.   Constitutional:       General: She is not in acute distress.     Appearance: Normal appearance. She is well-developed. She is obese. She is not ill-appearing or diaphoretic.   HENT:      Head: Normocephalic and atraumatic.   Eyes:      Extraocular Movements: Extraocular movements intact.      Conjunctiva/sclera: Conjunctivae normal.   Cardiovascular:      Rate and Rhythm: Normal rate and regular rhythm.      Heart sounds: Normal heart sounds.   Pulmonary:      Effort: No respiratory distress.   Musculoskeletal:         General: Normal range of motion.      Cervical back: Normal range of motion.      Right lower leg: No edema.      Left lower leg: No edema.   Skin:     General: Skin is warm.      Findings: No erythema  or rash.   Neurological:      General: No focal deficit present.      Mental Status: She is alert.   Psychiatric:         Attention and Perception: She is attentive.         Mood and Affect: Mood normal.         Speech: Speech normal.         Behavior: Behavior normal. Behavior is cooperative.         Thought Content: Thought content normal.         Judgment: Judgment normal.       A/P    Diagnoses and all orders for this visit:    1. Type 2 diabetes mellitus with hyperglycemia, without long-term current use of insulin (Primary)  -     POC Glycosylated Hemoglobin (Hb A1C)  Previous hemoglobin AIC was 5.0%, hemoglobin AIC is 5.4% today.  Patient is compliant on Mounjaro 10 mg weekly and tolerating well.  Stopped metformin and her diarrhea resolved.    2. Essential hypertension  Stable, well-controlled.  Compliant on medication.    3. Class 1 obesity due to excess calories with serious comorbidity and body mass index (BMI) of 31.0 to 31.9 in adult  Has lost an additional 7 lbs in the last few weeks.  Has lost 47 lbs since October 2022.    4. Malignant neoplasm of upper-outer quadrant of right breast in female, estrogen receptor positive  Stopped Arimidex due to fatigue and joint pain.  Feeling better since stopping.  Oncology is aware.       Plan of care reviewed with patient at the conclusion of today's visit. Education was provided regarding diagnosis, management and any prescribed or recommended OTC medications.  Patient verbalizes understanding of and agreement with management plan.    Dictated Utilizing Dragon Dictation     Please note that portions of this note were completed with a voice recognition program.     Part of this note may be an electronic transcription/translation of spoken language to printed text using the Dragon Dictation System.    Return in about 3 months (around 11/30/2023) for Annual physical.     Daly Archibald PA-C

## 2023-09-01 RX ORDER — TIRZEPATIDE 10 MG/.5ML
10 INJECTION, SOLUTION SUBCUTANEOUS
Qty: 6 ML | Refills: 0 | Status: SHIPPED | OUTPATIENT
Start: 2023-09-01

## 2023-09-01 NOTE — TELEPHONE ENCOUNTER
Rx Refill Note  Requested Prescriptions     Pending Prescriptions Disp Refills    Tirzepatide (Mounjaro) 10 MG/0.5ML solution pen-injector 6 mL 0     Sig: Inject 0.5 mL under the skin into the appropriate area as directed Every 7 (Seven) Days.      Last office visit with prescribing clinician: 8/31/2023   Last telemedicine visit with prescribing clinician: Visit date not found   Next office visit with prescribing clinician: 12/1/2023                         Would you like a call back once the refill request has been completed: [] Yes [] No    If the office needs to give you a call back, can they leave a voicemail: [] Yes [] No    April MENDOZA Fried  09/01/23, 08:31 EDT

## 2023-10-04 ENCOUNTER — HOSPITAL ENCOUNTER (OUTPATIENT)
Dept: ULTRASOUND IMAGING | Facility: HOSPITAL | Age: 63
Discharge: HOME | End: 2023-10-04
Admitting: INTERNAL MEDICINE
Payer: COMMERCIAL

## 2023-10-04 DIAGNOSIS — K75.81 NASH (NONALCOHOLIC STEATOHEPATITIS): ICD-10-CM

## 2023-10-04 DIAGNOSIS — K76.0 HEPATIC STEATOSIS: ICD-10-CM

## 2023-10-04 DIAGNOSIS — R79.89 ELEVATED LIVER FUNCTION TESTS: ICD-10-CM

## 2023-10-04 PROCEDURE — 76705 ECHO EXAM OF ABDOMEN: CPT

## 2023-10-10 DIAGNOSIS — I10 ESSENTIAL HYPERTENSION: ICD-10-CM

## 2023-10-10 RX ORDER — AMLODIPINE BESYLATE 5 MG/1
5 TABLET ORAL DAILY
Qty: 90 TABLET | Refills: 1 | Status: SHIPPED | OUTPATIENT
Start: 2023-10-10

## 2023-10-10 NOTE — TELEPHONE ENCOUNTER
Rx Refill Note  Requested Prescriptions     Pending Prescriptions Disp Refills    amLODIPine (NORVASC) 5 MG tablet 90 tablet 1     Sig: Take 1 tablet by mouth Daily.      Last office visit with prescribing clinician: 8/31/2023     Next office visit with prescribing clinician: 12/1/2023     Sharlene Maddox MA  10/10/23, 11:54 EDT

## 2023-11-09 DIAGNOSIS — E78.2 MIXED HYPERLIPIDEMIA: ICD-10-CM

## 2023-11-09 DIAGNOSIS — E78.1 HYPERTRIGLYCERIDEMIA: ICD-10-CM

## 2023-11-09 RX ORDER — ICOSAPENT ETHYL 1000 MG/1
2 CAPSULE ORAL 2 TIMES DAILY WITH MEALS
Qty: 360 CAPSULE | Refills: 1 | Status: SHIPPED | OUTPATIENT
Start: 2023-11-09

## 2023-11-09 NOTE — TELEPHONE ENCOUNTER
Rx Refill Note  Requested Prescriptions     Pending Prescriptions Disp Refills    icosapent ethyl (Vascepa) 1 g capsule capsule 360 capsule 1     Sig: Take 2 capsules by mouth 2 (Two) Times a Day With Meals.      Last office visit with prescribing clinician: 8/31/2023     Next office visit with prescribing clinician: 12/1/2023       Cathleen Garcia MA  11/09/23, 09:32 EST

## 2023-11-15 DIAGNOSIS — G43.019 INTRACTABLE MIGRAINE WITHOUT AURA AND WITHOUT STATUS MIGRAINOSUS: ICD-10-CM

## 2023-11-15 DIAGNOSIS — I10 ESSENTIAL HYPERTENSION: ICD-10-CM

## 2023-11-15 DIAGNOSIS — F41.9 ANXIETY: ICD-10-CM

## 2023-11-15 DIAGNOSIS — F33.0 MILD EPISODE OF RECURRENT MAJOR DEPRESSIVE DISORDER: ICD-10-CM

## 2023-11-15 DIAGNOSIS — F51.01 PRIMARY INSOMNIA: ICD-10-CM

## 2023-11-15 DIAGNOSIS — E78.2 MIXED HYPERLIPIDEMIA: ICD-10-CM

## 2023-11-15 RX ORDER — AMITRIPTYLINE HYDROCHLORIDE 10 MG/1
10 TABLET, FILM COATED ORAL NIGHTLY
Qty: 90 TABLET | Refills: 0 | Status: SHIPPED | OUTPATIENT
Start: 2023-11-15

## 2023-11-15 RX ORDER — PROPRANOLOL HCL 60 MG
60 CAPSULE, EXTENDED RELEASE 24HR ORAL DAILY
Qty: 90 CAPSULE | Refills: 0 | Status: SHIPPED | OUTPATIENT
Start: 2023-11-15

## 2023-11-15 RX ORDER — FLUOXETINE HYDROCHLORIDE 40 MG/1
80 CAPSULE ORAL DAILY
Qty: 180 CAPSULE | Refills: 1 | Status: SHIPPED | OUTPATIENT
Start: 2023-11-15

## 2023-11-15 RX ORDER — LOSARTAN POTASSIUM 100 MG/1
100 TABLET ORAL DAILY
Qty: 90 TABLET | Refills: 1 | Status: SHIPPED | OUTPATIENT
Start: 2023-11-15

## 2023-11-15 RX ORDER — ATORVASTATIN CALCIUM 20 MG/1
20 TABLET, FILM COATED ORAL NIGHTLY
Qty: 90 TABLET | Refills: 1 | Status: SHIPPED | OUTPATIENT
Start: 2023-11-15

## 2023-12-01 ENCOUNTER — LAB (OUTPATIENT)
Dept: LAB | Facility: HOSPITAL | Age: 63
End: 2023-12-01
Payer: COMMERCIAL

## 2023-12-01 ENCOUNTER — OFFICE VISIT (OUTPATIENT)
Dept: FAMILY MEDICINE CLINIC | Facility: CLINIC | Age: 63
End: 2023-12-01
Payer: COMMERCIAL

## 2023-12-01 VITALS
WEIGHT: 182 LBS | SYSTOLIC BLOOD PRESSURE: 112 MMHG | HEART RATE: 72 BPM | RESPIRATION RATE: 14 BRPM | OXYGEN SATURATION: 98 % | BODY MASS INDEX: 33.49 KG/M2 | DIASTOLIC BLOOD PRESSURE: 64 MMHG | HEIGHT: 62 IN

## 2023-12-01 DIAGNOSIS — E66.9 OBESITY (BMI 30.0-34.9): ICD-10-CM

## 2023-12-01 DIAGNOSIS — E78.2 MIXED HYPERLIPIDEMIA: ICD-10-CM

## 2023-12-01 DIAGNOSIS — G43.019 INTRACTABLE MIGRAINE WITHOUT AURA AND WITHOUT STATUS MIGRAINOSUS: ICD-10-CM

## 2023-12-01 DIAGNOSIS — Z13.0 SCREENING FOR DEFICIENCY ANEMIA: ICD-10-CM

## 2023-12-01 DIAGNOSIS — E11.65 TYPE 2 DIABETES MELLITUS WITH HYPERGLYCEMIA, WITHOUT LONG-TERM CURRENT USE OF INSULIN: ICD-10-CM

## 2023-12-01 DIAGNOSIS — Z13.1 SCREENING FOR DIABETES MELLITUS: ICD-10-CM

## 2023-12-01 DIAGNOSIS — G47.33 OSA ON CPAP: ICD-10-CM

## 2023-12-01 DIAGNOSIS — Z13.220 SCREENING FOR CHOLESTEROL LEVEL: ICD-10-CM

## 2023-12-01 DIAGNOSIS — J30.2 SEASONAL ALLERGIES: ICD-10-CM

## 2023-12-01 DIAGNOSIS — Z00.00 PHYSICAL EXAM, ANNUAL: Primary | ICD-10-CM

## 2023-12-01 DIAGNOSIS — K76.0 HEPATIC STEATOSIS: ICD-10-CM

## 2023-12-01 DIAGNOSIS — F33.42 RECURRENT MAJOR DEPRESSIVE DISORDER, IN FULL REMISSION: ICD-10-CM

## 2023-12-01 DIAGNOSIS — I10 ESSENTIAL HYPERTENSION: ICD-10-CM

## 2023-12-01 DIAGNOSIS — Z13.29 SCREENING FOR THYROID DISORDER: ICD-10-CM

## 2023-12-01 PROBLEM — E66.811 OBESITY (BMI 30.0-34.9): Status: ACTIVE | Noted: 2023-12-01

## 2023-12-01 LAB
ALBUMIN SERPL-MCNC: 4.8 G/DL (ref 3.5–5.2)
ALBUMIN/GLOB SERPL: 2 G/DL
ALP SERPL-CCNC: 112 U/L (ref 39–117)
ALT SERPL W P-5'-P-CCNC: 78 U/L (ref 1–33)
ANION GAP SERPL CALCULATED.3IONS-SCNC: 11 MMOL/L (ref 5–15)
AST SERPL-CCNC: 70 U/L (ref 1–32)
BASOPHILS # BLD AUTO: 0.03 10*3/MM3 (ref 0–0.2)
BASOPHILS NFR BLD AUTO: 0.5 % (ref 0–1.5)
BILIRUB SERPL-MCNC: 0.4 MG/DL (ref 0–1.2)
BUN SERPL-MCNC: 21 MG/DL (ref 8–23)
BUN/CREAT SERPL: 20.4 (ref 7–25)
CALCIUM SPEC-SCNC: 10 MG/DL (ref 8.6–10.5)
CHLORIDE SERPL-SCNC: 101 MMOL/L (ref 98–107)
CHOLEST SERPL-MCNC: 152 MG/DL (ref 0–200)
CO2 SERPL-SCNC: 27 MMOL/L (ref 22–29)
CREAT SERPL-MCNC: 1.03 MG/DL (ref 0.57–1)
DEPRECATED RDW RBC AUTO: 36.9 FL (ref 37–54)
EGFRCR SERPLBLD CKD-EPI 2021: 62 ML/MIN/1.73
EOSINOPHIL # BLD AUTO: 0.13 10*3/MM3 (ref 0–0.4)
EOSINOPHIL NFR BLD AUTO: 2 % (ref 0.3–6.2)
ERYTHROCYTE [DISTWIDTH] IN BLOOD BY AUTOMATED COUNT: 12.2 % (ref 12.3–15.4)
EXPIRATION DATE: NORMAL
EXPIRATION DATE: NORMAL
GLOBULIN UR ELPH-MCNC: 2.4 GM/DL
GLUCOSE SERPL-MCNC: 100 MG/DL (ref 65–99)
HBA1C MFR BLD: 5.3 % (ref 4.5–5.7)
HCT VFR BLD AUTO: 37.2 % (ref 34–46.6)
HDLC SERPL-MCNC: 57 MG/DL (ref 40–60)
HGB BLD-MCNC: 12.6 G/DL (ref 12–15.9)
IMM GRANULOCYTES # BLD AUTO: 0.02 10*3/MM3 (ref 0–0.05)
IMM GRANULOCYTES NFR BLD AUTO: 0.3 % (ref 0–0.5)
LDLC SERPL CALC-MCNC: 67 MG/DL (ref 0–100)
LDLC/HDLC SERPL: 1.09 {RATIO}
LYMPHOCYTES # BLD AUTO: 1.91 10*3/MM3 (ref 0.7–3.1)
LYMPHOCYTES NFR BLD AUTO: 29.2 % (ref 19.6–45.3)
Lab: NORMAL
Lab: NORMAL
MCH RBC QN AUTO: 28.6 PG (ref 26.6–33)
MCHC RBC AUTO-ENTMCNC: 33.9 G/DL (ref 31.5–35.7)
MCV RBC AUTO: 84.5 FL (ref 79–97)
MONOCYTES # BLD AUTO: 0.54 10*3/MM3 (ref 0.1–0.9)
MONOCYTES NFR BLD AUTO: 8.3 % (ref 5–12)
NEUTROPHILS NFR BLD AUTO: 3.9 10*3/MM3 (ref 1.7–7)
NEUTROPHILS NFR BLD AUTO: 59.7 % (ref 42.7–76)
NRBC BLD AUTO-RTO: 0 /100 WBC (ref 0–0.2)
PLATELET # BLD AUTO: 313 10*3/MM3 (ref 140–450)
PMV BLD AUTO: 9.9 FL (ref 6–12)
POC CREATININE URINE: 26.5
POC MICROALBUMIN URINE: 30
POTASSIUM SERPL-SCNC: 4.3 MMOL/L (ref 3.5–5.2)
PROT SERPL-MCNC: 7.2 G/DL (ref 6–8.5)
RBC # BLD AUTO: 4.4 10*6/MM3 (ref 3.77–5.28)
SODIUM SERPL-SCNC: 139 MMOL/L (ref 136–145)
TRIGL SERPL-MCNC: 164 MG/DL (ref 0–150)
TSH SERPL DL<=0.05 MIU/L-ACNC: 1.03 UIU/ML (ref 0.27–4.2)
VIT B12 BLD-MCNC: 472 PG/ML (ref 211–946)
VLDLC SERPL-MCNC: 28 MG/DL (ref 5–40)
WBC NRBC COR # BLD AUTO: 6.53 10*3/MM3 (ref 3.4–10.8)

## 2023-12-01 PROCEDURE — 80050 GENERAL HEALTH PANEL: CPT | Performed by: PHYSICIAN ASSISTANT

## 2023-12-01 PROCEDURE — 80061 LIPID PANEL: CPT | Performed by: PHYSICIAN ASSISTANT

## 2023-12-01 PROCEDURE — 82607 VITAMIN B-12: CPT | Performed by: PHYSICIAN ASSISTANT

## 2023-12-01 RX ORDER — FLUTICASONE PROPIONATE 50 MCG
2 SPRAY, SUSPENSION (ML) NASAL DAILY
Qty: 16 G | Refills: 11 | Status: SHIPPED | OUTPATIENT
Start: 2023-12-01

## 2023-12-01 NOTE — PROGRESS NOTES
Chief Complaint   Patient presents with    Annual Exam     Physical       Yamila Neff is a very pleasant 62 y.o. female who is here for annual physical exam.  Patient presents for management of obesity, hypertension, hyperlipidemia, diabetes type 2, NGOC on CPAP, depression, migraine and hepatic steatosis.  Patient is doing well overall.  She reports stable mood with medication.  Her blood pressure is normal.  No symptoms of hypotension.  She is on Mounjaro 10 mg weekly.  She feels the medication is not working as well.  Has gained about 9 lbs in the last few weeks.  Admits it is harder with holidays as well.  Had initially lost 50 lbs with medication.  Compliant on all medication.  Followed by Dr. Polk for liver disease.  States her recent liver ultrasound was normal and reassuring.  Complete hysterectomy and mastectomy.  Followed by oncology.  Going to ophthalmologist, dermatologist and dentist.  Colonoscopy up-to-date.    Past Medical History:   Diagnosis Date    Anemia     Arthritis     Carpal tunnel syndrome 2012    Degenerative joint disease     Depression     Depression     Diabetes mellitus     Elevated cholesterol     Elevated liver enzymes     Fatty liver     Hearing aid worn     HL (hearing loss)     Hypertension     Kidney disorder     one kidney    Malignant neoplasm of right female breast 2023    Palpitations     Polycystic ovaries     Psoriasis     PVC (premature ventricular contraction)     occasional pvc's    S/P total knee arthroplasty, right 2021    Sleep apnea     cpap at home    Wears glasses        Past Surgical History:   Procedure Laterality Date    BREAST BIOPSY Right     PAPILLOMA DR BELL     SECTION      x 3    CHOLECYSTECTOMY  2013    COLONOSCOPY      HYSTERECTOMY  2006    complete    JOINT REPLACEMENT Left 2011    left knee    MASTECTOMY W/ SENTINEL NODE BIOPSY Bilateral 2023    Procedure: MASTECTOMY WITH SENTINEL NODE BIOPSY RIGHT, LEFT PROPHYLATIC  MASTECTOMY;  Surgeon: Emi Lizama MD;  Location:  [facility];  Service: General;  Laterality: Bilateral;    OOPHORECTOMY      TOTAL ABDOMINAL HYSTERECTOMY WITH SALPINGO OOPHORECTOMY      TOTAL KNEE ARTHROPLASTY Right 2021    Procedure: TOTAL KNEE ARTHROPLASTY RIGHT;  Surgeon: Mateo Keith MD;  Location:  [facility];  Service: Orthopedics;  Laterality: Right;    TUBAL ABDOMINAL LIGATION         Family History   Problem Relation Age of Onset    Diabetes Mother         Type 2    Lymphoma Mother     Hypertension Mother     Cancer Mother         Nonhodgkins Lymphoma    Hearing loss Mother         Hearing Aids    No Known Problems Father     Hypertension Brother     Arthritis Brother     Hypertension Brother     Colon cancer Maternal Aunt     Heart disease Maternal Grandmother     Arthritis Maternal Grandmother             Diabetes Maternal Grandmother         Type 2-     Heart disease Maternal Grandfather     Heart attack Maternal Grandfather     Diabetes Maternal Aunt         Type 2    Hypertension Maternal Aunt     Diabetes Daughter         Type 1    Hypertension Brother     Breast cancer Neg Hx     Ovarian cancer Neg Hx        Social History     Socioeconomic History    Marital status:    Tobacco Use    Smoking status: Never    Smokeless tobacco: Never   Vaping Use    Vaping Use: Never used   Substance and Sexual Activity    Alcohol use: No    Drug use: No    Sexual activity: Not Currently     Partners: Male     Birth control/protection: Surgical     Comment:        Allergies   Allergen Reactions    Lisinopril Cough    Celexa [Citalopram] Other (See Comments)     Jittery        ROS  Review of Systems   Constitutional:  Negative for chills, diaphoresis, fatigue and fever.   HENT:  Negative for congestion, ear pain, hearing loss, postnasal drip, rhinorrhea and sore throat.    Eyes:  Negative for blurred vision and pain.   Respiratory:  Negative for cough, shortness of  "breath and wheezing.    Cardiovascular:  Negative for chest pain and leg swelling.   Gastrointestinal:  Negative for abdominal pain, blood in stool, constipation, diarrhea, nausea, vomiting and indigestion.   Endocrine: Negative for polyuria.   Genitourinary:  Negative for dysuria, flank pain and hematuria.   Musculoskeletal:  Negative for arthralgias, gait problem and myalgias.   Skin:  Negative for rash and skin lesions.   Neurological:  Negative for dizziness and headache.   Psychiatric/Behavioral:  Positive for stress. Negative for self-injury, sleep disturbance, suicidal ideas and depressed mood. The patient is not nervous/anxious.        Vitals:    12/01/23 0835   BP: 112/64   BP Location: Left arm   Patient Position: Sitting   Cuff Size: Adult   Pulse: 72   Resp: 14   SpO2: 98%   Weight: 82.6 kg (182 lb)   Height: 157.5 cm (62.01\")   PainSc: 0-No pain     Body mass index is 33.28 kg/m².    BMI is >= 30 and <35. (Class 1 Obesity). The following options were offered after discussion;: exercise counseling/recommendations and nutrition counseling/recommendations       Current Outpatient Medications on File Prior to Visit   Medication Sig Dispense Refill    amitriptyline (ELAVIL) 10 MG tablet Take 1 tablet by mouth Every Night. 90 tablet 0    amLODIPine (NORVASC) 5 MG tablet Take 1 tablet by mouth Daily. 90 tablet 1    atorvastatin (LIPITOR) 20 MG tablet Take 1 tablet by mouth Every Night. 90 tablet 1    cholecalciferol (VITAMIN D3) 1000 units tablet Take 2 tablets by mouth Daily.      clobetasol (TEMOVATE) 0.05 % external solution Apply to itchy, scaly areas on scalp topically as directed 2 (Two) Times a Day. 25 mL 3    colestipol (Colestid) 1 g tablet Take 2 tablets by mouth every morning.  **Take 2 hours separate from other medications 180 tablet 3    FLUoxetine (PROzac) 40 MG capsule Take 2 capsules by mouth Daily. 180 capsule 1    hydrocortisone 2.5 % ointment Apply 1 application  topically to the affected " area(s) as directed 2 (Two) Times a Day. 20 g 4    icosapent ethyl (Vascepa) 1 g capsule capsule Take 2 capsules by mouth 2 (Two) Times a Day With Meals. 360 capsule 1    ketoconazole (NIZORAL) 2 % shampoo Use as a shampoo to scalp and face 3 times a week. Leave in for 5 minutes and rinse. 120 mL 3    losartan (Cozaar) 100 MG tablet Take 1 tablet by mouth Daily. 90 tablet 1    Magnesium 250 MG tablet Take 1 tablet by mouth Daily.      mupirocin (BACTROBAN) 2 % ointment Apply a thin film topically to the appropriate area as directed. 22 g 2    ondansetron ODT (ZOFRAN-ODT) 4 MG disintegrating tablet Place 1 tablet on the tongue Every 8 (Eight) Hours As Needed for Nausea or Vomiting. 14 tablet 0    pantoprazole (PROTONIX) 40 MG EC tablet Take 1 tablet by mouth Daily. 90 tablet 3    Probiotic Product (PROBIOTIC DAILY PO) Take  by mouth Daily.      propranolol LA (INDERAL LA) 60 MG 24 hr capsule Take 1 capsule by mouth Daily. 90 capsule 0    silver sulfadiazine (Silvadene) 1 % cream Apply 1 application topically to the appropriate area as directed 2 (Two) Times a Day. Apply a 1.5 mm thickness. 25 g 0    [DISCONTINUED] clobetasol (TEMOVATE) 0.05 % external solution Apply topically to the itchy, scaly areas on scalp as directed 2 (two) times a day. 25 mL 3    [DISCONTINUED] hydrocortisone 2.5 % cream Apply 1 application topically to the appropriate area on abdomen until resolved every evening 28.35 g 3    [DISCONTINUED] hydrOXYzine (ATARAX) 50 MG tablet Take 1 tablet by mouth At Night As Needed for Anxiety. 90 tablet 1    [DISCONTINUED] ketoconazole (NIZORAL) 2 % cream Apply every morning to underarms until resolved. 30 g 3    [DISCONTINUED] metroNIDAZOLE (MetroCream) 0.75 % cream Apply twice daily to affected areas around mouth. 45 g 3    [DISCONTINUED] Tirzepatide (Mounjaro) 10 MG/0.5ML solution pen-injector Inject 0.5 mL under the skin into the appropriate area as directed Every 7 (Seven) Days. 6 mL 0     No current  facility-administered medications on file prior to visit.       Results for orders placed or performed in visit on 12/01/23   POC Glycosylated Hemoglobin (Hb A1C)    Specimen: Blood   Result Value Ref Range    Hemoglobin A1C 5.3 4.5 - 5.7 %    Lot Number [id number]     Expiration Date 07/02/2025        PE    Physical Exam  Vitals reviewed.   Constitutional:       General: She is not in acute distress.     Appearance: Normal appearance. She is well-developed. She is obese. She is not ill-appearing or diaphoretic.   HENT:      Head: Normocephalic and atraumatic.      Right Ear: Hearing, tympanic membrane, ear canal and external ear normal.      Left Ear: Hearing, tympanic membrane, ear canal and external ear normal.      Nose: Nose normal.      Right Sinus: No maxillary sinus tenderness or frontal sinus tenderness.      Left Sinus: No maxillary sinus tenderness or frontal sinus tenderness.      Mouth/Throat:      Pharynx: Uvula midline.   Eyes:      General: Lids are normal.      Extraocular Movements: Extraocular movements intact.      Conjunctiva/sclera: Conjunctivae normal.   Neck:      Thyroid: No thyroid mass or thyromegaly.      Trachea: Trachea and phonation normal.   Cardiovascular:      Rate and Rhythm: Normal rate and regular rhythm.      Heart sounds: Normal heart sounds.   Pulmonary:      Effort: Pulmonary effort is normal.      Breath sounds: Normal breath sounds.   Abdominal:      General: Bowel sounds are normal. There is no distension.      Palpations: Abdomen is soft. Abdomen is not rigid.      Tenderness: There is no abdominal tenderness. There is no guarding.   Musculoskeletal:         General: Normal range of motion.      Cervical back: Normal range of motion.      Right lower leg: No edema.      Left lower leg: No edema.   Lymphadenopathy:      Cervical: No cervical adenopathy.      Right cervical: No superficial cervical adenopathy.     Left cervical: No superficial cervical adenopathy.   Skin:      General: Skin is warm.      Findings: No erythema or rash.      Nails: There is no clubbing.   Neurological:      Mental Status: She is alert and oriented to person, place, and time.      Coordination: Coordination normal.      Gait: Gait normal.      Deep Tendon Reflexes: Reflexes are normal and symmetric.      Comments: CN grossly intact   Psychiatric:         Attention and Perception: Attention and perception normal. She is attentive.         Mood and Affect: Mood and affect normal.         Speech: Speech normal.         Behavior: Behavior normal. Behavior is cooperative.         Thought Content: Thought content normal.         Cognition and Memory: Cognition and memory normal.         Judgment: Judgment normal.         A/P    Diagnoses and all orders for this visit:    1. Physical exam, annual (Primary)  -     CBC Auto Differential; Future  -     Comprehensive Metabolic Panel; Future  -     TSH Rfx On Abnormal To Free T4; Future  -     Lipid Panel; Future  PE completed  Preventative labs ordered  Colonoscopy is up-to-date  Mammogram - complete mastectomy  Gynecologist - full hysterectomy  Dentist - going regularly  Ophthalmologist - going regularly  Dermatologist - going regularly  Vaccinations discussed    2. Obesity (BMI 30.0-34.9)  Had lost 50 lbs with Mounjaro.  Recently gained 9 lbs back during holidays.    3. Type 2 diabetes mellitus with hyperglycemia, without long-term current use of insulin  -     POC Glycosylated Hemoglobin (Hb A1C)  -     POCT microalbumin  -     Vitamin B12; Future  -     Tirzepatide (Mounjaro) 12.5 MG/0.5ML solution pen-injector; Inject 0.5 mL under the skin into the appropriate area as directed Every 7 (Seven) Days.  Dispense: 2 mL; Refill: 0  Previous hemoglobin AIC was 5.4%, hemoglobin AIC is 5.3% today.  Will trial increase in Mounjaro to help with glucose and weight.  Return in 3 months.    4. Mixed hyperlipidemia  On Vascepa and atorvastatin.    5. Essential  hypertension  Stable, well-controlled.  Compliant on medication.    6. Hepatic steatosis  Followed by GI.    7. NGOC on CPAP  Wearing nightly.    8. Seasonal allergies  -     fluticasone (FLONASE) 50 MCG/ACT nasal spray; Instill 2 sprays into the nostril(s) as directed by provider Daily.  Dispense: 16 g; Refill: 11    9. Intractable migraine without aura and without status migrainosus  No complaints.  Compliant on medication.    10. Recurrent major depressive disorder, in full remission  Doing well with medication.    11. Screening for deficiency anemia  -     CBC Auto Differential; Future    12. Screening for diabetes mellitus  -     Comprehensive Metabolic Panel; Future    13. Screening for thyroid disorder  -     TSH Rfx On Abnormal To Free T4; Future    14. Screening for cholesterol level  -     Lipid Panel; Future         Plan of care reviewed with patient at the conclusion of today's visit. Education was provided regarding nutrition , exercise, weight management, supplements, preventative screenings, and vaccinations diagnosis, management and any prescribed or recommended OTC medications.  Patient verbalizes understanding of and agreement with management plan.    Dictated Utilizing Dragon Dictation     Please note that portions of this note were completed with a voice recognition program.     Part of this note may be an electronic transcription/translation of spoken language to printed text using the Dragon Dictation System.    Return in about 3 months (around 3/1/2024) for Recheck, RAMIN.     Daly Archibald PA-C

## 2023-12-11 ENCOUNTER — OFFICE VISIT (OUTPATIENT)
Dept: GASTROENTEROLOGY | Facility: CLINIC | Age: 63
End: 2023-12-11
Payer: COMMERCIAL

## 2023-12-11 VITALS — HEIGHT: 62 IN | BODY MASS INDEX: 33.09 KG/M2 | WEIGHT: 179.8 LBS | TEMPERATURE: 97.3 F

## 2023-12-11 DIAGNOSIS — K76.0 HEPATIC STEATOSIS: ICD-10-CM

## 2023-12-11 DIAGNOSIS — K75.81 NASH (NONALCOHOLIC STEATOHEPATITIS): ICD-10-CM

## 2023-12-11 DIAGNOSIS — K21.9 GASTROESOPHAGEAL REFLUX DISEASE WITHOUT ESOPHAGITIS: ICD-10-CM

## 2023-12-11 DIAGNOSIS — K74.02 HEPATIC FIBROSIS, STAGE 3: Primary | ICD-10-CM

## 2023-12-11 PROCEDURE — 99214 OFFICE O/P EST MOD 30 MIN: CPT | Performed by: INTERNAL MEDICINE

## 2023-12-11 NOTE — PROGRESS NOTES
PCP:  Daly Archibald PA-C     No referring provider defined for this encounter.    Chief Complaint   Patient presents with    Hepatic Steatosis        HPI   The patient is a 62-year-old known to me.  She has stage III-IV fibrosis on liver biopsy back in 2018 with severe fatty change and steatohepatitis.  She had an ultrasound 10/4/2023 which was relatively normal.  She had an alpha-fetoprotein level of 2.45 which was normal in August 2023.  She has 2 maternal aunts with colon polyps and one of the two had colon cancer.  She has a history of breast cancer and her family physician thought she should get a screening.    Allergies   Allergen Reactions    Lisinopril Cough    Celexa [Citalopram] Other (See Comments)     Keturah           Current Outpatient Medications:     amitriptyline (ELAVIL) 10 MG tablet, Take 1 tablet by mouth Every Night., Disp: 90 tablet, Rfl: 0    amLODIPine (NORVASC) 5 MG tablet, Take 1 tablet by mouth Daily., Disp: 90 tablet, Rfl: 1    atorvastatin (LIPITOR) 20 MG tablet, Take 1 tablet by mouth Every Night., Disp: 90 tablet, Rfl: 1    cholecalciferol (VITAMIN D3) 1000 units tablet, Take 2 tablets by mouth Daily., Disp: , Rfl:     clobetasol (TEMOVATE) 0.05 % external solution, Apply to itchy, scaly areas on scalp topically as directed 2 (Two) Times a Day., Disp: 25 mL, Rfl: 3    FLUoxetine (PROzac) 40 MG capsule, Take 2 capsules by mouth Daily., Disp: 180 capsule, Rfl: 1    fluticasone (FLONASE) 50 MCG/ACT nasal spray, Instill 2 sprays into the nostril(s) as directed by provider Daily., Disp: 16 g, Rfl: 11    hydrocortisone 2.5 % ointment, Apply 1 application  topically to the affected area(s) as directed 2 (Two) Times a Day., Disp: 20 g, Rfl: 4    icosapent ethyl (Vascepa) 1 g capsule capsule, Take 2 capsules by mouth 2 (Two) Times a Day With Meals., Disp: 360 capsule, Rfl: 1    ketoconazole (NIZORAL) 2 % shampoo, Use as a shampoo to scalp and face 3 times a week. Leave in for 5  minutes and rinse., Disp: 120 mL, Rfl: 3    losartan (Cozaar) 100 MG tablet, Take 1 tablet by mouth Daily., Disp: 90 tablet, Rfl: 1    Magnesium 250 MG tablet, Take 1 tablet by mouth Daily., Disp: , Rfl:     ondansetron ODT (ZOFRAN-ODT) 4 MG disintegrating tablet, Place 1 tablet on the tongue Every 8 (Eight) Hours As Needed for Nausea or Vomiting., Disp: 14 tablet, Rfl: 0    pantoprazole (PROTONIX) 40 MG EC tablet, Take 1 tablet by mouth Daily., Disp: 90 tablet, Rfl: 3    Probiotic Product (PROBIOTIC DAILY PO), Take  by mouth Daily., Disp: , Rfl:     propranolol LA (INDERAL LA) 60 MG 24 hr capsule, Take 1 capsule by mouth Daily., Disp: 90 capsule, Rfl: 0    Tirzepatide (Mounjaro) 12.5 MG/0.5ML solution pen-injector, Inject 0.5 mL under the skin into the appropriate area as directed Every 7 (Seven) Days., Disp: 2 mL, Rfl: 0    colestipol (Colestid) 1 g tablet, Take 2 tablets by mouth every morning.  **Take 2 hours separate from other medications (Patient not taking: Reported on 12/11/2023), Disp: 180 tablet, Rfl: 3    mupirocin (BACTROBAN) 2 % ointment, Apply a thin film topically to the appropriate area as directed. (Patient not taking: Reported on 12/11/2023), Disp: 22 g, Rfl: 2    silver sulfadiazine (Silvadene) 1 % cream, Apply 1 application topically to the appropriate area as directed 2 (Two) Times a Day. Apply a 1.5 mm thickness., Disp: 25 g, Rfl: 0     Past Medical History:   Diagnosis Date    Anemia     Arthritis     Carpal tunnel syndrome 04/09/2012    Degenerative joint disease     Depression     Depression     Diabetes mellitus     Elevated cholesterol     Elevated liver enzymes     Fatty liver     Hearing aid worn     HL (hearing loss)     Hypertension     Kidney disorder     one kidney    Malignant neoplasm of right female breast 02/28/2023    Palpitations     Polycystic ovaries     Psoriasis     PVC (premature ventricular contraction)     occasional pvc's    S/P total knee arthroplasty, right  2021    Sleep apnea     cpap at home    Wears glasses        Past Surgical History:   Procedure Laterality Date    BREAST BIOPSY Right     PAPILLOMA DR BELL     SECTION      x 3    CHOLECYSTECTOMY  2013    COLONOSCOPY      HYSTERECTOMY  2006    complete    JOINT REPLACEMENT Left 2011    left knee    MASTECTOMY W/ SENTINEL NODE BIOPSY Bilateral 2023    Procedure: MASTECTOMY WITH SENTINEL NODE BIOPSY RIGHT, LEFT PROPHYLATIC MASTECTOMY;  Surgeon: Emi Lizama MD;  Location:  [facility];  Service: General;  Laterality: Bilateral;    OOPHORECTOMY      TOTAL ABDOMINAL HYSTERECTOMY WITH SALPINGO OOPHORECTOMY      TOTAL KNEE ARTHROPLASTY Right 2021    Procedure: TOTAL KNEE ARTHROPLASTY RIGHT;  Surgeon: Mateo Keith MD;  Location:  [facility];  Service: Orthopedics;  Laterality: Right;    TUBAL ABDOMINAL LIGATION          Social History     Socioeconomic History    Marital status:    Tobacco Use    Smoking status: Never    Smokeless tobacco: Never   Vaping Use    Vaping Use: Never used   Substance and Sexual Activity    Alcohol use: No    Drug use: No    Sexual activity: Not Currently     Partners: Male     Birth control/protection: Surgical     Comment:         Family History   Problem Relation Age of Onset    Diabetes Mother         Type 2    Lymphoma Mother     Hypertension Mother     Cancer Mother         Nonhodgkins Lymphoma    Hearing loss Mother         Hearing Aids    No Known Problems Father     Hypertension Brother     Arthritis Brother     Hypertension Brother     Colon cancer Maternal Aunt     Heart disease Maternal Grandmother     Arthritis Maternal Grandmother             Diabetes Maternal Grandmother         Type 2-     Heart disease Maternal Grandfather     Heart attack Maternal Grandfather     Diabetes Maternal Aunt         Type 2    Hypertension Maternal Aunt     Diabetes Daughter         Type 1    Hypertension Brother     Breast cancer Neg Hx      Ovarian cancer Neg Hx         Review of Systems     Vitals:    12/11/23 1444   Temp: 97.3 °F (36.3 °C)        Physical Exam  Constitutional:       General: She is not in acute distress.     Appearance: Normal appearance. She is not ill-appearing.   Skin:     Coloration: Skin is not jaundiced.   Neurological:      Mental Status: She is alert.          Diagnoses and all orders for this visit:    1. Hepatic fibrosis, stage 3 (Primary)    2. Hepatic steatosis    3. POTTER (nonalcoholic steatohepatitis)    4. Gastroesophageal reflux disease without esophagitis    Impressions and plan #1 hepatic fibrosis stage III-IV: She is stable from that standpoint.  She is due for a repeat ultrasound in April.  She is due for an alpha-fetoprotein in March.  We may get them both at the same time.  She has no issues at the moment.    #2 gastroesophageal reflux: This appears to be well-controlled.    #3 family history of colon polyps: She has 2 second-degree relatives with colorectal neoplasia.  Is been over 5 years since her last evaluation.  I think it is reasonable to reevaluate.  She would like to get reevaluated as well.  William Polk MD

## 2023-12-11 NOTE — LETTER
December 11, 2023       No Recipients    Patient: Yamila Neff   YOB: 1960   Date of Visit: 12/11/2023     Dear Daly Archibald PA-C:       Thank you for referring Yamila Neff to me for evaluation. Below are the relevant portions of my assessment and plan of care.    If you have questions, please do not hesitate to call me. I look forward to following Yamila along with you.         Sincerely,        William Polk MD        CC:   No Recipients    William Polk MD  12/11/23 1617  Sign when Signing Visit  PCP:  Daly Archibald PA-C     No referring provider defined for this encounter.    Chief Complaint   Patient presents with   • Hepatic Steatosis        HPI   The patient is a 62-year-old known to me.  She has stage III-IV fibrosis on liver biopsy back in 2018 with severe fatty change and steatohepatitis.  She had an ultrasound 10/4/2023 which was relatively normal.  She had an alpha-fetoprotein level of 2.45 which was normal in August 2023.  She has 2 maternal aunts with colon polyps and one of the two had colon cancer.  She has a history of breast cancer and her family physician thought she should get a screening.    Allergies   Allergen Reactions   • Lisinopril Cough   • Celexa [Citalopram] Other (See Comments)     Keturah           Current Outpatient Medications:   •  amitriptyline (ELAVIL) 10 MG tablet, Take 1 tablet by mouth Every Night., Disp: 90 tablet, Rfl: 0  •  amLODIPine (NORVASC) 5 MG tablet, Take 1 tablet by mouth Daily., Disp: 90 tablet, Rfl: 1  •  atorvastatin (LIPITOR) 20 MG tablet, Take 1 tablet by mouth Every Night., Disp: 90 tablet, Rfl: 1  •  cholecalciferol (VITAMIN D3) 1000 units tablet, Take 2 tablets by mouth Daily., Disp: , Rfl:   •  clobetasol (TEMOVATE) 0.05 % external solution, Apply to itchy, scaly areas on scalp topically as directed 2 (Two) Times a Day., Disp: 25 mL, Rfl: 3  •  FLUoxetine (PROzac) 40 MG capsule, Take 2 capsules by  mouth Daily., Disp: 180 capsule, Rfl: 1  •  fluticasone (FLONASE) 50 MCG/ACT nasal spray, Instill 2 sprays into the nostril(s) as directed by provider Daily., Disp: 16 g, Rfl: 11  •  hydrocortisone 2.5 % ointment, Apply 1 application  topically to the affected area(s) as directed 2 (Two) Times a Day., Disp: 20 g, Rfl: 4  •  icosapent ethyl (Vascepa) 1 g capsule capsule, Take 2 capsules by mouth 2 (Two) Times a Day With Meals., Disp: 360 capsule, Rfl: 1  •  ketoconazole (NIZORAL) 2 % shampoo, Use as a shampoo to scalp and face 3 times a week. Leave in for 5 minutes and rinse., Disp: 120 mL, Rfl: 3  •  losartan (Cozaar) 100 MG tablet, Take 1 tablet by mouth Daily., Disp: 90 tablet, Rfl: 1  •  Magnesium 250 MG tablet, Take 1 tablet by mouth Daily., Disp: , Rfl:   •  ondansetron ODT (ZOFRAN-ODT) 4 MG disintegrating tablet, Place 1 tablet on the tongue Every 8 (Eight) Hours As Needed for Nausea or Vomiting., Disp: 14 tablet, Rfl: 0  •  pantoprazole (PROTONIX) 40 MG EC tablet, Take 1 tablet by mouth Daily., Disp: 90 tablet, Rfl: 3  •  Probiotic Product (PROBIOTIC DAILY PO), Take  by mouth Daily., Disp: , Rfl:   •  propranolol LA (INDERAL LA) 60 MG 24 hr capsule, Take 1 capsule by mouth Daily., Disp: 90 capsule, Rfl: 0  •  Tirzepatide (Mounjaro) 12.5 MG/0.5ML solution pen-injector, Inject 0.5 mL under the skin into the appropriate area as directed Every 7 (Seven) Days., Disp: 2 mL, Rfl: 0  •  colestipol (Colestid) 1 g tablet, Take 2 tablets by mouth every morning.  **Take 2 hours separate from other medications (Patient not taking: Reported on 12/11/2023), Disp: 180 tablet, Rfl: 3  •  mupirocin (BACTROBAN) 2 % ointment, Apply a thin film topically to the appropriate area as directed. (Patient not taking: Reported on 12/11/2023), Disp: 22 g, Rfl: 2  •  silver sulfadiazine (Silvadene) 1 % cream, Apply 1 application topically to the appropriate area as directed 2 (Two) Times a Day. Apply a 1.5 mm thickness., Disp: 25 g, Rfl:  0     Past Medical History:   Diagnosis Date   • Anemia    • Arthritis    • Carpal tunnel syndrome 2012   • Degenerative joint disease    • Depression    • Depression    • Diabetes mellitus    • Elevated cholesterol    • Elevated liver enzymes    • Fatty liver    • Hearing aid worn    • HL (hearing loss)    • Hypertension    • Kidney disorder     one kidney   • Malignant neoplasm of right female breast 2023   • Palpitations    • Polycystic ovaries    • Psoriasis    • PVC (premature ventricular contraction)     occasional pvc's   • S/P total knee arthroplasty, right 2021   • Sleep apnea     cpap at home   • Wears glasses        Past Surgical History:   Procedure Laterality Date   • BREAST BIOPSY Right     PAPILLOMA DR BELL   •  SECTION      x 3   • CHOLECYSTECTOMY  2013   • COLONOSCOPY     • HYSTERECTOMY  2006    complete   • JOINT REPLACEMENT Left 2011    left knee   • MASTECTOMY W/ SENTINEL NODE BIOPSY Bilateral 2023    Procedure: MASTECTOMY WITH SENTINEL NODE BIOPSY RIGHT, LEFT PROPHYLATIC MASTECTOMY;  Surgeon: Emi Lizama MD;  Location:  [facility];  Service: General;  Laterality: Bilateral;   • OOPHORECTOMY     • TOTAL ABDOMINAL HYSTERECTOMY WITH SALPINGO OOPHORECTOMY     • TOTAL KNEE ARTHROPLASTY Right 2021    Procedure: TOTAL KNEE ARTHROPLASTY RIGHT;  Surgeon: Mateo Keith MD;  Location:  [facility];  Service: Orthopedics;  Laterality: Right;   • TUBAL ABDOMINAL LIGATION          Social History     Socioeconomic History   • Marital status:    Tobacco Use   • Smoking status: Never   • Smokeless tobacco: Never   Vaping Use   • Vaping Use: Never used   Substance and Sexual Activity   • Alcohol use: No   • Drug use: No   • Sexual activity: Not Currently     Partners: Male     Birth control/protection: Surgical     Comment:         Family History   Problem Relation Age of Onset   • Diabetes Mother         Type 2   • Lymphoma Mother    • Hypertension  Mother    • Cancer Mother         Nonhodgkins Lymphoma   • Hearing loss Mother         Hearing Aids   • No Known Problems Father    • Hypertension Brother    • Arthritis Brother    • Hypertension Brother    • Colon cancer Maternal Aunt    • Heart disease Maternal Grandmother    • Arthritis Maternal Grandmother            • Diabetes Maternal Grandmother         Type 2-    • Heart disease Maternal Grandfather    • Heart attack Maternal Grandfather    • Diabetes Maternal Aunt         Type 2   • Hypertension Maternal Aunt    • Diabetes Daughter         Type 1   • Hypertension Brother    • Breast cancer Neg Hx    • Ovarian cancer Neg Hx         Review of Systems     Vitals:    23 1444   Temp: 97.3 °F (36.3 °C)        Physical Exam  Constitutional:       General: She is not in acute distress.     Appearance: Normal appearance. She is not ill-appearing.   Skin:     Coloration: Skin is not jaundiced.   Neurological:      Mental Status: She is alert.          Diagnoses and all orders for this visit:    1. Hepatic fibrosis, stage 3 (Primary)    2. Hepatic steatosis    3. POTTER (nonalcoholic steatohepatitis)    4. Gastroesophageal reflux disease without esophagitis    Impressions and plan #1 hepatic fibrosis stage III-IV: She is stable from that standpoint.  She is due for a repeat ultrasound in April.  She is due for an alpha-fetoprotein in March.  We may get them both at the same time.  She has no issues at the moment.    #2 gastroesophageal reflux: This appears to be well-controlled.    #3 family history of colon polyps: She has 2 second-degree relatives with colorectal neoplasia.  Is been over 5 years since her last evaluation.  I think it is reasonable to reevaluate.  She would like to get reevaluated as well.  William Polk MD

## 2023-12-14 ENCOUNTER — OFFICE VISIT (OUTPATIENT)
Dept: ONCOLOGY | Facility: CLINIC | Age: 63
End: 2023-12-14
Payer: COMMERCIAL

## 2023-12-14 VITALS
RESPIRATION RATE: 16 BRPM | OXYGEN SATURATION: 96 % | WEIGHT: 178 LBS | HEIGHT: 62 IN | HEART RATE: 72 BPM | TEMPERATURE: 97.1 F | DIASTOLIC BLOOD PRESSURE: 59 MMHG | SYSTOLIC BLOOD PRESSURE: 104 MMHG | BODY MASS INDEX: 32.76 KG/M2

## 2023-12-14 DIAGNOSIS — Z17.0 MALIGNANT NEOPLASM OF UPPER-OUTER QUADRANT OF RIGHT BREAST IN FEMALE, ESTROGEN RECEPTOR POSITIVE: Primary | ICD-10-CM

## 2023-12-14 DIAGNOSIS — C50.411 MALIGNANT NEOPLASM OF UPPER-OUTER QUADRANT OF RIGHT BREAST IN FEMALE, ESTROGEN RECEPTOR POSITIVE: Primary | ICD-10-CM

## 2023-12-14 PROCEDURE — 99213 OFFICE O/P EST LOW 20 MIN: CPT | Performed by: INTERNAL MEDICINE

## 2023-12-14 NOTE — PROGRESS NOTES
"  Subjective     PROBLEM LIST:  1. 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  Pathology showed a low grade multifocal mucinous carcinoma, largest mass 2.4 cm, second focus 0.8 cm.  0/1 SLN involved.  B) anastrozole started 4/12/2023   2. Diabetes mellitus  3. Hypertension  4. Depression  5. Fatty liver   6. NGOC  7. Psoriasis    CHIEF COMPLAINT: breast cancer      HISTORY OF PRESENT ILLNESS:  The patient is a 62 y.o. female here for follow up for breast cancer.     She stopped taking anastrozole.  She says she took it for about 2 months but because of severe fatigue she decided it was not worth the side effects.          REVIEW OF SYSTEMS:  A 14 point review of systems was performed and is negative except as noted above.                Objective     /59   Pulse 72   Temp 97.1 °F (36.2 °C) (Temporal)   Resp 16   Ht 157.5 cm (62.01\")   Wt 80.7 kg (178 lb)   SpO2 96%   BMI 32.55 kg/m²   Vitals:    12/14/23 0924   PainSc: 0-No pain            ECOG score: 0           General: well appearing female in no acute distress  Neuro: alert and oriented  HEENT: sclerae anicteric, oropharynx clear  Extremities: no lower extremity edema  Skin: no rashes, lesions, bruising, or petechiae  Psych: mood and affect appropriate    Lab Results   Component Value Date    WBC 6.53 12/01/2023    HGB 12.6 12/01/2023    HCT 37.2 12/01/2023    MCV 84.5 12/01/2023     12/01/2023     Lab Results   Component Value Date    GLUCOSE 100 (H) 12/01/2023    BUN 21 12/01/2023    CREATININE 1.03 (H) 12/01/2023    EGFRIFNONA 59 (L) 11/12/2021    BCR 20.4 12/01/2023    K 4.3 12/01/2023    CO2 27.0 12/01/2023    CALCIUM 10.0 12/01/2023    PROTENTOTREF 5.8 (L) 12/17/2016    ALBUMIN 4.8 12/01/2023    LABIL2 0.9 12/17/2016    AST 70 (H) 12/01/2023    ALT 78 (H) 12/01/2023                 ASSESSMENT AND PLAN:     Yamila Neff is a 62 y.o. 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    He took anastrozole for about 2 months and then stopped it due to side effects.  We discussed the option of other estrogen blocking medications but she is comfortable with her low risk of recurrence and would prefer not to take any other medications.    DEXA scan April 2022 was normal.  We will repeat bone density scan April 2024.    We will see her once yearly for 5 years.               Lizzy Snow MD    12/14/2023

## 2023-12-18 DIAGNOSIS — E11.65 TYPE 2 DIABETES MELLITUS WITH HYPERGLYCEMIA, WITHOUT LONG-TERM CURRENT USE OF INSULIN: Primary | ICD-10-CM

## 2024-01-22 ENCOUNTER — TELEPHONE (OUTPATIENT)
Dept: GASTROENTEROLOGY | Facility: CLINIC | Age: 64
End: 2024-01-22

## 2024-01-22 NOTE — TELEPHONE ENCOUNTER
Caller: JESSICA PAREDES    Relationship to patient: SELF    Best call back number: [phone]    Chief complaint: NEED TO RESCHED COLONOSCOPY    Type of visit: COLONOSCOPY    Requested date:     If rescheduling, when is the original appointment: 2.15    Additional notes:

## 2024-02-14 DIAGNOSIS — F51.01 PRIMARY INSOMNIA: ICD-10-CM

## 2024-02-14 DIAGNOSIS — G43.019 INTRACTABLE MIGRAINE WITHOUT AURA AND WITHOUT STATUS MIGRAINOSUS: ICD-10-CM

## 2024-02-15 RX ORDER — PROPRANOLOL HCL 60 MG
60 CAPSULE, EXTENDED RELEASE 24HR ORAL DAILY
Qty: 90 CAPSULE | Refills: 0 | Status: SHIPPED | OUTPATIENT
Start: 2024-02-15

## 2024-02-15 RX ORDER — AMITRIPTYLINE HYDROCHLORIDE 10 MG/1
10 TABLET, FILM COATED ORAL NIGHTLY
Qty: 90 TABLET | Refills: 0 | Status: SHIPPED | OUTPATIENT
Start: 2024-02-15

## 2024-02-21 RX ORDER — SODIUM, POTASSIUM,MAG SULFATES 17.5-3.13G
SOLUTION, RECONSTITUTED, ORAL ORAL
Qty: 354 ML | Refills: 0 | Status: SHIPPED | OUTPATIENT
Start: 2024-02-21

## 2024-03-04 ENCOUNTER — OFFICE VISIT (OUTPATIENT)
Dept: FAMILY MEDICINE CLINIC | Facility: CLINIC | Age: 64
End: 2024-03-04
Payer: COMMERCIAL

## 2024-03-04 VITALS
HEIGHT: 62 IN | OXYGEN SATURATION: 98 % | RESPIRATION RATE: 14 BRPM | WEIGHT: 168.4 LBS | BODY MASS INDEX: 30.99 KG/M2 | DIASTOLIC BLOOD PRESSURE: 66 MMHG | HEART RATE: 72 BPM | SYSTOLIC BLOOD PRESSURE: 104 MMHG

## 2024-03-04 DIAGNOSIS — I10 ESSENTIAL HYPERTENSION: ICD-10-CM

## 2024-03-04 DIAGNOSIS — E66.9 OBESITY (BMI 30.0-34.9): ICD-10-CM

## 2024-03-04 DIAGNOSIS — F33.42 RECURRENT MAJOR DEPRESSIVE DISORDER, IN FULL REMISSION: ICD-10-CM

## 2024-03-04 DIAGNOSIS — F41.9 ANXIETY: ICD-10-CM

## 2024-03-04 DIAGNOSIS — E11.65 TYPE 2 DIABETES MELLITUS WITH HYPERGLYCEMIA, WITHOUT LONG-TERM CURRENT USE OF INSULIN: Primary | ICD-10-CM

## 2024-03-04 DIAGNOSIS — F51.01 PRIMARY INSOMNIA: ICD-10-CM

## 2024-03-04 LAB
EXPIRATION DATE: NORMAL
HBA1C MFR BLD: 5.3 % (ref 4.5–5.7)
Lab: NORMAL

## 2024-03-04 PROCEDURE — 99214 OFFICE O/P EST MOD 30 MIN: CPT | Performed by: PHYSICIAN ASSISTANT

## 2024-03-04 PROCEDURE — 83036 HEMOGLOBIN GLYCOSYLATED A1C: CPT | Performed by: PHYSICIAN ASSISTANT

## 2024-03-04 RX ORDER — HYDROXYZINE 50 MG/1
50 TABLET, FILM COATED ORAL 3 TIMES DAILY PRN
Qty: 90 TABLET | Refills: 5 | Status: SHIPPED | OUTPATIENT
Start: 2024-03-04

## 2024-03-04 RX ORDER — LOSARTAN POTASSIUM 50 MG/1
50 TABLET ORAL DAILY
Qty: 90 TABLET | Refills: 1 | Status: SHIPPED | OUTPATIENT
Start: 2024-03-04

## 2024-03-04 NOTE — PROGRESS NOTES
Chief Complaint   Patient presents with    Follow-up     DM       Yamila Neff is a pleasant 63 y.o. female who is here for routine follow-up of diabetes type 2, obesity and hypertension.  Patient is compliant on all medications.  Blood pressure is low today.  She denies any symptoms of hypotension.  She has lost 10 lbs since increasing dose of Mounjaro to 15 mg weekly.  She is tolerating medication well.    She reports family tragedy with the death of her son-in-law.  She has been staying with her daughter and helping with her 3 grandchildren.  She feels she is coping well at this time.  Compliant on Prozac 40 mg daily.  Requests refill on hydroxyzine 50 mg to help with sleep at times.    Past Medical History:   Diagnosis Date    Anemia     Arthritis     Carpal tunnel syndrome 2012    Degenerative joint disease     Depression     Depression     Diabetes mellitus     Elevated cholesterol     Elevated liver enzymes     Fatty liver     Hearing aid worn     HL (hearing loss)     Hypertension     Kidney disorder     one kidney    Malignant neoplasm of right female breast 2023    Palpitations     Polycystic ovaries     Psoriasis     PVC (premature ventricular contraction)     occasional pvc's    S/P total knee arthroplasty, right 2021    Sleep apnea     cpap at home    Wears glasses        Past Surgical History:   Procedure Laterality Date    BREAST BIOPSY Right     PAPILLOMA DR BELL     SECTION      x 3    CHOLECYSTECTOMY  2013    COLONOSCOPY      HYSTERECTOMY  2006    complete    JOINT REPLACEMENT Left     left knee    MASTECTOMY W/ SENTINEL NODE BIOPSY Bilateral 2023    Procedure: MASTECTOMY WITH SENTINEL NODE BIOPSY RIGHT, LEFT PROPHYLATIC MASTECTOMY;  Surgeon: Emi Lizama MD;  [facility];  Service: General;  Laterality: Bilateral;    OOPHORECTOMY      TOTAL ABDOMINAL HYSTERECTOMY WITH SALPINGO OOPHORECTOMY      TOTAL KNEE ARTHROPLASTY Right 2021     "Procedure: TOTAL KNEE ARTHROPLASTY RIGHT;  Surgeon: Mateo Keith MD;  Location: [facility];  Service: Orthopedics;  Laterality: Right;    TUBAL ABDOMINAL LIGATION         Family History   Problem Relation Age of Onset    Diabetes Mother         Type 2    Lymphoma Mother     Hypertension Mother     Cancer Mother         Nonhodgkins Lymphoma    Hearing loss Mother         Hearing Aids    No Known Problems Father     Hypertension Brother     Arthritis Brother     Hypertension Brother     Colon cancer Maternal Aunt     Heart disease Maternal Grandmother     Arthritis Maternal Grandmother             Diabetes Maternal Grandmother         Type 2-     Heart disease Maternal Grandfather     Heart attack Maternal Grandfather     Diabetes Maternal Aunt         Type 2    Hypertension Maternal Aunt     Diabetes Daughter         Type 1    Hypertension Brother     Breast cancer Neg Hx     Ovarian cancer Neg Hx        Social History     Socioeconomic History    Marital status:    Tobacco Use    Smoking status: Never    Smokeless tobacco: Never   Vaping Use    Vaping status: Never Used   Substance and Sexual Activity    Alcohol use: No    Drug use: No    Sexual activity: Not Currently     Partners: Male     Birth control/protection: Surgical     Comment:        Allergies   Allergen Reactions    Lisinopril Cough    Celexa [Citalopram] Other (See Comments)     Jittery        ROS  Review of Systems   Constitutional:  Negative for chills and fever.   Respiratory:  Negative for cough, shortness of breath and wheezing.    Cardiovascular:  Negative for chest pain, palpitations and leg swelling.   Neurological:  Negative for dizziness and headache.       Vitals:    24 0903   BP: 104/66   BP Location: Left arm   Patient Position: Sitting   Cuff Size: Adult   Pulse: 72   Resp: 14   SpO2: 98%   Weight: 76.4 kg (168 lb 6.4 oz)   Height: 157.5 cm (62.01\")   PainSc: 0-No pain     Body mass index is 30.79 " kg/m².           Current Outpatient Medications on File Prior to Visit   Medication Sig Dispense Refill    amitriptyline (ELAVIL) 10 MG tablet Take 1 tablet by mouth Every Night. 90 tablet 0    amLODIPine (NORVASC) 5 MG tablet Take 1 tablet by mouth Daily. 90 tablet 1    atorvastatin (LIPITOR) 20 MG tablet Take 1 tablet by mouth Every Night. 90 tablet 1    cholecalciferol (VITAMIN D3) 1000 units tablet Take 2 tablets by mouth Daily.      clobetasol (TEMOVATE) 0.05 % external solution Apply to itchy, scaly areas on scalp topically as directed 2 (Two) Times a Day. 25 mL 3    colestipol (Colestid) 1 g tablet Take 2 tablets by mouth every morning.  **Take 2 hours separate from other medications 180 tablet 3    FLUoxetine (PROzac) 40 MG capsule Take 2 capsules by mouth Daily. 180 capsule 1    fluticasone (FLONASE) 50 MCG/ACT nasal spray Instill 2 sprays into the nostril(s) as directed by provider Daily. 16 g 11    hydrocortisone 2.5 % ointment Apply 1 application  topically to the affected area(s) as directed 2 (Two) Times a Day. 20 g 4    icosapent ethyl (Vascepa) 1 g capsule capsule Take 2 capsules by mouth 2 (Two) Times a Day With Meals. 360 capsule 1    ketoconazole (NIZORAL) 2 % shampoo Use as a shampoo to scalp and face 3 times a week. Leave in for 5 minutes and rinse. 120 mL 3    Magnesium 250 MG tablet Take 1 tablet by mouth Daily.      mupirocin (BACTROBAN) 2 % ointment Apply a thin film topically to the appropriate area as directed. 22 g 2    ondansetron ODT (ZOFRAN-ODT) 4 MG disintegrating tablet Place 1 tablet on the tongue Every 8 (Eight) Hours As Needed for Nausea or Vomiting. 14 tablet 0    pantoprazole (PROTONIX) 40 MG EC tablet Take 1 tablet by mouth Daily. 90 tablet 3    Probiotic Product (PROBIOTIC DAILY PO) Take  by mouth Daily.      propranolol LA (INDERAL LA) 60 MG 24 hr capsule Take 1 capsule by mouth Daily. 90 capsule 0    sodium-potassium-magnesium sulfates (Suprep Bowel Prep Kit) 17.5-3.13-1.6  GM/177ML solution oral solution Use as directed by provider for colonoscopy prep 354 mL 0    [DISCONTINUED] losartan (Cozaar) 100 MG tablet Take 1 tablet by mouth Daily. 90 tablet 1    [DISCONTINUED] Tirzepatide (Mounjaro) 15 MG/0.5ML solution pen-injector pen Inject 0.5 mL under the skin into the appropriate area as directed Every 7 (Seven) Days. 6 mL 0     No current facility-administered medications on file prior to visit.       Results for orders placed or performed in visit on 03/04/24   POC Glycosylated Hemoglobin (Hb A1C)    Specimen: Blood   Result Value Ref Range    Hemoglobin A1C 5.3 4.5 - 5.7 %    Lot Number [id number]     Expiration Date 11/21/2025        PE    Physical Exam  Vitals reviewed.   Constitutional:       General: She is not in acute distress.     Appearance: Normal appearance. She is well-developed and overweight. She is not ill-appearing or diaphoretic.   HENT:      Head: Normocephalic and atraumatic.   Eyes:      Extraocular Movements: Extraocular movements intact.      Conjunctiva/sclera: Conjunctivae normal.   Pulmonary:      Effort: No respiratory distress.   Musculoskeletal:         General: Normal range of motion.      Cervical back: Normal range of motion.   Neurological:      General: No focal deficit present.      Mental Status: She is alert.   Psychiatric:         Attention and Perception: Attention and perception normal. She is attentive.         Mood and Affect: Mood and affect normal.         Speech: Speech normal.         Behavior: Behavior normal. Behavior is cooperative.         Thought Content: Thought content normal.         Cognition and Memory: Cognition and memory normal.         Judgment: Judgment normal.           A/P    Diagnoses and all orders for this visit:    1. Type 2 diabetes mellitus with hyperglycemia, without long-term current use of insulin (Primary)  -     POC Glycosylated Hemoglobin (Hb A1C)  -     Tirzepatide (Mounjaro) 15 MG/0.5ML solution pen-injector  pen; Inject 0.5 mL under the skin into the appropriate area as directed Every 7 (Seven) Days.  Dispense: 6 mL; Refill: 0  Hemoglobin AIC is 5.3% and well-controlled.  Compliant on Mounjaro 15 mg weekly and tolerating well.    2. Essential hypertension  -     losartan (Cozaar) 50 MG tablet; Take 1 tablet by mouth Daily.  Dispense: 90 tablet; Refill: 1  BP is borderline low.  She has lost weight, will reduce losartan to 50 mg daily.  Monitor BP at home.  Return in 3 months.    3. Obesity (BMI 30.0-34.9)  Has lost 10 lbs in the last few weeks.    4. Anxiety  -     hydrOXYzine (ATARAX) 50 MG tablet; Take 1 tablet by mouth 3 (Three) Times a Day As Needed for Itching.  Dispense: 90 tablet; Refill: 5    5. Recurrent major depressive disorder, in full remission  Stable mood with Prozac 40 mg daily.  Encouraged patient to establish with counselor.    6. Primary insomnia  Will send in prescription for hydroxyzine 50 mg.  This has helped well in the past for sleep.       Plan of care reviewed with patient at the conclusion of today's visit. Education was provided regarding diagnosis, management and any prescribed or recommended OTC medications.  Patient verbalizes understanding of and agreement with management plan.    Dictated Utilizing Dragon Dictation     Please note that portions of this note were completed with a voice recognition program.     Part of this note may be an electronic transcription/translation of spoken language to printed text using the Dragon Dictation System.    Return in about 3 months (around 6/4/2024) for Recheck, 6 month follow-up.     Daly Archibald PA-C

## 2024-03-05 ENCOUNTER — OUTSIDE FACILITY SERVICE (OUTPATIENT)
Dept: GASTROENTEROLOGY | Facility: CLINIC | Age: 64
End: 2024-03-05
Payer: COMMERCIAL

## 2024-03-05 ENCOUNTER — TELEPHONE (OUTPATIENT)
Dept: GASTROENTEROLOGY | Facility: CLINIC | Age: 64
End: 2024-03-05

## 2024-03-05 NOTE — TELEPHONE ENCOUNTER
X1-CALLED PT TO SEE IF SHE COULD COME IN AT 10 FOR HER PROCEDURE TODAY BUT PT SAYS SHE IS BABYSITTING HER GRANDKIDS AND WAITING FOR HER RIDE. SHE IS UNABLE TO COME IN ANY SOONER.

## 2024-04-19 DIAGNOSIS — I10 ESSENTIAL HYPERTENSION: ICD-10-CM

## 2024-04-22 RX ORDER — PANTOPRAZOLE SODIUM 40 MG/1
40 TABLET, DELAYED RELEASE ORAL DAILY
Qty: 90 TABLET | Refills: 3 | Status: SHIPPED | OUTPATIENT
Start: 2024-04-22

## 2024-04-22 RX ORDER — AMLODIPINE BESYLATE 5 MG/1
5 TABLET ORAL DAILY
Qty: 90 TABLET | Refills: 1 | Status: SHIPPED | OUTPATIENT
Start: 2024-04-22

## 2024-04-22 NOTE — TELEPHONE ENCOUNTER
Rx Refill Note  Requested Prescriptions     Pending Prescriptions Disp Refills    amLODIPine (NORVASC) 5 MG tablet 90 tablet 1     Sig: Take 1 tablet by mouth Daily.      Last office visit with prescribing clinician: 3/4/2024     Next office visit with prescribing clinician: 6/4/2024  Sharlene Maddox MA  04/22/24, 12:50 EDT

## 2024-04-30 ENCOUNTER — TELEMEDICINE (OUTPATIENT)
Dept: FAMILY MEDICINE CLINIC | Facility: TELEHEALTH | Age: 64
End: 2024-04-30
Payer: COMMERCIAL

## 2024-04-30 DIAGNOSIS — H92.02 OTALGIA OF LEFT EAR: ICD-10-CM

## 2024-04-30 DIAGNOSIS — J01.00 ACUTE NON-RECURRENT MAXILLARY SINUSITIS: Primary | ICD-10-CM

## 2024-04-30 PROCEDURE — 99213 OFFICE O/P EST LOW 20 MIN: CPT | Performed by: NURSE PRACTITIONER

## 2024-04-30 RX ORDER — AMOXICILLIN 875 MG/1
875 TABLET, COATED ORAL 2 TIMES DAILY
Qty: 20 TABLET | Refills: 0 | Status: SHIPPED | OUTPATIENT
Start: 2024-04-30 | End: 2024-05-10

## 2024-04-30 NOTE — PROGRESS NOTES
You have chosen to receive care through a telehealth visit.  Do you consent to use a video/audio connection for your medical care today? Yes     CHIEF COMPLAINT  Chief Complaint   Patient presents with    Earache         HPI  Yamila Neff is a 63 y.o. female  presents with complaint of respiratory virus that started like allergy symptoms little over a week ago. Reports she has been having a lot of yellow nasal discharge. Reports she is congested in her nose. Reports felt alittle feverish this am fever. + chills. No nausea or vomiting. Denies any SOA or CP. Reports she has taken some Flonase and phenergan DM. Reports her grandchildren all had the same symptoms.     Review of Systems   Constitutional:  Positive for chills and fever. Negative for fatigue.   HENT:  Positive for congestion, ear pain and sinus pressure. Negative for ear discharge, sinus pain and sore throat.    Respiratory:  Negative for cough, chest tightness, shortness of breath and wheezing.    Cardiovascular:  Negative for chest pain.   Gastrointestinal:  Negative for abdominal pain, diarrhea, nausea and vomiting.   Musculoskeletal:  Negative for back pain and myalgias.   Neurological:  Negative for dizziness and headaches.   Psychiatric/Behavioral: Negative.         Past Medical History:   Diagnosis Date    Anemia     Arthritis     Carpal tunnel syndrome 04/09/2012    Degenerative joint disease     Depression     Depression     Diabetes mellitus     Elevated cholesterol     Elevated liver enzymes     Fatty liver     Hearing aid worn     HL (hearing loss)     Hypertension     Kidney disorder     one kidney    Malignant neoplasm of right female breast 02/28/2023    Palpitations     Polycystic ovaries     Psoriasis     PVC (premature ventricular contraction)     occasional pvc's    S/P total knee arthroplasty, right 03/09/2021    Sleep apnea     cpap at home    Wears glasses        Family History   Problem Relation Age of Onset    Diabetes Mother          Type 2    Lymphoma Mother     Hypertension Mother     Cancer Mother         Nonhodgkins Lymphoma    Hearing loss Mother         Hearing Aids    No Known Problems Father     Hypertension Brother     Arthritis Brother     Hypertension Brother     Colon cancer Maternal Aunt     Heart disease Maternal Grandmother     Arthritis Maternal Grandmother             Diabetes Maternal Grandmother         Type 2-     Heart disease Maternal Grandfather     Heart attack Maternal Grandfather     Diabetes Maternal Aunt         Type 2    Hypertension Maternal Aunt     Diabetes Daughter         Type 1    Hypertension Brother     Breast cancer Neg Hx     Ovarian cancer Neg Hx        Social History     Socioeconomic History    Marital status:    Tobacco Use    Smoking status: Never    Smokeless tobacco: Never   Vaping Use    Vaping status: Never Used   Substance and Sexual Activity    Alcohol use: No    Drug use: No    Sexual activity: Not Currently     Partners: Male     Birth control/protection: Surgical     Comment:        Yamila Neff  reports that she has never smoked. She has never used smokeless tobacco. I have educated her on the risk of diseases from using tobacco products such as cancer, COPD, and heart disease.       I spent  1  minutes counseling the patient.              There were no vitals taken for this visit.    PHYSICAL EXAM  Physical Exam   Constitutional: She is oriented to person, place, and time. She appears well-developed and well-nourished. No distress.   HENT:   Head: Normocephalic and atraumatic.   Right Ear: Hearing normal. No drainage. No decreased hearing is noted.   Left Ear: There is drainage. Decreased hearing is noted.   Mouth/Throat: Mouth/Lips are normal.  Patient directed exam  Ear with slight clear drainage at times  Patient reports TM on left was red. Tm on right normal- used Tyto device to observe the ear herself   Eyes: Conjunctivae and lids are  normal.   Pulmonary/Chest: Effort normal.  No respiratory distress.  Neurological: She is alert and oriented to person, place, and time.   Psychiatric: She has a normal mood and affect. Her speech is normal and behavior is normal.       Results for orders placed or performed in visit on 03/04/24   POC Glycosylated Hemoglobin (Hb A1C)    Specimen: Blood   Result Value Ref Range    Hemoglobin A1C 5.3 4.5 - 5.7 %    Lot Number [id number]     Expiration Date 11/21/2025        Diagnoses and all orders for this visit:    1. Acute non-recurrent maxillary sinusitis (Primary)    2. Otalgia of left ear    Other orders  -     amoxicillin (AMOXIL) 875 MG tablet; Take 1 tablet by mouth 2 (Two) Times a Day for 10 days.  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 Offered for her to sign in for Tyto visit. Reports instead she will f/u with PCP if symptoms continue after antibiotic  ER for any worsening symptoms such as high fever, chest pain or SOA       FOLLOW-UP  As discussed during visit with PCP/[facility] if no improvement or Urgent Care/Emergency Department if worsening of symptoms    Patient verbalizes understanding of medication dosage, comfort measures, instructions for treatment and follow-up.    KAREN Peter  04/30/2024  19:47 EDT    The use of a video visit has been reviewed with the patient and verbal informed consent has been obtained. Myself and aYmila Neff participated in this visit. The patient is located in [address] [place] [address].    I am located in [place], [place]. Mychart and Twilio were utilized. I spent 5 minutes in the patient's chart for this visit.      Note Disclaimer: At [facility], we believe that sharing information builds trust and better   relationships. You are receiving  this note because you recently visited [facility]. It is possible you   will see health information before a provider has talked with you about it. This kind of information can   be easy to misunderstand. To help you fully understand what it means for your health, we urge you to   discuss this note with your provider.

## 2024-05-13 ENCOUNTER — OFFICE VISIT (OUTPATIENT)
Dept: FAMILY MEDICINE CLINIC | Facility: CLINIC | Age: 64
End: 2024-05-13
Payer: COMMERCIAL

## 2024-05-13 VITALS
OXYGEN SATURATION: 98 % | BODY MASS INDEX: 31.62 KG/M2 | HEART RATE: 68 BPM | SYSTOLIC BLOOD PRESSURE: 112 MMHG | WEIGHT: 171.8 LBS | TEMPERATURE: 98.5 F | HEIGHT: 62 IN | DIASTOLIC BLOOD PRESSURE: 64 MMHG

## 2024-05-13 DIAGNOSIS — J01.01 ACUTE RECURRENT MAXILLARY SINUSITIS: Primary | ICD-10-CM

## 2024-05-13 PROCEDURE — 99213 OFFICE O/P EST LOW 20 MIN: CPT | Performed by: FAMILY MEDICINE

## 2024-05-13 RX ORDER — AMOXICILLIN 875 MG/1
875 TABLET, COATED ORAL 2 TIMES DAILY
Qty: 14 TABLET | Refills: 0 | Status: SHIPPED | OUTPATIENT
Start: 2024-05-13 | End: 2024-05-20

## 2024-05-13 NOTE — ASSESSMENT & PLAN NOTE
Due to resurgence of symptoms will extend antibiotic course x 1 more week.  Continue fluticasone twice daily and antihistamine.

## 2024-05-13 NOTE — PROGRESS NOTES
Office Note     Name: Yamila Neff    : 1960     MRN: [id number]     Chief Complaint  Earache (Left ear , pain came back this morning she states she just finished antibiotics last Friday for her ear)    Subjective     History of Present Illness:  Yamila Neff is a 63 y.o. female who presents today for evaluation of continued left ear ache.    Patient reports that she continues to have symptoms of left ear pain.  She notes that last week she was diagnosed with acute maxillary sinusitis and left ear otitis media via telehealth and was prescribed amoxicillin twice daily for 7 days.  Patient states that this medication worked very well for her and her symptoms did resolve while she was on treatment, however shortly after finishing her treatment last Friday her symptoms are now recurring.  She feels like she has a stabbing pain to her left ear and she is having some change in her hearing despite her hearing aids.  She denies any fevers.    Review of Systems:   Review of Systems   HENT:  Positive for ear pain and hearing loss.    All other systems reviewed and are negative.      Past Medical History:   Past Medical History:   Diagnosis Date    Anemia     Arthritis     Carpal tunnel syndrome 2012    Degenerative joint disease     Depression     Depression     Diabetes mellitus     Elevated cholesterol     Elevated liver enzymes     Fatty liver     Hearing aid worn     HL (hearing loss)     Hypertension     Kidney disorder     one kidney    Malignant neoplasm of right female breast 2023    Palpitations     Polycystic ovaries     Psoriasis     PVC (premature ventricular contraction)     occasional pvc's    S/P total knee arthroplasty, right 2021    Sleep apnea     cpap at home    Wears glasses        Past Surgical History:   Past Surgical History:   Procedure Laterality Date    BREAST BIOPSY Right     PAPILLOMA DR BELL     SECTION      x 3    CHOLECYSTECTOMY       COLONOSCOPY      HYSTERECTOMY  2006    complete    JOINT REPLACEMENT Left 2011    left knee    MASTECTOMY W/ SENTINEL NODE BIOPSY Bilateral 2023    Procedure: MASTECTOMY WITH SENTINEL NODE BIOPSY RIGHT, LEFT PROPHYLATIC MASTECTOMY;  Surgeon: Emi Lizama MD;  Location:  [facility];  Service: General;  Laterality: Bilateral;    OOPHORECTOMY      TOTAL ABDOMINAL HYSTERECTOMY WITH SALPINGO OOPHORECTOMY      TOTAL KNEE ARTHROPLASTY Right 2021    Procedure: TOTAL KNEE ARTHROPLASTY RIGHT;  Surgeon: Mateo Keith MD;  Location:  [facility];  Service: Orthopedics;  Laterality: Right;    TUBAL ABDOMINAL LIGATION         Family History:   Family History   Problem Relation Age of Onset    Diabetes Mother         Type 2    Lymphoma Mother     Hypertension Mother     Cancer Mother         Nonhodgkins Lymphoma    Hearing loss Mother         Hearing Aids    No Known Problems Father     Hypertension Brother     Arthritis Brother     Hypertension Brother     Colon cancer Maternal Aunt     Heart disease Maternal Grandmother     Arthritis Maternal Grandmother             Diabetes Maternal Grandmother         Type 2-     Heart disease Maternal Grandfather     Heart attack Maternal Grandfather     Diabetes Maternal Aunt         Type 2    Hypertension Maternal Aunt     Diabetes Daughter         Type 1    Hypertension Brother     Breast cancer Neg Hx     Ovarian cancer Neg Hx        Social History:   Social History     Socioeconomic History    Marital status:    Tobacco Use    Smoking status: Never    Smokeless tobacco: Never   Vaping Use    Vaping status: Never Used   Substance and Sexual Activity    Alcohol use: No    Drug use: No    Sexual activity: Not Currently     Partners: Male     Birth control/protection: Surgical     Comment:        Immunizations:   Immunization History   Administered Date(s) Administered    Arexvy (RSV, Adults 60+ yrs) 2023    COVID-19 (PFIZER) Purple Cap  Monovalent 12/17/2020, 01/07/2021, 09/24/2021    COVID-19 F23 (PFIZER) 12YRS+ (COMIRNATY) 11/06/2023    Covid-19 (Pfizer) Gray Cap Monovalent 06/13/2022    Flu Vaccine Quad PF >36MO 10/18/2012, 10/01/2016, 10/31/2022    Influenza Injectable Mdck Pf Quad 11/06/2023    Pneumococcal Polysaccharide (PPSV23) 09/01/2020    Shingrix 02/22/2019, 12/02/2019    Tdap 10/07/2013, 11/06/2023    Zostavax 02/22/2019        Medications:     Current Outpatient Medications:     amitriptyline (ELAVIL) 10 MG tablet, Take 1 tablet by mouth Every Night., Disp: 90 tablet, Rfl: 0    amLODIPine (NORVASC) 5 MG tablet, Take 1 tablet by mouth Daily., Disp: 90 tablet, Rfl: 1    atorvastatin (LIPITOR) 20 MG tablet, Take 1 tablet by mouth Every Night., Disp: 90 tablet, Rfl: 1    cholecalciferol (VITAMIN D3) 1000 units tablet, Take 2 tablets by mouth Daily., Disp: , Rfl:     clobetasol (TEMOVATE) 0.05 % external solution, Apply to itchy, scaly areas on scalp topically as directed 2 (Two) Times a Day., Disp: 25 mL, Rfl: 3    colestipol (Colestid) 1 g tablet, Take 2 tablets by mouth every morning.  **Take 2 hours separate from other medications, Disp: 180 tablet, Rfl: 3    FLUoxetine (PROzac) 40 MG capsule, Take 2 capsules by mouth Daily., Disp: 180 capsule, Rfl: 1    fluticasone (FLONASE) 50 MCG/ACT nasal spray, Instill 2 sprays into the nostril(s) as directed by provider Daily., Disp: 16 g, Rfl: 11    hydrocortisone 2.5 % ointment, Apply 1 application  topically to the affected area(s) as directed 2 (Two) Times a Day., Disp: 20 g, Rfl: 4    hydrOXYzine (ATARAX) 50 MG tablet, Take 1 tablet by mouth 3 (Three) Times a Day As Needed for Itching., Disp: 90 tablet, Rfl: 5    icosapent ethyl (Vascepa) 1 g capsule capsule, Take 2 capsules by mouth 2 (Two) Times a Day With Meals., Disp: 360 capsule, Rfl: 1    ketoconazole (NIZORAL) 2 % shampoo, Use as a shampoo to scalp and face 3 times a week. Leave in for 5 minutes and rinse., Disp: 120 mL, Rfl: 3     "losartan (Cozaar) 50 MG tablet, Take 1 tablet by mouth Daily., Disp: 90 tablet, Rfl: 1    Magnesium 250 MG tablet, Take 1 tablet by mouth Daily., Disp: , Rfl:     mupirocin (BACTROBAN) 2 % ointment, Apply a thin film topically to the appropriate area as directed., Disp: 22 g, Rfl: 2    ondansetron ODT (ZOFRAN-ODT) 4 MG disintegrating tablet, Place 1 tablet on the tongue Every 8 (Eight) Hours As Needed for Nausea or Vomiting., Disp: 14 tablet, Rfl: 0    pantoprazole (PROTONIX) 40 MG EC tablet, Take 1 tablet by mouth Daily., Disp: 90 tablet, Rfl: 3    Probiotic Product (PROBIOTIC DAILY PO), Take  by mouth Daily., Disp: , Rfl:     propranolol LA (INDERAL LA) 60 MG 24 hr capsule, Take 1 capsule by mouth Daily., Disp: 90 capsule, Rfl: 0    sodium-potassium-magnesium sulfates (Suprep Bowel Prep Kit) 17.5-3.13-1.6 GM/177ML solution oral solution, Use as directed by provider for colonoscopy prep, Disp: 354 mL, Rfl: 0    Tirzepatide (Mounjaro) 15 MG/0.5ML solution pen-injector pen, Inject 0.5 mL under the skin into the appropriate area as directed Every 7 (Seven) Days., Disp: 6 mL, Rfl: 0    amoxicillin (AMOXIL) 875 MG tablet, Take 1 tablet by mouth 2 (Two) Times a Day for 7 days., Disp: 14 tablet, Rfl: 0    Allergies:   Allergies   Allergen Reactions    Lisinopril Cough    Celexa [Citalopram] Other (See Comments)     Jittery        Objective     Vital Signs  /64   Pulse 68   Temp 98.5 °F (36.9 °C)   Ht 157.5 cm (62.01\")   Wt 77.9 kg (171 lb 12.8 oz)   SpO2 98%   BMI 31.41 kg/m²   Estimated body mass index is 31.41 kg/m² as calculated from the following:    Height as of this encounter: 157.5 cm (62.01\").    Weight as of this encounter: 77.9 kg (171 lb 12.8 oz).      Physical Exam  Vitals and nursing note reviewed.   Constitutional:       Appearance: Normal appearance. She is normal weight.   HENT:      Head: Normocephalic and atraumatic.      Right Ear: Tympanic membrane, ear canal and external ear normal.      " Left Ear: Tympanic membrane is erythematous.      Nose: Nose normal.      Mouth/Throat:      Mouth: Mucous membranes are moist.   Eyes:      Extraocular Movements: Extraocular movements intact.      Pupils: Pupils are equal, round, and reactive to light.   Cardiovascular:      Rate and Rhythm: Normal rate and regular rhythm.      Heart sounds: Normal heart sounds.   Pulmonary:      Effort: Pulmonary effort is normal.      Breath sounds: Normal breath sounds.   Abdominal:      General: Abdomen is flat.   Musculoskeletal:         General: Normal range of motion.      Cervical back: Normal range of motion.   Skin:     General: Skin is warm.   Neurological:      General: No focal deficit present.      Mental Status: She is alert and oriented to person, place, and time.   Psychiatric:         Mood and Affect: Mood normal.         Behavior: Behavior normal.         Thought Content: Thought content normal.         Judgment: Judgment normal.          Procedures     Results:  No results found for this or any previous visit (from the past 24 hour(s)).     Assessment and Plan     Assessment/Plan:  Diagnoses and all orders for this visit:    1. Acute recurrent maxillary sinusitis (Primary)  Assessment & Plan:  Due to resurgence of symptoms will extend antibiotic course x 1 more week.  Continue fluticasone twice daily and antihistamine.    Orders:  -     amoxicillin (AMOXIL) 875 MG tablet; Take 1 tablet by mouth 2 (Two) Times a Day for 7 days.  Dispense: 14 tablet; Refill: 0        Follow Up  Return if symptoms worsen or fail to improve.    Noris Berry DO   [facility]

## 2024-05-16 DIAGNOSIS — F41.9 ANXIETY: ICD-10-CM

## 2024-05-16 DIAGNOSIS — E78.2 MIXED HYPERLIPIDEMIA: ICD-10-CM

## 2024-05-16 DIAGNOSIS — F33.0 MILD EPISODE OF RECURRENT MAJOR DEPRESSIVE DISORDER: ICD-10-CM

## 2024-05-16 DIAGNOSIS — E78.1 HYPERTRIGLYCERIDEMIA: ICD-10-CM

## 2024-05-16 DIAGNOSIS — F51.01 PRIMARY INSOMNIA: ICD-10-CM

## 2024-05-16 DIAGNOSIS — G43.019 INTRACTABLE MIGRAINE WITHOUT AURA AND WITHOUT STATUS MIGRAINOSUS: ICD-10-CM

## 2024-05-16 RX ORDER — FLUOXETINE HYDROCHLORIDE 40 MG/1
80 CAPSULE ORAL DAILY
Qty: 180 CAPSULE | Refills: 1 | Status: SHIPPED | OUTPATIENT
Start: 2024-05-16

## 2024-05-16 RX ORDER — AMITRIPTYLINE HYDROCHLORIDE 10 MG/1
10 TABLET, FILM COATED ORAL NIGHTLY
Qty: 90 TABLET | Refills: 0 | Status: SHIPPED | OUTPATIENT
Start: 2024-05-16

## 2024-05-16 RX ORDER — ATORVASTATIN CALCIUM 20 MG/1
20 TABLET, FILM COATED ORAL NIGHTLY
Qty: 90 TABLET | Refills: 1 | Status: SHIPPED | OUTPATIENT
Start: 2024-05-16

## 2024-05-16 RX ORDER — ICOSAPENT ETHYL 1 G/1
2 CAPSULE ORAL 2 TIMES DAILY WITH MEALS
Qty: 360 CAPSULE | Refills: 1 | Status: SHIPPED | OUTPATIENT
Start: 2024-05-16

## 2024-05-16 RX ORDER — PROPRANOLOL HCL 60 MG
60 CAPSULE, EXTENDED RELEASE 24HR ORAL DAILY
Qty: 90 CAPSULE | Refills: 0 | Status: SHIPPED | OUTPATIENT
Start: 2024-05-16

## 2024-06-04 ENCOUNTER — OFFICE VISIT (OUTPATIENT)
Dept: FAMILY MEDICINE CLINIC | Facility: CLINIC | Age: 64
End: 2024-06-04
Payer: COMMERCIAL

## 2024-06-04 ENCOUNTER — LAB (OUTPATIENT)
Dept: LAB | Facility: HOSPITAL | Age: 64
End: 2024-06-04
Payer: COMMERCIAL

## 2024-06-04 VITALS
WEIGHT: 168 LBS | BODY MASS INDEX: 30.91 KG/M2 | HEART RATE: 72 BPM | OXYGEN SATURATION: 98 % | DIASTOLIC BLOOD PRESSURE: 74 MMHG | HEIGHT: 62 IN | SYSTOLIC BLOOD PRESSURE: 124 MMHG

## 2024-06-04 DIAGNOSIS — F41.9 ANXIETY: ICD-10-CM

## 2024-06-04 DIAGNOSIS — F33.1 MODERATE EPISODE OF RECURRENT MAJOR DEPRESSIVE DISORDER: ICD-10-CM

## 2024-06-04 DIAGNOSIS — G47.33 OSA ON CPAP: ICD-10-CM

## 2024-06-04 DIAGNOSIS — E11.65 TYPE 2 DIABETES MELLITUS WITH HYPERGLYCEMIA, WITHOUT LONG-TERM CURRENT USE OF INSULIN: Primary | ICD-10-CM

## 2024-06-04 DIAGNOSIS — I10 ESSENTIAL HYPERTENSION: ICD-10-CM

## 2024-06-04 LAB
ALBUMIN SERPL-MCNC: 4.9 G/DL (ref 3.5–5.2)
ALBUMIN/GLOB SERPL: 1.7 G/DL
ALP SERPL-CCNC: 119 U/L (ref 39–117)
ALT SERPL W P-5'-P-CCNC: 40 U/L (ref 1–33)
ANION GAP SERPL CALCULATED.3IONS-SCNC: 10 MMOL/L (ref 5–15)
AST SERPL-CCNC: 32 U/L (ref 1–32)
BILIRUB SERPL-MCNC: 0.5 MG/DL (ref 0–1.2)
BUN SERPL-MCNC: 21 MG/DL (ref 8–23)
BUN/CREAT SERPL: 21.2 (ref 7–25)
CALCIUM SPEC-SCNC: 10.2 MG/DL (ref 8.6–10.5)
CHLORIDE SERPL-SCNC: 102 MMOL/L (ref 98–107)
CO2 SERPL-SCNC: 28 MMOL/L (ref 22–29)
CREAT SERPL-MCNC: 0.99 MG/DL (ref 0.57–1)
EGFRCR SERPLBLD CKD-EPI 2021: 64.6 ML/MIN/1.73
EXPIRATION DATE: NORMAL
GLOBULIN UR ELPH-MCNC: 2.9 GM/DL
GLUCOSE SERPL-MCNC: 92 MG/DL (ref 65–99)
HBA1C MFR BLD: 5.3 % (ref 4.5–5.7)
Lab: NORMAL
POTASSIUM SERPL-SCNC: 4.3 MMOL/L (ref 3.5–5.2)
PROT SERPL-MCNC: 7.8 G/DL (ref 6–8.5)
SODIUM SERPL-SCNC: 140 MMOL/L (ref 136–145)

## 2024-06-04 PROCEDURE — 83036 HEMOGLOBIN GLYCOSYLATED A1C: CPT | Performed by: PHYSICIAN ASSISTANT

## 2024-06-04 PROCEDURE — 99214 OFFICE O/P EST MOD 30 MIN: CPT | Performed by: PHYSICIAN ASSISTANT

## 2024-06-04 PROCEDURE — 80053 COMPREHEN METABOLIC PANEL: CPT

## 2024-06-04 NOTE — PROGRESS NOTES
Chief Complaint   Patient presents with    Type 2 diabetes mellitus with hyperglycemia, without long-t       Yamila Oanh Neff is a pleasant 63 y.o. female who is here for routine follow-up of diabetes type 2, hypertension and sleep apnea.  Patient is compliant on all medications.  Her blood pressure is stable and well-controlled.  She is under considerable stress with family situations.  She is helping her mom who has worsening dementia and helping her daughter and 3 grandchildren that recently lost their father.  She reports stable mood with current medications.  She feels overwhelmed and anxious at times.    Past Medical History:   Diagnosis Date    Anemia     Arthritis     Carpal tunnel syndrome 2012    Degenerative joint disease     Depression     Depression     Diabetes mellitus     Elevated cholesterol     Elevated liver enzymes     Fatty liver     Hearing aid worn     HL (hearing loss)     Hypertension     Kidney disorder     one kidney    Malignant neoplasm of right female breast 2023    Palpitations     Polycystic ovaries     Psoriasis     PVC (premature ventricular contraction)     occasional pvc's    S/P total knee arthroplasty, right 2021    Sleep apnea     cpap at home    Wears glasses        Past Surgical History:   Procedure Laterality Date    BREAST BIOPSY Right     PAPILLOMA DR BELL     SECTION      x 3    CHOLECYSTECTOMY  2013    COLONOSCOPY      HYSTERECTOMY  2006    complete    JOINT REPLACEMENT Left 2011    left knee    MASTECTOMY W/ SENTINEL NODE BIOPSY Bilateral 2023    Procedure: MASTECTOMY WITH SENTINEL NODE BIOPSY RIGHT, LEFT PROPHYLATIC MASTECTOMY;  Surgeon: Emi Lizama MD;  Location: [facility];  Service: General;  Laterality: Bilateral;    OOPHORECTOMY      TOTAL ABDOMINAL HYSTERECTOMY WITH SALPINGO OOPHORECTOMY      TOTAL KNEE ARTHROPLASTY Right 2021    Procedure: TOTAL KNEE ARTHROPLASTY RIGHT;  Surgeon: Mateo Keith MD;  Location:   [facility];  Service: Orthopedics;  Laterality: Right;    TUBAL ABDOMINAL LIGATION         Family History   Problem Relation Age of Onset    Diabetes Mother         Type 2    Lymphoma Mother     Hypertension Mother     Cancer Mother         Nonhodgkins Lymphoma    Hearing loss Mother         Hearing Aids    No Known Problems Father     Hypertension Brother     Arthritis Brother     Hypertension Brother     Colon cancer Maternal Aunt     Heart disease Maternal Grandmother     Arthritis Maternal Grandmother             Diabetes Maternal Grandmother         Type 2-     Heart disease Maternal Grandfather     Heart attack Maternal Grandfather     Diabetes Maternal Aunt         Type 2    Hypertension Maternal Aunt     Diabetes Daughter         Type 1    Hypertension Brother     Breast cancer Neg Hx     Ovarian cancer Neg Hx        Social History     Socioeconomic History    Marital status:    Tobacco Use    Smoking status: Never    Smokeless tobacco: Never   Vaping Use    Vaping status: Never Used   Substance and Sexual Activity    Alcohol use: No    Drug use: No    Sexual activity: Not Currently     Partners: Male     Birth control/protection: Surgical     Comment:        Allergies   Allergen Reactions    Lisinopril Cough    Celexa [Citalopram] Other (See Comments)     Jittery        ROS  Review of Systems   Constitutional:  Negative for chills and fever.   Respiratory:  Negative for cough, shortness of breath and wheezing.    Cardiovascular:  Negative for chest pain, palpitations and leg swelling.   Neurological:  Negative for dizziness and headache.   Psychiatric/Behavioral:  Positive for sleep disturbance, depressed mood and stress. Negative for self-injury and suicidal ideas. The patient is nervous/anxious.        Vitals:    24 0952   BP: 124/74   BP Location: Left arm   Patient Position: Sitting   Cuff Size: Adult   Pulse: 72   SpO2: 98%   Weight: 76.2 kg (168 lb)   Height: 157.5 cm  "(62.01\")     Body mass index is 30.72 kg/m².           Current Outpatient Medications on File Prior to Visit   Medication Sig Dispense Refill    amitriptyline (ELAVIL) 10 MG tablet Take 1 tablet by mouth Every Night. 90 tablet 0    amLODIPine (NORVASC) 5 MG tablet Take 1 tablet by mouth Daily. 90 tablet 1    atorvastatin (LIPITOR) 20 MG tablet Take 1 tablet by mouth Every Night. 90 tablet 1    cholecalciferol (VITAMIN D3) 1000 units tablet Take 2 tablets by mouth Daily.      clobetasol (TEMOVATE) 0.05 % external solution Apply to itchy, scaly areas on scalp topically as directed 2 (Two) Times a Day. 25 mL 3    colestipol (Colestid) 1 g tablet Take 2 tablets by mouth every morning.  **Take 2 hours separate from other medications 180 tablet 3    FLUoxetine (PROzac) 40 MG capsule Take 2 capsules by mouth Daily. 180 capsule 1    fluticasone (FLONASE) 50 MCG/ACT nasal spray Instill 2 sprays into the nostril(s) as directed by provider Daily. 16 g 11    hydrocortisone 2.5 % ointment Apply 1 application  topically to the affected area(s) as directed 2 (Two) Times a Day. 20 g 4    hydrOXYzine (ATARAX) 50 MG tablet Take 1 tablet by mouth 3 (Three) Times a Day As Needed for Itching. 90 tablet 5    icosapent ethyl (Vascepa) 1 g capsule capsule Take 2 capsules by mouth 2 (Two) Times a Day With Meals. 360 capsule 1    ketoconazole (NIZORAL) 2 % shampoo Use as a shampoo to scalp and face 3 times a week. Leave in for 5 minutes and rinse. 120 mL 3    losartan (Cozaar) 50 MG tablet Take 1 tablet by mouth Daily. 90 tablet 1    Magnesium 250 MG tablet Take 1 tablet by mouth Daily.      mupirocin (BACTROBAN) 2 % ointment Apply a thin film topically to the appropriate area as directed. 22 g 2    ondansetron ODT (ZOFRAN-ODT) 4 MG disintegrating tablet Place 1 tablet on the tongue Every 8 (Eight) Hours As Needed for Nausea or Vomiting. 14 tablet 0    pantoprazole (PROTONIX) 40 MG EC tablet Take 1 tablet by mouth Daily. 90 tablet 3    " Probiotic Product (PROBIOTIC DAILY PO) Take  by mouth Daily.      propranolol LA (INDERAL LA) 60 MG 24 hr capsule Take 1 capsule by mouth Daily. 90 capsule 0    sodium-potassium-magnesium sulfates (Suprep Bowel Prep Kit) 17.5-3.13-1.6 GM/177ML solution oral solution Use as directed by provider for colonoscopy prep 354 mL 0    [DISCONTINUED] Tirzepatide (Mounjaro) 15 MG/0.5ML solution pen-injector pen Inject 0.5 mL under the skin into the appropriate area as directed Every 7 (Seven) Days. 6 mL 0     No current facility-administered medications on file prior to visit.       Results for orders placed or performed in visit on 06/04/24   POC Glycosylated Hemoglobin (Hb A1C)    Specimen: Blood   Result Value Ref Range    Hemoglobin A1C 5.3 4.5 - 5.7 %    Lot Number [id number]     Expiration Date 2-12-26 PE    Physical Exam  Vitals reviewed.   Constitutional:       General: She is not in acute distress.     Appearance: Normal appearance. She is well-developed. She is obese. She is not ill-appearing or diaphoretic.   HENT:      Head: Normocephalic and atraumatic.   Eyes:      Extraocular Movements: Extraocular movements intact.      Conjunctiva/sclera: Conjunctivae normal.   Cardiovascular:      Rate and Rhythm: Normal rate and regular rhythm.      Heart sounds: Normal heart sounds.   Pulmonary:      Effort: No respiratory distress.   Musculoskeletal:         General: Normal range of motion.      Cervical back: Normal range of motion.      Right lower leg: No edema.      Left lower leg: No edema.   Skin:     General: Skin is warm.      Findings: No erythema or rash.   Neurological:      General: No focal deficit present.      Mental Status: She is alert.   Psychiatric:         Attention and Perception: Attention and perception normal. She is attentive.         Mood and Affect: Mood is anxious and depressed.         Speech: Speech normal.         Behavior: Behavior normal. Behavior is cooperative.         Thought  Content: Thought content normal.         Cognition and Memory: Cognition and memory normal.         Judgment: Judgment normal.           A/P    Diagnoses and all orders for this visit:    1. Type 2 diabetes mellitus with hyperglycemia, without long-term current use of insulin (Primary)  -     POC Glycosylated Hemoglobin (Hb A1C)  -     Tirzepatide (Mounjaro) 15 MG/0.5ML solution pen-injector pen; Inject 0.5 mL under the skin into the appropriate area as directed Every 7 (Seven) Days.  Dispense: 6 mL; Refill: 3  Previous hemoglobin A1c was 5.3%, hemoglobin A1c is 5.3% today.  Patient is compliant on Mounjaro 15 mg weekly.  She is tolerating the medication well.  Return in 3 months.    2. Essential hypertension  -     Comprehensive Metabolic Panel; Future  Stable, well-controlled.  Compliant on medication.    3. NGOC on CPAP  Wearing nightly.    4. Moderate episode of recurrent major depressive disorder  5. Anxiety  Has significant life stress with family situations.  Reports stable mood overall.  Worsening anxiety/stress.  Not interested in increasing medication at this time.  Will call if she needs anything.       Plan of care reviewed with patient at the conclusion of today's visit. Education was provided regarding diagnosis, management and any prescribed or recommended OTC medications.  Patient verbalizes understanding of and agreement with management plan.    Dictated Utilizing Dragon Dictation     Please note that portions of this note were completed with a voice recognition program.     Part of this note may be an electronic transcription/translation of spoken language to printed text using the Dragon Dictation System.    Return in about 3 months (around 9/4/2024) for Recheck, DM.     Daly Archibald PA-C

## 2024-07-16 ENCOUNTER — PRIOR AUTHORIZATION (OUTPATIENT)
Dept: FAMILY MEDICINE CLINIC | Facility: CLINIC | Age: 64
End: 2024-07-16
Payer: COMMERCIAL

## 2024-07-23 NOTE — TELEPHONE ENCOUNTER
Mounjaro 15 mg was sent in at her last appointment in June.  She is a diabetic and has been on this for over a year.

## 2024-07-23 NOTE — TELEPHONE ENCOUNTER
Key: [id number]  Mounjaro 10MG/0.5ML pen-injectors  Status: PA Response - Denied  Created: July 19th, 2024  Sent: July 19th, 2024    Denied on July 19  PA Case: [id number], Status: Denied, Denial Rationale: Based on the information the doctor provided, there is insufficient clinical data to support the use of this medication for the member's condition. More information was requested from the doctor; however, it was never received. Therefore, additional information is needed to determine if the requested medication meets medical necessity criteria. Coverage for Mounjaro 10MG/0.5ML Solution Pen-Injector is denied. It does not meet medical necessity. Mounjaro 10MG/0.5ML Solution Pen-Injector has been requested for the treatment of Type 2 diabetes mellitus. The information provided by your prescriber does not meet [facility]'s guideline. The guideline used is: GLP-1 (glucagon-like peptide-1) Agonists Prior Authorization criteria. Information provided does not show that the policy requirements have been met. The requirement(s) not met are: A condition of type 2 diabetes mellitus; AND diagnosis has been confirmed by lab tests (e.g., A1C greater than or equal to 6.5%); AND ONE of the following: - Trial and an inadequate response to an agent containing metformin or insulin; OR - confirmation that you are too sensitive to metformin or insulin; OR - confirmation that you have a Food and Drug Administration labeled sign of sickness or condition that prevent the use of BOTH metformin AND insulin; OR - a condition of type 2 diabetes with or at high risk for atherosclerotic cardiovascular disease, heart failure, and/or chronic kidney disease; AND confirmation you will NOT be using the requested agent in combination with a dipeptidyl peptidase-4 inhibitor (DPP-4) containing agent (such as Januvia, Janumet, Jentadueto, Kazano, Kombiglyze, Saxagliptin, Tradjenta, etc) for the requested indication; AND confirmation you will NOT  be using the requested agent in combination with another GLP-1 receptor agonist agent Therefore, coverage for Mounjaro 10MG/0.5ML Solution Pen-Injector is denied. Questions? Contact [phone].      APPEAL INFO  [facility] DEPT  FAX:[phone]

## 2024-08-25 ENCOUNTER — E-VISIT (OUTPATIENT)
Dept: ADMINISTRATIVE | Facility: OTHER | Age: 64
End: 2024-08-25
Payer: COMMERCIAL

## 2024-08-25 ENCOUNTER — TELEMEDICINE (OUTPATIENT)
Dept: FAMILY MEDICINE CLINIC | Facility: TELEHEALTH | Age: 64
End: 2024-08-25
Payer: COMMERCIAL

## 2024-08-25 VITALS — HEIGHT: 62 IN | BODY MASS INDEX: 30.91 KG/M2 | TEMPERATURE: 98.2 F | WEIGHT: 168 LBS

## 2024-08-25 DIAGNOSIS — S03.00XA DISLOCATION OF TEMPOROMANDIBULAR JOINT, INITIAL ENCOUNTER: Primary | ICD-10-CM

## 2024-08-25 DIAGNOSIS — U07.1 COVID: ICD-10-CM

## 2024-08-25 DIAGNOSIS — F51.01 PRIMARY INSOMNIA: ICD-10-CM

## 2024-08-25 DIAGNOSIS — G43.019 INTRACTABLE MIGRAINE WITHOUT AURA AND WITHOUT STATUS MIGRAINOSUS: ICD-10-CM

## 2024-08-25 RX ORDER — METHYLPREDNISOLONE 4 MG/1
TABLET ORAL
Qty: 21 TABLET | Refills: 0 | Status: SHIPPED | OUTPATIENT
Start: 2024-08-25

## 2024-08-25 NOTE — E-VISIT ESCALATED
Status: Referred Out  Date: 2024 10:55:30  Acuity Level: Not applicable  Referral message: Based on the information you provided during your interview, eVisit is not appropriate for treating your condition.  Patient: Yamila Neff  Patient : 1960  Patient Address: [address] [place] [address]  Patient Phone: [phone]  Clinician Response: Unavailable  Diagnosis: Unavailable  Diagnosis ICD: Unavailable     Patient Interview Questions and Responses: None available

## 2024-08-25 NOTE — E-VISIT ESCALATED
Status: Referred Out  Date: 2024 10:57:47  Acuity Level: Not applicable  Referral message:  Your health is our priority. COVID-19 treatments are only recommended for individuals with symptoms that started within the last 5 days.  Because you do not meet the criteria for COVID-19 treatments:   Please reach out to your primary   care provider If you have further questions about COVID-19 treatments   You won't be charged for this visit today. We hope you feel better soon!   Patient: Yamila Neff  Patient : 1960  Patient Address: [address] [place] [id number]  Patient Phone: [phone]  Clinician Response: Unavailable  Diagnosis: Unavailable  Diagnosis ICD: Unavailable     Patient Interview Questions and Responses:  Clinical Protocol: COVID-19 treatments  Please select the reason for your visit today.: COVID-19 treatments  This visit is intended only for COVID-19 treatment and not intended to differentiate COVID-19 with another type of viral upper respiratory infection. COVID-19 treatments are only recommended for high-risk individuals within 5 days of symptoms onset and a   confirmed diagnosis of COVID-19.  In the past 14 days, have you tested positive for COVID-19?: Yes  When did you test positive for COVID-19? (Please enter the date in the following format: MM/DD/YYYY.): 2024  When did your symptoms start?: More than 5 days ago

## 2024-08-25 NOTE — PROGRESS NOTES
You have chosen to receive care through a telehealth visit.  Do you consent to use a video/audio connection for your medical care today? Yes     HPI  Yamila Neff is a 63 y.o. female  presents with complaint of COVID. She reports that she tested Covid positive on Tuesday, 8/20. She also reports that she has had congestion all week and since yesterday she has been having left jaw, teeth and sinus pain She also reports that she does suffer from TMJ on that side but that this is the worst it's ever been for her. She also reports that she sleep with a bite guard and just recently had a dental checkup and her teeth were fine. She is taking ibuprofen 800mg tid. She is staying with her daughter and helping her and her family who've all been sick with Covid this week. She reports that is has been a very stressful week! Additional symptoms the patient is having are noted in the ROS portion of this visit.    Review of Systems   Constitutional:  Positive for chills and fatigue. Negative for fever.   HENT:  Positive for congestion, sinus pressure (left maxillary, teeth even hurt) and sinus pain (left maxillary, teeth even hurt). Sore throat: resolved.        Left jaw pain   Respiratory:  Positive for cough (resolved). Negative for chest tightness, shortness of breath and wheezing.    Gastrointestinal:  Negative for diarrhea and nausea.   Musculoskeletal:  Positive for myalgias (chills, restless).   Neurological:  Positive for headaches (mild).       Past Medical History:   Diagnosis Date    Anemia     Arthritis     Carpal tunnel syndrome 04/09/2012    Degenerative joint disease     Depression     Depression     Diabetes mellitus     Elevated cholesterol     Elevated liver enzymes     Fatty liver     Hearing aid worn     HL (hearing loss)     Hypertension     Kidney disorder     one kidney    Malignant neoplasm of right female breast 02/28/2023    Palpitations     Polycystic ovaries     Psoriasis     PVC (premature  "ventricular contraction)     occasional pvc's    S/P total knee arthroplasty, right 2021    Sleep apnea     cpap at home    Wears glasses        Family History   Problem Relation Age of Onset    Diabetes Mother         Type 2    Lymphoma Mother     Hypertension Mother     Cancer Mother         Nonhodgkins Lymphoma    Hearing loss Mother         Hearing Aids    No Known Problems Father     Hypertension Brother     Arthritis Brother     Hypertension Brother     Colon cancer Maternal Aunt     Heart disease Maternal Grandmother     Arthritis Maternal Grandmother             Diabetes Maternal Grandmother         Type 2-     Heart disease Maternal Grandfather     Heart attack Maternal Grandfather     Diabetes Maternal Aunt         Type 2    Hypertension Maternal Aunt     Diabetes Daughter         Type 1    Hypertension Brother     Breast cancer Neg Hx     Ovarian cancer Neg Hx        Social History     Socioeconomic History    Marital status:    Tobacco Use    Smoking status: Never    Smokeless tobacco: Never   Vaping Use    Vaping status: Never Used   Substance and Sexual Activity    Alcohol use: No    Drug use: No    Sexual activity: Not Currently     Partners: Male     Birth control/protection: Surgical     Comment:        Yamila Neff  reports that she has never smoked. She has never used smokeless tobacco. She does not dip.      Temp 98.2 °F (36.8 °C)   Ht 157.5 cm (62\")   Wt 76.2 kg (168 lb)   Breastfeeding No   BMI 30.73 kg/m²     PHYSICAL EXAM  Physical Exam   Constitutional: She is oriented to person, place, and time. She appears well-developed.   HENT:   Head: Normocephalic and atraumatic.   Nose: Congestion present. Left sinus exhibits maxillary sinus tenderness (left maxillary).   Eyes: Lids are normal. Right eye exhibits no discharge. Left eye exhibits no discharge. Right conjunctiva is not injected. Left conjunctiva is not injected.   Pulmonary/Chest:  No " respiratory distress.  Neurological: She is alert and oriented to person, place, and time. No cranial nerve deficit.   Psychiatric: She has a normal mood and affect. Her speech is normal and behavior is normal. Judgment and thought content normal.       Results for orders placed or performed in visit on 06/04/24   Comprehensive Metabolic Panel    Specimen: Blood   Result Value Ref Range    Glucose 92 65 - 99 mg/dL    BUN 21 8 - 23 mg/dL    Creatinine 0.99 0.57 - 1.00 mg/dL    Sodium 140 136 - 145 mmol/L    Potassium 4.3 3.5 - 5.2 mmol/L    Chloride 102 98 - 107 mmol/L    CO2 28.0 22.0 - 29.0 mmol/L    Calcium 10.2 8.6 - 10.5 mg/dL    Total Protein 7.8 6.0 - 8.5 g/dL    Albumin 4.9 3.5 - 5.2 g/dL    ALT (SGPT) 40 (H) 1 - 33 U/L    AST (SGOT) 32 1 - 32 U/L    Alkaline Phosphatase 119 (H) 39 - 117 U/L    Total Bilirubin 0.5 0.0 - 1.2 mg/dL    Globulin 2.9 gm/dL    A/G Ratio 1.7 g/dL    BUN/Creatinine Ratio 21.2 7.0 - 25.0    Anion Gap 10.0 5.0 - 15.0 mmol/L    eGFR 64.6 >60.0 mL/min/1.73       Diagnoses and all orders for this visit:    1. Dislocation of temporomandibular joint, initial encounter (Primary)    2. COVID    Take Medrol with food as early in the day as possible  Do not take Medrol with nsaids such as ibuprofen, aleve, or aspirin  May take tylenol for pain or fever  Hydrate well  May take vitamins C, D and Zinc  Alternate rest and mild exercise such as walking  Isolate per CDC guidelines. Fever free for 24 hours without fever reducers and with improving symptoms    FOLLOW-UP  If symptoms worsen or persist follow up with PCP, Virtual Care or Urgent Care    Patient verbalizes understanding of medication dosage, comfort measures, instructions for treatment and follow-up.    Vanessa Aguiar, KAREN  08/25/2024  13:07 EDT    The use of a video visit has been reviewed with the patient and verbal informed consent has been obtained. Myself and Yamila Neff participated in this visit. The patient is located in  [address] [place] [id number].    I am located in [place], [place]. [employer] and [employer] were utilized. I spent 26 minutes in the patient's chart for this visit.

## 2024-08-26 RX ORDER — PROPRANOLOL HCL 60 MG
60 CAPSULE, EXTENDED RELEASE 24HR ORAL DAILY
Qty: 90 CAPSULE | Refills: 0 | Status: SHIPPED | OUTPATIENT
Start: 2024-08-26

## 2024-08-26 RX ORDER — AMITRIPTYLINE HYDROCHLORIDE 10 MG/1
10 TABLET ORAL NIGHTLY
Qty: 90 TABLET | Refills: 0 | Status: SHIPPED | OUTPATIENT
Start: 2024-08-26

## 2024-08-26 NOTE — TELEPHONE ENCOUNTER
Rx Refill Note  Requested Prescriptions     Pending Prescriptions Disp Refills    amitriptyline (ELAVIL) 10 MG tablet 90 tablet 0     Sig: Take 1 tablet by mouth Every Night.    propranolol LA (INDERAL LA) 60 MG 24 hr capsule 90 capsule 0     Sig: Take 1 capsule by mouth Daily.      Last office visit with prescribing clinician: 6/4/2024   Last telemedicine visit with prescribing clinician: Visit date not found   Next office visit with prescribing clinician: 9/4/2024                         Would you like a call back once the refill request has been completed: [] Yes [] No    If the office needs to give you a call back, can they leave a voicemail: [] Yes [] No    Cheri Connolly MA  08/26/24, 09:35 EDT

## 2024-08-27 ENCOUNTER — TELEPHONE (OUTPATIENT)
Dept: FAMILY MEDICINE CLINIC | Facility: CLINIC | Age: 64
End: 2024-08-27
Payer: COMMERCIAL

## 2024-08-27 ENCOUNTER — APPOINTMENT (OUTPATIENT)
Facility: HOSPITAL | Age: 64
End: 2024-08-27
Payer: COMMERCIAL

## 2024-08-27 ENCOUNTER — HOSPITAL ENCOUNTER (OUTPATIENT)
Facility: HOSPITAL | Age: 64
Setting detail: OBSERVATION
Discharge: HOME | End: 2024-08-28
Attending: EMERGENCY MEDICINE | Admitting: HOSPITALIST
Payer: COMMERCIAL

## 2024-08-27 DIAGNOSIS — I48.91 ATRIAL FIBRILLATION, UNSPECIFIED TYPE: Primary | ICD-10-CM

## 2024-08-27 PROBLEM — U07.1 COVID-19 VIRUS DETECTED: Status: ACTIVE | Noted: 2024-08-27

## 2024-08-27 PROBLEM — I48.0 PAROXYSMAL ATRIAL FIBRILLATION WITH RAPID VENTRICULAR RESPONSE: Status: ACTIVE | Noted: 2024-08-27

## 2024-08-27 LAB
ALBUMIN SERPL-MCNC: 4.8 G/DL (ref 3.5–5.2)
ALBUMIN/GLOB SERPL: 1.5 G/DL
ALP SERPL-CCNC: 113 U/L (ref 39–117)
ALT SERPL W P-5'-P-CCNC: 50 U/L (ref 1–33)
ANION GAP SERPL CALCULATED.3IONS-SCNC: 13.6 MMOL/L (ref 5–15)
APTT PPP: 26.5 SECONDS (ref 60–90)
AST SERPL-CCNC: 31 U/L (ref 1–32)
BASOPHILS # BLD AUTO: 0 10*3/MM3 (ref 0–0.2)
BASOPHILS NFR BLD AUTO: 0 % (ref 0–1.5)
BILIRUB SERPL-MCNC: 0.6 MG/DL (ref 0–1.2)
BUN SERPL-MCNC: 34 MG/DL (ref 8–23)
BUN/CREAT SERPL: 37 (ref 7–25)
CALCIUM SPEC-SCNC: 10.1 MG/DL (ref 8.6–10.5)
CHLORIDE SERPL-SCNC: 100 MMOL/L (ref 98–107)
CO2 SERPL-SCNC: 23.4 MMOL/L (ref 22–29)
CREAT SERPL-MCNC: 0.92 MG/DL (ref 0.57–1)
DEPRECATED RDW RBC AUTO: 39.1 FL (ref 37–54)
EGFRCR SERPLBLD CKD-EPI 2021: 70.5 ML/MIN/1.73
EOSINOPHIL # BLD AUTO: 0 10*3/MM3 (ref 0–0.4)
EOSINOPHIL NFR BLD AUTO: 0 % (ref 0.3–6.2)
ERYTHROCYTE [DISTWIDTH] IN BLOOD BY AUTOMATED COUNT: 12.7 % (ref 12.3–15.4)
FLUAV RNA RESP QL NAA+PROBE: NOT DETECTED
FLUBV RNA RESP QL NAA+PROBE: NOT DETECTED
GLOBULIN UR ELPH-MCNC: 3.1 GM/DL
GLUCOSE BLDC GLUCOMTR-MCNC: 91 MG/DL (ref 70–130)
GLUCOSE SERPL-MCNC: 111 MG/DL (ref 65–99)
HCT VFR BLD AUTO: 42 % (ref 34–46.6)
HGB BLD-MCNC: 13.9 G/DL (ref 12–15.9)
HOLD SPECIMEN: NORMAL
IMM GRANULOCYTES # BLD AUTO: 0.02 10*3/MM3 (ref 0–0.05)
IMM GRANULOCYTES NFR BLD AUTO: 0.2 % (ref 0–0.5)
INR PPP: 0.94 (ref 0.89–1.12)
LYMPHOCYTES # BLD AUTO: 1.45 10*3/MM3 (ref 0.7–3.1)
LYMPHOCYTES NFR BLD AUTO: 12.6 % (ref 19.6–45.3)
MCH RBC QN AUTO: 27.4 PG (ref 26.6–33)
MCHC RBC AUTO-ENTMCNC: 33.1 G/DL (ref 31.5–35.7)
MCV RBC AUTO: 82.7 FL (ref 79–97)
MONOCYTES # BLD AUTO: 0.56 10*3/MM3 (ref 0.1–0.9)
MONOCYTES NFR BLD AUTO: 4.9 % (ref 5–12)
NEUTROPHILS NFR BLD AUTO: 82.3 % (ref 42.7–76)
NEUTROPHILS NFR BLD AUTO: 9.44 10*3/MM3 (ref 1.7–7)
NT-PROBNP SERPL-MCNC: 198 PG/ML (ref 0–900)
PLATELET # BLD AUTO: 364 10*3/MM3 (ref 140–450)
PMV BLD AUTO: 9.2 FL (ref 6–12)
POTASSIUM SERPL-SCNC: 3.9 MMOL/L (ref 3.5–5.2)
PROT SERPL-MCNC: 7.9 G/DL (ref 6–8.5)
PROTHROMBIN TIME: 13.1 SECONDS (ref 12.2–14.5)
RBC # BLD AUTO: 5.08 10*6/MM3 (ref 3.77–5.28)
RSV RNA RESP QL NAA+PROBE: NOT DETECTED
SARS-COV-2 RNA RESP QL NAA+PROBE: DETECTED
SODIUM SERPL-SCNC: 137 MMOL/L (ref 136–145)
T4 FREE SERPL-MCNC: 1.5 NG/DL (ref 0.92–1.68)
TROPONIN T SERPL HS-MCNC: 7 NG/L
TSH SERPL DL<=0.05 MIU/L-ACNC: 0.23 UIU/ML (ref 0.27–4.2)
WBC NRBC COR # BLD AUTO: 11.47 10*3/MM3 (ref 3.4–10.8)
WHOLE BLOOD HOLD COAG: NORMAL
WHOLE BLOOD HOLD SPECIMEN: NORMAL

## 2024-08-27 PROCEDURE — 84484 ASSAY OF TROPONIN QUANT: CPT | Performed by: PHYSICIAN ASSISTANT

## 2024-08-27 PROCEDURE — 25510000001 IOPAMIDOL PER 1 ML: Performed by: EMERGENCY MEDICINE

## 2024-08-27 PROCEDURE — G0378 HOSPITAL OBSERVATION PER HR: HCPCS

## 2024-08-27 PROCEDURE — 96366 THER/PROPH/DIAG IV INF ADDON: CPT

## 2024-08-27 PROCEDURE — 85730 THROMBOPLASTIN TIME PARTIAL: CPT | Performed by: PHYSICIAN ASSISTANT

## 2024-08-27 PROCEDURE — 99291 CRITICAL CARE FIRST HOUR: CPT

## 2024-08-27 PROCEDURE — 71275 CT ANGIOGRAPHY CHEST: CPT

## 2024-08-27 PROCEDURE — 82948 REAGENT STRIP/BLOOD GLUCOSE: CPT

## 2024-08-27 PROCEDURE — 93005 ELECTROCARDIOGRAM TRACING: CPT

## 2024-08-27 PROCEDURE — 71045 X-RAY EXAM CHEST 1 VIEW: CPT

## 2024-08-27 PROCEDURE — 80050 GENERAL HEALTH PANEL: CPT | Performed by: PHYSICIAN ASSISTANT

## 2024-08-27 PROCEDURE — 96365 THER/PROPH/DIAG IV INF INIT: CPT

## 2024-08-27 PROCEDURE — 83880 ASSAY OF NATRIURETIC PEPTIDE: CPT | Performed by: PHYSICIAN ASSISTANT

## 2024-08-27 PROCEDURE — 85610 PROTHROMBIN TIME: CPT | Performed by: PHYSICIAN ASSISTANT

## 2024-08-27 PROCEDURE — 84439 ASSAY OF FREE THYROXINE: CPT | Performed by: NURSE PRACTITIONER

## 2024-08-27 PROCEDURE — 87637 SARSCOV2&INF A&B&RSV AMP PRB: CPT | Performed by: PHYSICIAN ASSISTANT

## 2024-08-27 PROCEDURE — 93005 ELECTROCARDIOGRAM TRACING: CPT | Performed by: EMERGENCY MEDICINE

## 2024-08-27 PROCEDURE — 99223 1ST HOSP IP/OBS HIGH 75: CPT | Performed by: INTERNAL MEDICINE

## 2024-08-27 RX ORDER — BISACODYL 10 MG
10 SUPPOSITORY, RECTAL RECTAL DAILY PRN
Status: DISCONTINUED | OUTPATIENT
Start: 2024-08-27 | End: 2024-08-28 | Stop reason: HOSPADM

## 2024-08-27 RX ORDER — PANTOPRAZOLE SODIUM 40 MG/1
40 TABLET, DELAYED RELEASE ORAL DAILY
Status: DISCONTINUED | OUTPATIENT
Start: 2024-08-28 | End: 2024-08-28 | Stop reason: HOSPADM

## 2024-08-27 RX ORDER — SODIUM CHLORIDE 9 MG/ML
40 INJECTION, SOLUTION INTRAVENOUS AS NEEDED
Status: DISCONTINUED | OUTPATIENT
Start: 2024-08-27 | End: 2024-08-28 | Stop reason: HOSPADM

## 2024-08-27 RX ORDER — ATORVASTATIN CALCIUM 20 MG/1
20 TABLET, FILM COATED ORAL NIGHTLY
Status: DISCONTINUED | OUTPATIENT
Start: 2024-08-27 | End: 2024-08-28 | Stop reason: HOSPADM

## 2024-08-27 RX ORDER — IBUPROFEN 600 MG/1
1 TABLET ORAL
Status: DISCONTINUED | OUTPATIENT
Start: 2024-08-27 | End: 2024-08-28 | Stop reason: HOSPADM

## 2024-08-27 RX ORDER — NICOTINE POLACRILEX 4 MG
15 LOZENGE BUCCAL
Status: DISCONTINUED | OUTPATIENT
Start: 2024-08-27 | End: 2024-08-28 | Stop reason: HOSPADM

## 2024-08-27 RX ORDER — ONDANSETRON 2 MG/ML
4 INJECTION INTRAMUSCULAR; INTRAVENOUS EVERY 6 HOURS PRN
Status: DISCONTINUED | OUTPATIENT
Start: 2024-08-27 | End: 2024-08-28 | Stop reason: HOSPADM

## 2024-08-27 RX ORDER — DILTIAZEM HYDROCHLORIDE 5 MG/ML
10 INJECTION INTRAVENOUS ONCE
Status: DISCONTINUED | OUTPATIENT
Start: 2024-08-27 | End: 2024-08-27

## 2024-08-27 RX ORDER — IOPAMIDOL 755 MG/ML
100 INJECTION, SOLUTION INTRAVASCULAR
Status: COMPLETED | OUTPATIENT
Start: 2024-08-27 | End: 2024-08-27

## 2024-08-27 RX ORDER — AMOXICILLIN 250 MG
2 CAPSULE ORAL 2 TIMES DAILY PRN
Status: DISCONTINUED | OUTPATIENT
Start: 2024-08-27 | End: 2024-08-28 | Stop reason: HOSPADM

## 2024-08-27 RX ORDER — FLUTICASONE PROPIONATE 50 MCG
2 SPRAY, SUSPENSION (ML) NASAL DAILY
Status: DISCONTINUED | OUTPATIENT
Start: 2024-08-28 | End: 2024-08-28 | Stop reason: HOSPADM

## 2024-08-27 RX ORDER — ACETAMINOPHEN 500 MG
1000 TABLET ORAL EVERY 8 HOURS PRN
Status: DISCONTINUED | OUTPATIENT
Start: 2024-08-27 | End: 2024-08-28 | Stop reason: HOSPADM

## 2024-08-27 RX ORDER — DEXTROSE MONOHYDRATE 25 G/50ML
25 INJECTION, SOLUTION INTRAVENOUS
Status: DISCONTINUED | OUTPATIENT
Start: 2024-08-27 | End: 2024-08-28 | Stop reason: HOSPADM

## 2024-08-27 RX ORDER — SODIUM CHLORIDE 0.9 % (FLUSH) 0.9 %
10 SYRINGE (ML) INJECTION AS NEEDED
Status: DISCONTINUED | OUTPATIENT
Start: 2024-08-27 | End: 2024-08-28 | Stop reason: HOSPADM

## 2024-08-27 RX ORDER — POLYETHYLENE GLYCOL 3350 17 G/17G
17 POWDER, FOR SOLUTION ORAL DAILY PRN
Status: DISCONTINUED | OUTPATIENT
Start: 2024-08-27 | End: 2024-08-28 | Stop reason: HOSPADM

## 2024-08-27 RX ORDER — NITROGLYCERIN 0.4 MG/1
0.4 TABLET SUBLINGUAL
Status: DISCONTINUED | OUTPATIENT
Start: 2024-08-27 | End: 2024-08-28 | Stop reason: HOSPADM

## 2024-08-27 RX ORDER — AMITRIPTYLINE HYDROCHLORIDE 10 MG/1
10 TABLET ORAL NIGHTLY
Status: DISCONTINUED | OUTPATIENT
Start: 2024-08-27 | End: 2024-08-28 | Stop reason: HOSPADM

## 2024-08-27 RX ORDER — SODIUM CHLORIDE 0.9 % (FLUSH) 0.9 %
10 SYRINGE (ML) INJECTION EVERY 12 HOURS SCHEDULED
Status: DISCONTINUED | OUTPATIENT
Start: 2024-08-27 | End: 2024-08-28 | Stop reason: HOSPADM

## 2024-08-27 RX ORDER — HYDROXYZINE HYDROCHLORIDE 25 MG/1
50 TABLET, FILM COATED ORAL 3 TIMES DAILY PRN
Status: DISCONTINUED | OUTPATIENT
Start: 2024-08-27 | End: 2024-08-28 | Stop reason: HOSPADM

## 2024-08-27 RX ORDER — INSULIN LISPRO 100 [IU]/ML
2-7 INJECTION, SOLUTION INTRAVENOUS; SUBCUTANEOUS
Status: DISCONTINUED | OUTPATIENT
Start: 2024-08-27 | End: 2024-08-28 | Stop reason: HOSPADM

## 2024-08-27 RX ORDER — DILTIAZEM HCL/D5W 125 MG/125
5-15 PLASTIC BAG, INJECTION (ML) INTRAVENOUS
Status: DISCONTINUED | OUTPATIENT
Start: 2024-08-27 | End: 2024-08-28

## 2024-08-27 RX ORDER — BISACODYL 5 MG/1
5 TABLET, DELAYED RELEASE ORAL DAILY PRN
Status: DISCONTINUED | OUTPATIENT
Start: 2024-08-27 | End: 2024-08-28 | Stop reason: HOSPADM

## 2024-08-27 RX ORDER — AMLODIPINE BESYLATE 5 MG/1
5 TABLET ORAL DAILY
Status: DISCONTINUED | OUTPATIENT
Start: 2024-08-27 | End: 2024-08-28 | Stop reason: HOSPADM

## 2024-08-27 RX ORDER — ONDANSETRON 4 MG/1
4 TABLET, ORALLY DISINTEGRATING ORAL EVERY 6 HOURS PRN
Status: DISCONTINUED | OUTPATIENT
Start: 2024-08-27 | End: 2024-08-28 | Stop reason: HOSPADM

## 2024-08-27 RX ORDER — CHOLECALCIFEROL (VITAMIN D3) 25 MCG
2000 TABLET ORAL DAILY
Status: DISCONTINUED | OUTPATIENT
Start: 2024-08-28 | End: 2024-08-28 | Stop reason: HOSPADM

## 2024-08-27 RX ORDER — LOSARTAN POTASSIUM 50 MG/1
50 TABLET ORAL DAILY
Status: DISCONTINUED | OUTPATIENT
Start: 2024-08-27 | End: 2024-08-28 | Stop reason: HOSPADM

## 2024-08-27 RX ADMIN — APIXABAN 5 MG: 5 TABLET, FILM COATED ORAL at 14:21

## 2024-08-27 RX ADMIN — Medication 5 MG/HR: at 16:40

## 2024-08-27 RX ADMIN — Medication 5 MG/HR: at 13:38

## 2024-08-27 RX ADMIN — IOPAMIDOL 75 ML: 755 INJECTION, SOLUTION INTRAVENOUS at 12:08

## 2024-08-27 RX ADMIN — HYDROXYZINE HYDROCHLORIDE 50 MG: 25 TABLET, FILM COATED ORAL at 22:32

## 2024-08-27 RX ADMIN — ACETAMINOPHEN 1000 MG: 500 TABLET ORAL at 22:32

## 2024-08-27 RX ADMIN — Medication 10 ML: at 22:35

## 2024-08-27 RX ADMIN — ATORVASTATIN CALCIUM 20 MG: 20 TABLET, FILM COATED ORAL at 20:15

## 2024-08-27 RX ADMIN — AMITRIPTYLINE HYDROCHLORIDE 10 MG: 10 TABLET, FILM COATED ORAL at 22:32

## 2024-08-27 NOTE — PLAN OF CARE
Problem: Adult Inpatient Plan of Care  Goal: Readiness for Transition of Care  Intervention: Mutually Develop Transition Plan  Recent Flowsheet Documentation  Taken 8/27/2024 1638 by Marry Tompkins, RN  Transportation Anticipated: family or friend will provide  Patient/Family Anticipated Services at Transition: none  Patient/Family Anticipates Transition to: home  Taken 8/27/2024 1635 by Marry Tompkins, RN  Equipment Currently Used at Home: none   Goal Outcome Evaluation:

## 2024-08-27 NOTE — H&P
[facility] Medicine Services  HISTORY AND PHYSICAL    Patient Name: Yamila Neff  : 1960  MRN: [id number]  Primary Care Physician: Daly Archibald PA-C  Date of admission: 2024    Subjective   Subjective     Chief Complaint:  Palpitations    HPI:  Yamila Neff is a 63 y.o. female with PMH significant for HTN, HLD, DM2, breast cancer, sleep apnea, psoriasis, CHRISTIANE, absence of one kidney who presents to [facility] from [facility] ED where she presented with palpitations and was found to be in a-fib with RVR. Patient was initiated on a Cardizem gtt and given a one-time dose of Eliquis prior to transfer. Patient states she tested positive for COVID-19 last Tuesday, 2024.  At that time, she had fever, chills, and congestion.  She started to feel better and then had another flare of symptoms on  with chilling and increased pain in her jaw (patient with known TMJ).  She called her PCP who started her on a steroid Dosepak with improvement in symptoms.  Patient denies having any shortness of breath, chest pain, cough, N/V.  She has a history of breast cancer on the right but did not receive any radiation.  She is status post total bilateral mastectomy.  Dr. Marshall with cardiology was contacted from the ED.  Cardiology has been consulted and will see patient in the morning.  Patient has been admitted to hospital medicine for further evaluation and treatment.      Personal History     Past Medical History:   Diagnosis Date    Anemia     Arthritis     Carpal tunnel syndrome 2012    Degenerative joint disease     Depression     Depression     Diabetes mellitus     Elevated cholesterol     Elevated liver enzymes     Fatty liver     Hearing aid worn     HL (hearing loss)     Hypertension     Kidney disorder     one kidney    Malignant neoplasm of right female breast 2023    Palpitations     Polycystic ovaries     Psoriasis     PVC (premature ventricular  contraction)     occasional pvc's    S/P total knee arthroplasty, right 2021    Sleep apnea     cpap at home    Wears glasses          Oncology Problem List:  Malignant neoplasm of upper-outer quadrant of right female breast (; Status: Active)  Malignant neoplasm of right female breast (2023; Status: Active)        Past Surgical History:   Procedure Laterality Date    BREAST BIOPSY Right     PAPILLOMA DR BELL     SECTION      x 3    CHOLECYSTECTOMY  2013    COLONOSCOPY      HYSTERECTOMY  2006    complete    JOINT REPLACEMENT Left 2011    left knee    MASTECTOMY W/ SENTINEL NODE BIOPSY Bilateral 2023    Procedure: MASTECTOMY WITH SENTINEL NODE BIOPSY RIGHT, LEFT PROPHYLATIC MASTECTOMY;  Surgeon: Emi Lizama MD;  Location:  [facility];  Service: General;  Laterality: Bilateral;    OOPHORECTOMY      TOTAL ABDOMINAL HYSTERECTOMY WITH SALPINGO OOPHORECTOMY      TOTAL KNEE ARTHROPLASTY Right 2021    Procedure: TOTAL KNEE ARTHROPLASTY RIGHT;  Surgeon: Mateo Keith MD;  Location:  [facility];  Service: Orthopedics;  Laterality: Right;    TUBAL ABDOMINAL LIGATION         Family History:  family history includes Arthritis in her brother and maternal grandmother; Cancer in her mother; Colon cancer in her maternal aunt; Diabetes in her daughter, maternal aunt, maternal grandmother, and mother; Hearing loss in her mother; Heart attack in her maternal grandfather; Heart disease in her maternal grandfather and maternal grandmother; Hypertension in her brother, brother, brother, maternal aunt, and mother; Lymphoma in her mother; No Known Problems in her father.     Social History:  reports that she has never smoked. She has never used smokeless tobacco. She reports that she does not drink alcohol and does not use drugs.  Social History     Social History Narrative    Not on file       Medications:  FLUoxetine, Magnesium, Probiotic Product, Tirzepatide, amLODIPine, amitriptyline,  atorvastatin, cholecalciferol, clobetasol, colestipol, fluticasone, hydrOXYzine, hydrocortisone, icosapent ethyl, ketoconazole, losartan, methylPREDNISolone, mupirocin, ondansetron ODT, pantoprazole, and propranolol LA    Allergies   Allergen Reactions    Lisinopril Cough    Celexa [Citalopram] Other (See Comments)     Jittery        Objective   Objective     Vital Signs:   Temp:  [97.7 °F (36.5 °C)-98.1 °F (36.7 °C)] 97.7 °F (36.5 °C)  Heart Rate:  [] 96  Resp:  [16-18] 18  BP: (108-129)/(70-98) 115/87    Physical Exam   Constitutional: Awake, alert  Eyes: PERRLA, sclerae anicteric, no conjunctival injection  HENT: NCAT, mucous membranes moist  Neck: Supple, no thyromegaly, no lymphadenopathy, trachea midline  Respiratory: Clear to auscultation bilaterally, nonlabored respirations, room air  Cardiovascular: Regularly irregular rhythm, no murmurs, rubs, or gallops, palpable pedal pulses bilaterally  Gastrointestinal: Hypoactive bowel sounds, soft, nontender, nondistended  Musculoskeletal: No bilateral ankle edema, no clubbing or cyanosis to extremities  Psychiatric: Appropriate affect, cooperative  Neurologic: Oriented x 3, strength symmetric in all extremities, Cranial Nerves grossly intact to confrontation, speech clear  Skin: No rashes      Result Review:  I have personally reviewed the results from the time of this admission to 8/27/2024 18:15 EDT and agree with these findings:  [x]  Laboratory list / accordion  []  Microbiology  [x]  Radiology  [x]  EKG/Telemetry   [x]  Cardiology/Vascular   []  Pathology  [x]  Old records  []  Other:  Most notable findings include:     LAB RESULTS:      Lab 08/27/24  1112   WBC 11.47*   HEMOGLOBIN 13.9   HEMATOCRIT 42.0   PLATELETS 364   NEUTROS ABS 9.44*   IMMATURE GRANS (ABS) 0.02   LYMPHS ABS 1.45   MONOS ABS 0.56   EOS ABS 0.00   MCV 82.7   PROTIME 13.1   APTT 26.5*         Lab 08/27/24  1112   SODIUM 137   POTASSIUM 3.9   CHLORIDE 100   CO2 23.4   ANION GAP 13.6    BUN 34*   CREATININE 0.92   EGFR 70.5   GLUCOSE 111*   CALCIUM 10.1         Lab 08/27/24  1112   TOTAL PROTEIN 7.9   ALBUMIN 4.8   GLOBULIN 3.1   ALT (SGPT) 50*   AST (SGOT) 31   BILIRUBIN 0.6   ALK PHOS 113         Lab 08/27/24  1112   PROBNP 198.0   HSTROP T 7   PROTIME 13.1   INR 0.94                 Brief Urine Lab Results  (Last result in the past 365 days)        Color   Clarity   Blood   Leuk Est   Nitrite   Protein   CREAT   Urine HCG        12/01/23 0927             26.5               Microbiology Results (last 10 days)       Procedure Component Value - Date/Time    COVID-19, FLU A/B, RSV PCR 1 HR TAT - Swab, Nasopharynx [[id number]]  (Abnormal) Collected: 08/27/24 1418    Lab Status: Final result Specimen: Swab from Nasopharynx Updated: 08/27/24 1503     COVID19 Detected     Influenza A PCR Not Detected     Influenza B PCR Not Detected     RSV, PCR Not Detected    Narrative:      Fact sheet for providers: https://www.fda.gov/media/065027/download    Fact sheet for patients: https://www.fda.gov/media/988452/download    Test performed by PCR.            CT Angiogram Chest Pulmonary Embolism    Result Date: 8/27/2024  CT ANGIOGRAM CHEST PULMONARY EMBOLISM Date of Exam: 8/27/2024 12:07 PM EDT Indication: palpitations. Comparison: 12/15/2016 Technique: Axial CT images were obtained of the chest after the uneventful intravenous administration of 75 mL Isovue-370 utilizing pulmonary embolism protocol.  Reconstructed coronal and sagittal images were also obtained. Automated exposure control and  iterative construction methods were used. FINDINGS: Thoracic inlet: Unremarkable. Pulmonary arteries: No filling defects are identified within the pulmonary arteries to suggest acute pulmonary embolism. Great vessels: Mild vascular calcifications are present. The thoracic aorta and proximal arch vessels appear otherwise grossly unremarkable. Mediastinum/Natalie: No pathologically enlarged mediastinal lymph nodes are seen.  The esophagus appears unremarkable. Lung parenchyma: There are hypoventilatory changes within the bilateral lower lobes. No acute infiltrate is definitely identified. No suspicious pulmonary nodules are seen. Trachea and airways: The trachea and central airways appear unremarkable. Pleural space: No significant pleural effusion or pneumothorax is seen. Heart and pericardium: Mild coronary artery calcifications. Otherwise, the heart and pericardium appear unremarkable. Chest wall: No acute or suspicious osseous or soft tissue lesion is identified. Upper abdomen: No acute abnormality is identified within the visualized upper abdomen. Status post cholecystectomy. Multiple splenic granulomas. Left kidney is not visualized.     Impression: 1.No acute abnormality is identified within the thorax. Specifically, there is no evidence of acute pulmonary embolism. 2.Please see above for additional details. Electronically Signed: Raymundo Be MD  8/27/2024 12:33 PM EDT  Workstation ID: [id number]    XR Chest 1 View    Result Date: 8/27/2024  XR CHEST 1 VW Date of Exam: 8/27/2024 11:20 AM EDT Indication: palpitations Comparison: None available. Findings: Single view. Heart and pulmonary vessels and mediastinal contours are within normal limits. Lung fields are well inflated and clear. There are no effusions.     Impression: Impression: Negative. Electronically Signed: Kim Valdez MD  8/27/2024 11:41 AM EDT  Workstation ID: [id number]     Results for orders placed during the hospital encounter of 12/15/16    Adult Transthoracic Echo Limited    Interpretation Summary  · Left ventricular function is normal. Estimated EF = 50%.  · Left atrial cavity size is mildly dilated.      Assessment & Plan   Assessment & Plan       Essential hypertension    Mixed hyperlipidemia    Anxiety    NGOC on CPAP    Type 2 diabetes mellitus with hyperglycemia, without long-term current use of insulin    Paroxysmal atrial fibrillation with rapid  ventricular response    COVID-19 virus detected    Yamila Neff is a 63 y.o. female with PMH significant for HTN, HLD, DM2, breast cancer, sleep apnea, psoriasis, CHRISTIANE, absence of one kidney who presents to [facility] from [facility] ED where she presented with palpitations and was found to be in a-fib with RVR. Patient was initiated on a Cardizem gtt and given a one-time dose of Eliquis prior to transfer. Patient states she tested positive for COVID-19 last Tuesday, 8/20/2024. Cardiology consulted from ED.    A-fib with RVR  --etiology of a-fib possibly secondary to Covid, also recently started on steroid dose pack on Monday, 8/26, for pain r/t TMJ  --CXR negative for acute process, reviewed, no areas of consolidation noted  --CTA chest negative for PE, no infiltrates seen  --EKG with a-fib RVR, HR as 121, asymptomatic  --initiated on Cardizem gtt and given one-time dose Eliquis at [place] [facility]  --continue Cardizem gtt for rate control  --continue Eliquis 5 mg BID for anticoagulation  --hold antihypertensives for now  --check echocardiogram, last echo from 2016 with EF 50%  --check TSH  --AM BMP to monitor electrolytes  --cardiology consult for AM  --NPO after MN in case patient will need PILLO/cardioversion    Covid-19  Leukocytosis  --tested positive on home test 8/20/2024, currently asymptomatic, on room air, defer treatment for Covid  --WBCs 11.47, leukocytosis likely secondary to recent steroid use, afebrile  --AM CBC    HTN  HLD  --hold home amlodipine, losartan as patient in a-fib RVR, monitor BP  --continue statin    DM2  --A1c 5.3, on Mounjaro weekly at home  --ACHS fingersticks and low-dose SSI  --okay to DC fingersticks if patient has 4 consecutive BG readings < 140    NGOC   --okay to use home CPAP    TMJ  Migraines  --hold home steroid dose pack  --Tylenol 1000 mg TID prn for pain  -- continue propanolol     Anxiety  Depression  --continue home Elavil, Prozac    GERD  --PPI      DVT prophylaxis:   Medical    CODE STATUS:    Level Of Support Discussed With: Patient  Code Status (Patient has no pulse and is not breathing): CPR (Attempt to Resuscitate)  Medical Interventions (Patient has pulse or is breathing): Full Support      Expected Discharge  Expected Discharge Date: 8/29/2024; Expected Discharge Time:       This note has been completed as part of a split-shared workflow.     Signature: Electronically signed by KAREN Gomez, 08/27/24, 4:51 PM EDT        Attending   Admission Attestation       I have performed an independent face-to-face diagnostic evaluation including performing an independent physical examination.  I approve of the documented plan of care above that was reviewed and developed with the advanced practice clinician (APC) and take responsibility for that plan along with its associated risks.  I have updated the HPI as appropriate.    Brief HPI    Ms Neff is a 63 you with history of HTN, HLD, DMII, breast cancer s/p mastectomy, POTTER, and NGOC, psoriasis, anemia, absence of one kidney and recent diagnosis of COVID-19 who presents with new onset atrial fibrillation with RVR.    Patient says she developed COVID symptoms one week ago last Tuesday, with fever and cough after exposure to a sick family member.  She tested positive the next day.  Her respiratory symptoms have since improved.      Saturday she developed left jaw pain that she attributes to TMJ.  She received a medrol dose pack from her provider that have helped with symptoms.    Today the patient developed palpitations.  EKG revealed afib with RVR.    This     Attending Physical Exam:  Temp:  [97.7 °F (36.5 °C)-98.1 °F (36.7 °C)] 97.7 °F (36.5 °C)  Heart Rate:  [] 96  Resp:  [16-18] 18  BP: (108-129)/(70-98) 115/87    Non toxic appearing, in bed  MM moist  Irregularly irregular  CTAB  Abd soft, NT  Normal affect  Alert, speech clear  No edema      Result Review:  I have personally reviewed the results from the time  of this admission to 8/27/2024 19:10 EDT and agree with these findings:  [x]  Laboratory list / accordion  []  Microbiology  []  Radiology  [x]  EKG/Telemetry   []  Cardiology/Vascular   []  Pathology  []  Old records  []  Other:  Most notable findings include:     Assessment and Plan:    Atrial fibrillation with RVR  - IV Cardiazem  - eliquis 5 mg PO BID  - check echo  - cardiology consultation in am    Recent COVID-19 infection  - 97 to 98% room air, defer additional treatment at this time    HTN    HLD    DMII  - glucose well controlled    NGOC  - CPAP    TMJ  - patient may complete her medrol dose-pack      See assessment and plan documented by APC above and updated/edited by me as appropriate.    Damian Malin MD  08/27/24

## 2024-08-27 NOTE — FSED PROVIDER NOTE
[place]    EMERGENCY DEPARTMENT ENCOUNTER      Pt Name: Yamila Neff  MRN: [id number]  YOB: 1960  Date of evaluation: 8/27/2024  Provider: Kate Garcia PA-C    CHIEF COMPLAINT       Chief Complaint   Patient presents with    Palpitations     HISTORY OF PRESENT ILLNESS  (Location/Symptom, Timing/Onset, Context/Setting, Quality, Duration, Modifying Factors, Severity.)   Yamila Neff is a 63 y.o. female who presents to the emergency department with palpitations.  Patient states her symptoms started this morning.  She states she had a similar episode several years ago that resolved.  Patient does mention she was diagnosed with COVID a week ago, she states those symptoms have improved.  She denies any chest pain or shortness of breath.    Nursing notes were reviewed.  REVIEW OF SYSTEMS    (2-9 systems for level 4, 10 or more for level 5)   Review of Systems   Constitutional:  Negative for chills and fever.   HENT:  Negative for ear pain and sore throat.    Respiratory:  Negative for cough and shortness of breath.    Cardiovascular:  Positive for palpitations. Negative for chest pain.   Gastrointestinal:  Negative for diarrhea, nausea and vomiting.   Genitourinary:  Negative for dysuria and frequency.   Musculoskeletal:  Negative for back pain and neck pain.   Neurological:  Negative for headaches.      All systems reviewed and negative except for those discussed in HPI.   PAST MEDICAL HISTORY     Past Medical History:   Diagnosis Date    Anemia     Arthritis     Carpal tunnel syndrome 04/09/2012    Degenerative joint disease     Depression     Depression     Diabetes mellitus     Elevated cholesterol     Elevated liver enzymes     Fatty liver     Hearing aid worn     HL (hearing loss)     Hypertension     Kidney disorder     one kidney    Malignant neoplasm of right female breast 02/28/2023    Palpitations     Polycystic ovaries     Psoriasis     PVC (premature ventricular contraction)      occasional pvc's    S/P total knee arthroplasty, right 2021    Sleep apnea     cpap at home    Wears glasses      SURGICAL HISTORY       Past Surgical History:   Procedure Laterality Date    BREAST BIOPSY Right     PAPILLOMA DR RAY     SECTION      x 3    CHOLECYSTECTOMY  2013    COLONOSCOPY      HYSTERECTOMY  2006    complete    JOINT REPLACEMENT Left 2011    left knee    MASTECTOMY W/ SENTINEL NODE BIOPSY Bilateral 2023    Procedure: MASTECTOMY WITH SENTINEL NODE BIOPSY RIGHT, LEFT PROPHYLATIC MASTECTOMY;  Surgeon: Emi Lizama MD;  Location:  [facility];  Service: General;  Laterality: Bilateral;    OOPHORECTOMY      TOTAL ABDOMINAL HYSTERECTOMY WITH SALPINGO OOPHORECTOMY      TOTAL KNEE ARTHROPLASTY Right 2021    Procedure: TOTAL KNEE ARTHROPLASTY RIGHT;  Surgeon: Mateo Keith MD;  Location:  [facility];  Service: Orthopedics;  Laterality: Right;    TUBAL ABDOMINAL LIGATION       CURRENT MEDICATIONS       Current Facility-Administered Medications:     acetaminophen (TYLENOL) tablet 1,000 mg, 1,000 mg, Oral, Q8H PRN, LambertRenettat E, APRN    amitriptyline (ELAVIL) tablet 10 mg, 10 mg, Oral, Nightly, LambRenetta souzat E, APRN    [Held by provider] amLODIPine (NORVASC) tablet 5 mg, 5 mg, Oral, Daily, LambertAlfredaFrida E, APRN    [START ON 2024] apixaban (ELIQUIS) tablet 5 mg, 5 mg, Oral, Q12H, Renetta Lemust E, APRN    atorvastatin (LIPITOR) tablet 20 mg, 20 mg, Oral, Nightly, Renetta Lemust E, APRN, 20 mg at 24 2015    sennosides-docusate (PERICOLACE) 8.6-50 MG per tablet 2 tablet, 2 tablet, Oral, BID PRN **AND** polyethylene glycol (MIRALAX) packet 17 g, 17 g, Oral, Daily PRN **AND** bisacodyl (DULCOLAX) EC tablet 5 mg, 5 mg, Oral, Daily PRN **AND** bisacodyl (DULCOLAX) suppository 10 mg, 10 mg, Rectal, Daily PRN, LambRenetta souzat E, APRN    [START ON 2024] cholecalciferol (VITAMIN D3) tablet 2,000 Units, 2,000 Units, Oral, Daily, Frida Lemus  KAREN BREAUX    dextrose (D50W) (25 g/50 mL) IV injection 25 g, 25 g, Intravenous, Q15 Min PRN, Frida Lemus APRN    dextrose (GLUTOSE) oral gel 15 g, 15 g, Oral, Q15 Min PRN, Frida Lemus, KAREN    dilTIAZem (CARDIZEM) 125 mg in 125 mL D5W infusion, 5-15 mg/hr, Intravenous, Titrated, Frida Lemus APRBHARAT, Last Rate: 5 mL/hr at 08/27/24 1851, 5 mg/hr at 08/27/24 1851    [START ON 8/28/2024] FLUoxetine (PROzac) capsule 80 mg, 80 mg, Oral, Daily, Frida Lemus APRN    [START ON 8/28/2024] fluticasone (FLONASE) 50 MCG/ACT nasal spray 2 spray, 2 spray, Nasal, Daily, Frida Lemus APRN    glucagon (GLUCAGEN) injection 1 mg, 1 mg, Intramuscular, Q15 Min PRN, Frida Lemus APRN    hydrOXYzine (ATARAX) tablet 50 mg, 50 mg, Oral, TID PRN, Frida Lemus APRN    Insulin Lispro (humaLOG) injection 2-7 Units, 2-7 Units, Subcutaneous, 4x Daily AC & at Bedtime, Frida Lemus APRN    [Held by provider] losartan (COZAAR) tablet 50 mg, 50 mg, Oral, Daily, Frida Lemus APRN    Magnesium Standard Dose Replacement - Follow Nurse / BPA Driven Protocol, , Does not apply, PRN, Frida eLmus APRN    nitroglycerin (NITROSTAT) SL tablet 0.4 mg, 0.4 mg, Sublingual, Q5 Min PRN, Frida Leums APRN    ondansetron ODT (ZOFRAN-ODT) disintegrating tablet 4 mg, 4 mg, Oral, Q6H PRN **OR** ondansetron (ZOFRAN) injection 4 mg, 4 mg, Intravenous, Q6H PRN, Frida Lemus APRN    [START ON 8/28/2024] pantoprazole (PROTONIX) EC tablet 40 mg, 40 mg, Oral, Daily, Frida Lemus APRN    Potassium Replacement - Follow Nurse / BPA Driven Protocol, , Does not apply, PRN, Frida Lemus APRN    [START ON 8/28/2024] propranolol (INDERAL) tablet 30 mg, 30 mg, Oral, Q12H, Frida Lemus APRN    sodium chloride 0.9 % flush 10 mL, 10 mL, Intravenous, PRN, Frida Lemus APRN    [COMPLETED] Insert Peripheral IV, , , Once **AND** sodium chloride 0.9 % flush 10 mL, 10 mL,  Intravenous, PRN, Lambert, Frida E, APRN    sodium chloride 0.9 % flush 10 mL, 10 mL, Intravenous, Q12H, Lambert, Frida E, APRN    sodium chloride 0.9 % flush 10 mL, 10 mL, Intravenous, PRN, Lambert, Frida E, APRN    sodium chloride 0.9 % infusion 40 mL, 40 mL, Intravenous, PRN, Lambert, Frida E, APRN    ALLERGIES     Lisinopril and Celexa [citalopram]    FAMILY HISTORY       Family History   Problem Relation Age of Onset    Diabetes Mother         Type 2    Lymphoma Mother     Hypertension Mother     Cancer Mother         Nonhodgkins Lymphoma    Hearing loss Mother         Hearing Aids    No Known Problems Father     Hypertension Brother     Arthritis Brother     Hypertension Brother     Colon cancer Maternal Aunt     Heart disease Maternal Grandmother     Arthritis Maternal Grandmother             Diabetes Maternal Grandmother         Type 2-     Heart disease Maternal Grandfather     Heart attack Maternal Grandfather     Diabetes Maternal Aunt         Type 2    Hypertension Maternal Aunt     Diabetes Daughter         Type 1    Hypertension Brother     Breast cancer Neg Hx     Ovarian cancer Neg Hx      SOCIAL HISTORY       Social History     Socioeconomic History    Marital status:    Tobacco Use    Smoking status: Never    Smokeless tobacco: Never   Vaping Use    Vaping status: Never Used   Substance and Sexual Activity    Alcohol use: No    Drug use: No    Sexual activity: Not Currently     Partners: Male     Birth control/protection: Surgical     Comment:      PHYSICAL EXAM    (up to 7 for level 4, 8 or more for level 5)   Physical Exam  Vitals and nursing note reviewed. Exam conducted with a chaperone present.   HENT:      Head: Normocephalic and atraumatic.   Eyes:      Extraocular Movements: Extraocular movements intact.      Pupils: Pupils are equal, round, and reactive to light.   Cardiovascular:      Rate and Rhythm: Tachycardia present. Rhythm irregular.       Pulses: Normal pulses.   Pulmonary:      Effort: Pulmonary effort is normal.      Breath sounds: Normal breath sounds.   Abdominal:      General: Abdomen is flat. Bowel sounds are normal.      Palpations: Abdomen is soft.   Musculoskeletal:         General: Normal range of motion.      Cervical back: Normal range of motion.   Skin:     General: Skin is warm and dry.   Neurological:      General: No focal deficit present.      Mental Status: She is alert and oriented to person, place, and time.   Psychiatric:         Mood and Affect: Mood normal.         Behavior: Behavior normal.       DIAGNOSTIC RESULTS     EKG: All EKGs are interpreted by the Emergency Department Physician who either signs or Co-signs this chart in the absence of a cardiologist.    ECG 12 Lead Other; palpitations   Preliminary Result   Test Reason : Other~   Blood Pressure :   */*   mmHG   Vent. Rate : 121 BPM     Atrial Rate :   * BPM      P-R Int :   * ms          QRS Dur :  82 ms       QT Int : 322 ms       P-R-T Axes :   *  -7 -37 degrees      QTc Int : 457 ms      Atrial fibrillation with rapid ventricular response   Nonspecific ST and T wave abnormality   Abnormal ECG   When compared with ECG of 24-MAR-2023 11:19,   Atrial fibrillation has replaced Sinus rhythm   Vent. rate has increased BY  46 BPM      Referred By:            Confirmed By:       Telemetry Scan   Final Result        RADIOLOGY:   Non-plain film images such as CT, Ultrasound and MRI are read by the radiologist. Plain radiographic images are visualized and preliminarily interpreted by the emergency physician with the below findings:    [x] Radiologist's Report Reviewed:  CT Angiogram Chest Pulmonary Embolism   Final Result   1.No acute abnormality is identified within the thorax. Specifically, there is no evidence of acute pulmonary embolism.   2.Please see above for additional details.            Electronically Signed: Raymundo Be MD     8/27/2024 12:33 PM EDT      Workstation ID: [id number]      XR Chest 1 View   Final Result   Impression:   Negative.         Electronically Signed: Kim Valdez MD     8/27/2024 11:41 AM EDT     Workstation ID: [id number]        ED BEDSIDE ULTRASOUND:   Performed by ED Physician - none    LABS:    I have reviewed and interpreted all of the currently available lab results from this visit (if applicable):  Results for orders placed or performed during the hospital encounter of 08/27/24   COVID-19, FLU A/B, RSV PCR 1 HR TAT - Swab, Nasopharynx    Specimen: Nasopharynx; Swab   Result Value Ref Range    COVID19 Detected (C) Not Detected - Ref. Range    Influenza A PCR Not Detected Not Detected    Influenza B PCR Not Detected Not Detected    RSV, PCR Not Detected Not Detected   Comprehensive Metabolic Panel    Specimen: Blood   Result Value Ref Range    Glucose 111 (H) 65 - 99 mg/dL    BUN 34 (H) 8 - 23 mg/dL    Creatinine 0.92 0.57 - 1.00 mg/dL    Sodium 137 136 - 145 mmol/L    Potassium 3.9 3.5 - 5.2 mmol/L    Chloride 100 98 - 107 mmol/L    CO2 23.4 22.0 - 29.0 mmol/L    Calcium 10.1 8.6 - 10.5 mg/dL    Total Protein 7.9 6.0 - 8.5 g/dL    Albumin 4.8 3.5 - 5.2 g/dL    ALT (SGPT) 50 (H) 1 - 33 U/L    AST (SGOT) 31 1 - 32 U/L    Alkaline Phosphatase 113 39 - 117 U/L    Total Bilirubin 0.6 0.0 - 1.2 mg/dL    Globulin 3.1 gm/dL    A/G Ratio 1.5 g/dL    BUN/Creatinine Ratio 37.0 (H) 7.0 - 25.0    Anion Gap 13.6 5.0 - 15.0 mmol/L    eGFR 70.5 >60.0 mL/min/1.73   Single High Sensitivity Troponin T    Specimen: Blood   Result Value Ref Range    HS Troponin T 7 <14 ng/L   CBC Auto Differential    Specimen: Blood   Result Value Ref Range    WBC 11.47 (H) 3.40 - 10.80 10*3/mm3    RBC 5.08 3.77 - 5.28 10*6/mm3    Hemoglobin 13.9 12.0 - 15.9 g/dL    Hematocrit 42.0 34.0 - 46.6 %    MCV 82.7 79.0 - 97.0 fL    MCH 27.4 26.6 - 33.0 pg    MCHC 33.1 31.5 - 35.7 g/dL    RDW 12.7 12.3 - 15.4 %    RDW-SD 39.1 37.0 - 54.0 fl    MPV 9.2 6.0 - 12.0 fL    Platelets 364  140 - 450 10*3/mm3    Neutrophil % 82.3 (H) 42.7 - 76.0 %    Lymphocyte % 12.6 (L) 19.6 - 45.3 %    Monocyte % 4.9 (L) 5.0 - 12.0 %    Eosinophil % 0.0 (L) 0.3 - 6.2 %    Basophil % 0.0 0.0 - 1.5 %    Immature Grans % 0.2 0.0 - 0.5 %    Neutrophils, Absolute 9.44 (H) 1.70 - 7.00 10*3/mm3    Lymphocytes, Absolute 1.45 0.70 - 3.10 10*3/mm3    Monocytes, Absolute 0.56 0.10 - 0.90 10*3/mm3    Eosinophils, Absolute 0.00 0.00 - 0.40 10*3/mm3    Basophils, Absolute 0.00 0.00 - 0.20 10*3/mm3    Immature Grans, Absolute 0.02 0.00 - 0.05 10*3/mm3   Protime-INR    Specimen: Blood   Result Value Ref Range    Protime 13.1 12.2 - 14.5 Seconds    INR 0.94 0.89 - 1.12   aPTT    Specimen: Blood   Result Value Ref Range    PTT 26.5 (L) 60.0 - 90.0 seconds   BNP    Specimen: Blood   Result Value Ref Range    proBNP 198.0 0.0 - 900.0 pg/mL   TSH Rfx On Abnormal To Free T4    Specimen: Blood   Result Value Ref Range    TSH 0.235 (L) 0.270 - 4.200 uIU/mL   T4, Free    Specimen: Blood   Result Value Ref Range    Free T4 1.50 0.92 - 1.68 ng/dL   POC Glucose Once    Specimen: Blood   Result Value Ref Range    Glucose 91 70 - 130 mg/dL   ECG 12 Lead Other; palpitations   Result Value Ref Range    QT Interval 322 ms    QTC Interval 457 ms   Green Top (Gel)   Result Value Ref Range    Extra Tube Hold for add-ons.    Lavender Top   Result Value Ref Range    Extra Tube hold for add-on    Gold Top - SST   Result Value Ref Range    Extra Tube Hold for add-ons.    Gray Top   Result Value Ref Range    Extra Tube Hold for add-ons.    Light Blue Top   Result Value Ref Range    Extra Tube Hold for add-ons.       All other labs were within normal range or not returned as of this dictation.    EMERGENCY DEPARTMENT COURSE and DIFFERENTIAL DIAGNOSIS/MDM:   Vitals:    Vitals:    08/27/24 1430 08/27/24 1512 08/27/24 1640 08/27/24 2000   BP: 111/79 115/76 115/87 109/77   BP Location:   Left arm Left arm   Patient Position:   Lying Sitting   Pulse: 95 94 96  101   Resp:  16 18 18   Temp:  98.1 °F (36.7 °C) 97.7 °F (36.5 °C) 99 °F (37.2 °C)   TempSrc:  Oral Oral Oral   SpO2: 97% 98%  97%   Weight:   73.1 kg (161 lb 3.2 oz)    Height:           ED Course as of 08/27/24 2149   Tue Aug 27, 2024   1205 HS Troponin T: 7 [WA]   1205 WBC(!): 11.47 [WA]   1205 Hemoglobin: 13.9 [WA]   1205 Hematocrit: 42.0 [WA]   1205 BUN(!): 34 [WA]   1205 Creatinine: 0.92 [WA]   1205 Glucose(!): 111 [WA]   1205 ALT (SGPT)(!): 50 [WA]   1205 AST (SGOT): 31 [WA]   1323 PTT(!): 26.5 [WA]   1323 Protime: 13.1 [WA]   1323 INR: 0.94 [WA]   1419 proBNP: 198.0 [WA]      ED Course User Index  [WA] Kate Garcia PA-C     MDM     Amount and/or Complexity of Data Reviewed  Discuss the patient with other providers: (I spoke with Dr. Marshall (Cards) he states he is willing to consult on the patient once she arrives at [facility]. I spoke with Dr. Berry (Hospitalist) he accepted the patient for admission.)    Patient was vital, labs and imaging were obtained.  Patient was found to be new onset A-fib with RVR.  Patient was given 2 doses of Cardizem IV without improvement in symptoms therefore she was started on a Cardizem drip.  CT PE protocol revealed no acute findings.  Patient will be admitted at [facility] for further evaluation.  Patient stable at time admission.    MEDICATIONS ADMINISTERED IN ED:  Medications   sodium chloride 0.9 % flush 10 mL (has no administration in time range)   sodium chloride 0.9 % flush 10 mL (has no administration in time range)   dilTIAZem (CARDIZEM) 125 mg in 125 mL D5W infusion ( Intravenous Handoff 8/27/24 1905)   amitriptyline (ELAVIL) tablet 10 mg (has no administration in time range)   amLODIPine (NORVASC) tablet 5 mg ( Oral Dose Auto Held 9/4/24 0900)   atorvastatin (LIPITOR) tablet 20 mg (20 mg Oral Given 8/27/24 2015)   cholecalciferol (VITAMIN D3) tablet 2,000 Units (has no administration in time range)   FLUoxetine (PROzac) capsule 80 mg (has no  administration in time range)   fluticasone (FLONASE) 50 MCG/ACT nasal spray 2 spray (has no administration in time range)   hydrOXYzine (ATARAX) tablet 50 mg (has no administration in time range)   losartan (COZAAR) tablet 50 mg ( Oral Dose Auto Held 9/4/24 0900)   pantoprazole (PROTONIX) EC tablet 40 mg (has no administration in time range)   propranolol (INDERAL) tablet 30 mg (has no administration in time range)   apixaban (ELIQUIS) tablet 5 mg (has no administration in time range)   sodium chloride 0.9 % flush 10 mL (has no administration in time range)   sodium chloride 0.9 % flush 10 mL (has no administration in time range)   sodium chloride 0.9 % infusion 40 mL (has no administration in time range)   dextrose (GLUTOSE) oral gel 15 g (has no administration in time range)   dextrose (D50W) (25 g/50 mL) IV injection 25 g (has no administration in time range)   glucagon (GLUCAGEN) injection 1 mg (has no administration in time range)   Insulin Lispro (humaLOG) injection 2-7 Units ( Subcutaneous Not Given 8/27/24 2017)   nitroglycerin (NITROSTAT) SL tablet 0.4 mg (has no administration in time range)   Potassium Replacement - Follow Nurse / BPA Driven Protocol (has no administration in time range)   Magnesium Standard Dose Replacement - Follow Nurse / BPA Driven Protocol (has no administration in time range)   sennosides-docusate (PERICOLACE) 8.6-50 MG per tablet 2 tablet (has no administration in time range)     And   polyethylene glycol (MIRALAX) packet 17 g (has no administration in time range)     And   bisacodyl (DULCOLAX) EC tablet 5 mg (has no administration in time range)     And   bisacodyl (DULCOLAX) suppository 10 mg (has no administration in time range)   ondansetron ODT (ZOFRAN-ODT) disintegrating tablet 4 mg (has no administration in time range)     Or   ondansetron (ZOFRAN) injection 4 mg (has no administration in time range)   acetaminophen (TYLENOL) tablet 1,000 mg (has no administration in time  range)   dilTIAZem (CARDIZEM) bolus from bag 1 mg/mL solution 10 mg (10 mg Intravenous Bolus from Bag 8/27/24 1142)   iopamidol (ISOVUE-370) 76 % injection 100 mL (75 mL Intravenous Given 8/27/24 1208)   dilTIAZem (CARDIZEM) bolus from bag 1 mg/mL solution 10 mg (10 mg Intravenous Bolus from Bag 8/27/24 1248)   apixaban (ELIQUIS) tablet 5 mg (5 mg Oral Given 8/27/24 1421)     PROCEDURES:  Procedures:none      CRITICAL CARE TIME    Total Critical Care time was 30 minutes, excluding separately reportable procedures.   There was a high probability of clinically significant/life threatening deterioration in the patient's condition which required my urgent intervention.    FINAL IMPRESSION      1. Atrial fibrillation, unspecified type        DISPOSITION/PLAN     ED Disposition       ED Disposition   Decision to Admit    Condition   --    Comment   Level of Care: Telemetry [5]   Accepting Provider:: VU MONTELONGO [[id number]]               PATIENT REFERRED TO:  No follow-up provider specified.    DISCHARGE MEDICATIONS:     Medication List        CONTINUE taking these medications      amitriptyline 10 MG tablet  Commonly known as: ELAVIL  Take 1 tablet by mouth Every Night.     amLODIPine 5 MG tablet  Commonly known as: NORVASC  Take 1 tablet by mouth Daily.     atorvastatin 20 MG tablet  Commonly known as: LIPITOR  Take 1 tablet by mouth Every Night.     cholecalciferol 25 MCG (1000 UT) tablet  Commonly known as: VITAMIN D3     * clobetasol 0.05 % external solution  Commonly known as: TEMOVATE  Apply to itchy, scaly areas on scalp topically as directed 2 (Two) Times a Day.     * clobetasol 0.05 % external solution  Commonly known as: TEMOVATE  Apply 1 Application topically to the appropriate area on scalp as directed 2 (Two) Times a Day.     colestipol 1 g tablet  Commonly known as: Colestid  Take 2 tablets by mouth every morning.  **Take 2 hours separate from other medications     FLUoxetine 40 MG capsule  Commonly known  as: PROzac  Take 2 capsules by mouth Daily.     fluticasone 50 MCG/ACT nasal spray  Commonly known as: FLONASE  Instill 2 sprays into the nostril(s) as directed by provider Daily.     hydrocortisone 2.5 % ointment  Apply 1 application  topically to the affected area(s) as directed 2 (Two) Times a Day.     hydrOXYzine 50 MG tablet  Commonly known as: ATARAX  Take 1 tablet by mouth 3 (Three) Times a Day As Needed for Itching.     icosapent ethyl 1 g capsule capsule  Commonly known as: Vascepa  Take 2 capsules by mouth 2 (Two) Times a Day With Meals.     * ketoconazole 2 % shampoo  Commonly known as: NIZORAL  Use as a shampoo to scalp and face 3 times a week. Leave in for 5 minutes and rinse.     * ketoconazole 2 % shampoo  Commonly known as: NIZORAL  Apply 1 Application topically to the appropriate area as directed 3 (Three) Times a Week. Leave on for 5 minutes, then rinse.     losartan 50 MG tablet  Commonly known as: Cozaar  Take 1 tablet by mouth Daily.     Magnesium 250 MG tablet     methylPREDNISolone 4 MG dose pack  Commonly known as: MEDROL  Take as directed on package instructions.     Mounjaro 15 MG/0.5ML solution pen-injector pen  Generic drug: Tirzepatide  Inject 0.5 mL under the skin into the appropriate area as directed Every 7 (Seven) Days.     mupirocin 2 % ointment  Commonly known as: BACTROBAN  Apply a thin film topically to the appropriate area as directed.     ondansetron ODT 4 MG disintegrating tablet  Commonly known as: ZOFRAN-ODT  Place 1 tablet on the tongue Every 8 (Eight) Hours As Needed for Nausea or Vomiting.     pantoprazole 40 MG EC tablet  Commonly known as: PROTONIX  Take 1 tablet by mouth Daily.     PROBIOTIC DAILY PO     propranolol LA 60 MG 24 hr capsule  Commonly known as: INDERAL LA  Take 1 capsule by mouth Daily.           * This list has 4 medication(s) that are the same as other medications prescribed for you. Read the directions carefully, and ask your doctor or other care  provider to review them with you.                  Comment: Please note this report has been produced using speech recognition software.      Kate Garcia PA-C

## 2024-08-27 NOTE — TELEPHONE ENCOUNTER
Caller: Yamila Neff    Relationship to patient: Self    Best call back number: [phone]     Chief complaint: NA    Patient directed to call 911 or go to their nearest emergency room.     Patient verbalized understanding: [x] Yes  [] No  If no, why?    Additional notes:HEART RACING.   PATIENT STATES SHE WILL GO TO THE ER TO GET CHECKED OUT.

## 2024-08-28 ENCOUNTER — READMISSION MANAGEMENT (OUTPATIENT)
Dept: CALL CENTER | Facility: HOSPITAL | Age: 64
End: 2024-08-28
Payer: COMMERCIAL

## 2024-08-28 ENCOUNTER — APPOINTMENT (OUTPATIENT)
Dept: CARDIOLOGY | Facility: HOSPITAL | Age: 64
End: 2024-08-28
Payer: COMMERCIAL

## 2024-08-28 VITALS
WEIGHT: 161.2 LBS | RESPIRATION RATE: 20 BRPM | HEART RATE: 62 BPM | BODY MASS INDEX: 29.66 KG/M2 | TEMPERATURE: 97.7 F | HEIGHT: 62 IN | SYSTOLIC BLOOD PRESSURE: 100 MMHG | DIASTOLIC BLOOD PRESSURE: 70 MMHG | OXYGEN SATURATION: 95 %

## 2024-08-28 PROBLEM — I48.91 ATRIAL FIBRILLATION WITH RVR: Status: ACTIVE | Noted: 2024-08-28

## 2024-08-28 LAB
ANION GAP SERPL CALCULATED.3IONS-SCNC: 14 MMOL/L (ref 5–15)
BUN SERPL-MCNC: 41 MG/DL (ref 8–23)
BUN/CREAT SERPL: 32.8 (ref 7–25)
CALCIUM SPEC-SCNC: 9.9 MG/DL (ref 8.6–10.5)
CHLORIDE SERPL-SCNC: 103 MMOL/L (ref 98–107)
CO2 SERPL-SCNC: 22 MMOL/L (ref 22–29)
CREAT SERPL-MCNC: 1.25 MG/DL (ref 0.57–1)
DEPRECATED RDW RBC AUTO: 38.7 FL (ref 37–54)
EGFRCR SERPLBLD CKD-EPI 2021: 48.8 ML/MIN/1.73
ERYTHROCYTE [DISTWIDTH] IN BLOOD BY AUTOMATED COUNT: 12.7 % (ref 12.3–15.4)
GLUCOSE BLDC GLUCOMTR-MCNC: 114 MG/DL (ref 70–130)
GLUCOSE SERPL-MCNC: 91 MG/DL (ref 65–99)
HCT VFR BLD AUTO: 39.1 % (ref 34–46.6)
HGB BLD-MCNC: 12.9 G/DL (ref 12–15.9)
MCH RBC QN AUTO: 27.3 PG (ref 26.6–33)
MCHC RBC AUTO-ENTMCNC: 33 G/DL (ref 31.5–35.7)
MCV RBC AUTO: 82.8 FL (ref 79–97)
PLATELET # BLD AUTO: 361 10*3/MM3 (ref 140–450)
PMV BLD AUTO: 9.3 FL (ref 6–12)
POTASSIUM SERPL-SCNC: 4.4 MMOL/L (ref 3.5–5.2)
RBC # BLD AUTO: 4.72 10*6/MM3 (ref 3.77–5.28)
SODIUM SERPL-SCNC: 139 MMOL/L (ref 136–145)
WBC NRBC COR # BLD AUTO: 13.21 10*3/MM3 (ref 3.4–10.8)

## 2024-08-28 PROCEDURE — 93005 ELECTROCARDIOGRAM TRACING: CPT | Performed by: PHYSICIAN ASSISTANT

## 2024-08-28 PROCEDURE — 80048 BASIC METABOLIC PNL TOTAL CA: CPT | Performed by: NURSE PRACTITIONER

## 2024-08-28 PROCEDURE — 96366 THER/PROPH/DIAG IV INF ADDON: CPT

## 2024-08-28 PROCEDURE — 99239 HOSP IP/OBS DSCHRG MGMT >30: CPT | Performed by: HOSPITALIST

## 2024-08-28 PROCEDURE — 82948 REAGENT STRIP/BLOOD GLUCOSE: CPT

## 2024-08-28 PROCEDURE — G0378 HOSPITAL OBSERVATION PER HR: HCPCS

## 2024-08-28 PROCEDURE — 85027 COMPLETE CBC AUTOMATED: CPT | Performed by: NURSE PRACTITIONER

## 2024-08-28 RX ORDER — METHYLPREDNISOLONE 4 MG
4 TABLET, DOSE PACK ORAL DAILY
Status: DISCONTINUED | OUTPATIENT
Start: 2024-08-30 | End: 2024-08-28 | Stop reason: HOSPADM

## 2024-08-28 RX ORDER — METHYLPREDNISOLONE 4 MG
4 TABLET, DOSE PACK ORAL 2 TIMES DAILY
Status: DISCONTINUED | OUTPATIENT
Start: 2024-08-29 | End: 2024-08-28 | Stop reason: HOSPADM

## 2024-08-28 RX ORDER — METHYLPREDNISOLONE 4 MG
4 TABLET, DOSE PACK ORAL
Status: DISCONTINUED | OUTPATIENT
Start: 2024-08-28 | End: 2024-08-28 | Stop reason: HOSPADM

## 2024-08-28 RX ADMIN — Medication 10 ML: at 08:33

## 2024-08-28 RX ADMIN — Medication 4 MG: at 08:32

## 2024-08-28 RX ADMIN — PANTOPRAZOLE SODIUM 40 MG: 40 TABLET, DELAYED RELEASE ORAL at 08:31

## 2024-08-28 RX ADMIN — Medication 2000 UNITS: at 08:31

## 2024-08-28 RX ADMIN — FLUOXETINE HYDROCHLORIDE 80 MG: 20 CAPSULE ORAL at 08:30

## 2024-08-28 RX ADMIN — FLUTICASONE PROPIONATE 2 SPRAY: 50 SPRAY, METERED NASAL at 08:30

## 2024-08-28 RX ADMIN — APIXABAN 5 MG: 5 TABLET, FILM COATED ORAL at 08:31

## 2024-08-28 RX ADMIN — PROPRANOLOL HYDROCHLORIDE 10 MG: 10 TABLET ORAL at 08:31

## 2024-08-28 NOTE — PLAN OF CARE
Problem: Adult Inpatient Plan of Care  Goal: Absence of Hospital-Acquired Illness or Injury  Intervention: Identify and Manage Fall Risk  Recent Flowsheet Documentation  Taken 8/28/2024 1200 by Maryr Tompkins RN  Safety Promotion/Fall Prevention:   assistive device/personal items within reach   clutter free environment maintained   fall prevention program maintained   toileting scheduled   safety round/check completed   room organization consistent   nonskid shoes/slippers when out of bed  Taken 8/28/2024 0810 by Marry Tompkins RN  Safety Promotion/Fall Prevention:   assistive device/personal items within reach   clutter free environment maintained   fall prevention program maintained   toileting scheduled   safety round/check completed   room organization consistent   nonskid shoes/slippers when out of bed  Intervention: Prevent Skin Injury  Recent Flowsheet Documentation  Taken 8/28/2024 1200 by Marry Tompkins RN  Body Position: position changed independently  Skin Protection: adhesive use limited  Taken 8/28/2024 0810 by Marry Tompkins RN  Body Position: position changed independently  Skin Protection:   adhesive use limited   tubing/devices free from skin contact   transparent dressing maintained   skin-to-skin areas padded  Intervention: Prevent and Manage VTE (Venous Thromboembolism) Risk  Recent Flowsheet Documentation  Taken 8/28/2024 1200 by Marry Tompkins RN  Activity Management: up ad tootie  Taken 8/28/2024 0810 by Marry Tompkins RN  Activity Management: up ad tootie  VTE Prevention/Management: (see MAR)   sequential compression devices off   other (see comments)  Range of Motion: active ROM (range of motion) encouraged  Intervention: Prevent Infection  Recent Flowsheet Documentation  Taken 8/28/2024 1200 by Marry Tompkins RN  Infection Prevention: environmental surveillance performed  Taken 8/28/2024 0810 by Marry Tompkins RN  Infection Prevention: environmental surveillance  performed   Goal Outcome Evaluation:

## 2024-08-28 NOTE — CONSULTS
Cardiology Consult - [facility]    Yamila Neff     [id number]  1960  [address]  [facility] [address]         Admission Date:  8/27/2024    Consultation Date:  08/28/24        PCP:  Daly Archibald PA-C  Referring MD: Dr. Malin  Consulting MD: Dr. Amezcua          CC: Tachypalpitations, dyspnea    Reason for Consult: A-fib RVR in setting of COVID 19 infection        Allergies:  is allergic to lisinopril and celexa [citalopram].    Medications Prior to Admission   Medication Sig Dispense Refill Last Dose    amitriptyline (ELAVIL) 10 MG tablet Take 1 tablet by mouth Every Night. 90 tablet 0 8/26/2024    amLODIPine (NORVASC) 5 MG tablet Take 1 tablet by mouth Daily. 90 tablet 1 8/26/2024    atorvastatin (LIPITOR) 20 MG tablet Take 1 tablet by mouth Every Night. 90 tablet 1 8/26/2024    colestipol (Colestid) 1 g tablet Take 2 tablets by mouth every morning.  **Take 2 hours separate from other medications 180 tablet 3 8/26/2024    FLUoxetine (PROzac) 40 MG capsule Take 2 capsules by mouth Daily. 180 capsule 1 8/26/2024    fluticasone (FLONASE) 50 MCG/ACT nasal spray Instill 2 sprays into the nostril(s) as directed by provider Daily. 16 g 11 8/26/2024    icosapent ethyl (Vascepa) 1 g capsule capsule Take 2 capsules by mouth 2 (Two) Times a Day With Meals. 360 capsule 1 8/26/2024    losartan (Cozaar) 50 MG tablet Take 1 tablet by mouth Daily. 90 tablet 1 8/27/2024    Magnesium 250 MG tablet Take 1 tablet by mouth Daily.   8/26/2024    methylPREDNISolone (MEDROL) 4 MG dose pack Take as directed on package instructions. 21 tablet 0 8/26/2024    pantoprazole (PROTONIX) 40 MG EC tablet Take 1 tablet by mouth Daily. 90 tablet 3 8/27/2024    propranolol LA (INDERAL LA) 60 MG 24 hr capsule Take 1 capsule by mouth Daily. 90 capsule 0 Past Week    Tirzepatide (Mounjaro) 15 MG/0.5ML solution pen-injector pen Inject 0.5 mL under the skin into the appropriate area as directed Every 7  (Seven) Days. 6 mL 3 Past Week    cholecalciferol (VITAMIN D3) 1000 units tablet Take 2 tablets by mouth Daily.       clobetasol (TEMOVATE) 0.05 % external solution Apply to itchy, scaly areas on scalp topically as directed 2 (Two) Times a Day. 25 mL 3 More than a month    clobetasol (TEMOVATE) 0.05 % external solution Apply 1 Application topically to the appropriate area on scalp as directed 2 (Two) Times a Day. 25 mL 12 More than a month    hydrocortisone 2.5 % ointment Apply 1 application  topically to the affected area(s) as directed 2 (Two) Times a Day. 20 g 4 More than a month    hydrOXYzine (ATARAX) 50 MG tablet Take 1 tablet by mouth 3 (Three) Times a Day As Needed for Itching. 90 tablet 5 More than a month    ketoconazole (NIZORAL) 2 % shampoo Use as a shampoo to scalp and face 3 times a week. Leave in for 5 minutes and rinse. 120 mL 3 More than a month    ketoconazole (NIZORAL) 2 % shampoo Apply 1 Application topically to the appropriate area as directed 3 (Three) Times a Week. Leave on for 5 minutes, then rinse. 120 mL 12 More than a month    mupirocin (BACTROBAN) 2 % ointment Apply a thin film topically to the appropriate area as directed. 22 g 2 More than a month    ondansetron ODT (ZOFRAN-ODT) 4 MG disintegrating tablet Place 1 tablet on the tongue Every 8 (Eight) Hours As Needed for Nausea or Vomiting. 14 tablet 0 More than a month    Probiotic Product (PROBIOTIC DAILY PO) Take  by mouth Daily.          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  She was diagnosed with COVID-19 on August 20 with reports of fevers, chills, congestion and cough.  She was started on a steroid Dosepak over the weekend after reporting increased chilling and dyspnea.  She presented to [facility] ED with reports of palpitations within 24 hours of starting steroids and  found to be in A-fib RVR.  She was given a one-time dose of Eliquis, placed on Cardizem drip and transferred to [facility] for further cardiac care.  She has been admitted to hospitalist services and cardiology has been asked to consult.    At time of consultation, heart rate is 62, no repeat EKG has been performed but she appears to be in NSR on telemetry monitoring.  She is 95% O2 on RA.  She reports having a brief episode of A-fib 6 years ago in the setting of a CMV infection.  She states the episode of A-fib was less than 2 minutes and she was not discharged home on any antiarrhythmic/rate control/anticoagulation.  She reports no complaints of tachypalpitations or irregular heartbeats until this past .  She feels improved today and back to baseline.          Social History     Socioeconomic History    Marital status:    Tobacco Use    Smoking status: Never    Smokeless tobacco: Never   Vaping Use    Vaping status: Never Used   Substance and Sexual Activity    Alcohol use: No    Drug use: No    Sexual activity: Not Currently     Partners: Male     Birth control/protection: Surgical     Comment:      Family History   Problem Relation Age of Onset    Diabetes Mother         Type 2    Lymphoma Mother     Hypertension Mother     Cancer Mother         Nonhodgkins Lymphoma    Hearing loss Mother         Hearing Aids    No Known Problems Father     Hypertension Brother     Arthritis Brother     Hypertension Brother     Colon cancer Maternal Aunt     Heart disease Maternal Grandmother     Arthritis Maternal Grandmother             Diabetes Maternal Grandmother         Type 2-     Heart disease Maternal Grandfather     Heart attack Maternal Grandfather     Diabetes Maternal Aunt         Type 2    Hypertension Maternal Aunt     Diabetes Daughter         Type 1    Hypertension Brother     Breast cancer Neg Hx     Ovarian cancer Neg Hx      Past Surgical History:   Procedure Laterality Date     "BREAST BIOPSY Right     PAPILLOMA DR BELL     SECTION      x 3    CHOLECYSTECTOMY  2013    COLONOSCOPY      HYSTERECTOMY  2006    complete    JOINT REPLACEMENT Left 2011    left knee    MASTECTOMY W/ SENTINEL NODE BIOPSY Bilateral 2023    Procedure: MASTECTOMY WITH SENTINEL NODE BIOPSY RIGHT, LEFT PROPHYLATIC MASTECTOMY;  Surgeon: Emi Lizama MD;  [facility];  Service: General;  Laterality: Bilateral;    OOPHORECTOMY      TOTAL ABDOMINAL HYSTERECTOMY WITH SALPINGO OOPHORECTOMY      TOTAL KNEE ARTHROPLASTY Right 2021    Procedure: TOTAL KNEE ARTHROPLASTY RIGHT;  Surgeon: Mateo Keith MD;  [facility];  Service: Orthopedics;  Laterality: Right;    TUBAL ABDOMINAL LIGATION       ROS: Pertinent items are noted in HPI, all other systems reviewed and negative     Objective     height is 157.5 cm (62\") and weight is 73.1 kg (161 lb 3.2 oz). Her oral temperature is 98.5 °F (36.9 °C). Her blood pressure is 90/54 and her pulse is 62. Her respiration is 18 and oxygen saturation is 95%.    Intake/Output Summary (Last 24 hours) at 2024 0754  Last data filed at 2024 2233  Gross per 24 hour   Intake 64.58 ml   Output --   Net 64.58 ml     Intake/Output                   24 0701 - 24 0700     [id number] 4209-2047 Total              Intake    I.V.  --  64.6 64.6    Total Intake -- 64.6 64.6       Output    Total Output -- -- --               24  1055 24  1640   Weight: 74.8 kg (165 lb) 73.1 kg (161 lb 3.2 oz)          Physical Exam:  General Appearance:    Alert, cooperative, in no acute distress   Head:    Normocephalic, without obvious abnormality, atraumatic   Eyes:            Lids and lashes normal, conjunctivae and sclerae normal, no   icterus, no pallor, corneas clear, PERRLA. + Glasses   Ears:    Ears appear intact with no abnormalities noted   Throat:   No oral lesions, no thrush, oral mucosa moist   Neck:   No adenopathy, supple, trachea " midline, no thyromegaly, no carotid bruit, no JVD   Back:     No kyphosis present, no scoliosis present, no skin lesions,    erythema or scars, no tenderness to percussion or                   palpation, range of motion normal   Lungs:     Clear to auscultation,respirations regular, even and               unlabored    Heart:    Regular rhythm and normal rate, normal S1 and S2, no         murmur, no gallop, no rub, no click   Abdomen:     Normal bowel sounds, no masses, no organomegaly, soft     nontender, nondistended, no guarding, no rebound   tenderness   Extremities:   Moves all extremities well,  no cyanosis, no redness, no edema   Pulses:   Pulses palpable and equal bilaterally   Skin:   No bleeding, bruising or rash   Lymph nodes:   No palpable adenopathy   Neurologic:   Cranial nerves 2 - 12 grossly intact, sensation intact, DTR     present and equal bilaterally          Results Review:  I personally reviewed the patient's clinical results.  Results from last 7 days   Lab Units 08/28/24  0342   WBC 10*3/mm3 13.21*   HEMOGLOBIN g/dL 12.9   HEMATOCRIT % 39.1   PLATELETS 10*3/mm3 361     Results from last 7 days   Lab Units 08/28/24  0342 08/27/24  1112   SODIUM mmol/L 139 137   POTASSIUM mmol/L 4.4 3.9   CHLORIDE mmol/L 103 100   CO2 mmol/L 22.0 23.4   BUN mg/dL 41* 34*   CREATININE mg/dL 1.25* 0.92   CALCIUM mg/dL 9.9 10.1   BILIRUBIN mg/dL  --  0.6   ALK PHOS U/L  --  113   ALT (SGPT) U/L  --  50*   AST (SGOT) U/L  --  31   GLUCOSE mg/dL 91 111*       Results from last 7 days   Lab Units 08/27/24  1112   INR  0.94     Results from last 7 days   Lab Units 08/27/24 2006   TSH uIU/mL 0.235*   FREE T4 ng/dL 1.50         Results from last 7 days   Lab Units 08/27/24  1112   PROBNP pg/mL 198.0             Radiology:  Imaging Results (Last 72 Hours)       Procedure Component Value Units Date/Time    CT Angiogram Chest Pulmonary Embolism [[id number]] Collected: 08/27/24 1227     Updated: 08/27/24 1236    Narrative:       CT ANGIOGRAM CHEST PULMONARY EMBOLISM    Date of Exam: 8/27/2024 12:07 PM EDT    Indication: palpitations.    Comparison: 12/15/2016    Technique: Axial CT images were obtained of the chest after the uneventful intravenous administration of 75 mL Isovue-370 utilizing pulmonary embolism protocol.  Reconstructed coronal and sagittal images were also obtained. Automated exposure control and   iterative construction methods were used.      FINDINGS:    Thoracic inlet: Unremarkable.    Pulmonary arteries: No filling defects are identified within the pulmonary arteries to suggest acute pulmonary embolism.    Great vessels: Mild vascular calcifications are present. The thoracic aorta and proximal arch vessels appear otherwise grossly unremarkable.    Mediastinum/Natalie: No pathologically enlarged mediastinal lymph nodes are seen. The esophagus appears unremarkable.    Lung parenchyma: There are hypoventilatory changes within the bilateral lower lobes. No acute infiltrate is definitely identified. No suspicious pulmonary nodules are seen.    Trachea and airways: The trachea and central airways appear unremarkable.    Pleural space: No significant pleural effusion or pneumothorax is seen.    Heart and pericardium: Mild coronary artery calcifications. Otherwise, the heart and pericardium appear unremarkable.    Chest wall: No acute or suspicious osseous or soft tissue lesion is identified.    Upper abdomen: No acute abnormality is identified within the visualized upper abdomen. Status post cholecystectomy. Multiple splenic granulomas. Left kidney is not visualized.      Impression:      1.No acute abnormality is identified within the thorax. Specifically, there is no evidence of acute pulmonary embolism.  2.Please see above for additional details.        Electronically Signed: Raymundo Be MD    8/27/2024 12:33 PM EDT    Workstation ID: [id number]    XR Chest 1 View [[id number]] Collected: 08/27/24 1140     Updated: 08/27/24  1144    Narrative:      XR CHEST 1 VW    Date of Exam: 8/27/2024 11:20 AM EDT    Indication: palpitations    Comparison: None available.    Findings:  Single view. Heart and pulmonary vessels and mediastinal contours are within normal limits. Lung fields are well inflated and clear. There are no effusions.      Impression:      Impression:  Negative.      Electronically Signed: Kim Valdez MD    8/27/2024 11:41 AM EDT    Workstation ID: [id number]              Tele: NSR    Assessment:  -63-year-old CF with recent COVID-19 infection (8/20/2024) presents to [facility] with flareup of dyspnea, cough, tachypalpitations.  Recent steroid initiation 8/25/2024  -Episodic A-fib in setting of COVID-19 infection  -[id number]= 3.  -HTN  -DM 2  -HLP on statin  -Abnormal TSH    Plan:  -Transferred from [place] to [facility] under the care of hospitalist services  -Given IV Cardizem, converted to NSR.  Do not resume.  Resume home propranolol.  -Continue NOAC, Eliquis 5 mg twice daily.  Follow-up in office with EKG, echo in 4 to 6 weeks  -Continue statin  -Follow-up with PCP in 1 week  -Cardiology will sign off.  Have discussed plan of care with patient and hospitalist.      I discussed the patient's findings and my recommendations with the patient, any present family members, and the nursing staff.  Mateo Amezcua MD saw and examined patient, verified hx and PE, read all radiographic studies, reviewed labs and micro data, and formulated dx, plan for treatment and all medical decision making.      Karol Roberts PA-C, working with Mateo Amezcua MD  08/28/24 07:54 EDT

## 2024-08-28 NOTE — DISCHARGE SUMMARY
[facility] Medicine Services  DISCHARGE SUMMARY    Patient Name: Yamila Neff  : 1960  MRN: [id number]    Date of Admission: 2024 10:57 AM  Date of Discharge: 2024  Primary Care Physician: Daly Archibald PA-C    Consults       Date and Time Order Name Status Description    2024  5:55 PM Inpatient Cardiology Consult Completed             Hospital Course     Presenting Problem: Onset A-fib with RVR    Active Hospital Problems    Diagnosis  POA    **Atrial fibrillation with RVR [I48.91]  Yes    Paroxysmal atrial fibrillation with rapid ventricular response [I48.0]  Yes    COVID-19 virus detected [U07.1]  Yes    Type 2 diabetes mellitus with hyperglycemia, without long-term current use of insulin [E11.65]  Yes    NGOC on CPAP [G47.33]  Yes    Mixed hyperlipidemia [E78.2]  Yes    Anxiety [F41.9]  Yes    Essential hypertension [I10]  Yes      Resolved Hospital Problems   No resolved problems to display.          Hospital Course:  Yamila Neff is a 63 y.o. female with PMH significant for HTN, HLD, DM2, breast cancer, sleep apnea, psoriasis, CHRISTIANE, absence of one kidney who presents to [facility] from [facility] ED where she presented with palpitations and was found to be in a-fib with RVR. Patient was initiated on a Cardizem gtt and given a one-time dose of Eliquis prior to transfer. Patient states she tested positive for COVID-19 last Tuesday, 2024. Cardiology consulted from ED.     Copied text in this note has been reviewed and is accurate as of 2024        A-fib with RVR.  Paroxysmal.  Converted to normal sinus rhythm with controlled rate.  --etiology of a-fib possibly secondary to Covid, also recently started on steroid dose pack on Monday, , for pain r/t TMJ  --CXR negative for acute process, reviewed, no areas of consolidation noted  --CTA chest negative for PE, no infiltrates seen  --EKG with a-fib RVR, HR as 121,  asymptomatic  --initiated on Cardizem gtt and given one-time dose Eliquis at [place] [facility]  -- Started on Eliquis 5 mg BID for anticoagulation  --hold antihypertensives for now  --check echocardiogram, last echo from 2016 with EF 50%  -- TSH low normal free T4.  -- Cardiology consulted.  Recommend to continue home propranolol 60 mg daily.  -- No indication to add any new rate/rhythm medication at this point.  --Follow-up with cardiology as an outpatient with repeated echo and EKG.     Covid-19  Leukocytosis  --tested positive on home test 8/20/2024, currently asymptomatic, on room air, defer treatment for Covid  --WBCs 11.47, leukocytosis likely secondary to recent steroid use, afebrile  --AM CBC     HTN  HLD  -- Blood pressure was soft during the admission but patient was on diltiazem drip  --Patient is a nurse.  She is betito measure her blood pressure twice a day with close follow-up with her PCP.  --She was asked to hold her blood pressure medication losartan/amlodipine if her blood pressure less than 90.  She voiced understanding.  --continue statin     DM2  --A1c 5.3, on Mounjaro weekly at home       NGOC   --okay to use home CPAP     TMJ  Migraines  -- Continue home steroid dose pack  --Tylenol 1000 mg TID prn for pain  -- continue propanolol      Anxiety  Depression  --continue home Elavil, Prozac     GERD  --PPI      Discharge Follow Up Recommendations for outpatient labs/diagnostics:  Follow-up with PCP in 1 week  Follow-up with cardiology with repeated echo/EKG in 4 to 6 weeks    Day of Discharge     HPI:   Patient was seen and examined today.  She converted to normal sinus rhythm with controlled rate.  Cardiology recommend to discharge patient on the same dose of propranolol in addition to Eliquis 5 mg twice daily.  Okay to continue blood pressure medications and monitor her blood pressure.  She is going to follow-up as listed above.    Review of Systems  No fever, chills, chest pain, shortness of breath or  abdominal pain.    Vital Signs:   Temp:  [97.4 °F (36.3 °C)-99 °F (37.2 °C)] 97.7 °F (36.5 °C)  Heart Rate:  [] 62  Resp:  [16-22] 20  BP: ()/(54-98) 100/70      Physical Exam:  Constitutional: Awake, alert  Eyes: PERRLA, sclerae anicteric, no conjunctival injection  HENT: NCAT, mucous membranes moist  Neck: Supple, no thyromegaly, no lymphadenopathy, trachea midline  Respiratory: Clear to auscultation bilaterally, nonlabored respirations, room air  Cardiovascular: Regularly  rhythm, no murmurs, rubs, or gallops, palpable pedal pulses bilaterally  Gastrointestinal: Hypoactive bowel sounds, soft, nontender, nondistended  Musculoskeletal: No bilateral ankle edema, no clubbing or cyanosis to extremities  Psychiatric: Appropriate affect, cooperative  Neurologic: Oriented x 3, strength symmetric in all extremities, Cranial Nerves grossly intact to confrontation, speech clear  Skin: No rashes    Pertinent  and/or Most Recent Results     LAB RESULTS:      Lab 08/28/24 0342 08/27/24  1112   WBC 13.21* 11.47*   HEMOGLOBIN 12.9 13.9   HEMATOCRIT 39.1 42.0   PLATELETS 361 364   NEUTROS ABS  --  9.44*   IMMATURE GRANS (ABS)  --  0.02   LYMPHS ABS  --  1.45   MONOS ABS  --  0.56   EOS ABS  --  0.00   MCV 82.8 82.7   PROTIME  --  13.1   APTT  --  26.5*         Lab 08/28/24 0342 08/27/24 2006 08/27/24  1112   SODIUM 139  --  137   POTASSIUM 4.4  --  3.9   CHLORIDE 103  --  100   CO2 22.0  --  23.4   ANION GAP 14.0  --  13.6   BUN 41*  --  34*   CREATININE 1.25*  --  0.92   EGFR 48.8*  --  70.5   GLUCOSE 91  --  111*   CALCIUM 9.9  --  10.1   TSH  --  0.235*  --          Lab 08/27/24  1112   TOTAL PROTEIN 7.9   ALBUMIN 4.8   GLOBULIN 3.1   ALT (SGPT) 50*   AST (SGOT) 31   BILIRUBIN 0.6   ALK PHOS 113         Lab 08/27/24  1112   PROBNP 198.0   HSTROP T 7   PROTIME 13.1   INR 0.94                 Brief Urine Lab Results  (Last result in the past 365 days)        Color   Clarity   Blood   Leuk Est   Nitrite   Protein    CREAT   Urine HCG        12/01/23 0927             26.5               Microbiology Results (last 10 days)       Procedure Component Value - Date/Time    COVID-19, FLU A/B, RSV PCR 1 HR TAT - Swab, Nasopharynx [[id number]]  (Abnormal) Collected: 08/27/24 1418    Lab Status: Final result Specimen: Swab from Nasopharynx Updated: 08/27/24 1503     COVID19 Detected     Influenza A PCR Not Detected     Influenza B PCR Not Detected     RSV, PCR Not Detected    Narrative:      Fact sheet for providers: https://www.fda.gov/media/716630/download    Fact sheet for patients: https://www.fda.gov/media/312681/download    Test performed by PCR.            CT Angiogram Chest Pulmonary Embolism    Result Date: 8/27/2024  CT ANGIOGRAM CHEST PULMONARY EMBOLISM Date of Exam: 8/27/2024 12:07 PM EDT Indication: palpitations. Comparison: 12/15/2016 Technique: Axial CT images were obtained of the chest after the uneventful intravenous administration of 75 mL Isovue-370 utilizing pulmonary embolism protocol.  Reconstructed coronal and sagittal images were also obtained. Automated exposure control and  iterative construction methods were used. FINDINGS: Thoracic inlet: Unremarkable. Pulmonary arteries: No filling defects are identified within the pulmonary arteries to suggest acute pulmonary embolism. Great vessels: Mild vascular calcifications are present. The thoracic aorta and proximal arch vessels appear otherwise grossly unremarkable. Mediastinum/Natalie: No pathologically enlarged mediastinal lymph nodes are seen. The esophagus appears unremarkable. Lung parenchyma: There are hypoventilatory changes within the bilateral lower lobes. No acute infiltrate is definitely identified. No suspicious pulmonary nodules are seen. Trachea and airways: The trachea and central airways appear unremarkable. Pleural space: No significant pleural effusion or pneumothorax is seen. Heart and pericardium: Mild coronary artery calcifications. Otherwise, the  heart and pericardium appear unremarkable. Chest wall: No acute or suspicious osseous or soft tissue lesion is identified. Upper abdomen: No acute abnormality is identified within the visualized upper abdomen. Status post cholecystectomy. Multiple splenic granulomas. Left kidney is not visualized.     1.No acute abnormality is identified within the thorax. Specifically, there is no evidence of acute pulmonary embolism. 2.Please see above for additional details. Electronically Signed: Raymundo Be MD  8/27/2024 12:33 PM EDT  Workstation ID: [id number]    XR Chest 1 View    Result Date: 8/27/2024  XR CHEST 1 VW Date of Exam: 8/27/2024 11:20 AM EDT Indication: palpitations Comparison: None available. Findings: Single view. Heart and pulmonary vessels and mediastinal contours are within normal limits. Lung fields are well inflated and clear. There are no effusions.     Impression: Negative. Electronically Signed: Kim Valdez MD  8/27/2024 11:41 AM EDT  Workstation ID: [id number]             Results for orders placed during the hospital encounter of 12/15/16    Adult Transthoracic Echo Limited    Interpretation Summary  · Left ventricular function is normal. Estimated EF = 50%.  · Left atrial cavity size is mildly dilated.      Plan for Follow-up of Pending Labs/Results:     Discharge Details        Discharge Medications        New Medications        Instructions Start Date   Eliquis 5 MG tablet tablet  Generic drug: apixaban   5 mg, Oral, Every 12 Hours Scheduled             Continue These Medications        Instructions Start Date   amitriptyline 10 MG tablet  Commonly known as: ELAVIL   10 mg, Oral, Nightly      amLODIPine 5 MG tablet  Commonly known as: NORVASC   5 mg, Oral, Daily      atorvastatin 20 MG tablet  Commonly known as: LIPITOR   20 mg, Oral, Nightly      cholecalciferol 25 MCG (1000 UT) tablet  Commonly known as: VITAMIN D3   2,000 Units, Oral, Daily      clobetasol 0.05 % external solution  Commonly  known as: TEMOVATE   Apply to itchy, scaly areas on scalp topically as directed 2 (Two) Times a Day.      clobetasol 0.05 % external solution  Commonly known as: TEMOVATE   Apply 1 Application topically to the appropriate area on scalp as directed 2 (Two) Times a Day.      colestipol 1 g tablet  Commonly known as: Colestid   Take 2 tablets by mouth every morning.  **Take 2 hours separate from other medications      FLUoxetine 40 MG capsule  Commonly known as: PROzac   80 mg, Oral, Daily      fluticasone 50 MCG/ACT nasal spray  Commonly known as: FLONASE   Instill 2 sprays into the nostril(s) as directed by provider Daily.      hydrocortisone 2.5 % ointment   Apply 1 application  topically to the affected area(s) as directed 2 (Two) Times a Day.      hydrOXYzine 50 MG tablet  Commonly known as: ATARAX   50 mg, Oral, 3 Times Daily PRN      icosapent ethyl 1 g capsule capsule  Commonly known as: Vascepa   Take 2 capsules by mouth 2 (Two) Times a Day With Meals.      ketoconazole 2 % shampoo  Commonly known as: NIZORAL   Use as a shampoo to scalp and face 3 times a week. Leave in for 5 minutes and rinse.      ketoconazole 2 % shampoo  Commonly known as: NIZORAL   1 Application, Topical, 3 Times Weekly, Leave on for 5 minutes, then rinse.      losartan 50 MG tablet  Commonly known as: Cozaar   50 mg, Oral, Daily      Magnesium 250 MG tablet   1 tablet, Oral, Daily      methylPREDNISolone 4 MG dose pack  Commonly known as: MEDROL   Take as directed on package instructions.      Mounjaro 15 MG/0.5ML solution pen-injector pen  Generic drug: Tirzepatide   15 mg, Subcutaneous, Every 7 Days      mupirocin 2 % ointment  Commonly known as: BACTROBAN   Apply a thin film topically to the appropriate area as directed.      ondansetron ODT 4 MG disintegrating tablet  Commonly known as: ZOFRAN-ODT   4 mg, Translingual, Every 8 Hours PRN      pantoprazole 40 MG EC tablet  Commonly known as: PROTONIX   40 mg, Oral, Daily      PROBIOTIC  DAILY PO   Oral, Daily      propranolol LA 60 MG 24 hr capsule  Commonly known as: INDERAL LA   60 mg, Oral, Daily               Allergies   Allergen Reactions    Lisinopril Cough    Celexa [Citalopram] Other (See Comments)     Jittery          Discharge Disposition:  Home or Self Care    Diet:  Hospital:  Diet Order   Procedures    Diet: Cardiac; Healthy Heart (2-3 Na+); Fluid Consistency: Thin (IDDSI 0)            Activity: As tolerated             CODE STATUS:    Code Status and Medical Interventions: CPR (Attempt to Resuscitate); Full Support   Ordered at: 08/27/24 1814     Level Of Support Discussed With:    Patient     Code Status (Patient has no pulse and is not breathing):    CPR (Attempt to Resuscitate)     Medical Interventions (Patient has pulse or is breathing):    Full Support       Future Appointments   Date Time Provider Department Center   9/4/2024  9:45 AM Daly Archibald PA-C [facility]   12/16/2024  8:30 AM Paige Fenton APRN [facility]                 Corrie Walter MD  08/28/24      Time Spent on Discharge:  I spent 35 minutes on this discharge activity which included: face-to-face encounter with the patient, reviewing the data in the system, coordination of the care with the nursing staff as well as consultants, documentation, and entering orders.

## 2024-08-28 NOTE — OUTREACH NOTE
Prep Survey      Flowsheet Row Responses   [facility] patient discharged from? [place]   Is LACE score < 7 ? No   Eligibility [facility]   Date of Admission 08/27/24   Date of Discharge 08/28/24   Discharge Disposition Home or Self Care   Discharge diagnosis Atrial fibrillation with RVR   Does the patient have one of the following disease processes/diagnoses(primary or secondary)? Other   Does the patient have Home health ordered? No   Is there a DME ordered? No   Prep survey completed? Yes            Rima MCCULLOUGH - Registered Nurse

## 2024-08-29 ENCOUNTER — TRANSITIONAL CARE MANAGEMENT TELEPHONE ENCOUNTER (OUTPATIENT)
Dept: CALL CENTER | Facility: HOSPITAL | Age: 64
End: 2024-08-29
Payer: COMMERCIAL

## 2024-08-29 LAB
QT INTERVAL: 426 MS
QTC INTERVAL: 432 MS

## 2024-08-29 NOTE — OUTREACH NOTE
Call Center TCM Note      Flowsheet Row Responses   [facility] patient discharged from? [place]   Does the patient have one of the following disease processes/diagnoses(primary or secondary)? Other   TCM attempt successful? 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   Meds reviewed with patient/caregiver? Yes   Is the patient having any side effects they believe may be caused by any medication additions or changes? No   Does the patient have all medications ordered at discharge? Yes   Is the patient taking all medications as directed (includes completed medication regime)? Yes   Comments Previously scheduled appt with PCP for 9/4   Does the patient have an appointment with their PCP within 7-14 days of discharge? No   Nursing Interventions Confirmed date/time of appointment   Has home health visited the patient within 72 hours of discharge? N/A   Psychosocial issues? No   Did the patient receive a copy of their discharge instructions? Yes   Nursing interventions Reviewed instructions with patient   What is the patient's perception of their health status since discharge? Improving   Is the patient/caregiver able to teach back signs and symptoms related to disease process for when to call PCP? Yes   Is the patient/caregiver able to teach back signs and symptoms related to disease process for when to call 911? Yes   Is the patient/caregiver able to teach back the hierarchy of who to call/visit for symptoms/problems? PCP, Specialist, Home health nurse, Urgent Care, ED, 911 Yes   TCM call completed? 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   Call end time 1010   Would this patient benefit from a Referral to [facility] Social Work? No   Is the patient interested in additional calls from an ambulatory ? No            Patricia Austin RN    8/29/2024, 10:11 EDT

## 2024-09-01 LAB
QT INTERVAL: 322 MS
QTC INTERVAL: 457 MS

## 2024-09-04 ENCOUNTER — OFFICE VISIT (OUTPATIENT)
Dept: FAMILY MEDICINE CLINIC | Facility: CLINIC | Age: 64
End: 2024-09-04
Payer: COMMERCIAL

## 2024-09-04 VITALS
WEIGHT: 164.6 LBS | TEMPERATURE: 98.1 F | HEART RATE: 72 BPM | SYSTOLIC BLOOD PRESSURE: 98 MMHG | DIASTOLIC BLOOD PRESSURE: 70 MMHG | HEIGHT: 62 IN | OXYGEN SATURATION: 96 % | BODY MASS INDEX: 30.29 KG/M2

## 2024-09-04 DIAGNOSIS — E11.65 TYPE 2 DIABETES MELLITUS WITH HYPERGLYCEMIA, WITHOUT LONG-TERM CURRENT USE OF INSULIN: ICD-10-CM

## 2024-09-04 DIAGNOSIS — I10 ESSENTIAL HYPERTENSION: ICD-10-CM

## 2024-09-04 DIAGNOSIS — I48.91 ATRIAL FIBRILLATION WITH RVR: ICD-10-CM

## 2024-09-04 DIAGNOSIS — Z09 HOSPITAL DISCHARGE FOLLOW-UP: Primary | ICD-10-CM

## 2024-09-04 LAB
EXPIRATION DATE: NORMAL
HBA1C MFR BLD: 5.5 % (ref 4.5–5.7)
Lab: NORMAL

## 2024-09-04 PROCEDURE — 83036 HEMOGLOBIN GLYCOSYLATED A1C: CPT | Performed by: PHYSICIAN ASSISTANT

## 2024-09-04 PROCEDURE — 99495 TRANSJ CARE MGMT MOD F2F 14D: CPT | Performed by: PHYSICIAN ASSISTANT

## 2024-09-04 NOTE — PROGRESS NOTES
Transitional Care Follow Up Visit  Subjective     Yamila Neff is a 63 y.o. female who presents for a transitional care management visit.    Within 48 business hours after discharge our office contacted her via telephone to coordinate her care and needs.      I reviewed and discussed the details of that call along with the discharge summary, hospital problems, inpatient lab results, inpatient diagnostic studies, and consultation reports with Yamila.     Current outpatient and discharge medications have been reconciled for the patient.        8/28/2024     6:15 PM   Date of TCM Phone Call   [facility]   Date of Admission 8/27/2024   Date of Discharge 8/28/2024   Discharge Disposition Home or Self Care     Risk for Readmission (LACE) Score: 7 (8/28/2024  6:00 AM)      History of Present Illness  Patient is a pleasant 63-year-old female who presents for routine hospital follow-up.  Patient was recently admitted at [facility] from 8/27-8/28 after developing atrial fibrillation with RVR felt to be secondary to recent Covid infection.  Patient was placed on a diltiazem drip and her rhythm returned to sinus.  She was started on Eliquis 5 mg twice daily.  She was told to continue on propranolol.  Her blood pressure is low today.  She denies any symptoms of hypotension.  Supposed to follow-up with cardiology.    Hospital Course:  Yamila Neff is a 63 y.o. female with PMH significant for HTN, HLD, DM2, breast cancer, sleep apnea, psoriasis, CHRISTIANE, absence of one kidney who presents to [facility] from [facility] ED where she presented with palpitations and was found to be in a-fib with RVR. Patient was initiated on a Cardizem gtt and given a one-time dose of Eliquis prior to transfer. Patient states she tested positive for COVID-19 last Tuesday, 8/20/2024. Cardiology consulted from ED.     Copied text in this note has been reviewed and is accurate as of 08/28/2024        A-fib with RVR.  Paroxysmal.   Converted to normal sinus rhythm with controlled rate.  --etiology of a-fib possibly secondary to Covid, also recently started on steroid dose pack on Monday, 8/26, for pain r/t TMJ  --CXR negative for acute process, reviewed, no areas of consolidation noted  --CTA chest negative for PE, no infiltrates seen  --EKG with a-fib RVR, HR as 121, asymptomatic  --initiated on Cardizem gtt and given one-time dose Eliquis at [facility]  -- Started on Eliquis 5 mg BID for anticoagulation  --hold antihypertensives for now  --check echocardiogram, last echo from 2016 with EF 50%  -- TSH low normal free T4.  -- Cardiology consulted.  Recommend to continue home propranolol 60 mg daily.  -- No indication to add any new rate/rhythm medication at this point.  --Follow-up with cardiology as an outpatient with repeated echo and EKG.     Covid-19  Leukocytosis  --tested positive on home test 8/20/2024, currently asymptomatic, on room air, defer treatment for Covid  --WBCs 11.47, leukocytosis likely secondary to recent steroid use, afebrile  --AM CBC     HTN  HLD  -- Blood pressure was soft during the admission but patient was on diltiazem drip  --Patient is a nurse.  She is betito measure her blood pressure twice a day with close follow-up with her PCP.  --She was asked to hold her blood pressure medication losartan/amlodipine if her blood pressure less than 90.  She voiced understanding.  --continue statin     DM2  --A1c 5.3, on Mounjaro weekly at home        NGOC   --okay to use home CPAP     TMJ  Migraines  -- Continue home steroid dose pack  --Tylenol 1000 mg TID prn for pain  -- continue propanolol      Anxiety  Depression  --continue home Elavil, Prozac     GERD  --PPI       Duration of Hospital Stay:  08/27 to 08/28     The following portions of the patient's history were reviewed and updated as appropriate: allergies, current medications, past family history, past medical history, past social history, past surgical history, and  problem list.    Current outpatient and discharge medications have been reconciled for the patient.  Reviewed by: Daly Archibald PA-C    Review of Systems   Constitutional:  Positive for fatigue. Negative for chills, diaphoresis and fever.   HENT:  Negative for congestion, postnasal drip and rhinorrhea.    Eyes:  Negative for visual disturbance.   Respiratory:  Negative for cough, shortness of breath and wheezing.    Cardiovascular:  Negative for chest pain and leg swelling.   Endocrine: Negative for polyuria.   Neurological:  Negative for dizziness and headache.   Psychiatric/Behavioral:  Positive for stress. Negative for self-injury, sleep disturbance, suicidal ideas and depressed mood. The patient is not nervous/anxious.        Current Outpatient Medications on File Prior to Visit   Medication Sig Dispense Refill    amitriptyline (ELAVIL) 10 MG tablet Take 1 tablet by mouth Every Night. 90 tablet 0    amLODIPine (NORVASC) 5 MG tablet Take 1 tablet by mouth Daily. 90 tablet 1    apixaban (ELIQUIS) 5 MG tablet tablet Take 1 tablet by mouth Every 12 (Twelve) Hours. Indications: Atrial Fibrillation 60 tablet 6    atorvastatin (LIPITOR) 20 MG tablet Take 1 tablet by mouth Every Night. 90 tablet 1    cholecalciferol (VITAMIN D3) 1000 units tablet Take 2 tablets by mouth Daily.      clobetasol (TEMOVATE) 0.05 % external solution Apply to itchy, scaly areas on scalp topically as directed 2 (Two) Times a Day. 25 mL 3    clobetasol (TEMOVATE) 0.05 % external solution Apply 1 Application topically to the appropriate area on scalp as directed 2 (Two) Times a Day. 25 mL 12    colestipol (Colestid) 1 g tablet Take 2 tablets by mouth every morning.  **Take 2 hours separate from other medications 180 tablet 3    FLUoxetine (PROzac) 40 MG capsule Take 2 capsules by mouth Daily. 180 capsule 1    fluticasone (FLONASE) 50 MCG/ACT nasal spray Instill 2 sprays into the nostril(s) as directed by provider Daily. 16 g 11     "hydrocortisone 2.5 % ointment Apply 1 application  topically to the affected area(s) as directed 2 (Two) Times a Day. 20 g 4    hydrOXYzine (ATARAX) 50 MG tablet Take 1 tablet by mouth 3 (Three) Times a Day As Needed for Itching. 90 tablet 5    icosapent ethyl (Vascepa) 1 g capsule capsule Take 2 capsules by mouth 2 (Two) Times a Day With Meals. 360 capsule 1    ketoconazole (NIZORAL) 2 % shampoo Use as a shampoo to scalp and face 3 times a week. Leave in for 5 minutes and rinse. 120 mL 3    ketoconazole (NIZORAL) 2 % shampoo Apply 1 Application topically to the appropriate area as directed 3 (Three) Times a Week. Leave on for 5 minutes, then rinse. 120 mL 12    Magnesium 250 MG tablet Take 1 tablet by mouth Daily.      mupirocin (BACTROBAN) 2 % ointment Apply a thin film topically to the appropriate area as directed. 22 g 2    ondansetron ODT (ZOFRAN-ODT) 4 MG disintegrating tablet Place 1 tablet on the tongue Every 8 (Eight) Hours As Needed for Nausea or Vomiting. 14 tablet 0    pantoprazole (PROTONIX) 40 MG EC tablet Take 1 tablet by mouth Daily. 90 tablet 3    Probiotic Product (PROBIOTIC DAILY PO) Take  by mouth Daily.      propranolol LA (INDERAL LA) 60 MG 24 hr capsule Take 1 capsule by mouth Daily. 90 capsule 0    Tirzepatide (Mounjaro) 15 MG/0.5ML solution pen-injector pen Inject 0.5 mL under the skin into the appropriate area as directed Every 7 (Seven) Days. 6 mL 3     No current facility-administered medications on file prior to visit.       Results for orders placed or performed in visit on 09/04/24   POC Glycosylated Hemoglobin (Hb A1C)    Specimen: Blood   Result Value Ref Range    Hemoglobin A1C 5.5 4.5 - 5.7 %    Lot Number [id number]     Expiration Date 5,232,026        Visit Vitals  BP 98/70 (BP Location: Left arm, Patient Position: Sitting, Cuff Size: Adult)   Pulse 72   Temp 98.1 °F (36.7 °C) (Oral)   Ht 157.5 cm (62.01\")   Wt 74.7 kg (164 lb 9.6 oz)   SpO2 96%   BMI 30.10 kg/m²     Body mass " index is 30.1 kg/m².    Objective   Physical Exam  Vitals reviewed.   Constitutional:       General: She is not in acute distress.     Appearance: Normal appearance. She is well-developed and normal weight. She is not ill-appearing or diaphoretic.   HENT:      Head: Normocephalic and atraumatic.   Eyes:      Extraocular Movements: Extraocular movements intact.      Conjunctiva/sclera: Conjunctivae normal.   Cardiovascular:      Rate and Rhythm: Normal rate and regular rhythm.      Heart sounds: Normal heart sounds.   Pulmonary:      Effort: Pulmonary effort is normal.      Breath sounds: Normal breath sounds.   Musculoskeletal:         General: Normal range of motion.      Cervical back: Normal range of motion.      Right lower leg: No edema.      Left lower leg: No edema.   Skin:     General: Skin is warm.      Findings: No erythema or rash.   Neurological:      General: No focal deficit present.      Mental Status: She is alert.   Psychiatric:         Attention and Perception: Attention and perception normal. She is attentive.         Mood and Affect: Mood and affect normal.         Speech: Speech normal.         Behavior: Behavior normal. Behavior is cooperative.         Thought Content: Thought content normal.         Cognition and Memory: Cognition and memory normal.         Judgment: Judgment normal.         Assessment & Plan   Diagnoses and all orders for this visit:    1. Hospital discharge follow-up (Primary)    2. Atrial fibrillation with RVR  RRR today.  Continue on Eliquis and Propranolol.  Follow-up with cardiology.    3. Type 2 diabetes mellitus with hyperglycemia, without long-term current use of insulin  -     POC Glycosylated Hemoglobin (Hb A1C)  Hemoglobin A1c is 5.5% and well-controlled.  Continue on current medication.  Tolerating Mounjaro well.  Return in 3-4 months.    4. Essential hypertension  Low blood pressure on repeat.  Hold losartan.  Monitor BP at home.  If less than 120/70 on a regular  basis, stop amlodipine.  Continue on propranolol.             Dictated Utilizing Dragon Dictation     Please note that portions of this note were completed with a voice recognition program.     Part of this note may be an electronic transcription/translation of spoken language to printed text using the Dragon Dictation System.

## 2024-10-02 ENCOUNTER — OFFICE VISIT (OUTPATIENT)
Dept: FAMILY MEDICINE CLINIC | Facility: CLINIC | Age: 64
End: 2024-10-02
Payer: COMMERCIAL

## 2024-10-02 VITALS
HEART RATE: 68 BPM | HEIGHT: 62 IN | BODY MASS INDEX: 30.47 KG/M2 | SYSTOLIC BLOOD PRESSURE: 98 MMHG | OXYGEN SATURATION: 97 % | WEIGHT: 165.6 LBS | DIASTOLIC BLOOD PRESSURE: 68 MMHG

## 2024-10-02 DIAGNOSIS — I48.91 ATRIAL FIBRILLATION WITH RVR: ICD-10-CM

## 2024-10-02 DIAGNOSIS — F33.2 SEVERE EPISODE OF RECURRENT MAJOR DEPRESSIVE DISORDER, WITHOUT PSYCHOTIC FEATURES: ICD-10-CM

## 2024-10-02 DIAGNOSIS — L03.113 CELLULITIS OF RIGHT UPPER EXTREMITY: Primary | ICD-10-CM

## 2024-10-02 DIAGNOSIS — Z23 IMMUNIZATION DUE: ICD-10-CM

## 2024-10-02 DIAGNOSIS — I10 ESSENTIAL HYPERTENSION: ICD-10-CM

## 2024-10-02 PROCEDURE — 99214 OFFICE O/P EST MOD 30 MIN: CPT | Performed by: PHYSICIAN ASSISTANT

## 2024-10-02 PROCEDURE — 90471 IMMUNIZATION ADMIN: CPT | Performed by: PHYSICIAN ASSISTANT

## 2024-10-02 PROCEDURE — 90656 IIV3 VACC NO PRSV 0.5 ML IM: CPT | Performed by: PHYSICIAN ASSISTANT

## 2024-10-02 RX ORDER — DOXYCYCLINE 100 MG/1
100 CAPSULE ORAL 2 TIMES DAILY
Qty: 20 CAPSULE | Refills: 0 | Status: SHIPPED | OUTPATIENT
Start: 2024-10-02 | End: 2024-10-12

## 2024-10-02 NOTE — PROGRESS NOTES
Chief Complaint   Patient presents with    Insect Bite     24         Yamila Neff is a 63 y.o. female who presents for Insect Bite (24)    Patient reports insect bite on .  The area is warm and erythematous.  Looks slightly better than 2 days ago.  Patient was worried since she has had lymph node removal in the right axilla/arm.  No antibiotic allergies.    Worsening depression.  Has lots of family stress.  Feeling overwhelmed and more sad lately.  No suicidal ideation.  Interested in counseling.  Currently on Prozac 80 mg daily and amitriptyline 10 mg nightly.      Blood pressure on repeat is 100/60.  On propranolol for migraines and atrial fibrillation.  Followed by cardiology.    Past Medical History:   Diagnosis Date    Anemia     Arthritis     Carpal tunnel syndrome 2012    Degenerative joint disease     Depression     Depression     Diabetes mellitus     Elevated cholesterol     Elevated liver enzymes     Fatty liver     Hearing aid worn     HL (hearing loss)     Hypertension     Kidney disorder     one kidney    Malignant neoplasm of right female breast 2023    Palpitations     Polycystic ovaries     Psoriasis     PVC (premature ventricular contraction)     occasional pvc's    S/P total knee arthroplasty, right 2021    Sleep apnea     cpap at home    Wears glasses        Past Surgical History:   Procedure Laterality Date    BREAST BIOPSY Right     PAPILLOMA DR BELL     SECTION      x 3    CHOLECYSTECTOMY  2013    COLONOSCOPY      HYSTERECTOMY  2006    complete    JOINT REPLACEMENT Left 2011    left knee    MASTECTOMY W/ SENTINEL NODE BIOPSY Bilateral 2023    Procedure: MASTECTOMY WITH SENTINEL NODE BIOPSY RIGHT, LEFT PROPHYLATIC MASTECTOMY;  Surgeon: Emi Lizama MD;  [facility]: [facility];  Service: General;  Laterality: Bilateral;    OOPHORECTOMY      TOTAL ABDOMINAL HYSTERECTOMY WITH SALPINGO OOPHORECTOMY      TOTAL KNEE ARTHROPLASTY Right  "2021    Procedure: TOTAL KNEE ARTHROPLASTY RIGHT;  Surgeon: Mateo Keith MD;  Location: [facility];  Service: Orthopedics;  Laterality: Right;    TUBAL ABDOMINAL LIGATION         Family History   Problem Relation Age of Onset    Diabetes Mother         Type 2    Lymphoma Mother     Hypertension Mother     Cancer Mother         Nonhodgkins Lymphoma    Hearing loss Mother         Hearing Aids    No Known Problems Father     Hypertension Brother     Arthritis Brother     Hypertension Brother     Colon cancer Maternal Aunt     Heart disease Maternal Grandmother     Arthritis Maternal Grandmother             Diabetes Maternal Grandmother         Type 2-     Heart disease Maternal Grandfather     Heart attack Maternal Grandfather     Diabetes Maternal Aunt         Type 2    Hypertension Maternal Aunt     Diabetes Daughter         Type 1    Hypertension Brother     Breast cancer Neg Hx     Ovarian cancer Neg Hx        Social History     Socioeconomic History    Marital status:    Tobacco Use    Smoking status: Never    Smokeless tobacco: Never   Vaping Use    Vaping status: Never Used   Substance and Sexual Activity    Alcohol use: No    Drug use: No    Sexual activity: Not Currently     Partners: Male     Birth control/protection: Surgical     Comment:        Allergies   Allergen Reactions    Lisinopril Cough    Celexa [Citalopram] Other (See Comments)     Keturah CHURCHILL    Review of Systems   Cardiovascular:  Negative for chest pain, palpitations and leg swelling.   Skin:  Positive for color change and skin lesions.   Neurological:  Negative for dizziness, light-headedness and headache.   Psychiatric/Behavioral:  Positive for depressed mood and stress. Negative for suicidal ideas.        Vitals:    10/02/24 1000   BP: 98/68   BP Location: Left arm   Patient Position: Sitting   Cuff Size: Adult   Pulse: 68   SpO2: 97%   Weight: 75.1 kg (165 lb 9.6 oz)   Height: 157.5 cm (62.01\") "     Body mass index is 30.28 kg/m².    Current Outpatient Medications on File Prior to Visit   Medication Sig Dispense Refill    amitriptyline (ELAVIL) 10 MG tablet Take 1 tablet by mouth Every Night. 90 tablet 0    apixaban (ELIQUIS) 5 MG tablet tablet Take 1 tablet by mouth Every 12 (Twelve) Hours. Indications: Atrial Fibrillation 60 tablet 6    atorvastatin (LIPITOR) 20 MG tablet Take 1 tablet by mouth Every Night. 90 tablet 1    cholecalciferol (VITAMIN D3) 1000 units tablet Take 2 tablets by mouth Daily.      clobetasol (TEMOVATE) 0.05 % external solution Apply to itchy, scaly areas on scalp topically as directed 2 (Two) Times a Day. 25 mL 3    clobetasol (TEMOVATE) 0.05 % external solution Apply 1 Application topically to the appropriate area on scalp as directed 2 (Two) Times a Day. 25 mL 12    colestipol (Colestid) 1 g tablet Take 2 tablets by mouth every morning.  **Take 2 hours separate from other medications 180 tablet 3    FLUoxetine (PROzac) 40 MG capsule Take 2 capsules by mouth Daily. 180 capsule 1    fluticasone (FLONASE) 50 MCG/ACT nasal spray Instill 2 sprays into the nostril(s) as directed by provider Daily. 16 g 11    hydrocortisone 2.5 % ointment Apply 1 application  topically to the affected area(s) as directed 2 (Two) Times a Day. 20 g 4    hydrOXYzine (ATARAX) 50 MG tablet Take 1 tablet by mouth 3 (Three) Times a Day As Needed for Itching. 90 tablet 5    icosapent ethyl (Vascepa) 1 g capsule capsule Take 2 capsules by mouth 2 (Two) Times a Day With Meals. 360 capsule 1    ketoconazole (NIZORAL) 2 % shampoo Use as a shampoo to scalp and face 3 times a week. Leave in for 5 minutes and rinse. 120 mL 3    ketoconazole (NIZORAL) 2 % shampoo Apply 1 Application topically to the appropriate area as directed 3 (Three) Times a Week. Leave on for 5 minutes, then rinse. 120 mL 12    Magnesium 250 MG tablet Take 1 tablet by mouth Daily.      mupirocin (BACTROBAN) 2 % ointment Apply a thin film topically  to the appropriate area as directed. 22 g 2    ondansetron ODT (ZOFRAN-ODT) 4 MG disintegrating tablet Place 1 tablet on the tongue Every 8 (Eight) Hours As Needed for Nausea or Vomiting. 14 tablet 0    pantoprazole (PROTONIX) 40 MG EC tablet Take 1 tablet by mouth Daily. 90 tablet 3    Probiotic Product (PROBIOTIC DAILY PO) Take  by mouth Daily.      propranolol LA (INDERAL LA) 60 MG 24 hr capsule Take 1 capsule by mouth Daily. 90 capsule 0    Tirzepatide (Mounjaro) 15 MG/0.5ML solution pen-injector pen Inject 0.5 mL under the skin into the appropriate area as directed Every 7 (Seven) Days. 6 mL 3    [DISCONTINUED] amLODIPine (NORVASC) 5 MG tablet Take 1 tablet by mouth Daily. 90 tablet 1     No current facility-administered medications on file prior to visit.       Results for orders placed or performed in visit on 09/04/24   POC Glycosylated Hemoglobin (Hb A1C)    Specimen: Blood   Result Value Ref Range    Hemoglobin A1C 5.5 4.5 - 5.7 %    Lot Number [id number]     Expiration Date 5,232,026        PE    Physical Exam  Vitals reviewed.   Constitutional:       General: She is not in acute distress.     Appearance: Normal appearance. She is well-developed. She is not ill-appearing or diaphoretic.   HENT:      Head: Normocephalic and atraumatic.   Eyes:      Extraocular Movements: Extraocular movements intact.      Conjunctiva/sclera: Conjunctivae normal.   Pulmonary:      Effort: No respiratory distress.   Musculoskeletal:         General: Normal range of motion.      Cervical back: Normal range of motion.   Skin:     Findings: Erythema present.          Neurological:      General: No focal deficit present.      Mental Status: She is alert.   Psychiatric:         Attention and Perception: Attention and perception normal. She is attentive.         Mood and Affect: Mood normal. Mood is depressed. Affect is tearful.         Speech: Speech normal.         Behavior: Behavior normal. Behavior is cooperative.          Thought Content: Thought content normal.         Cognition and Memory: Cognition and memory normal.         Judgment: Judgment normal.          A/P    Diagnoses and all orders for this visit:    1. Cellulitis of right upper extremity (Primary)  -     doxycycline (VIBRAMYCIN) 100 MG capsule; Take 1 capsule by mouth 2 (Two) Times a Day for 10 days.  Dispense: 20 capsule; Refill: 0  New.  Noticed first on 09/24.  Warm and erythematous circular region.  Most likely reaction to bug bite.   Will cover for cellulitis given duration and minimal improvement.  Doxycycline sent to pharmacy.  Call if she has any concerns or not improving.    2. Severe episode of recurrent major depressive disorder, without psychotic features  -     Ambulatory Referral to Behavioral Health  -     Ambulatory Referral to Behavioral Health  -     Cariprazine HCl (Vraylar) 1.5 MG capsule capsule; Take 1 capsule by mouth Daily.  Dispense: 30 capsule; Refill: 2  Worsening depression.  No suicidal ideation.  Continue on Prozac 80 mg and amitriptyline 10 mg nightly.  Will add in Vraylar 1.5 mg daily.  Referral started to behavioral health for counseling and medication management.  Telehealth preferred by patient.  Keep appointment in December.  Call sooner if she has worsening symptoms or any concerns.    3. Essential hypertension  Low today at 100/60.  Has been low the last few appointments.  Asymptomatic.  Stop amlodipine.  Monitor at home.    4. Atrial fibrillation with RVR  On propranolol and eliquis.  Followed by cardiology.    5. Immunization due  -     Fluzone >6mos ([id number])         Plan of care reviewed with patient at the conclusion of today's visit. Education was provided regarding diagnosis, management and any prescribed or recommended OTC medications.  Patient verbalizes understanding of and agreement with management plan.    Dictated Utilizing Dragon Dictation     Please note that portions of this note were completed with a voice  recognition program.     Part of this note may be an electronic transcription/translation of spoken language to printed text using the Dragon Dictation System.    No follow-ups on file.     Daly Archibald PA-C

## 2024-10-16 DIAGNOSIS — E78.1 HYPERTRIGLYCERIDEMIA: ICD-10-CM

## 2024-10-16 DIAGNOSIS — F51.01 PRIMARY INSOMNIA: ICD-10-CM

## 2024-10-16 DIAGNOSIS — G43.019 INTRACTABLE MIGRAINE WITHOUT AURA AND WITHOUT STATUS MIGRAINOSUS: ICD-10-CM

## 2024-10-16 DIAGNOSIS — F41.9 ANXIETY: ICD-10-CM

## 2024-10-16 DIAGNOSIS — F33.0 MILD EPISODE OF RECURRENT MAJOR DEPRESSIVE DISORDER: ICD-10-CM

## 2024-10-16 DIAGNOSIS — E78.2 MIXED HYPERLIPIDEMIA: ICD-10-CM

## 2024-10-16 DIAGNOSIS — L03.113 CELLULITIS OF RIGHT UPPER EXTREMITY: ICD-10-CM

## 2024-10-16 RX ORDER — PROPRANOLOL HCL 60 MG
60 CAPSULE, EXTENDED RELEASE 24HR ORAL DAILY
Qty: 90 CAPSULE | Refills: 0 | Status: CANCELLED | OUTPATIENT
Start: 2024-10-16

## 2024-10-16 RX ORDER — DOXYCYCLINE 100 MG/1
100 CAPSULE ORAL 2 TIMES DAILY
Qty: 20 CAPSULE | Refills: 0 | Status: CANCELLED | OUTPATIENT
Start: 2024-10-16 | End: 2024-10-26

## 2024-10-16 RX ORDER — FLUOXETINE 40 MG/1
80 CAPSULE ORAL DAILY
Qty: 180 CAPSULE | Refills: 1 | Status: CANCELLED | OUTPATIENT
Start: 2024-10-16

## 2024-10-16 RX ORDER — AMITRIPTYLINE HYDROCHLORIDE 10 MG/1
10 TABLET ORAL NIGHTLY
Qty: 90 TABLET | Refills: 0 | Status: CANCELLED | OUTPATIENT
Start: 2024-10-16

## 2024-10-16 RX ORDER — ICOSAPENT ETHYL 1 G/1
2 CAPSULE ORAL 2 TIMES DAILY WITH MEALS
Qty: 360 CAPSULE | Refills: 1 | Status: CANCELLED | OUTPATIENT
Start: 2024-10-16

## 2024-10-16 RX ORDER — ATORVASTATIN CALCIUM 20 MG/1
20 TABLET, FILM COATED ORAL NIGHTLY
Qty: 90 TABLET | Refills: 1 | Status: CANCELLED | OUTPATIENT
Start: 2024-10-16

## 2024-10-17 DIAGNOSIS — E78.2 MIXED HYPERLIPIDEMIA: ICD-10-CM

## 2024-10-17 DIAGNOSIS — F51.01 PRIMARY INSOMNIA: ICD-10-CM

## 2024-10-17 DIAGNOSIS — F41.9 ANXIETY: ICD-10-CM

## 2024-10-17 DIAGNOSIS — G43.019 INTRACTABLE MIGRAINE WITHOUT AURA AND WITHOUT STATUS MIGRAINOSUS: ICD-10-CM

## 2024-10-17 DIAGNOSIS — F33.0 MILD EPISODE OF RECURRENT MAJOR DEPRESSIVE DISORDER: ICD-10-CM

## 2024-10-17 DIAGNOSIS — E78.1 HYPERTRIGLYCERIDEMIA: ICD-10-CM

## 2024-10-17 DIAGNOSIS — L03.113 CELLULITIS OF RIGHT UPPER EXTREMITY: ICD-10-CM

## 2024-10-18 RX ORDER — FLUOXETINE 40 MG/1
80 CAPSULE ORAL DAILY
Qty: 180 CAPSULE | Refills: 1 | Status: SHIPPED | OUTPATIENT
Start: 2024-10-18

## 2024-10-18 RX ORDER — PROPRANOLOL HCL 60 MG
60 CAPSULE, EXTENDED RELEASE 24HR ORAL DAILY
Qty: 90 CAPSULE | Refills: 0 | Status: SHIPPED | OUTPATIENT
Start: 2024-10-18

## 2024-10-18 RX ORDER — ATORVASTATIN CALCIUM 20 MG/1
20 TABLET, FILM COATED ORAL NIGHTLY
Qty: 90 TABLET | Refills: 1 | Status: SHIPPED | OUTPATIENT
Start: 2024-10-18

## 2024-10-18 RX ORDER — ICOSAPENT ETHYL 1 G/1
2 CAPSULE ORAL 2 TIMES DAILY WITH MEALS
Qty: 360 CAPSULE | Refills: 1 | Status: SHIPPED | OUTPATIENT
Start: 2024-10-18

## 2024-10-18 RX ORDER — AMITRIPTYLINE HYDROCHLORIDE 10 MG/1
10 TABLET ORAL NIGHTLY
Qty: 90 TABLET | Refills: 0 | Status: SHIPPED | OUTPATIENT
Start: 2024-10-18

## 2024-10-18 RX ORDER — DOXYCYCLINE 100 MG/1
100 CAPSULE ORAL 2 TIMES DAILY
Qty: 20 CAPSULE | Refills: 0 | Status: SHIPPED | OUTPATIENT
Start: 2024-10-18 | End: 2024-10-31

## 2024-10-21 ENCOUNTER — PRIOR AUTHORIZATION (OUTPATIENT)
Dept: FAMILY MEDICINE CLINIC | Facility: CLINIC | Age: 64
End: 2024-10-21
Payer: COMMERCIAL

## 2024-10-21 NOTE — TELEPHONE ENCOUNTER
(Key: [id number])  Vraylar 1.5MG capsules  status: Question Response - N/A  Created: October 21st, 2024    The [employer] Prior Authorization Team is unable to review this request for prior authorization as the requested medication is on formulary and does not require a prior authorization. No further action is needed at this time.

## 2024-11-07 ENCOUNTER — TELEMEDICINE (OUTPATIENT)
Dept: PSYCHIATRY | Facility: CLINIC | Age: 64
End: 2024-11-07
Payer: COMMERCIAL

## 2024-11-07 DIAGNOSIS — F41.1 GAD (GENERALIZED ANXIETY DISORDER): Primary | ICD-10-CM

## 2024-11-07 DIAGNOSIS — F33.1 MDD (MAJOR DEPRESSIVE DISORDER), RECURRENT EPISODE, MODERATE: ICD-10-CM

## 2024-11-07 NOTE — PROGRESS NOTES
Sleep Clinic Follow Up Note    Chief Complaint  Sleeping Problem, Sleep Apnea, and Follow-up    Subjective     History of Present Illness (from previous encounter on 4/29/2021 with Ms. Anna):  Yamila Neff is a 60 y.o. female here for follow-up of sleep apnea.  Last saw patient 3/11/2021 and she did need a new machine.  Patient states she is doing very well with new machine sleeping 7 hours nightly.  Patient does feel refreshed upon awakening.  Patient will go to sleep within 10 minutes and does not get up during the night.  Patient has an Ridgeway score of 8/24.  Patient has no concerns or complaints regarding CPAP and wishes to continue. (End copied text).     Interval History:  Yamila Neff is a 63 y.o. female returns for follow up and compliance of PAP therapy. The patient was last seen on 4/29/2021 with Ms. Anna. Overall the patient feels good with regard to therapy. The device appears to be working appropriately. On average the patient sleeps 7 hours per night. The patient wakes 2-3 times per night. She has been using Elavil for several years but is not sure if this is helpful now. She does have anxiety and had a family tragedy. She just started with therapy.     The patient reports the following changes to their medical and medication history since they were last seen:  Af[facility]- in August.    Further details are as follows:      Ridgeway Scale is (out of 24): Total score: 12     Weight:  Current Weight: 162 lb    Weight change in the last year:  loss: 70 lbs    The patient's relevant past medical, surgical, family, and social history reviewed and updated in Epic as appropriate.    PMH:    Past Medical History:   Diagnosis Date    Anemia     Arthritis     Carpal tunnel syndrome 04/09/2012    Degenerative joint disease     Depression     Depression     Diabetes mellitus     Elevated cholesterol     Elevated liver enzymes     Fatty liver     Hearing aid worn     HL (hearing loss)     Hypertension      Kidney disorder     one kidney    Malignant neoplasm of right female breast 2023    Palpitations     Polycystic ovaries     Psoriasis     PVC (premature ventricular contraction)     occasional pvc's    S/P total knee arthroplasty, right 2021    Sleep apnea     cpap at home    Wears glasses      Past Surgical History:   Procedure Laterality Date    BREAST BIOPSY Right     PAPILLOMA DR BELL     SECTION      x 3    CHOLECYSTECTOMY  2013    COLONOSCOPY      HYSTERECTOMY  2006    complete    JOINT REPLACEMENT Left 2011    left knee    MASTECTOMY W/ SENTINEL NODE BIOPSY Bilateral 2023    Procedure: MASTECTOMY WITH SENTINEL NODE BIOPSY RIGHT, LEFT PROPHYLATIC MASTECTOMY;  Surgeon: Emi Lizama MD;  Location:  [facility];  Service: General;  Laterality: Bilateral;    OOPHORECTOMY      TOTAL ABDOMINAL HYSTERECTOMY WITH SALPINGO OOPHORECTOMY      TOTAL KNEE ARTHROPLASTY Right 2021    Procedure: TOTAL KNEE ARTHROPLASTY RIGHT;  Surgeon: Mateo Keith MD;  Location:  [facility];  Service: Orthopedics;  Laterality: Right;    TUBAL ABDOMINAL LIGATION       OB History          3    Para   3    Term   3            AB        Living             SAB        IAB        Ectopic        Molar        Multiple        Live Births                  Allergies   Allergen Reactions    Lisinopril Cough    Celexa [Citalopram] Other (See Comments)     Jittery        MEDS:  Prior to Admission medications    Medication Sig Start Date End Date Taking? Authorizing Provider   amitriptyline (ELAVIL) 10 MG tablet Take 1 tablet by mouth Every Night. 10/18/24   Daly Archibald PA-C   apixaban (ELIQUIS) 5 MG tablet tablet Take 1 tablet by mouth Every 12 (Twelve) Hours. Indications: Atrial Fibrillation 24   Case, SIOMARA Licona   atorvastatin (LIPITOR) 20 MG tablet Take 1 tablet by mouth Every Night. 10/18/24   Daly Archibald PA-C   Cariprazine HCl (Vraylar) 1.5 MG capsule capsule Take  1 capsule by mouth Daily. 10/2/24   aDly Archibald PA-C   cholecalciferol (VITAMIN D3) 1000 units tablet Take 2 tablets by mouth Daily.    Provider, MD Abel   clobetasol (TEMOVATE) 0.05 % external solution Apply to itchy, scaly areas on scalp topically as directed 2 (Two) Times a Day. 7/19/23      clobetasol (TEMOVATE) 0.05 % external solution Apply topically to the appropriate area on scalp as directed 2 (Two) Times a Day. 7/1/24      colestipol (Colestid) 1 g tablet Take 2 tablets by mouth every morning.  **Take 2 hours separate from other medications 12/8/22   William Polk MD   FLUoxetine (PROzac) 40 MG capsule Take 2 capsules by mouth Daily. 10/18/24   Daly Archibald PA-C   fluticasone (FLONASE) 50 MCG/ACT nasal spray Instill 2 sprays into the nostril(s) as directed by provider Daily. 12/1/23   Daly Archibald PA-C   hydrocortisone 2.5 % ointment Apply 1 application  topically to the affected area(s) as directed 2 (Two) Times a Day. 7/19/23      hydrOXYzine (ATARAX) 50 MG tablet Take 1 tablet by mouth 3 (Three) Times a Day As Needed for Itching. 3/4/24   Daly Archibald PA-C   icosapent ethyl (Vascepa) 1 g capsule capsule Take 2 capsules by mouth 2 (Two) Times a Day With Meals. 10/18/24   Daly Archibald PA-C   ketoconazole (NIZORAL) 2 % shampoo Use as a shampoo to scalp and face 3 times a week. Leave in for 5 minutes and rinse. 7/19/23      ketoconazole (NIZORAL) 2 % shampoo Apply topically to the appropriate area as directed 3 (Three) Times a Week. Leave on for 5 minutes, then rinse. 7/1/24      Magnesium 250 MG tablet Take 1 tablet by mouth Daily.    Provider, MD Abel   mupirocin (BACTROBAN) 2 % ointment Apply a thin film topically to the appropriate area as directed. 4/10/23   Schuyler Faith MD   ondansetron ODT (ZOFRAN-ODT) 4 MG disintegrating tablet Place 1 tablet on the tongue Every 8 (Eight) Hours As Needed for Nausea or Vomiting. 1/25/23   " Daly Archibald PA-C   pantoprazole (PROTONIX) 40 MG EC tablet Take 1 tablet by mouth Daily. 24   William Polk MD   Probiotic Product (PROBIOTIC DAILY PO) Take  by mouth Daily.    Provider, MD Abel   propranolol LA (INDERAL LA) 60 MG 24 hr capsule Take 1 capsule by mouth Daily. 10/18/24   Daly Archibald PA-C   Tirzepatide (Mounjaro) 15 MG/0.5ML solution pen-injector pen Inject 0.5 mL under the skin into the appropriate area as directed Every 7 (Seven) Days. 24   Daly Archibald PA-C         FH:  Family History   Problem Relation Age of Onset    Diabetes Mother         Type 2    Lymphoma Mother     Hypertension Mother     Cancer Mother         Nonhodgkins Lymphoma    Hearing loss Mother         Hearing Aids    No Known Problems Father     Hypertension Brother     Arthritis Brother     Hypertension Brother     Colon cancer Maternal Aunt     Heart disease Maternal Grandmother     Arthritis Maternal Grandmother             Diabetes Maternal Grandmother         Type 2-     Heart disease Maternal Grandfather     Heart attack Maternal Grandfather     Diabetes Maternal Aunt         Type 2    Hypertension Maternal Aunt     Diabetes Daughter         Type 1    Hypertension Brother     Breast cancer Neg Hx     Ovarian cancer Neg Hx        Objective   Vital Signs:  /70 (BP Location: Left arm, Patient Position: Sitting, Cuff Size: Adult)   Pulse 72   Temp 97.3 °F (36.3 °C) (Temporal)   Ht 157.5 cm (62.01\")   Wt 28 kg (61 lb 12.8 oz)   SpO2 97%   BMI 11.30 kg/m²          Physical Exam  Vitals reviewed.   Constitutional:       Appearance: Normal appearance.   HENT:      Head: Normocephalic and atraumatic.      Nose: Nose normal.      Mouth/Throat:      Mouth: Mucous membranes are moist.   Cardiovascular:      Rate and Rhythm: Normal rate and regular rhythm.      Heart sounds: No murmur heard.     No friction rub. No gallop.   Pulmonary:      Effort: " Pulmonary effort is normal. No respiratory distress.      Breath sounds: Normal breath sounds. No wheezing or rhonchi.   Neurological:      Mental Status: She is alert and oriented to person, place, and time.   Psychiatric:         Behavior: Behavior normal.               Result Review :           PAP Report:  AHI: 2.5/h  Days of Usage: 28/30 (93.3%)  Number of Days Greater than 4 hours: 86.7%  Average time (days used): 7 hours 18 minutes  90th Percentile Pressure: 9.0 cmH2O  Settings: CPAP-8/18 cm H2O, a a flex-2, ramp time 30 minutes, ramp start pressure 4 cm H2O.       Assessment and Plan  Yamila Neff is a 63 y.o. female who returns for follow-up and compliance of PAP therapy.  The Pap report has been reviewed.  Overall usage is at 93.3% with compliance at 86.7%.  The patient averages 7 hours and 18 minutes of therapy.  Sleep apnea is well-controlled with an AHI of 2.5/H. I will refill the patient's supplies, and I have asked them to return for follow-up and compliance in 1 year or sooner should they have further questions or concerns.    The patient has had some difficulty with insomnia.  She has been prescribed Elavil for many years but is not sure if this is helping anymore.  She is going to be evaluated by behavioral health and psychiatry to evaluate her medications.  The patient has suffered along with her family with a significant tragedy which is likely contributing to her insomnia.  I have discussed cognitive behavioral therapy for insomnia and given her information on the [facility] online program.    Diagnoses and all orders for this visit:    1. Obstructive sleep apnea, adult (Primary)  -     PAP Therapy    2. Obesity (BMI 30.0-34.9)         The patient continues to use and benefit from PAP therapy.    1. The patient was counseled regarding multimodal approach with healthy nutrition, healthy sleep, regular physical activity, social activities, counseling, and medications. Encouraged to  practice lateral sleep position. Avoid alcohol and sedatives close to bedtime.     2.  We will refill supplies x1 year.  Return to clinic 1 year or sooner if symptoms warrant. I have reviewed the results of my evaluation and impression and discussed my recommendations in detail with the patient.           Follow Up  Return in about 1 year (around 11/12/2025) for Annual visit.  Patient was given instructions and counseling regarding her condition or for health maintenance advice. Please see specific information pulled into the AVS if appropriate.       KAREN Irene, ACNP-BC  Pulmonology, Critical Care, and Sleep Medicine
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
## 2024-11-10 NOTE — PATIENT INSTRUCTIONS
[place] warm Line: Phone number: 7-742-920-5752      Monday through Friday 10 AM to 9 PM and Saturdays 5 PM to 9 PM.      A warmline is a NON-CRISIS number to call to get emotional support. You can call to have a conversation with someone who can provide support during hard times. Warmlines are staffed by trained peers who have been through their own mental health struggles and know what it's like to need help.      -Suicide hotline number: 988      -[facility] Resource Connection      The resource line is dedicated to providing behavioral health resources to residents of [place] and [facility], seven days a week, 7 a.m.-7 p.m.      [phone]      [email or web address]  [email or web address] Hospitals for Children/BehavioralHealth      Should you ever need assistance or just want to reach out to someone when your behavioral health provider is not available due to the office being closed you can contact [email or web address].       -Just text HOME to 326998 and someone will reach out to you within a few minutes.  This is a 24/7 help line and they are open even on holidays.

## 2024-11-12 ENCOUNTER — OFFICE VISIT (OUTPATIENT)
Dept: SLEEP MEDICINE | Age: 64
End: 2024-11-12
Payer: COMMERCIAL

## 2024-11-12 VITALS
TEMPERATURE: 97.3 F | DIASTOLIC BLOOD PRESSURE: 70 MMHG | HEIGHT: 62 IN | OXYGEN SATURATION: 97 % | BODY MASS INDEX: 11.37 KG/M2 | SYSTOLIC BLOOD PRESSURE: 130 MMHG | HEART RATE: 72 BPM | WEIGHT: 61.8 LBS

## 2024-11-12 DIAGNOSIS — E66.811 OBESITY (BMI 30.0-34.9): ICD-10-CM

## 2024-11-12 DIAGNOSIS — G47.33 OBSTRUCTIVE SLEEP APNEA, ADULT: Primary | ICD-10-CM

## 2024-11-12 PROCEDURE — 99213 OFFICE O/P EST LOW 20 MIN: CPT | Performed by: NURSE PRACTITIONER

## 2024-11-13 ENCOUNTER — TELEMEDICINE (OUTPATIENT)
Dept: PSYCHIATRY | Facility: CLINIC | Age: 64
End: 2024-11-13
Payer: MEDICARE

## 2024-11-13 VITALS — BODY MASS INDEX: 29.63 KG/M2 | HEIGHT: 62 IN | WEIGHT: 161 LBS

## 2024-11-13 DIAGNOSIS — F41.1 GAD (GENERALIZED ANXIETY DISORDER): ICD-10-CM

## 2024-11-13 DIAGNOSIS — F43.21 GRIEF: ICD-10-CM

## 2024-11-13 DIAGNOSIS — F33.1 MDD (MAJOR DEPRESSIVE DISORDER), RECURRENT EPISODE, MODERATE: Primary | ICD-10-CM

## 2024-11-13 NOTE — PATIENT INSTRUCTIONS
For concerns or needing assistance call the [facility] at [phone]  Continue Vraylar 1.5mg daily- try moving admin to daytime  Continue prozac 80mg daily  Reduce elavil to every other night for a couple of weeks then d/c   Continue with hydroxyzine 50mg HS prn insomnia/anxiety

## 2024-11-13 NOTE — PROGRESS NOTES
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 with any worsening of symptoms or onset of side effects, or if any concerns or questions arise.  The contact information for the office is made available to the patient. Patient is agreeable to call 911 or go to the nearest ER should they begin having any SI/HI, or if any urgent concerns arise. No medication side effects or related complaints today.        Continue Vraylar 1.5mg daily- try moving admin to daytime  Continue prozac 80mg daily  Reduce elavil to every other night for a couple of weeks then d/c due to history of atrial fib and concurrent use of SSRI  Continue with hydroxyzine 50mg HS prn insomnia/anxiety    MEDS ORDERED DURING VISIT:  No orders of the defined types were placed in this encounter.      Follow Up Appointment:   Return in about 29 days (around 12/12/2024) for Recheck, Video visit.             This patient will not be seen for the in person visits due to the following exception(s): the patient does not have access to another provider in his/her area.    It is my professional opinion that that patient is at risk for disengagement with care that has been effective in managing his/her illness.     This document has been electronically signed by KAREN Tinajero  November 15, 2024 09:58  EST    Dictated Utilizing Dragon Dictation: Part of this note may be an electronic transcription/translation of spoken language to printed text using the Dragon Dictation System.

## 2024-11-18 DIAGNOSIS — R11.14 BILIOUS VOMITING WITH NAUSEA: ICD-10-CM

## 2024-11-19 RX ORDER — ONDANSETRON 4 MG/1
4 TABLET, ORALLY DISINTEGRATING ORAL EVERY 8 HOURS PRN
Qty: 30 TABLET | Refills: 0 | Status: SHIPPED | OUTPATIENT
Start: 2024-11-19

## 2024-12-03 ENCOUNTER — TELEMEDICINE (OUTPATIENT)
Dept: PSYCHIATRY | Facility: CLINIC | Age: 64
End: 2024-12-03
Payer: MEDICARE

## 2024-12-03 DIAGNOSIS — F43.21 GRIEF: ICD-10-CM

## 2024-12-03 DIAGNOSIS — F33.1 MDD (MAJOR DEPRESSIVE DISORDER), RECURRENT EPISODE, MODERATE: Primary | ICD-10-CM

## 2024-12-03 DIAGNOSIS — F41.1 GAD (GENERALIZED ANXIETY DISORDER): ICD-10-CM

## 2024-12-03 NOTE — PROGRESS NOTES
Date: December 4, 2024  Time In: 3:07  Time out: 4:02      This provider is located at the [facility]), [address], [place] [address] using a secure 2threadshart Video Visit through [employer]. Patient is being seen remotely via telehealth at home address in [place] and stated they are in a secure environment for this session. The patient's condition being diagnosed/treated is appropriate for telemedicine. The provider identified herself as well as her credentials. The patient, and/or patients guardian, consent to be seen remotely, and when consent is given they understand that the consent allows for patient identifiable information to be sent to a third party as needed. They may refuse to be seen remotely at any time. The electronic data is encrypted and password protected, and the patient and/or guardian has been advised of the potential risks to privacy not withstanding such measures.     You have chosen to receive care through a telehealth visit.  Do you consent to use a video/audio connection for your medical care today? Yes      Pt is located at Home    Subjective   Yamila Neff is a 63 y.o. female who presents today fully oriented, appropriately dressed and groomed, and open to communication for follow up appointment.    Chief Complaint:   Chief Complaint   Patient presents with    Anxiety    Depression    PTSD        History of Present Illness: Rapport was established through conversation and unconditional positive regard. Recent events were discussed and how these events impact Pt's emotional health.   Pt reports successful use of learned coping skills since last report.  Pt reports continuation of symptoms and states the intensity and duration of symptoms has remained unchanged since last report.       Sleep: Pt reports sleep has remained unchanged since last report. Healthy sleep habits were discussed such as maintaining a schedule,  "routine, avoiding caffeine, and limiting screen time before bed.    Appetite:  Pt reports appetite has been good since last report.  Discussed the importance of hydration and eating a healthy diet for overall and mental health.    Medication compliance: Pt reports medications are being taken daily as prescribed. Discussed the importance of compliance with prescriber's directions and Pt was instructed to report questions/concerns, as well as missed doses or discontinuation of medication by Pt.     Safety Plan in Place: No Pt denies SI/HI/SIB recent or current    Daily Functioning:  Since last report, Pt states symptoms are causing Moderate difficulty in daily functioning.      Content Discussion:   Pt reports life updates since previous session. Pt identified current stressors. Pt acknowledged stressors within and outside of one's control. Pt identified successes and issues with utilizing coping mechanisms.  Pt was allowed to verbally process thoughts and feelings associated with son-in-law's accidental death by GSW by his 3 yo daughter.  Pt states she is often her daughter's \"sounding board,\" and sometimes the remarks daughter says are hurtful.  Pt was encouraged to prioritize her own self care. Pt states the processing was helpful.      Counselor supported Pt in continued exploration of underlying belief systems which contribute to difficulty in connecting/vulnerability.  Through discussion, Pt identifies themes of preservation and overt emotional and physical reactivity when physical and/or emotional statis is in question, even in low or perceived threatening situations. Counselor supported Pt in identifying past experiences and underlying beliefs which contribute to these feelings and reactions.  Pt was supported and encouraged in processing emotions related to frustrations with the expectations of self and others.  Pt was encouraged to identify cultural and nuclear familial contexts which contribute to these " concerns.  Pt was receptive and engaged in conversation, and Pt reports this was beneficial and applicable to their situation.      Reviewed coping skills and encouraged Pt to continue to practicing coping skills daily when not under distress. Pt was praised for their attempts to decrease symptoms and mitigate activating events.    Clinical Maneuvering/Intervention:  CBT was utilized to encourage Pt to identify maladaptive thoughts and behaviors and replace with more affirming and positive.Pt encouraged to set and maintain appropriate and healthy boundaries with others. Pt was instructed to practice daily using appropriate and specific words to communicate feelings to others.  Motivational interviewing used to encourage Pt to identify strengths which can be utilized in working toward treatment goals. Pt encouraged to practice daily learned skills such as controlled breathing, grounding, and mindfulness. Pt was encouraged to ask for help from support persons to assist them in maintaining stability and alleviate symptoms. Discussed the importance of being one's own mental health advocate and to refrain from seeing the need to ask for help as a weakness. Pt was encouraged to formulate a plan of action to be more proactive in managing stressors and refrain from using reactive and automatic heightened emotional responses.     Solution-focused therapy employed to identify how Pt would like their life to be if they were to make positive changes. Pt encouraged to identify effective coping skills and strengths they can use to continue utilizing those skills. Pt encouraged to discontinue utilizing non-effective coping mechanisms. Pt provided with feedback to highlight achievements and personal and other resources. Encouraged use of SMART goals    Assisted patient in processing above session content; acknowledged and normalized patient’s thoughts, feelings, and concerns.  Rationalized patient thought process regarding concerns  presented at session.  Discussed triggers associated with patient's  anxiety , depression , and grief Also discussed coping skills for patient to implement such as mindfulness , self care , and positive self talk     Allowed patient to freely discuss issues without interruption or judgment. Provided safe, confidential environment to facilitate the development of positive therapeutic relationship and encourage open, honest communication. Assisted patient in identifying risk factors which would indicate the need for higher level of care including thoughts to harm self or others and/or self-harming behavior and encouraged patient to contact this office, call 911, or present to the nearest emergency room should any of these events occur. Discussed crisis intervention services and means to access. Patient adamantly and convincingly denies current suicidal or homicidal ideation or perceptual disturbance.    Assessment:     Patient appears to maintain relative stability as compared to their baseline.  However, patient persistently struggles with symptoms which continue to cause impairment in important areas of functioning.  As a result, they can be reasonably expected to continue to benefit from treatment and would likely be at increased risk for decompensation otherwise.    Mental Status Exam:   Hygiene:   good  Cooperation:  Cooperative  Eye Contact:  Good  Psychomotor Behavior:  Appropriate  Affect:  Full range  Mood: anxious  Speech:  Normal  Thought Process:  Goal directed  Thought Content:  Normal  Suicidal:  None  Homicidal: None  Hallucinations:  None  Delusion: None  Memory:  Intact  Orientation:  Grossly Intact  Reliability:  good  Insight:  Good  Judgement:  Good  Impulse Control:  Good  Physical/Medical Issues:  No        Functional Status: Moderate impairment     Progress toward goal: Not at goal    Prognosis: Good with continued therapeutic intervention        Plan:    Patient will continue in individual  outpatient therapy with focus on improved functioning and coping skills, maintaining stability, and avoiding decompensation and the need for higher level of care.    Patient will adhere to medication regimen as prescribed and report any side effects. Patient will contact this office, call 911 or present to the nearest emergency room should suicidal or homicidal ideations occur. Provide Cognitive Behavioral Therapy and Solution Focused Therapy to improve functioning, maintain stability, and avoid decompensation and the need for higher level of care.     Return in about 2 weeks (around 12/17/2024).      VISIT DIAGNOSIS:    Diagnosis Plan   1. MDD (major depressive disorder), recurrent episode, moderate        2. LISSETH (generalized anxiety disorder)        3. Grief         15:07 EST       This document has been electronically signed by LISETTE Edwards  December 4, 2024      Part of this note may be an electronic transcription/translation of spoken language to printed text using the Dragon Dictation System.

## 2024-12-04 ENCOUNTER — TELEPHONE (OUTPATIENT)
Dept: PSYCHIATRY | Facility: CLINIC | Age: 64
End: 2024-12-04
Payer: MEDICARE

## 2024-12-04 NOTE — TELEPHONE ENCOUNTER
Called patient and left a message to return my call. Her provider Tammy Malin is needing her scheduled for a few follow up appointments.

## 2024-12-10 ENCOUNTER — OFFICE VISIT (OUTPATIENT)
Dept: FAMILY MEDICINE CLINIC | Facility: CLINIC | Age: 64
End: 2024-12-10
Payer: MEDICARE

## 2024-12-10 ENCOUNTER — LAB (OUTPATIENT)
Dept: LAB | Facility: HOSPITAL | Age: 64
End: 2024-12-10
Payer: MEDICARE

## 2024-12-10 VITALS
DIASTOLIC BLOOD PRESSURE: 70 MMHG | OXYGEN SATURATION: 100 % | HEIGHT: 62 IN | WEIGHT: 162.8 LBS | HEART RATE: 75 BPM | BODY MASS INDEX: 29.96 KG/M2 | SYSTOLIC BLOOD PRESSURE: 102 MMHG

## 2024-12-10 DIAGNOSIS — Z23 IMMUNIZATION DUE: ICD-10-CM

## 2024-12-10 DIAGNOSIS — R74.8 ABNORMAL LIVER ENZYMES: ICD-10-CM

## 2024-12-10 DIAGNOSIS — G43.019 INTRACTABLE MIGRAINE WITHOUT AURA AND WITHOUT STATUS MIGRAINOSUS: ICD-10-CM

## 2024-12-10 DIAGNOSIS — E11.65 TYPE 2 DIABETES MELLITUS WITH HYPERGLYCEMIA, WITHOUT LONG-TERM CURRENT USE OF INSULIN: ICD-10-CM

## 2024-12-10 DIAGNOSIS — M25.511 CHRONIC RIGHT SHOULDER PAIN: ICD-10-CM

## 2024-12-10 DIAGNOSIS — J30.2 SEASONAL ALLERGIES: ICD-10-CM

## 2024-12-10 DIAGNOSIS — E78.2 MIXED HYPERLIPIDEMIA: ICD-10-CM

## 2024-12-10 DIAGNOSIS — R94.6 ABNORMAL THYROID FUNCTION TEST: ICD-10-CM

## 2024-12-10 DIAGNOSIS — G89.29 CHRONIC RIGHT SHOULDER PAIN: ICD-10-CM

## 2024-12-10 DIAGNOSIS — Z00.00 MEDICARE ANNUAL WELLNESS VISIT, SUBSEQUENT: Primary | ICD-10-CM

## 2024-12-10 LAB
ALBUMIN SERPL-MCNC: 4.1 G/DL (ref 3.5–5.2)
ALBUMIN/GLOB SERPL: 1.3 G/DL
ALP SERPL-CCNC: 123 U/L (ref 39–117)
ALT SERPL W P-5'-P-CCNC: 52 U/L (ref 1–33)
ANION GAP SERPL CALCULATED.3IONS-SCNC: 10.7 MMOL/L (ref 5–15)
AST SERPL-CCNC: 53 U/L (ref 1–32)
BILIRUB SERPL-MCNC: 0.5 MG/DL (ref 0–1.2)
BUN SERPL-MCNC: 20 MG/DL (ref 8–23)
BUN/CREAT SERPL: 20.2 (ref 7–25)
CALCIUM SPEC-SCNC: 9.7 MG/DL (ref 8.6–10.5)
CHLORIDE SERPL-SCNC: 101 MMOL/L (ref 98–107)
CHOLEST SERPL-MCNC: 111 MG/DL (ref 0–200)
CO2 SERPL-SCNC: 25.3 MMOL/L (ref 22–29)
CREAT SERPL-MCNC: 0.99 MG/DL (ref 0.57–1)
DEPRECATED RDW RBC AUTO: 35.9 FL (ref 37–54)
EGFRCR SERPLBLD CKD-EPI 2021: 64.6 ML/MIN/1.73
ERYTHROCYTE [DISTWIDTH] IN BLOOD BY AUTOMATED COUNT: 12.1 % (ref 12.3–15.4)
FOLATE SERPL-MCNC: 6.19 NG/ML (ref 4.78–24.2)
GLOBULIN UR ELPH-MCNC: 3.1 GM/DL
GLUCOSE SERPL-MCNC: 76 MG/DL (ref 65–99)
HBA1C MFR BLD: 4.8 % (ref 4.8–5.6)
HCT VFR BLD AUTO: 38.8 % (ref 34–46.6)
HDLC SERPL-MCNC: 50 MG/DL (ref 40–60)
HGB BLD-MCNC: 13.4 G/DL (ref 12–15.9)
LDLC SERPL CALC-MCNC: 49 MG/DL (ref 0–100)
LDLC/HDLC SERPL: 1.02 {RATIO}
MCH RBC QN AUTO: 28.8 PG (ref 26.6–33)
MCHC RBC AUTO-ENTMCNC: 34.5 G/DL (ref 31.5–35.7)
MCV RBC AUTO: 83.4 FL (ref 79–97)
PLATELET # BLD AUTO: 285 10*3/MM3 (ref 140–450)
PMV BLD AUTO: 10 FL (ref 6–12)
POTASSIUM SERPL-SCNC: 4.2 MMOL/L (ref 3.5–5.2)
PROT SERPL-MCNC: 7.2 G/DL (ref 6–8.5)
RBC # BLD AUTO: 4.65 10*6/MM3 (ref 3.77–5.28)
SODIUM SERPL-SCNC: 137 MMOL/L (ref 136–145)
TRIGL SERPL-MCNC: 50 MG/DL (ref 0–150)
TSH SERPL DL<=0.05 MIU/L-ACNC: 0.71 UIU/ML (ref 0.27–4.2)
VIT B12 BLD-MCNC: 1582 PG/ML (ref 211–946)
VLDLC SERPL-MCNC: 12 MG/DL (ref 5–40)
WBC NRBC COR # BLD AUTO: 6.01 10*3/MM3 (ref 3.4–10.8)

## 2024-12-10 PROCEDURE — G0009 ADMIN PNEUMOCOCCAL VACCINE: HCPCS | Performed by: PHYSICIAN ASSISTANT

## 2024-12-10 PROCEDURE — 82043 UR ALBUMIN QUANTITATIVE: CPT

## 2024-12-10 PROCEDURE — 80053 COMPREHEN METABOLIC PANEL: CPT

## 2024-12-10 PROCEDURE — 3078F DIAST BP <80 MM HG: CPT | Performed by: PHYSICIAN ASSISTANT

## 2024-12-10 PROCEDURE — 84443 ASSAY THYROID STIM HORMONE: CPT

## 2024-12-10 PROCEDURE — G0402 INITIAL PREVENTIVE EXAM: HCPCS | Performed by: PHYSICIAN ASSISTANT

## 2024-12-10 PROCEDURE — 83036 HEMOGLOBIN GLYCOSYLATED A1C: CPT

## 2024-12-10 PROCEDURE — 1160F RVW MEDS BY RX/DR IN RCRD: CPT | Performed by: PHYSICIAN ASSISTANT

## 2024-12-10 PROCEDURE — 82607 VITAMIN B-12: CPT

## 2024-12-10 PROCEDURE — 85027 COMPLETE CBC AUTOMATED: CPT

## 2024-12-10 PROCEDURE — 80061 LIPID PANEL: CPT

## 2024-12-10 PROCEDURE — 99214 OFFICE O/P EST MOD 30 MIN: CPT | Performed by: PHYSICIAN ASSISTANT

## 2024-12-10 PROCEDURE — 1159F MED LIST DOCD IN RCRD: CPT | Performed by: PHYSICIAN ASSISTANT

## 2024-12-10 PROCEDURE — 3074F SYST BP LT 130 MM HG: CPT | Performed by: PHYSICIAN ASSISTANT

## 2024-12-10 PROCEDURE — 83690 ASSAY OF LIPASE: CPT

## 2024-12-10 PROCEDURE — 1170F FXNL STATUS ASSESSED: CPT | Performed by: PHYSICIAN ASSISTANT

## 2024-12-10 PROCEDURE — 82570 ASSAY OF URINE CREATININE: CPT

## 2024-12-10 PROCEDURE — 82746 ASSAY OF FOLIC ACID SERUM: CPT

## 2024-12-10 PROCEDURE — 1126F AMNT PAIN NOTED NONE PRSNT: CPT | Performed by: PHYSICIAN ASSISTANT

## 2024-12-10 PROCEDURE — 90677 PCV20 VACCINE IM: CPT | Performed by: PHYSICIAN ASSISTANT

## 2024-12-10 PROCEDURE — 3044F HG A1C LEVEL LT 7.0%: CPT | Performed by: PHYSICIAN ASSISTANT

## 2024-12-10 RX ORDER — FLUTICASONE PROPIONATE 50 MCG
2 SPRAY, SUSPENSION (ML) NASAL DAILY
Qty: 16 G | Refills: 11 | Status: SHIPPED | OUTPATIENT
Start: 2024-12-10

## 2024-12-10 RX ORDER — ATORVASTATIN CALCIUM 20 MG/1
20 TABLET, FILM COATED ORAL NIGHTLY
Qty: 90 TABLET | Refills: 3 | Status: SHIPPED | OUTPATIENT
Start: 2024-12-10 | End: 2024-12-11

## 2024-12-10 RX ORDER — PROPRANOLOL HYDROCHLORIDE 60 MG/1
60 CAPSULE, EXTENDED RELEASE ORAL DAILY
Qty: 90 CAPSULE | Refills: 3 | Status: SHIPPED | OUTPATIENT
Start: 2024-12-10

## 2024-12-10 NOTE — ASSESSMENT & PLAN NOTE
Recent hemoglobin A1c was 5.5%.  Compliant on Mounjaro.  Tolerating well.  Return in 4 months.    Orders:    Tirzepatide 15 MG/0.5ML solution auto-injector; Inject 15 mg under the skin into the appropriate area as directed Every 7 (Seven) Days.    CBC (No Diff); Future    Hemoglobin A1c; Future    Vitamin B12 & Folate; Future    Microalbumin / Creatinine Urine Ratio - Urine, Clean Catch; Future

## 2024-12-10 NOTE — LETTER
[facility]  Vaccine Consent Form    Patient Name:  Yamila Neff  Patient :  1960     Vaccine(s) Ordered    Pneumococcal Conjugate Vaccine 20-Valent All        Screening Checklist  The following questions should be completed prior to vaccination. If you answer “yes” to any question, it does not necessarily mean you should not be vaccinated. It just means we may need to clarify or ask more questions. If a question is unclear, please ask your healthcare provider to explain it.    Yes No   Any fever or moderate to severe illness today (mild illness and/or antibiotic treatment are not contraindications)?     Do you have a history of a serious reaction to any previous vaccinations, such as anaphylaxis, encephalopathy within 7 days, Guillain-Plummer syndrome within 6 weeks, seizure?     Have you received any live vaccine(s) (e.g MMR, EVELIN) or any other vaccines in the last month (to ensure duplicate doses aren't given)?     Do you have an anaphylactic allergy to latex (DTaP, DTaP-IPV, Hep A, Hep B, MenB, RV, Td, Tdap), baker’s yeast (Hep B, HPV), polysorbates (RSV, nirsevimab, PCV 20, Rotavirrus, Tdap, Shingrix), or gelatin (EVELIN, MMR)?     Do you have an anaphylactic allergy to neomycin (Rabies, EVELIN, MMR, IPV, Hep A), polymyxin B (IPV), or streptomycin (IPV)?      Any cancer, leukemia, AIDS, or other immune system disorder? (EVELIN, MMR, RV)     Do you have a parent, brother, or sister with an immune system problem (if immune competence of vaccine recipient clinically verified, can proceed)? (MMR, EVELIN)     Any recent steroid treatments for >2 weeks, chemotherapy, or radiation treatment? (EVELIN, MMR)     Have you received antibody-containing blood transfusions or IVIG in the past 11 months (recommended interval is dependent on product)? (MMR, EVELIN)     Have you taken antiviral drugs (acyclovir, famciclovir, valacyclovir for EVELIN) in the last 24 or 48 hours, respectively?      Are you pregnant or planning to become  "pregnant within 1 month? (EVELIN, MMR, HPV, IPV, MenB, Abrexvy; For Hep B- refer to Engerix-B; For RSV - Abrysvo is indicated for 32-36 weeks of pregnancy from September to January)     For infants, have you ever been told your child has had intussusception or a medical emergency involving obstruction of the intestine (Rotavirus)? If not for an infant, can skip this question.         *Ordering Physicians/APC should be consulted if \"yes\" is checked by the patient or guardian above.  I have received, read, and understand the Vaccine Information Statement (VIS) for each vaccine ordered.  I have considered my or my child's health status as well as the health status of my close contacts.  I have taken the opportunity to discuss my vaccine questions with my or my child's health care provider.   I have requested that the ordered vaccine(s) be given to me or my child.  I understand the benefits and risks of the vaccines.  I understand that I should remain in the clinic for 15 minutes after receiving the vaccine(s).  _________________________________________________________  Signature of Patient or Parent/Legal Guardian ____________________  Date     "

## 2024-12-10 NOTE — PROGRESS NOTES
Subjective   The ABCs of the Annual Wellness Visit  Welcome to Medicare Wellness Visit      Yamila Neff is a 63 y.o. patient who presents for a Medicare Wellness Visit.    The following portions of the patient's history were reviewed and   updated as appropriate: allergies, current medications, past family history, past medical history, past social history, past surgical history, and problem list.    Compared to one year ago, the patient's physical   health is worse.  Compared to one year ago, the patient's mental   health is the same.    Recent Hospitalizations:  This patient has had a  admission record on file within the last 365 days.  Current Medical Providers:  Patient Care Team:  Daly Archibald PA-C as PCP - General (Physician Assistant)  Delphine Giles APRN as Nurse Practitioner (Obstetrics and Gynecology)  Emi Lizama MD as Consulting Physician (General Surgery)  Lizzy Snow MD as Consulting Physician (Hematology and Oncology)  Mateo Amezcua MD as Consulting Physician (Cardiology)  [facility]  ([facility]al [facility])    Outpatient Medications Prior to Visit   Medication Sig Dispense Refill    apixaban (ELIQUIS) 5 MG tablet tablet Take 1 tablet by mouth Every 12 (Twelve) Hours. Indications: Atrial Fibrillation 60 tablet 6    Cariprazine HCl (Vraylar) 1.5 MG capsule capsule Take 1 capsule by mouth Daily. 30 capsule 2    cholecalciferol (VITAMIN D3) 1000 units tablet Take 2 tablets by mouth Daily.      clobetasol (TEMOVATE) 0.05 % external solution Apply topically to the appropriate area on scalp as directed 2 (Two) Times a Day. 25 mL 12    FLUoxetine (PROzac) 40 MG capsule Take 2 capsules by mouth Daily. 180 capsule 1    hydrocortisone 2.5 % ointment Apply 1 application  topically to the affected area(s) as directed 2 (Two) Times a Day. 20 g 4    hydrOXYzine (ATARAX) 50 MG tablet Take 1 tablet by mouth 3 (Three) Times a Day As  Needed for Itching. 90 tablet 5    icosapent ethyl (Vascepa) 1 g capsule capsule Take 2 capsules by mouth 2 (Two) Times a Day With Meals. 360 capsule 1    ketoconazole (NIZORAL) 2 % shampoo Apply topically to the appropriate area as directed 3 (Three) Times a Week. Leave on for 5 minutes, then rinse. 120 mL 12    Magnesium 250 MG tablet Take 1 tablet by mouth Daily.      mupirocin (BACTROBAN) 2 % ointment Apply a thin film topically to the appropriate area as directed. 22 g 2    ondansetron ODT (ZOFRAN-ODT) 4 MG disintegrating tablet Place 1 tablet on the tongue Every 8 (Eight) Hours As Needed for Nausea or Vomiting. 30 tablet 0    pantoprazole (PROTONIX) 40 MG EC tablet Take 1 tablet by mouth Daily. 90 tablet 3    Probiotic Product (PROBIOTIC DAILY PO) Take  by mouth Daily.      atorvastatin (LIPITOR) 20 MG tablet Take 1 tablet by mouth Every Night. 90 tablet 1    fluticasone (FLONASE) 50 MCG/ACT nasal spray Instill 2 sprays into the nostril(s) as directed by provider Daily. 16 g 11    propranolol LA (INDERAL LA) 60 MG 24 hr capsule Take 1 capsule by mouth Daily. 90 capsule 0    Tirzepatide (Mounjaro) 15 MG/0.5ML solution pen-injector pen Inject 0.5 mL under the skin into the appropriate area as directed Every 7 (Seven) Days. 6 mL 3    amitriptyline (ELAVIL) 10 MG tablet Take 1 tablet by mouth Every Night. 90 tablet 0     No facility-administered medications prior to visit.     No opioid medication identified on active medication list. I have reviewed chart for other potential  high risk medication/s and harmful drug interactions in the elderly.      Aspirin is not on active medication list.  Aspirin use is not indicated based on review of current medical condition/s. Risk of harm outweighs potential benefits.  .    Patient Active Problem List   Diagnosis    Degenerative joint disease    Depression    Psoriasis    Congenital single kidney    Hepatic steatosis    Essential hypertension    Intractable migraine  "without aura and without status migrainosus    Mixed hyperlipidemia    Anxiety    Primary insomnia    Iron deficiency    Primary osteoarthritis involving multiple joints    NGOC on CPAP    Tendonitis, Achilles, right    Malignant neoplasm of right female breast    Malignant neoplasm of upper-outer quadrant of right female breast    Obesity (BMI 30.0-34.9)    Type 2 diabetes mellitus with hyperglycemia, without long-term current use of insulin    Acute recurrent maxillary sinusitis    Paroxysmal atrial fibrillation with rapid ventricular response    COVID-19 virus detected    Atrial fibrillation with RVR     Advance Care Planning Advance Directive is not on file.  ACP discussion was declined by the patient. Patient does not have an advance directive, declines further assistance.            Objective   Vitals:    12/10/24 0849   BP: 102/70   BP Location: Left arm   Patient Position: Sitting   Cuff Size: Adult   Pulse: 75   SpO2: 100%   Weight: 73.8 kg (162 lb 12.8 oz)   Height: 157.5 cm (62.01\")   PainSc: 0-No pain       Estimated body mass index is 29.77 kg/m² as calculated from the following:    Height as of this encounter: 157.5 cm (62.01\").    Weight as of this encounter: 73.8 kg (162 lb 12.8 oz).    BMI is >= 25 and <30. (Overweight) The following options were offered after discussion;: exercise counseling/recommendations and nutrition counseling/recommendations         Gait and Balance Evaluation:  Normal  Does the patient have evidence of cognitive impairment? No                                                                                                Health  Risk Assessment    Smoking Status:  Social History     Tobacco Use   Smoking Status Never   Smokeless Tobacco Never     Alcohol Consumption:  Social History     Substance and Sexual Activity   Alcohol Use No       Fall Risk Screen  STEADI Fall Risk Assessment was completed, and patient is at MODERATE risk for falls. Assessment completed " on:12/10/2024    Depression Screening   Little interest or pleasure in doing things? Not at all   Feeling down, depressed, or hopeless? Over half   PHQ-2 Total Score 2   Trouble falling or staying asleep, or sleeping too much? Several days   Feeling tired or having little energy? Over half   Poor appetite or overeating? Not at all   Feeling bad about yourself - or that you are a failure or have let yourself or your family down? Not at all   Trouble concentrating on things, such as reading the newspaper or watching television? Not at all   Moving or speaking so slowly that other people could have noticed? Or the opposite - being so fidgety or restless that you have been moving around a lot more than usual? Not at all   Thoughts that you would be better off dead, or of hurting yourself in some way? Not at all   PHQ-9 Total Score 5   If you checked off any problems, how difficult have these problems made it for you to do your work, take care of things at home, or get along with other people? Somewhat difficult      Health Habits and Functional and Cognitive Screenin/10/2024     8:45 AM   Functional & Cognitive Status   Do you have difficulty preparing food and eating? No   Do you have difficulty bathing yourself, getting dressed or grooming yourself? No   Do you have difficulty using the toilet? No   Do you have difficulty moving around from place to place? No   Do you have trouble with steps or getting out of a bed or a chair? No   Current Diet Well Balanced Diet   Dental Exam Up to date   Eye Exam Up to date   Exercise (times per week) 4 times per week   Current Exercises Include Walking   Do you need help using the phone?  No   Are you deaf or do you have serious difficulty hearing?  No   Do you need help to go to places out of walking distance? No   Do you need help shopping? No   Do you need help preparing meals?  No   Do you need help with housework?  No   Do you need help with laundry? No   Do you need  help taking your medications? No   Do you need help managing money? No   Do you ever drive or ride in a car without wearing a seat belt? No   Have you felt unusual stress, anger or loneliness in the last month? No   Who do you live with? Alone   If you need help, do you have trouble finding someone available to you? No   Have you been bothered in the last four weeks by sexual problems? No   Do you have difficulty concentrating, remembering or making decisions? No           Visual Acuity:  Vision Screening    Right eye Left eye Both eyes   Without correction      With correction 20/13 20/15 20/10     Age-appropriate Screening Schedule:  Refer to the list below for future screening recommendations based on patient's age, sex and/or medical conditions. Orders for these recommended tests are listed in the plan section. The patient has been provided with a written plan.    Health Maintenance List  Health Maintenance   Topic Date Due    Pneumococcal Vaccine 0-64 (2 of 2 - PCV) 09/01/2021    COVID-19 Vaccine (6 - 2024-25 season) 09/01/2024    LIPID PANEL  12/01/2024    URINE MICROALBUMIN  12/01/2024    BMI FOLLOWUP  12/01/2024    HEMOGLOBIN A1C  03/04/2025    DIABETIC EYE EXAM  03/15/2025    ANNUAL WELLNESS VISIT  12/10/2025    TDAP/TD VACCINES (3 - Td or Tdap) 11/06/2033    COLORECTAL CANCER SCREENING  03/05/2034    HEPATITIS C SCREENING  Completed    INFLUENZA VACCINE  Completed    MAMMOGRAM  Discontinued    ZOSTER VACCINE  Discontinued                                                                                                                                                CMS Preventative Services Quick Reference  Risk Factors Identified During Encounter  Immunizations Discussed/Encouraged: Prevnar 20 (Pneumococcal 20-valent conjugate) and COVID19  Dental Screening Recommended  Vision Screening Recommended    The above risks/problems have been discussed with the patient.  Pertinent information has been shared with  the patient in the After Visit Summary.  An After Visit Summary and PPPS were made available to the patient.    Follow Up:   Next Medicare Wellness visit to be scheduled in 1 year.         Additional E&M Note during same encounter follows:  Patient has additional, significant, and separately identifiable condition(s)/problem(s) that require work above and beyond the Medicare Wellness Visit     Chief Complaint  Welcome To Medicare    Subjective   HPI  Yamila is also being seen today for additional medical problem/s.  Diabetes type 2, abnormal thyroid function, depression, allergies, hyperlipidemia, migraine and chronic right shoulder pain.  Patient is followed by behavioral health and is compliant on medication.  Mood is stable currently.  Her blood pressure is normal without medication.  Compliant on Mounjaro 15 mg weekly for diabetes type 2.  This is controlling her diabetes well and she has no issues with tolerating.    Reports right shoulder pain along bicep/deltoid region.  Hurts when she sleeps on her right side.  No range of motion issues.  No trauma/injury.  Pain has been present for the last year.  No PT or imaging.    Review of Systems   Constitutional:  Negative for chills and fever.   HENT:  Negative for congestion, ear pain, postnasal drip, rhinorrhea, sinus pressure and sore throat.    Eyes:  Negative for visual disturbance.   Respiratory:  Negative for cough, shortness of breath and wheezing.    Cardiovascular:  Negative for chest pain, palpitations and leg swelling.   Gastrointestinal:  Negative for abdominal pain, blood in stool, diarrhea, nausea and vomiting.   Endocrine: Negative for polyuria.   Genitourinary:  Negative for dysuria, flank pain, frequency and hematuria.   Musculoskeletal:  Positive for arthralgias and myalgias. Negative for gait problem.   Skin:  Negative for rash.   Neurological:  Negative for dizziness and confusion.   Hematological:  Does not bruise/bleed easily.  "  Psychiatric/Behavioral:  Positive for dysphoric mood. Negative for agitation, self-injury, sleep disturbance and suicidal ideas. The patient is not nervous/anxious.               Objective   Vital Signs:  /70 (BP Location: Left arm, Patient Position: Sitting, Cuff Size: Adult)   Pulse 75   Ht 157.5 cm (62.01\")   Wt 73.8 kg (162 lb 12.8 oz)   SpO2 100%   BMI 29.77 kg/m²   Physical Exam  Vitals reviewed.   Constitutional:       General: She is not in acute distress.     Appearance: Normal appearance. She is well-developed and normal weight. She is not ill-appearing or diaphoretic.   HENT:      Head: Normocephalic and atraumatic.      Right Ear: Hearing, tympanic membrane, ear canal and external ear normal.      Left Ear: Hearing, tympanic membrane, ear canal and external ear normal.      Nose: Nose normal.      Right Sinus: No maxillary sinus tenderness or frontal sinus tenderness.      Left Sinus: No maxillary sinus tenderness or frontal sinus tenderness.      Mouth/Throat:      Pharynx: Uvula midline.   Eyes:      General: Lids are normal.      Extraocular Movements: Extraocular movements intact.      Conjunctiva/sclera: Conjunctivae normal.   Neck:      Thyroid: No thyroid mass or thyromegaly.      Trachea: Trachea and phonation normal.   Cardiovascular:      Rate and Rhythm: Normal rate and regular rhythm.      Heart sounds: Normal heart sounds.   Pulmonary:      Effort: Pulmonary effort is normal.      Breath sounds: Normal breath sounds.   Abdominal:      General: Bowel sounds are normal. There is no distension.      Palpations: Abdomen is soft. Abdomen is not rigid.      Tenderness: There is no abdominal tenderness. There is no guarding.   Musculoskeletal:         General: Normal range of motion.        Arms:       Cervical back: Normal range of motion.      Right lower leg: No edema.      Left lower leg: No edema.      Comments: ROM intact.  Mild TTP along bicep region and bicep tendon insertion. "   Lymphadenopathy:      Cervical: No cervical adenopathy.      Right cervical: No superficial cervical adenopathy.     Left cervical: No superficial cervical adenopathy.   Skin:     General: Skin is warm.      Findings: No erythema or rash.      Nails: There is no clubbing.   Neurological:      Mental Status: She is alert and oriented to person, place, and time.      Coordination: Coordination normal.      Gait: Gait normal.      Deep Tendon Reflexes: Reflexes are normal and symmetric.      Comments: CN grossly intact   Psychiatric:         Attention and Perception: Attention and perception normal. She is attentive.         Mood and Affect: Mood and affect normal.         Speech: Speech normal.         Behavior: Behavior normal. Behavior is cooperative.         Thought Content: Thought content normal.         Cognition and Memory: Cognition and memory normal.         Judgment: Judgment normal.         The following data was reviewed by: Daly Archibald PA-C on 12/10/2024:    CMP          6/4/2024    10:25 8/27/2024    11:12 8/28/2024    03:42   CMP   Glucose 92  111  91    BUN 21  34  41    Creatinine 0.99  0.92  1.25    EGFR 64.6  70.5  48.8    Sodium 140  137  139    Potassium 4.3  3.9  4.4    Chloride 102  100  103    Calcium 10.2  10.1  9.9    Total Protein 7.8  7.9     Albumin 4.9  4.8     Globulin 2.9  3.1     Total Bilirubin 0.5  0.6     Alkaline Phosphatase 119  113     AST (SGOT) 32  31     ALT (SGPT) 40  50     Albumin/Globulin Ratio 1.7  1.5     BUN/Creatinine Ratio 21.2  37.0  32.8    Anion Gap 10.0  13.6  14.0      CBC          8/27/2024    11:12 8/28/2024    03:42   CBC   WBC 11.47  13.21    RBC 5.08  4.72    Hemoglobin 13.9  12.9    Hematocrit 42.0  39.1    MCV 82.7  82.8    MCH 27.4  27.3    MCHC 33.1  33.0    RDW 12.7  12.7    Platelets 364  361      TSH          8/27/2024    20:06   TSH   TSH 0.235      Most Recent A1C          9/4/2024    09:54   HGBA1C Most Recent   Hemoglobin A1C 5.5               Assessment and Plan Additional age appropriate preventative wellness advice topics were discussed during today's preventative wellness exam(some topics already addressed during AWV portion of the note above):    Physical Activity: Advised cardiovascular activity 150 minutes per week as tolerated. (example brisk walk for 30 minutes, 5 days a week).     Nutrition: Discussed nutrition plan with patient. Information shared in after visit summary. Goal is for a well balanced diet to enhance overall health.           Medicare annual wellness visit, subsequent         Chronic right shoulder pain  New.  Started about a year ago.  Pain with sleeping on right side.  No ROM issues.  No trauma/injury.  Trial PT and obtain x-ray.  Call if pain persists, would consider ortho vs. MRI.    Orders:    Ambulatory Referral to Physical Therapy for Evaluation & Treatment    XR Shoulder 2+ View Right; Future    Mixed hyperlipidemia   On atorvastatin.    Orders:    atorvastatin (LIPITOR) 20 MG tablet; Take 1 tablet by mouth Every Night.    Comprehensive Metabolic Panel; Future    CBC (No Diff); Future    Lipid Panel; Future    Seasonal allergies    Orders:    fluticasone (FLONASE) 50 MCG/ACT nasal spray; Instill 2 sprays into the nostril(s) as directed by provider Daily.    Intractable migraine without aura and without status migrainosus  Stable on propranolol.            Orders:    propranolol LA (INDERAL LA) 60 MG 24 hr capsule; Take 1 capsule by mouth Daily.    Abnormal thyroid function test    Orders:    TSH Rfx On Abnormal To Free T4; Future    Type 2 diabetes mellitus with hyperglycemia, without long-term current use of insulin  Recent hemoglobin A1c was 5.5%.  Compliant on Mounjaro.  Tolerating well.  Return in 4 months.    Orders:    Tirzepatide 15 MG/0.5ML solution auto-injector; Inject 15 mg under the skin into the appropriate area as directed Every 7 (Seven) Days.    CBC (No Diff); Future    Hemoglobin A1c; Future     Vitamin B12 & Folate; Future    Microalbumin / Creatinine Urine Ratio - Urine, Clean Catch; Future    Immunization due    Orders:    Pneumococcal Conjugate Vaccine 20-Valent All          I spent 40 minutes caring for Yamila on this date of service. This time includes time spent by me in the following activities:preparing for the visit, reviewing tests, obtaining and/or reviewing a separately obtained history, performing a medically appropriate examination and/or evaluation , counseling and educating the patient/family/caregiver, ordering medications, tests, or procedures, referring and communicating with other health care professionals , documenting information in the medical record, independently interpreting results and communicating that information with the patient/family/caregiver, and care coordination  Follow Up   No follow-ups on file.  Patient was given instructions and counseling regarding her condition or for health maintenance advice. Please see specific information pulled into the AVS if appropriate.

## 2024-12-10 NOTE — ASSESSMENT & PLAN NOTE
On atorvastatin.    Orders:    atorvastatin (LIPITOR) 20 MG tablet; Take 1 tablet by mouth Every Night.    Comprehensive Metabolic Panel; Future    CBC (No Diff); Future    Lipid Panel; Future

## 2024-12-10 NOTE — ASSESSMENT & PLAN NOTE
Stable on propranolol.            Orders:    propranolol LA (INDERAL LA) 60 MG 24 hr capsule; Take 1 capsule by mouth Daily.

## 2024-12-11 ENCOUNTER — TELEPHONE (OUTPATIENT)
Dept: FAMILY MEDICINE CLINIC | Facility: CLINIC | Age: 64
End: 2024-12-11
Payer: MEDICARE

## 2024-12-11 DIAGNOSIS — R74.8 ABNORMAL LIVER ENZYMES: Primary | ICD-10-CM

## 2024-12-11 DIAGNOSIS — E11.65 TYPE 2 DIABETES MELLITUS WITH HYPERGLYCEMIA, WITHOUT LONG-TERM CURRENT USE OF INSULIN: ICD-10-CM

## 2024-12-11 DIAGNOSIS — E78.2 MIXED HYPERLIPIDEMIA: Primary | ICD-10-CM

## 2024-12-11 DIAGNOSIS — R74.8 ABNORMAL LIVER ENZYMES: ICD-10-CM

## 2024-12-11 LAB
ALBUMIN UR-MCNC: 1.7 MG/DL
CREAT UR-MCNC: 185.9 MG/DL
LIPASE SERPL-CCNC: 34 U/L (ref 13–60)
MICROALBUMIN/CREAT UR: 9.1 MG/G (ref 0–29)

## 2024-12-11 RX ORDER — ATORVASTATIN CALCIUM 10 MG/1
10 TABLET, FILM COATED ORAL NIGHTLY
Qty: 90 TABLET | Refills: 3 | Status: SHIPPED | OUTPATIENT
Start: 2024-12-11

## 2024-12-12 ENCOUNTER — TELEMEDICINE (OUTPATIENT)
Dept: PSYCHIATRY | Facility: CLINIC | Age: 64
End: 2024-12-12
Payer: MEDICARE

## 2024-12-12 DIAGNOSIS — F43.21 GRIEF: ICD-10-CM

## 2024-12-12 DIAGNOSIS — F41.1 GAD (GENERALIZED ANXIETY DISORDER): ICD-10-CM

## 2024-12-12 DIAGNOSIS — F33.2 SEVERE EPISODE OF RECURRENT MAJOR DEPRESSIVE DISORDER, WITHOUT PSYCHOTIC FEATURES: Primary | ICD-10-CM

## 2024-12-12 NOTE — PROGRESS NOTES
"Mode of Visit: Video  Location of patient: -HOME-  Location of provider: +HOME+  You have chosen to receive care through a telehealth visit.  The patient has signed the video visit consent form.  The visit included audio and video interaction. No technical issues occurred during this visit.     PT Identifiers used: Name and .    You have chosen to receive care through a telehealth visit.  Do you consent to use a video/audio connection for your medical care today? Yes      Subjective   Yamila Neff is a 63 y.o. female who presents today for follow up    Chief Complaint:  \"depression, anxiety\"     History of Present Illness:     History of Present Illness  Patient presents today for medication management. She reports some improvement in depressive symptoms.  Current medications are Prozac 80 mg daily, and Vraylar 1.5 mg daily.  Her PHQ-9 score previously was 8, today she has endorsed a score of 5. Previous LISSETH-7 score was 8, today she endorsed a score of 5, which is an improvement. She states she is still having some difficulties with sleep and is waking at least 2 times throughout the night. However, she says she feels that she is still getting an adequate amount of sleep. She reports she has been taking her Magnesium glycinate at bedtime but upon verifying dose today she realized she has only been taking 1 210 mg tablet instead of the recommended dose of 420 mg. She says she plans to take correct dose at bedtime to see if this will improve her sleep. She recently stopped taking Amitriptyline and states she does not notice a difference in her sleep. She states this will be the first [place] without her son-in-law and this is difficult to deal with.  Patient has established for regular psychotherapy with AMILCAR Edwards and has several appointments scheduled.            Patient Health Questionnaire-9 (PHQ-9) (Depression Screening Tool)  Little interest or pleasure in doing things? (Patient-Rptd) Not at " all   Feeling down, depressed, or hopeless? (Patient-Rptd) Several days   PHQ-2 Total Score (Patient-Rptd) 1   Trouble falling or staying asleep, or sleeping too much? (Patient-Rptd) Almost all   Feeling tired or having little energy? (Patient-Rptd) Several days   Poor appetite or overeating? (Patient-Rptd) Not at all   Feeling bad about yourself - or that you are a failure or have let yourself or your family down? (Patient-Rptd) Not at all   Trouble concentrating on things, such as reading the newspaper or watching television? (Patient-Rptd) Not at all   Moving or speaking so slowly that other people could have noticed? Or the opposite - being so fidgety or restless that you have been moving around a lot more than usual? (Patient-Rptd) Not at all   Thoughts that you would be better off dead, or of hurting yourself in some way? (Patient-Rptd) Not at all   PHQ-9 Total Score (Patient-Rptd) 5   If you checked off any problems, how difficult have these problems made it for you to do your work, take care of things at home, or get along with other people? (Patient-Rptd) Not difficult at all         PHQ-9 Total Score: (Patient-Rptd) 5       Generalized Anxiety Disorder 7-Item (LISSETH-7) Screening Tool  Feeling nervous, anxious or on edge: (Patient-Rptd) Several days  Not being able to stop or control worrying: (Patient-Rptd) Several days  Worrying too much about different things: (Patient-Rptd) Several days  Trouble Relaxing: (Patient-Rptd) Several days  Being so restless that it is hard to sit still: (Patient-Rptd) Not at all  Feeling afraid as if something awful might happen: (Patient-Rptd) Not at all  Becoming easily annoyed or irritable: (Patient-Rptd) Not at all  LISSETH 7 Total Score: (Patient-Rptd) 4  If you checked any problems, how difficult have these problems made it for you to do your work, take care of things at home, or get along with other people: (Patient-Rptd) Not difficult at all    The following portions of the  patient's history were reviewed and updated as appropriate: allergies, current medications, past family history, past medical history, past social history, past surgical history and problem list.    Past Psychiatric History:  Began Treatment:   Diagnoses:Depression and Anxiety  Psychiatrist: Previously was receiving medication management from KAREN Tran  Therapist: Has engaged in marital counseling in the past, recently had intake for individual therapy with [facility]  Admission History:Denies  Medication Trials: Patient reports historical trials of the following medications, Abilify, Wellbutrin-reportedly helped for a while, Zoloft was reportedly helpful then it quit working.  Celexa is on her allergy list, has been on and off Prozac a couple times throughout the years.  Hydroxyzine helped some with anxiety.  Self Harm: Denies  Suicide Attempts:Denies   Psychosis, Anxiety, Depression: Denies  Patient denied any history of a head injury or seizure  Patient denied any history of hallucinations or psychosis    Past Medical History:  Past Medical History:   Diagnosis Date    Anemia     Anxiety     Arthritis     Carpal tunnel syndrome 2012    Degenerative joint disease     Depression     Depression     Diabetes mellitus     Elevated cholesterol     Elevated liver enzymes     Fatty liver     Hearing aid worn     HL (hearing loss)     Hypertension     Kidney disorder     one kidney    Malignant neoplasm of right female breast 2023    Palpitations     Polycystic ovaries     Psoriasis     PVC (premature ventricular contraction)     occasional pvc's    S/P total knee arthroplasty, right 2021    Sleep apnea     cpap at home    Wears glasses        Substance Abuse History:   Types:Denies all, including illicit       Social History:  Social History     Socioeconomic History    Marital status:    Tobacco Use    Smoking status: Never    Smokeless tobacco: Never    Vaping Use    Vaping status: Never Used   Substance and Sexual Activity    Alcohol use: No    Drug use: No    Sexual activity: Not Currently     Partners: Male     Birth control/protection: Abstinence, Hysterectomy     Comment:    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  Patient had 1 marriage 1 divorce.  Patient has 3 grown daughters and several grandchildren.  Support system consisting of her children, patient also attends [employer].  No history of  service.  Patient denied any history of legal problems.    Family History:  Family History   Problem Relation Age of Onset    Diabetes Mother         Type 2    Lymphoma Mother     Hypertension Mother     Cancer Mother         Nonhodgkins Lymphoma    Hearing loss Mother         Hearing Aids    Dementia Mother         Believed to be from years of chemotherapy    Depression Mother         Also daughters    No Known Problems Father     Hypertension Brother     Arthritis Brother     Hypertension Brother     Colon cancer Maternal Aunt     Heart disease Maternal Grandmother     Arthritis Maternal Grandmother             Diabetes Maternal Grandmother         Type 2-     Heart disease Maternal Grandfather     Heart attack Maternal Grandfather     Diabetes Maternal Aunt         Type 2    Hypertension Maternal Aunt     Diabetes Daughter         Type 1    Hypertension Brother     ADD / ADHD Cousin     Bipolar disorder Cousin     Breast cancer Neg Hx     Ovarian cancer Neg Hx        Past Surgical History:  Past Surgical History:   Procedure Laterality Date    BREAST BIOPSY Right     PAPILLOMA DR BELL     SECTION      x 3    CHOLECYSTECTOMY  2013    COLONOSCOPY      HYSTERECTOMY  2006    complete    JOINT REPLACEMENT Left 2011    left knee    MASTECTOMY W/ SENTINEL NODE BIOPSY Bilateral 2023    Procedure: MASTECTOMY WITH SENTINEL NODE BIOPSY RIGHT, LEFT PROPHYLATIC  MASTECTOMY;  Surgeon: Emi Lizama MD;  Location:  [facility];  Service: General;  Laterality: Bilateral;    OOPHORECTOMY      TOTAL ABDOMINAL HYSTERECTOMY WITH SALPINGO OOPHORECTOMY      TOTAL KNEE ARTHROPLASTY Right 03/09/2021    Procedure: TOTAL KNEE ARTHROPLASTY RIGHT;  Surgeon: Mateo Keith MD;  Location:  [facility];  Service: Orthopedics;  Laterality: Right;    TUBAL ABDOMINAL LIGATION         Problem List:  Patient Active Problem List   Diagnosis    Degenerative joint disease    Depression    Psoriasis    Congenital single kidney    Hepatic steatosis    Essential hypertension    Intractable migraine without aura and without status migrainosus    Mixed hyperlipidemia    Anxiety    Primary insomnia    Iron deficiency    Primary osteoarthritis involving multiple joints    NOGC on CPAP    Tendonitis, Achilles, right    Malignant neoplasm of right female breast    Malignant neoplasm of upper-outer quadrant of right female breast    Obesity (BMI 30.0-34.9)    Type 2 diabetes mellitus with hyperglycemia, without long-term current use of insulin    Acute recurrent maxillary sinusitis    Paroxysmal atrial fibrillation with rapid ventricular response    COVID-19 virus detected    Atrial fibrillation with RVR       Allergy:   Allergies   Allergen Reactions    Lisinopril Cough    Celexa [Citalopram] Other (See Comments)     Jittery         Current Medications:   Current Outpatient Medications   Medication Sig Dispense Refill    apixaban (ELIQUIS) 5 MG tablet tablet Take 1 tablet by mouth Every 12 (Twelve) Hours. Indications: Atrial Fibrillation 60 tablet 6    atorvastatin (LIPITOR) 10 MG tablet Take 1 tablet by mouth Every Night. 90 tablet 3    Cariprazine HCl (Vraylar) 1.5 MG capsule capsule Take 1 capsule by mouth Daily. 30 capsule 2    cholecalciferol (VITAMIN D3) 1000 units tablet Take 2 tablets by mouth Daily.      clobetasol (TEMOVATE) 0.05 % external solution Apply topically to the appropriate area on  scalp as directed 2 (Two) Times a Day. 25 mL 12    FLUoxetine (PROzac) 40 MG capsule Take 2 capsules by mouth Daily. 180 capsule 1    fluticasone (FLONASE) 50 MCG/ACT nasal spray Instill 2 sprays into the nostril(s) as directed by provider Daily. 16 g 11    hydrocortisone 2.5 % ointment Apply 1 application  topically to the affected area(s) as directed 2 (Two) Times a Day. 20 g 4    icosapent ethyl (Vascepa) 1 g capsule capsule Take 2 capsules by mouth 2 (Two) Times a Day With Meals. 360 capsule 1    ketoconazole (NIZORAL) 2 % shampoo Apply topically to the appropriate area as directed 3 (Three) Times a Week. Leave on for 5 minutes, then rinse. 120 mL 12    Magnesium 250 MG tablet Take 1 tablet by mouth Daily.      ondansetron ODT (ZOFRAN-ODT) 4 MG disintegrating tablet Place 1 tablet on the tongue Every 8 (Eight) Hours As Needed for Nausea or Vomiting. 30 tablet 0    pantoprazole (PROTONIX) 40 MG EC tablet Take 1 tablet by mouth Daily. 90 tablet 3    Probiotic Product (PROBIOTIC DAILY PO) Take  by mouth Daily.      propranolol LA (INDERAL LA) 60 MG 24 hr capsule Take 1 capsule by mouth Daily. 90 capsule 3    Tirzepatide 10 MG/0.5ML solution auto-injector Inject 10 mg under the skin into the appropriate area as directed Every 7 (Seven) Days. 2 mL 2    hydrOXYzine (ATARAX) 50 MG tablet Take 1 tablet by mouth 3 (Three) Times a Day As Needed for Itching. (Patient not taking: Reported on 12/12/2024) 90 tablet 5    mupirocin (BACTROBAN) 2 % ointment Apply a thin film topically to the appropriate area as directed. (Patient not taking: Reported on 12/12/2024) 22 g 2     No current facility-administered medications for this visit.       Review of Systems:    Review of Systems   Psychiatric/Behavioral:  Positive for sleep disturbance, depressed mood and stress. The patient is nervous/anxious.    All other systems reviewed and are negative.        Physical Exam:   Physical Exam  Vitals reviewed.   Constitutional:        Appearance: Normal appearance. She is well-developed and well-groomed.   HENT:      Head: Normocephalic.   Neurological:      Mental Status: She is alert.   Psychiatric:         Attention and Perception: Attention and perception normal.         Mood and Affect: Affect normal. Mood is anxious and depressed.         Speech: Speech normal.         Behavior: Behavior normal. Behavior is cooperative.         Cognition and Memory: Cognition and memory normal.         Vitals:  not currently breastfeeding. There is no height or weight on file to calculate BMI.  Due to extenuating circumstances and possible current health risks associated with the patient being present in a clinical setting (with current health restrictions in place in regards to possible COVID 19 transmission/exposure), the patient was seen remotely today via a MyChart Video Visit through [facility] and telephone encounter.  Unable to obtain vital signs due to nature of remote visit.  Height stated at 62 inches.  Weight stated at 161 pounds.    Last 3 Blood Pressure Readings:  BP Readings from Last 3 Encounters:   12/10/24 102/70   11/12/24 130/70   10/02/24 98/68          Mental Status Exam:   Hygiene:   good  Cooperation:  Cooperative  Eye Contact:  Good  Psychomotor Behavior:  Appropriate  Affect:  Full range  Mood: depressed and anxious  Hopelessness: Denies  Speech:  Normal  Thought Process:  Goal directed and Linear  Thought Content:  Normal  Suicidal:  None  Homicidal:  None  Hallucinations:  None  Delusion:  None  Memory:  Intact  Orientation:  Person, Place, Time, and Situation  Reliability:  good  Insight:  Good  Judgement:  Good  Impulse Control:  Good  Physical/Medical Issues:  Yes unilateral congenital kidney, atrial fib, status post bilateral mastectomies for breast cancer, history of COVID infection        Lab Results:   Lab on 12/10/2024   Component Date Value Ref Range Status    Glucose 12/10/2024 76  65 - 99 mg/dL Final    BUN 12/10/2024 20  8 -  23 mg/dL Final    Creatinine 12/10/2024 0.99  0.57 - 1.00 mg/dL Final    Sodium 12/10/2024 137  136 - 145 mmol/L Final    Potassium 12/10/2024 4.2  3.5 - 5.2 mmol/L Final    Chloride 12/10/2024 101  98 - 107 mmol/L Final    CO2 12/10/2024 25.3  22.0 - 29.0 mmol/L Final    Calcium 12/10/2024 9.7  8.6 - 10.5 mg/dL Final    Total Protein 12/10/2024 7.2  6.0 - 8.5 g/dL Final    Albumin 12/10/2024 4.1  3.5 - 5.2 g/dL Final    ALT (SGPT) 12/10/2024 52 (H)  1 - 33 U/L Final    AST (SGOT) 12/10/2024 53 (H)  1 - 32 U/L Final    Alkaline Phosphatase 12/10/2024 123 (H)  39 - 117 U/L Final    Total Bilirubin 12/10/2024 0.5  0.0 - 1.2 mg/dL Final    Globulin 12/10/2024 3.1  gm/dL Final    A/G Ratio 12/10/2024 1.3  g/dL Final    BUN/Creatinine Ratio 12/10/2024 20.2  7.0 - 25.0 Final    Anion Gap 12/10/2024 10.7  5.0 - 15.0 mmol/L Final    eGFR 12/10/2024 64.6  >60.0 mL/min/1.73 Final    TSH 12/10/2024 0.709  0.270 - 4.200 uIU/mL Final    WBC 12/10/2024 6.01  3.40 - 10.80 10*3/mm3 Final    RBC 12/10/2024 4.65  3.77 - 5.28 10*6/mm3 Final    Hemoglobin 12/10/2024 13.4  12.0 - 15.9 g/dL Final    Hematocrit 12/10/2024 38.8  34.0 - 46.6 % Final    MCV 12/10/2024 83.4  79.0 - 97.0 fL Final    MCH 12/10/2024 28.8  26.6 - 33.0 pg Final    MCHC 12/10/2024 34.5  31.5 - 35.7 g/dL Final    RDW 12/10/2024 12.1 (L)  12.3 - 15.4 % Final    RDW-SD 12/10/2024 35.9 (L)  37.0 - 54.0 fl Final    MPV 12/10/2024 10.0  6.0 - 12.0 fL Final    Platelets 12/10/2024 285  140 - 450 10*3/mm3 Final    Total Cholesterol 12/10/2024 111  0 - 200 mg/dL Final    Triglycerides 12/10/2024 50  0 - 150 mg/dL Final    HDL Cholesterol 12/10/2024 50  40 - 60 mg/dL Final    LDL Cholesterol  12/10/2024 49  0 - 100 mg/dL Final    VLDL Cholesterol 12/10/2024 12  5 - 40 mg/dL Final    LDL/HDL Ratio 12/10/2024 1.02   Final    Hemoglobin A1C 12/10/2024 4.80  4.80 - 5.60 % Final    Folate 12/10/2024 6.19  4.78 - 24.20 ng/mL Final    Vitamin B-12 12/10/2024 1,582 (H)  552 - 038  pg/mL Final    Microalbumin/Creatinine Ratio 12/10/2024 9.1  0.0 - 29.0 mg/g Final    Creatinine, Urine 12/10/2024 185.9  mg/dL Final    Microalbumin, Urine 12/10/2024 1.7  mg/dL Final    Lipase 12/10/2024 34  13 - 60 U/L Final       EKG Results:     Test Reason : Rhythm Change  Blood Pressure :   */*   mmHG  Vent. Rate :  62 BPM     Atrial Rate :  62 BPM     P-R Int : 182 ms          QRS Dur :  94 ms      QT Int : 426 ms       P-R-T Axes :  -9 -16   3 degrees     QTc Int : 432 ms     Normal sinus rhythm  Minimal voltage criteria for LVH, may be normal variant  Borderline ECG  When compared with ECG of 27-AUG-2024 10:56, (Unconfirmed)  Sinus rhythm has replaced Atrial fibrillation  Vent. rate has decreased BY  59 BPM  ST no longer depressed in Inferior leads  ST no longer depressed in Anterolateral leads  Nonspecific T wave abnormality no longer evident in Anterolateral leads  Confirmed by CARLOS YA MD ([id number]) on 8/29/2024 10:22:14 PM     Referred By: CASE           Confirmed By: CARLOS YA MD            Assessment & Plan   Diagnoses and all orders for this visit:    1. Severe episode of recurrent major depressive disorder, without psychotic features (Primary)  -     Cariprazine HCl (Vraylar) 1.5 MG capsule capsule; Take 1 capsule by mouth Daily.  Dispense: 30 capsule; Refill: 2    2. LISSETH (generalized anxiety disorder)    3. Grief          Visit Diagnoses:    ICD-10-CM ICD-9-CM   1. Severe episode of recurrent major depressive disorder, without psychotic features  F33.2 296.33   2. LISSETH (generalized anxiety disorder)  F41.1 300.02   3. Grief  F43.21 309.0           GOALS:  Short Term Goals: Patient will be compliant with medication, and patient will have no significant medication related side effects.  Patient will be engaged in psychotherapy as indicated.  Patient will report subjective improvement of symptoms.  Long term goals: To stabilize mood and treat/improve subjective symptoms, the patient will  "stay out of the hospital, the patient will be at an optimal level of functioning, and the patient will take all medications as prescribed.  The patient/guardian verbalized understanding and agreement with goals that were mutually set.    RISK ASSESSMENT  Current harm-to-self risk is reported by pt as \"none.\"  Current [id number] risk is reported by pt as \"none.\"   No suicidal thoughts, intent, plan is appreciated by this provider on this date of exam.   No homicidal thoughts, intent, plan is appreciated by this provider on this date.      TREATMENT PLAN: Continue supportive psychotherapy efforts and medications as indicated.    Pharmacological and Non-Pharmacological treatment options discussed during today's visit. Patient/Guardian acknowledged and verbally consented with current treatment plan and was educated on the importance of compliance with treatment and follow-up appointments.      MEDICATION ISSUES:  Discussed medication options and treatment plan of prescribed medication as well as the risks, benefits, any black box warnings, and side effects including potential falls, possible impaired driving, and metabolic adversities among others. Patient is agreeable to call the office with any worsening of symptoms or onset of side effects, or if any concerns or questions arise.  The contact information for the office is made available to the patient. Patient is agreeable to call 911 or go to the nearest ER should they begin having any SI/HI, or if any urgent concerns arise. No medication side effects or related complaints today.        Continue Vraylar 1.5mg daily   Continue prozac 80mg daily  Continue with hydroxyzine 50mg HS prn insomnia/anxiety  Increase over-the-counter magnesium glycinate to 2 tablets for 420 mg at bedtime    MEDS ORDERED DURING VISIT:  New Medications Ordered This Visit   Medications    Cariprazine HCl (Vraylar) 1.5 MG capsule capsule     Sig: Take 1 capsule by mouth Daily.     Dispense:  " 30 capsule     Refill:  2       Follow Up Appointment:   Return in about 6 weeks (around 1/23/2025) for Recheck, Video visit.         Portions of the HPI were completed by Fay Dunlap RN, APRN student. HPI was reviewed by undersigned.     This patient will not be seen for the in person visits due to the following exception(s): the patient does not have access to another provider in his/her area.    It is my professional opinion that that patient is at risk for disengagement with care that has been effective in managing his/her illness.     This document has been electronically signed by KAREN Tinajero  December 12, 2024 11:38 EST    Dictated Utilizing Dragon Dictation: Part of this note may be an electronic transcription/translation of spoken language to printed text using the Dragon Dictation System.

## 2024-12-12 NOTE — PATIENT INSTRUCTIONS
For concerns or needing assistance call the [facility] at [phone]  Continue Vraylar 1.5mg daily- try moving admin to daytime  Continue prozac 80mg daily  Continue with hydroxyzine 50mg HS prn insomnia/anxiety  Increase over-the-counter magnesium glycinate to 2 tablets for 420 mg at bedtime

## 2024-12-16 ENCOUNTER — OFFICE VISIT (OUTPATIENT)
Dept: ONCOLOGY | Facility: CLINIC | Age: 64
End: 2024-12-16
Payer: MEDICARE

## 2024-12-16 VITALS
HEART RATE: 68 BPM | HEIGHT: 62 IN | OXYGEN SATURATION: 96 % | DIASTOLIC BLOOD PRESSURE: 70 MMHG | RESPIRATION RATE: 20 BRPM | TEMPERATURE: 98.3 F | BODY MASS INDEX: 30 KG/M2 | SYSTOLIC BLOOD PRESSURE: 115 MMHG | WEIGHT: 163 LBS

## 2024-12-16 DIAGNOSIS — C50.411 MALIGNANT NEOPLASM OF UPPER-OUTER QUADRANT OF RIGHT BREAST IN FEMALE, ESTROGEN RECEPTOR POSITIVE: Primary | ICD-10-CM

## 2024-12-16 DIAGNOSIS — Z17.0 MALIGNANT NEOPLASM OF UPPER-OUTER QUADRANT OF RIGHT BREAST IN FEMALE, ESTROGEN RECEPTOR POSITIVE: Primary | ICD-10-CM

## 2024-12-16 PROCEDURE — 3074F SYST BP LT 130 MM HG: CPT | Performed by: NURSE PRACTITIONER

## 2024-12-16 PROCEDURE — 1126F AMNT PAIN NOTED NONE PRSNT: CPT | Performed by: NURSE PRACTITIONER

## 2024-12-16 PROCEDURE — 1160F RVW MEDS BY RX/DR IN RCRD: CPT | Performed by: NURSE PRACTITIONER

## 2024-12-16 PROCEDURE — 99213 OFFICE O/P EST LOW 20 MIN: CPT | Performed by: NURSE PRACTITIONER

## 2024-12-16 PROCEDURE — 1159F MED LIST DOCD IN RCRD: CPT | Performed by: NURSE PRACTITIONER

## 2024-12-16 PROCEDURE — 3078F DIAST BP <80 MM HG: CPT | Performed by: NURSE PRACTITIONER

## 2024-12-16 NOTE — PROGRESS NOTES
"  Subjective     PROBLEM LIST:  1. 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  Pathology showed a low grade multifocal mucinous carcinoma, largest mass 2.4 cm, second focus 0.8 cm.  0/1 SLN involved.  B) anastrozole started 4/12/2023. Stopped Anastrozole approximately June 2023 d/t intolerance   2. Diabetes mellitus  3. Hypertension  4. Depression  5. Fatty liver   6. NGOC  7. Psoriasis    CHIEF COMPLAINT: breast cancer      HISTORY OF PRESENT ILLNESS:  The patient is a 63 y.o. female here for follow up for breast cancer.     She reports she had COVID this summer.  In August she went to the emergency department for new diagnosis of atrial fibrillation.  She was started on Eliquis.  She is tolerating the Eliquis well.  No nose or gum bleeding.  No blood in urine or stool.      She denies any long bone pain, cough, dyspnea, severe headaches or diplopia.    She has had intermittent right shoulder pain for the past year that is worse at bedtime when she sleeping.  She is going to physical therapy to see if that will help with the shoulder pain.             REVIEW OF SYSTEMS:  A 14 point review of systems was performed and is negative except as noted above.                Objective     /70 Comment: LUE  Pulse 68   Temp 98.3 °F (36.8 °C) (Temporal)   Resp 20   Ht 157.5 cm (62\")   Wt 73.9 kg (163 lb)   SpO2 96% Comment: RA  BMI 29.81 kg/m²   Vitals:    12/16/24 0826   PainSc: 0-No pain              ECOG score: 0           General: well appearing female in no acute distress  Neuro: alert and oriented  HEENT: sclerae anicteric, oropharynx clear  Chest: Bilateral mastectomy incisions well-healed with no masses or skin changes  Extremities: no lower extremity edema  Skin: no rashes, lesions, bruising, or petechiae  Psych: mood and affect appropriate    Lab Results   Component Value Date    WBC 6.01 12/10/2024    HGB 13.4 12/10/2024    " HCT 38.8 12/10/2024    MCV 83.4 12/10/2024     12/10/2024     Lab Results   Component Value Date    GLUCOSE 76 12/10/2024    BUN 20 12/10/2024    CREATININE 0.99 12/10/2024    EGFRIFNONA 59 (L) 11/12/2021    BCR 20.2 12/10/2024    K 4.2 12/10/2024    CO2 25.3 12/10/2024    CALCIUM 9.7 12/10/2024    PROTENTOTREF 5.8 (L) 12/17/2016    ALBUMIN 4.1 12/10/2024    LABIL2 0.9 12/17/2016    AST 53 (H) 12/10/2024    ALT 52 (H) 12/10/2024                 ASSESSMENT AND PLAN:     Yamila Neff is a 63 y.o. 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    She tried anastrozole for several months in 2023 but stopped due to side effects.  She is not interested in other options of estrogen blocking medications and is comfortable with her low risk of recurrence.  Clinically she is doing well with no evidence of disease recurrence at this time.    We will see her once yearly for total of 5 years.               Paige Fenton, APRN    12/16/2024

## 2024-12-30 ENCOUNTER — TELEPHONE (OUTPATIENT)
Dept: PSYCHIATRY | Facility: CLINIC | Age: 64
End: 2024-12-30
Payer: MEDICARE

## 2024-12-30 DIAGNOSIS — F41.1 GAD (GENERALIZED ANXIETY DISORDER): Primary | ICD-10-CM

## 2024-12-30 RX ORDER — LORAZEPAM 0.5 MG/1
TABLET ORAL
Qty: 20 TABLET | Refills: 0 | Status: SHIPPED | OUTPATIENT
Start: 2024-12-30

## 2024-12-30 NOTE — TELEPHONE ENCOUNTER
Patient states she has had increase anxiety the last two weeks, and that it is worsening. Patient denied having any SI/HI tendencies at this time. Patient was informed if that changes to go to the ED or call 911.  Patient states she is only taking hydroxyzine 50 mg TID, but that it seems that is not helping.    Please advise.

## 2024-12-30 NOTE — TELEPHONE ENCOUNTER
Called patient to discuss symptomology.  Patient has been staying with her daughter and grandchildren for the last 2 weeks.  Has been waking up in a panicky feeling, having some nightmares.  Patient has had a lot of major stressors in the last couple years.  Patient is at the maximum dosage of Prozac, currently at 80 mg daily.  Agreed upon trial of low-dose of lorazepam once daily as needed.  Patient has follow-up appointment scheduled with undersigned in about 4 weeks.  Patient was instructed to call with any further needs prior to that appointment.

## 2025-01-06 ENCOUNTER — TELEMEDICINE (OUTPATIENT)
Dept: PSYCHIATRY | Facility: CLINIC | Age: 65
End: 2025-01-06
Payer: MEDICARE

## 2025-01-06 DIAGNOSIS — F43.10 POST TRAUMATIC STRESS DISORDER (PTSD): ICD-10-CM

## 2025-01-06 DIAGNOSIS — F33.2 SEVERE EPISODE OF RECURRENT MAJOR DEPRESSIVE DISORDER, WITHOUT PSYCHOTIC FEATURES: Primary | ICD-10-CM

## 2025-01-06 DIAGNOSIS — F43.21 GRIEF: ICD-10-CM

## 2025-01-06 NOTE — PROGRESS NOTES
"Mode of Visit: Video  Location of patient: -HOME-  Location of provider: +HOME+  You have chosen to receive care through a telehealth visit.  The patient has signed the video visit consent form.  The visit included audio and video interaction. No technical issues occurred during this visit.     PT Identifiers used: Name and .    You have chosen to receive care through a telehealth visit.  Do you consent to use a video/audio connection for your medical care today? Yes  Patient verbally confirmed consent for today's encounter  2025      Subjective   Yamila Neff is a 64 y.o. female who presents today for follow up    Chief Complaint:  \"depression, PTSD\"     History of Present Illness:     History of Present Illness  Patient presents today for medication management.  Patient had called on 24.  She had been staying with her daughter-in-law for a couple of weeks during the holidays.  This was the first holiday season without her son-in-law.  Prescribed low dose of lorazepam to be taken as needed.  Patient reports she continues to worry about everything, she also has had an issue with anticipatory fear that a snake may be in her house.  She does report a huge fear of snakes.  I question if this is related to the lorazepam.  She does tend to try to take the medication during the day.  No other side effects.  Patient is instructed to evaluate if there is a correlation and if so she can discontinue the lorazepam for a few days to see if this issue improves.  She is not hallucinating.  She does report increase in anxiety when her daughter-in-law calls.  The patient has therapy appointment today.  PHQ-9 score today is 6, previously was 5, LISSETH-7 score today is 13 previously was 5.  Also discussed that sometimes Vraylar while it is good for depression, can be stimulating and some people may find that anxiety may be a little worse with the medication.  However patient has been on this medication for a while " and has not had this issue.               Patient Health Questionnaire-9 (PHQ-9) (Depression Screening Tool)  Little interest or pleasure in doing things? (Patient-Rptd) Several days   Feeling down, depressed, or hopeless? (Patient-Rptd) Several days   PHQ-2 Total Score (Patient-Rptd) 2   Trouble falling or staying asleep, or sleeping too much? (Patient-Rptd) Several days   Feeling tired or having little energy? (Patient-Rptd) Several days   Poor appetite or overeating? (Patient-Rptd) Several days   Feeling bad about yourself - or that you are a failure or have let yourself or your family down? (Patient-Rptd) Several days   Trouble concentrating on things, such as reading the newspaper or watching television? (Patient-Rptd) Not at all   Moving or speaking so slowly that other people could have noticed? Or the opposite - being so fidgety or restless that you have been moving around a lot more than usual? (Patient-Rptd) Not at all   Thoughts that you would be better off dead, or of hurting yourself in some way? (Patient-Rptd) Not at all   PHQ-9 Total Score (Patient-Rptd) 6   If you checked off any problems, how difficult have these problems made it for you to do your work, take care of things at home, or get along with other people? (Patient-Rptd) Somewhat difficult         PHQ-9 Total Score: (Patient-Rptd) 6       Generalized Anxiety Disorder 7-Item (LISSETH-7) Screening Tool  Feeling nervous, anxious or on edge: (Patient-Rptd) Nearly every day  Not being able to stop or control worrying: (Patient-Rptd) Several days  Worrying too much about different things: (Patient-Rptd) Nearly every day  Trouble Relaxing: (Patient-Rptd) More than half the days  Being so restless that it is hard to sit still: (Patient-Rptd) Not at all  Feeling afraid as if something awful might happen: (Patient-Rptd) Nearly every day  Becoming easily annoyed or irritable: (Patient-Rptd) Several days  LISSETH 7 Total Score: (Patient-Rptd) 13  If you checked  any problems, how difficult have these problems made it for you to do your work, take care of things at home, or get along with other people: (Patient-Rptd) Very difficult    The following portions of the patient's history were reviewed and updated as appropriate: allergies, current medications, past family history, past medical history, past social history, past surgical history and problem list.    Past Psychiatric History:  Began Treatment:   Diagnoses:Depression and Anxiety  Psychiatrist: Previously was receiving medication management from KAREN Tran  Therapist: Has engaged in marital counseling in the past, recently had intake for individual therapy with [facility]  Admission History:Denies  Medication Trials: Patient reports historical trials of the following medications, Abilify, Wellbutrin-reportedly helped for a while, Zoloft was reportedly helpful then it quit working.  Celexa is on her allergy list, has been on and off Prozac a couple times throughout the years.  Hydroxyzine helped some with anxiety.  Self Harm: Denies  Suicide Attempts:Denies   Psychosis, Anxiety, Depression: Denies  Patient denied any history of a head injury or seizure  Patient denied any history of hallucinations or psychosis    Past Medical History:  Past Medical History:   Diagnosis Date    Anemia     Anxiety     Arthritis     Carpal tunnel syndrome 2012    Degenerative joint disease     Depression     Depression     Diabetes mellitus     Elevated cholesterol     Elevated liver enzymes     Fatty liver     Hearing aid worn     HL (hearing loss)     Hypertension     Kidney disorder     one kidney    Malignant neoplasm of right female breast 2023    Palpitations     Polycystic ovaries     Psoriasis     PVC (premature ventricular contraction)     occasional pvc's    S/P total knee arthroplasty, right 2021    Sleep apnea     cpap at home    Wears glasses        Substance Abuse  History:   Types:Denies all, including illicit       Social History:  Social History     Socioeconomic History    Marital status:    Tobacco Use    Smoking status: Never    Smokeless tobacco: Never   Vaping Use    Vaping status: Never Used   Substance and Sexual Activity    Alcohol use: No    Drug use: No    Sexual activity: Not Currently     Partners: Male     Birth control/protection: Abstinence, Hysterectomy     Comment:    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  Patient had 1 marriage 1 divorce.  Patient has 3 grown daughters and several grandchildren.  Support system consisting of her children, patient also attends [employer].  No history of  service.  Patient denied any history of legal problems.    Family History:  Family History   Problem Relation Age of Onset    Diabetes Mother         Type 2    Lymphoma Mother     Hypertension Mother     Cancer Mother         Nonhodgkins Lymphoma    Hearing loss Mother         Hearing Aids    Dementia Mother         Believed to be from years of chemotherapy    Depression Mother         Also daughters    No Known Problems Father     Hypertension Brother     Arthritis Brother     Hypertension Brother     Colon cancer Maternal Aunt     Heart disease Maternal Grandmother     Arthritis Maternal Grandmother             Diabetes Maternal Grandmother         Type 2-     Heart disease Maternal Grandfather     Heart attack Maternal Grandfather     Diabetes Maternal Aunt         Type 2    Hypertension Maternal Aunt     Diabetes Daughter         Type 1    Hypertension Brother     ADD / ADHD Cousin     Bipolar disorder Cousin     Breast cancer Neg Hx     Ovarian cancer Neg Hx        Past Surgical History:  Past Surgical History:   Procedure Laterality Date    BREAST BIOPSY Right     PAPILLOMA DR BELL     SECTION      x 3    CHOLECYSTECTOMY  2013    COLONOSCOPY       HYSTERECTOMY  2006    complete    JOINT REPLACEMENT Left 2011    left knee    MASTECTOMY W/ SENTINEL NODE BIOPSY Bilateral 03/28/2023    Procedure: MASTECTOMY WITH SENTINEL NODE BIOPSY RIGHT, LEFT PROPHYLATIC MASTECTOMY;  Surgeon: Emi Lizama MD;  Location: [facility] OR;  Service: General;  Laterality: Bilateral;    OOPHORECTOMY      TOTAL ABDOMINAL HYSTERECTOMY WITH SALPINGO OOPHORECTOMY      TOTAL KNEE ARTHROPLASTY Right 03/09/2021    Procedure: TOTAL KNEE ARTHROPLASTY RIGHT;  Surgeon: Mateo Keith MD;  Location: [facility] OR;  Service: Orthopedics;  Laterality: Right;    TUBAL ABDOMINAL LIGATION         Problem List:  Patient Active Problem List   Diagnosis    Degenerative joint disease    Depression    Psoriasis    Congenital single kidney    Hepatic steatosis    Essential hypertension    Intractable migraine without aura and without status migrainosus    Mixed hyperlipidemia    Anxiety    Primary insomnia    Iron deficiency    Primary osteoarthritis involving multiple joints    NGOC on CPAP    Tendonitis, Achilles, right    Malignant neoplasm of right female breast    Malignant neoplasm of upper-outer quadrant of right female breast    Obesity (BMI 30.0-34.9)    Type 2 diabetes mellitus with hyperglycemia, without long-term current use of insulin    Acute recurrent maxillary sinusitis    Paroxysmal atrial fibrillation with rapid ventricular response    COVID-19 virus detected    Atrial fibrillation with RVR       Allergy:   Allergies   Allergen Reactions    Lisinopril Cough    Celexa [Citalopram] Other (See Comments)     Jittery         Current Medications:   Current Outpatient Medications   Medication Sig Dispense Refill    apixaban (ELIQUIS) 5 MG tablet tablet Take 1 tablet by mouth Every 12 (Twelve) Hours. Indications: Atrial Fibrillation 60 tablet 6    atorvastatin (LIPITOR) 10 MG tablet Take 1 tablet by mouth Every Night. 90 tablet 3    Cariprazine HCl (Vraylar) 1.5 MG capsule capsule Take 1 capsule  by mouth Daily. 30 capsule 2    cholecalciferol (VITAMIN D3) 1000 units tablet Take 2 tablets by mouth Daily.      clobetasol (TEMOVATE) 0.05 % external solution Apply topically to the appropriate area on scalp as directed 2 (Two) Times a Day. 25 mL 12    FLUoxetine (PROzac) 40 MG capsule Take 2 capsules by mouth Daily. 180 capsule 1    fluticasone (FLONASE) 50 MCG/ACT nasal spray Instill 2 sprays into the nostril(s) as directed by provider Daily. 16 g 11    hydrocortisone 2.5 % ointment Apply 1 application  topically to the affected area(s) as directed 2 (Two) Times a Day. 20 g 4    hydrOXYzine (ATARAX) 50 MG tablet Take 1 tablet by mouth 3 (Three) Times a Day As Needed for Itching. 90 tablet 5    icosapent ethyl (Vascepa) 1 g capsule capsule Take 2 capsules by mouth 2 (Two) Times a Day With Meals. 360 capsule 1    ketoconazole (NIZORAL) 2 % shampoo Apply topically to the appropriate area as directed 3 (Three) Times a Week. Leave on for 5 minutes, then rinse. 120 mL 12    LORazepam (Ativan) 0.5 MG tablet Take one oral tablet daily as needed for anxiety exacerbation 20 tablet 0    Magnesium 250 MG tablet Take 1 tablet by mouth Daily.      mupirocin (BACTROBAN) 2 % ointment Apply a thin film topically to the appropriate area as directed. 22 g 2    ondansetron ODT (ZOFRAN-ODT) 4 MG disintegrating tablet Place 1 tablet on the tongue Every 8 (Eight) Hours As Needed for Nausea or Vomiting. 30 tablet 0    pantoprazole (PROTONIX) 40 MG EC tablet Take 1 tablet by mouth Daily. 90 tablet 3    Probiotic Product (PROBIOTIC DAILY PO) Take  by mouth Daily.      propranolol LA (INDERAL LA) 60 MG 24 hr capsule Take 1 capsule by mouth Daily. 90 capsule 3    Tirzepatide 10 MG/0.5ML solution auto-injector Inject 10 mg under the skin into the appropriate area as directed Every 7 (Seven) Days. 2 mL 2     No current facility-administered medications for this visit.       Review of Systems:    Review of Systems   Psychiatric/Behavioral:   Positive for sleep disturbance, depressed mood and stress. The patient is nervous/anxious.    All other systems reviewed and are negative.        Physical Exam:   Physical Exam  Vitals reviewed.   Constitutional:       Appearance: Normal appearance. She is well-developed and well-groomed.   HENT:      Head: Normocephalic.   Neurological:      Mental Status: She is alert.   Psychiatric:         Attention and Perception: Attention and perception normal.         Mood and Affect: Affect normal. Mood is anxious and depressed.         Speech: Speech normal.         Behavior: Behavior normal. Behavior is cooperative.         Cognition and Memory: Cognition and memory normal.         Vitals:  not currently breastfeeding. There is no height or weight on file to calculate BMI.  Due to extenuating circumstances and possible current health risks associated with the patient being present in a clinical setting (with current health restrictions in place in regards to possible COVID 19 transmission/exposure), the patient was seen remotely today via a MyChart Video Visit through [facility] and telephone encounter.  Unable to obtain vital signs due to nature of remote visit.  Height stated at 62 inches.  Weight stated at 163 pounds.    Last 3 Blood Pressure Readings:  BP Readings from Last 3 Encounters:   12/16/24 115/70   12/10/24 102/70   11/12/24 130/70          Mental Status Exam:   Hygiene:   good  Cooperation:  Cooperative  Eye Contact:  Good  Psychomotor Behavior:  Appropriate  Affect:  Full range  Mood: depressed and anxious  Hopelessness: Denies  Speech:  Normal  Thought Process:  Goal directed and Linear  Thought Content:  Normal  Suicidal:  None  Homicidal:  None  Hallucinations:  None  Delusion:  None  Memory:  Intact  Orientation:  Person, Place, Time, and Situation  Reliability:  good  Insight:  Good  Judgement:  Good  Impulse Control:  Good  Physical/Medical Issues:  Yes unilateral congenital kidney, atrial fib, status post  bilateral mastectomies for breast cancer, history of COVID infection        Lab Results:   Lab on 12/10/2024   Component Date Value Ref Range Status    Glucose 12/10/2024 76  65 - 99 mg/dL Final    BUN 12/10/2024 20  8 - 23 mg/dL Final    Creatinine 12/10/2024 0.99  0.57 - 1.00 mg/dL Final    Sodium 12/10/2024 137  136 - 145 mmol/L Final    Potassium 12/10/2024 4.2  3.5 - 5.2 mmol/L Final    Chloride 12/10/2024 101  98 - 107 mmol/L Final    CO2 12/10/2024 25.3  22.0 - 29.0 mmol/L Final    Calcium 12/10/2024 9.7  8.6 - 10.5 mg/dL Final    Total Protein 12/10/2024 7.2  6.0 - 8.5 g/dL Final    Albumin 12/10/2024 4.1  3.5 - 5.2 g/dL Final    ALT (SGPT) 12/10/2024 52 (H)  1 - 33 U/L Final    AST (SGOT) 12/10/2024 53 (H)  1 - 32 U/L Final    Alkaline Phosphatase 12/10/2024 123 (H)  39 - 117 U/L Final    Total Bilirubin 12/10/2024 0.5  0.0 - 1.2 mg/dL Final    Globulin 12/10/2024 3.1  gm/dL Final    A/G Ratio 12/10/2024 1.3  g/dL Final    BUN/Creatinine Ratio 12/10/2024 20.2  7.0 - 25.0 Final    Anion Gap 12/10/2024 10.7  5.0 - 15.0 mmol/L Final    eGFR 12/10/2024 64.6  >60.0 mL/min/1.73 Final    TSH 12/10/2024 0.709  0.270 - 4.200 uIU/mL Final    WBC 12/10/2024 6.01  3.40 - 10.80 10*3/mm3 Final    RBC 12/10/2024 4.65  3.77 - 5.28 10*6/mm3 Final    Hemoglobin 12/10/2024 13.4  12.0 - 15.9 g/dL Final    Hematocrit 12/10/2024 38.8  34.0 - 46.6 % Final    MCV 12/10/2024 83.4  79.0 - 97.0 fL Final    MCH 12/10/2024 28.8  26.6 - 33.0 pg Final    MCHC 12/10/2024 34.5  31.5 - 35.7 g/dL Final    RDW 12/10/2024 12.1 (L)  12.3 - 15.4 % Final    RDW-SD 12/10/2024 35.9 (L)  37.0 - 54.0 fl Final    MPV 12/10/2024 10.0  6.0 - 12.0 fL Final    Platelets 12/10/2024 285  140 - 450 10*3/mm3 Final    Total Cholesterol 12/10/2024 111  0 - 200 mg/dL Final    Triglycerides 12/10/2024 50  0 - 150 mg/dL Final    HDL Cholesterol 12/10/2024 50  40 - 60 mg/dL Final    LDL Cholesterol  12/10/2024 49  0 - 100 mg/dL Final    VLDL Cholesterol 12/10/2024  12  5 - 40 mg/dL Final    LDL/HDL Ratio 12/10/2024 1.02   Final    Hemoglobin A1C 12/10/2024 4.80  4.80 - 5.60 % Final    Folate 12/10/2024 6.19  4.78 - 24.20 ng/mL Final    Vitamin B-12 12/10/2024 1,582 (H)  211 - 946 pg/mL Final    Microalbumin/Creatinine Ratio 12/10/2024 9.1  0.0 - 29.0 mg/g Final    Creatinine, Urine 12/10/2024 185.9  mg/dL Final    Microalbumin, Urine 12/10/2024 1.7  mg/dL Final    Lipase 12/10/2024 34  13 - 60 U/L Final       EKG Results:     Test Reason : Rhythm Change  Blood Pressure :   */*   mmHG  Vent. Rate :  62 BPM     Atrial Rate :  62 BPM     P-R Int : 182 ms          QRS Dur :  94 ms      QT Int : 426 ms       P-R-T Axes :  -9 -16   3 degrees     QTc Int : 432 ms     Normal sinus rhythm  Minimal voltage criteria for LVH, may be normal variant  Borderline ECG  When compared with ECG of 27-AUG-2024 10:56, (Unconfirmed)  Sinus rhythm has replaced Atrial fibrillation  Vent. rate has decreased BY  59 BPM  ST no longer depressed in Inferior leads  ST no longer depressed in Anterolateral leads  Nonspecific T wave abnormality no longer evident in Anterolateral leads  Confirmed by CARLOS YA MD ([id number]) on 8/29/2024 10:22:14 PM     Referred By: CASE           Confirmed By: CARLOS YA MD            Assessment & Plan   There are no diagnoses linked to this encounter.        Visit Diagnoses:  No diagnosis found.          GOALS:  Short Term Goals: Patient will be compliant with medication, and patient will have no significant medication related side effects.  Patient will be engaged in psychotherapy as indicated.  Patient will report subjective improvement of symptoms.  Long term goals: To stabilize mood and treat/improve subjective symptoms, the patient will stay out of the hospital, the patient will be at an optimal level of functioning, and the patient will take all medications as prescribed.  The patient/guardian verbalized understanding and agreement with goals that were mutually  "set.    RISK ASSESSMENT  Current harm-to-self risk is reported by pt as \"none.\"  Current [id number] risk is reported by pt as \"none.\"   No suicidal thoughts, intent, plan is appreciated by this provider on this date of exam.   No homicidal thoughts, intent, plan is appreciated by this provider on this date.      TREATMENT PLAN: Continue supportive psychotherapy efforts and medications as indicated.    Pharmacological and Non-Pharmacological treatment options discussed during today's visit. Patient/Guardian acknowledged and verbally consented with current treatment plan and was educated on the importance of compliance with treatment and follow-up appointments.      MEDICATION ISSUES:  Discussed medication options and treatment plan of prescribed medication as well as the risks, benefits, any black box warnings, and side effects including potential falls, possible impaired driving, and metabolic adversities among others. Patient is agreeable to call the office with any worsening of symptoms or onset of side effects, or if any concerns or questions arise.  The contact information for the office is made available to the patient. Patient is agreeable to call 911 or go to the nearest ER should they begin having any SI/HI, or if any urgent concerns arise. No medication side effects or related complaints today.      Continue Vraylar 1.5 mg daily  Continue Prozac 80 mg total daily  Continue as needed use of lorazepam, 0.5mg daily for exacerbation of anxiety.  Evaluate if there is any correlation with the lorazepam and the fear of snakes.  Discontinue lorazepam for a few days to see if this gets better.  Continue hydroxyzine 50 mg at bedtime as needed for insomnia.  May also use for anxiety exacerbation       MEDS ORDERED DURING VISIT:  No orders of the defined types were placed in this encounter.      Follow Up Appointment:   Return in about 16 days (around 1/22/2025) for Recheck, Video visit.              This patient " will not be seen for the in person visits due to the following exception(s): the patient does not have access to another provider in his/her area.    It is my professional opinion that that patient is at risk for disengagement with care that has been effective in managing his/her illness.     This document has been electronically signed by KAREN Tinajero  January 6, 2025 08:52 EST    Dictated Utilizing Dragon Dictation: Part of this note may be an electronic transcription/translation of spoken language to printed text using the Dragon Dictation System.

## 2025-01-06 NOTE — PATIENT INSTRUCTIONS
For concerns or needing assistance call the [facility] at [phone]  Continue Vraylar 1.5 mg daily  Continue Prozac 80 mg total daily  Continue as needed use of lorazepam, 0.5mg daily for exacerbation of anxiety.  Evaluate if there is any correlation with the lorazepam and the fear of snakes.  Discontinue lorazepam for a few days to see if this gets better.  Continue hydroxyzine 50 mg at bedtime as needed for insomnia.  May also use for anxiety exacerbation

## 2025-01-09 ENCOUNTER — TELEMEDICINE (OUTPATIENT)
Dept: PSYCHIATRY | Facility: CLINIC | Age: 65
End: 2025-01-09
Payer: MEDICARE

## 2025-01-09 DIAGNOSIS — F43.10 POST TRAUMATIC STRESS DISORDER (PTSD): Primary | ICD-10-CM

## 2025-01-09 DIAGNOSIS — F41.1 GAD (GENERALIZED ANXIETY DISORDER): ICD-10-CM

## 2025-01-09 DIAGNOSIS — F33.1 MDD (MAJOR DEPRESSIVE DISORDER), RECURRENT EPISODE, MODERATE: ICD-10-CM

## 2025-01-09 NOTE — PATIENT INSTRUCTIONS
[place] warm Line: Phone number: [phone]      Monday through Friday 10 AM to 9 PM and Saturdays 5 PM to 9 PM.      A warmline is a NON-CRISIS number to call to get emotional support. You can call to have a conversation with someone who can provide support during hard times. Warmlines are staffed by trained peers who have been through their own mental health struggles and know what it's like to need help.      -Suicide hotline number: 988      -[facility] Resource Connection      The resource line is dedicated to providing behavioral health resources to residents of [place] and [facility], seven days a week, 7 a.m.-7 p.m.      [phone]      [email or web address]  [facility] CenterEleut[facility] TechnologySt[facility]/BehavioralHealth      Should you ever need assistance or just want to reach out to someone when your behavioral health provider is not available due to the office being closed you can contact [email or web address].       -Just text HOME to 595416 and someone will reach out to you within a few minutes.  This is a 24/7 help line and they are open even on holidays.
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
## 2025-01-11 DIAGNOSIS — R11.14 BILIOUS VOMITING WITH NAUSEA: ICD-10-CM
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
## 2025-01-13 RX ORDER — ONDANSETRON 4 MG/1
4 TABLET, ORALLY DISINTEGRATING ORAL EVERY 8 HOURS PRN
Qty: 30 TABLET | Refills: 0 | Status: SHIPPED | OUTPATIENT
Start: 2025-01-13

## 2025-01-13 NOTE — TELEPHONE ENCOUNTER
Rx Refill Note  Requested Prescriptions     Pending Prescriptions Disp Refills    ondansetron ODT (ZOFRAN-ODT) 4 MG disintegrating tablet 30 tablet 0     Sig: Place 1 tablet on the tongue Every 8 (Eight) Hours As Needed for Nausea or Vomiting.      Last office visit with prescribing clinician: 12/10/2024   Last telemedicine visit with prescribing clinician: Visit date not found   Next office visit with prescribing clinician: 4/14/2025                         Would you like a call back once the refill request has been completed: [] Yes [] No    If the office needs to give you a call back, can they leave a voicemail: [] Yes [] No    Cheri Connolly MA  01/13/25, 08:37 EST

## 2025-01-20 ENCOUNTER — TELEMEDICINE (OUTPATIENT)
Dept: PSYCHIATRY | Facility: CLINIC | Age: 65
End: 2025-01-20
Payer: MEDICARE

## 2025-01-20 DIAGNOSIS — F33.1 MDD (MAJOR DEPRESSIVE DISORDER), RECURRENT EPISODE, MODERATE: ICD-10-CM

## 2025-01-20 DIAGNOSIS — F43.10 POST TRAUMATIC STRESS DISORDER (PTSD): Primary | ICD-10-CM

## 2025-01-20 DIAGNOSIS — F41.1 GAD (GENERALIZED ANXIETY DISORDER): ICD-10-CM

## 2025-01-20 DIAGNOSIS — F43.21 GRIEF: ICD-10-CM
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
## 2025-01-22 ENCOUNTER — TELEMEDICINE (OUTPATIENT)
Dept: PSYCHIATRY | Facility: CLINIC | Age: 65
End: 2025-01-22
Payer: MEDICARE

## 2025-01-22 DIAGNOSIS — F43.10 POST TRAUMATIC STRESS DISORDER (PTSD): ICD-10-CM

## 2025-01-22 DIAGNOSIS — F41.1 GAD (GENERALIZED ANXIETY DISORDER): ICD-10-CM

## 2025-01-22 DIAGNOSIS — F43.21 GRIEF: ICD-10-CM

## 2025-01-22 DIAGNOSIS — F33.2 SEVERE EPISODE OF RECURRENT MAJOR DEPRESSIVE DISORDER, WITHOUT PSYCHOTIC FEATURES: Primary | ICD-10-CM

## 2025-01-22 RX ORDER — LORAZEPAM 0.5 MG/1
0.5 TABLET ORAL 2 TIMES DAILY PRN
Qty: 60 TABLET | Refills: 0 | Status: SHIPPED | OUTPATIENT
Start: 2025-01-22

## 2025-01-22 NOTE — PROGRESS NOTES
"Mode of Visit: Video  Location of patient: -HOME-  Location of provider: +HOME+  You have chosen to receive care through a telehealth visit.  The patient has signed the video visit consent form.  The visit included audio and video interaction. No technical issues occurred during this visit.     PT Identifiers used: Name and .    You have chosen to receive care through a telehealth visit.  Do you consent to use a video/audio connection for your medical care today? Yes  Patient verbally confirmed consent for today's encounter  2025      Subjective   Yamila Neff is a 64 y.o. female who presents today for follow up    Chief Complaint:  \"depression, PTSD\"     History of Present Illness:     History of Present Illness  Patient reports depression does not seem to be improving.  Anxiety has proved some.  She is no longer having the fear of snakes.  She is taking the lorazepam pretty much daily but she tries to wait to take it later in the day because that is when anxiety seems to be worse.  She does utilize hydroxyzine if needed to help with sleep.  She reports she is easily triggered, she has crying spells.  She is engaging in psychotherapy about every 2 weeks at this point.  Agreeable to increase Vraylar to 3.0 mg daily.  I will also increase the lorazepam a little bit as well.  Previous PHQ-9 was 9, today is also scored at 9.  Previous LISSETH-7 was scored at 13, today is slightly improved at 9.               Patient Health Questionnaire-9 (PHQ-9) (Depression Screening Tool)  Little interest or pleasure in doing things? (Patient-Rptd) Almost all   Feeling down, depressed, or hopeless? (Patient-Rptd) Over half   PHQ-2 Total Score (Patient-Rptd) 5   Trouble falling or staying asleep, or sleeping too much? (Patient-Rptd) Several days   Feeling tired or having little energy? (Patient-Rptd) Several days   Poor appetite or overeating? (Patient-Rptd) Not at all   Feeling bad about yourself - or that you are a " failure or have let yourself or your family down? (Patient-Rptd) Several days   Trouble concentrating on things, such as reading the newspaper or watching television? (Patient-Rptd) Several days   Moving or speaking so slowly that other people could have noticed? Or the opposite - being so fidgety or restless that you have been moving around a lot more than usual? (Patient-Rptd) Not at all   Thoughts that you would be better off dead, or of hurting yourself in some way? (Patient-Rptd) Not at all   PHQ-9 Total Score (Patient-Rptd) 9   If you checked off any problems, how difficult have these problems made it for you to do your work, take care of things at home, or get along with other people? (Patient-Rptd) Very difficult         PHQ-9 Total Score: (Patient-Rptd) 9       Generalized Anxiety Disorder 7-Item (LISSETH-7) Screening Tool  Feeling nervous, anxious or on edge: (Patient-Rptd) More than half the days  Not being able to stop or control worrying: (Patient-Rptd) Several days  Worrying too much about different things: (Patient-Rptd) Several days  Trouble Relaxing: (Patient-Rptd) Several days  Being so restless that it is hard to sit still: (Patient-Rptd) Not at all  Feeling afraid as if something awful might happen: (Patient-Rptd) Nearly every day  Becoming easily annoyed or irritable: (Patient-Rptd) Several days  LISSETH 7 Total Score: (Patient-Rptd) 9  If you checked any problems, how difficult have these problems made it for you to do your work, take care of things at home, or get along with other people: (Patient-Rptd) Very difficult    The following portions of the patient's history were reviewed and updated as appropriate: allergies, current medications, past family history, past medical history, past social history, past surgical history and problem list.    Past Psychiatric History:  Began Treatment: 1995  Diagnoses:Depression and Anxiety  Psychiatrist: Previously was receiving medication management from Kristen  KAREN Smith  Therapist: Has engaged in marital counseling in the past, recently had intake for individual therapy with [facility]  Admission History:Denies  Medication Trials: Patient reports historical trials of the following medications, Abilify, Wellbutrin-reportedly helped for a while, Zoloft was reportedly helpful then it quit working.  Celexa is on her allergy list, has been on and off Prozac a couple times throughout the years.  Hydroxyzine helped some with anxiety.  Self Harm: Denies  Suicide Attempts:Denies   Psychosis, Anxiety, Depression: Denies  Patient denied any history of a head injury or seizure  Patient denied any history of hallucinations or psychosis    Past Medical History:  Past Medical History:   Diagnosis Date    Anemia     Anxiety     Arthritis     Carpal tunnel syndrome 2012    Degenerative joint disease     Depression     Depression     Diabetes mellitus     Elevated cholesterol     Elevated liver enzymes     Fatty liver     Hearing aid worn     HL (hearing loss)     Hypertension     Kidney disorder     one kidney    Malignant neoplasm of right female breast 2023    Palpitations     Polycystic ovaries     Psoriasis     PVC (premature ventricular contraction)     occasional pvc's    S/P total knee arthroplasty, right 2021    Sleep apnea     cpap at home    Wears glasses        Substance Abuse History:   Types:Denies all, including illicit       Social History:  Social History     Socioeconomic History    Marital status:    Tobacco Use    Smoking status: Never    Smokeless tobacco: Never   Vaping Use    Vaping status: Never Used   Substance and Sexual Activity    Alcohol use: No    Drug use: No    Sexual activity: Not Currently     Partners: Male     Birth control/protection: Abstinence, Hysterectomy     Comment:    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  Patient  had 1 marriage 1 divorce.  Patient has 3 grown daughters and several grandchildren.  Support system consisting of her children, patient also attends [employer].  No history of  service.  Patient denied any history of legal problems.    Family History:  Family History   Problem Relation Age of Onset    Diabetes Mother         Type 2    Lymphoma Mother     Hypertension Mother     Cancer Mother         Nonhodgkins Lymphoma    Hearing loss Mother         Hearing Aids    Dementia Mother         Believed to be from years of chemotherapy    Depression Mother         Also daughters    No Known Problems Father     Hypertension Brother     Arthritis Brother     Hypertension Brother     Colon cancer Maternal Aunt     Heart disease Maternal Grandmother     Arthritis Maternal Grandmother             Diabetes Maternal Grandmother         Type 2-     Heart disease Maternal Grandfather     Heart attack Maternal Grandfather     Diabetes Maternal Aunt         Type 2    Hypertension Maternal Aunt     Diabetes Daughter         Type 1    Hypertension Brother     ADD / ADHD Cousin     Bipolar disorder Cousin     Breast cancer Neg Hx     Ovarian cancer Neg Hx        Past Surgical History:  Past Surgical History:   Procedure Laterality Date    ABDOMINAL SURGERY      BREAST BIOPSY Right     PAPILLOMA DR BELL     SECTION      x 3    CHOLECYSTECTOMY  2013    COLONOSCOPY      HYSTERECTOMY  2006    complete    JOINT REPLACEMENT Left 2011    left knee    MASTECTOMY  3/23    MASTECTOMY W/ SENTINEL NODE BIOPSY Bilateral 2023    Procedure: MASTECTOMY WITH SENTINEL NODE BIOPSY RIGHT, LEFT PROPHYLATIC MASTECTOMY;  Surgeon: Emi Lizama MD;  Location: [facility];  Service: General;  Laterality: Bilateral;    OOPHORECTOMY      TOTAL ABDOMINAL HYSTERECTOMY WITH SALPINGO OOPHORECTOMY      TOTAL KNEE ARTHROPLASTY Right 2021    Procedure: TOTAL KNEE ARTHROPLASTY RIGHT;  Surgeon: Mateo Keith MD;   Location: [facility];  Service: Orthopedics;  Laterality: Right;    TUBAL ABDOMINAL LIGATION         Problem List:  Patient Active Problem List   Diagnosis    Degenerative joint disease    Depression    Psoriasis    Congenital single kidney    Hepatic steatosis    Essential hypertension    Intractable migraine without aura and without status migrainosus    Mixed hyperlipidemia    Anxiety    Primary insomnia    Iron deficiency    Primary osteoarthritis involving multiple joints    NGOC on CPAP    Tendonitis, Achilles, right    Malignant neoplasm of right female breast    Malignant neoplasm of upper-outer quadrant of right female breast    Obesity (BMI 30.0-34.9)    Type 2 diabetes mellitus with hyperglycemia, without long-term current use of insulin    Acute recurrent maxillary sinusitis    Paroxysmal atrial fibrillation with rapid ventricular response    COVID-19 virus detected    Atrial fibrillation with RVR       Allergy:   Allergies   Allergen Reactions    Lisinopril Cough    Celexa [Citalopram] Other (See Comments)     Jittery         Current Medications:   Current Outpatient Medications   Medication Sig Dispense Refill    apixaban (ELIQUIS) 5 MG tablet tablet Take 1 tablet by mouth Every 12 (Twelve) Hours. Indications: Atrial Fibrillation 60 tablet 6    atorvastatin (LIPITOR) 10 MG tablet Take 1 tablet by mouth Every Night. 90 tablet 3    Cariprazine HCl (Vraylar) 3 MG capsule capsule Take 1 capsule by mouth Daily. 30 capsule 2    cholecalciferol (VITAMIN D3) 1000 units tablet Take 2 tablets by mouth Daily.      clobetasol (TEMOVATE) 0.05 % external solution Apply topically to the appropriate area on scalp as directed 2 (Two) Times a Day. 25 mL 12    FLUoxetine (PROzac) 40 MG capsule Take 2 capsules by mouth Daily. 180 capsule 1    fluticasone (FLONASE) 50 MCG/ACT nasal spray Instill 2 sprays into the nostril(s) as directed by provider Daily. 16 g 11    hydrocortisone 2.5 % ointment Apply 1 application  topically  to the affected area(s) as directed 2 (Two) Times a Day. 20 g 4    hydrOXYzine (ATARAX) 50 MG tablet Take 1 tablet by mouth 3 (Three) Times a Day As Needed for Itching. 90 tablet 5    icosapent ethyl (Vascepa) 1 g capsule capsule Take 2 capsules by mouth 2 (Two) Times a Day With Meals. 360 capsule 1    ketoconazole (NIZORAL) 2 % shampoo Apply topically to the appropriate area as directed 3 (Three) Times a Week. Leave on for 5 minutes, then rinse. 120 mL 12    Magnesium 250 MG tablet Take 1 tablet by mouth Daily.      mupirocin (BACTROBAN) 2 % ointment Apply a thin film topically to the appropriate area as directed. 22 g 2    ondansetron ODT (ZOFRAN-ODT) 4 MG disintegrating tablet Place 1 tablet on the tongue Every 8 (Eight) Hours As Needed for Nausea or Vomiting. 30 tablet 0    pantoprazole (PROTONIX) 40 MG EC tablet Take 1 tablet by mouth Daily. 90 tablet 3    Probiotic Product (PROBIOTIC DAILY PO) Take  by mouth Daily.      propranolol LA (INDERAL LA) 60 MG 24 hr capsule Take 1 capsule by mouth Daily. 90 capsule 3    Tirzepatide 10 MG/0.5ML solution auto-injector Inject 10 mg under the skin into the appropriate area as directed Every 7 (Seven) Days. 2 mL 2    LORazepam (Ativan) 0.5 MG tablet Take 1 tablet by mouth 2 (Two) Times a Day As Needed for Anxiety. 60 tablet 0     No current facility-administered medications for this visit.       Review of Systems:    Review of Systems   Psychiatric/Behavioral:  Positive for sleep disturbance, depressed mood and stress. The patient is nervous/anxious.    All other systems reviewed and are negative.        Physical Exam:   Physical Exam  Vitals reviewed.   Constitutional:       Appearance: Normal appearance. She is well-developed and well-groomed.   HENT:      Head: Normocephalic.   Neurological:      Mental Status: She is alert.   Psychiatric:         Attention and Perception: Attention and perception normal.         Mood and Affect: Affect normal. Mood is anxious and  depressed.         Speech: Speech normal.         Behavior: Behavior normal. Behavior is cooperative.         Cognition and Memory: Cognition and memory normal.         Vitals:  not currently breastfeeding. There is no height or weight on file to calculate BMI.  Due to extenuating circumstances and possible current health risks associated with the patient being present in a clinical setting (with current health restrictions in place in regards to possible COVID 19 transmission/exposure), the patient was seen remotely today via a MyChart Video Visit through [facility] and telephone encounter.  Unable to obtain vital signs due to nature of remote visit.  Height stated at 62 inches.  Weight stated at 163 pounds.    Last 3 Blood Pressure Readings:  BP Readings from Last 3 Encounters:   12/16/24 115/70   12/10/24 102/70   11/12/24 130/70          Mental Status Exam:   Hygiene:   good  Cooperation:  Cooperative  Eye Contact:  Good  Psychomotor Behavior:  Appropriate  Affect:  Full range  Mood: depressed and anxious  Hopelessness: Denies  Speech:  Normal  Thought Process:  Goal directed and Linear  Thought Content:  Normal  Suicidal:  None  Homicidal:  None  Hallucinations:  None  Delusion:  None  Memory:  Intact  Orientation:  Person, Place, Time, and Situation  Reliability:  good  Insight:  Good  Judgement:  Good  Impulse Control:  Good  Physical/Medical Issues:  Yes unilateral congenital kidney, atrial fib, status post bilateral mastectomies for breast cancer, history of COVID infection        Lab Results:   No visits with results within 1 Month(s) from this visit.   Latest known visit with results is:   Lab on 12/10/2024   Component Date Value Ref Range Status    Glucose 12/10/2024 76  65 - 99 mg/dL Final    BUN 12/10/2024 20  8 - 23 mg/dL Final    Creatinine 12/10/2024 0.99  0.57 - 1.00 mg/dL Final    Sodium 12/10/2024 137  136 - 145 mmol/L Final    Potassium 12/10/2024 4.2  3.5 - 5.2 mmol/L Final    Chloride 12/10/2024 101   98 - 107 mmol/L Final    CO2 12/10/2024 25.3  22.0 - 29.0 mmol/L Final    Calcium 12/10/2024 9.7  8.6 - 10.5 mg/dL Final    Total Protein 12/10/2024 7.2  6.0 - 8.5 g/dL Final    Albumin 12/10/2024 4.1  3.5 - 5.2 g/dL Final    ALT (SGPT) 12/10/2024 52 (H)  1 - 33 U/L Final    AST (SGOT) 12/10/2024 53 (H)  1 - 32 U/L Final    Alkaline Phosphatase 12/10/2024 123 (H)  39 - 117 U/L Final    Total Bilirubin 12/10/2024 0.5  0.0 - 1.2 mg/dL Final    Globulin 12/10/2024 3.1  gm/dL Final    A/G Ratio 12/10/2024 1.3  g/dL Final    BUN/Creatinine Ratio 12/10/2024 20.2  7.0 - 25.0 Final    Anion Gap 12/10/2024 10.7  5.0 - 15.0 mmol/L Final    eGFR 12/10/2024 64.6  >60.0 mL/min/1.73 Final    TSH 12/10/2024 0.709  0.270 - 4.200 uIU/mL Final    WBC 12/10/2024 6.01  3.40 - 10.80 10*3/mm3 Final    RBC 12/10/2024 4.65  3.77 - 5.28 10*6/mm3 Final    Hemoglobin 12/10/2024 13.4  12.0 - 15.9 g/dL Final    Hematocrit 12/10/2024 38.8  34.0 - 46.6 % Final    MCV 12/10/2024 83.4  79.0 - 97.0 fL Final    MCH 12/10/2024 28.8  26.6 - 33.0 pg Final    MCHC 12/10/2024 34.5  31.5 - 35.7 g/dL Final    RDW 12/10/2024 12.1 (L)  12.3 - 15.4 % Final    RDW-SD 12/10/2024 35.9 (L)  37.0 - 54.0 fl Final    MPV 12/10/2024 10.0  6.0 - 12.0 fL Final    Platelets 12/10/2024 285  140 - 450 10*3/mm3 Final    Total Cholesterol 12/10/2024 111  0 - 200 mg/dL Final    Triglycerides 12/10/2024 50  0 - 150 mg/dL Final    HDL Cholesterol 12/10/2024 50  40 - 60 mg/dL Final    LDL Cholesterol  12/10/2024 49  0 - 100 mg/dL Final    VLDL Cholesterol 12/10/2024 12  5 - 40 mg/dL Final    LDL/HDL Ratio 12/10/2024 1.02   Final    Hemoglobin A1C 12/10/2024 4.80  4.80 - 5.60 % Final    Folate 12/10/2024 6.19  4.78 - 24.20 ng/mL Final    Vitamin B-12 12/10/2024 1,582 (H)  211 - 946 pg/mL Final    Microalbumin/Creatinine Ratio 12/10/2024 9.1  0.0 - 29.0 mg/g Final    Creatinine, Urine 12/10/2024 185.9  mg/dL Final    Microalbumin, Urine 12/10/2024 1.7  mg/dL Final    Lipase  12/10/2024 34  13 - 60 U/L Final       EKG Results:     Test Reason : Rhythm Change  Blood Pressure :   */*   mmHG  Vent. Rate :  62 BPM     Atrial Rate :  62 BPM     P-R Int : 182 ms          QRS Dur :  94 ms      QT Int : 426 ms       P-R-T Axes :  -9 -16   3 degrees     QTc Int : 432 ms     Normal sinus rhythm  Minimal voltage criteria for LVH, may be normal variant  Borderline ECG  When compared with ECG of 27-AUG-2024 10:56, (Unconfirmed)  Sinus rhythm has replaced Atrial fibrillation  Vent. rate has decreased BY  59 BPM  ST no longer depressed in Inferior leads  ST no longer depressed in Anterolateral leads  Nonspecific T wave abnormality no longer evident in Anterolateral leads  Confirmed by CARLOS YA MD ([id number]) on 8/29/2024 10:22:14 PM     Referred By: CASE           Confirmed By: CARLOS YA MD            Assessment & Plan   Diagnoses and all orders for this visit:    1. Severe episode of recurrent major depressive disorder, without psychotic features (Primary)  -     Cariprazine HCl (Vraylar) 3 MG capsule capsule; Take 1 capsule by mouth Daily.  Dispense: 30 capsule; Refill: 2    2. Post traumatic stress disorder (PTSD)    3. LISSETH (generalized anxiety disorder)  -     LORazepam (Ativan) 0.5 MG tablet; Take 1 tablet by mouth 2 (Two) Times a Day As Needed for Anxiety.  Dispense: 60 tablet; Refill: 0    4. Grief              Visit Diagnoses:    ICD-10-CM ICD-9-CM   1. Severe episode of recurrent major depressive disorder, without psychotic features  F33.2 296.33   2. Post traumatic stress disorder (PTSD)  F43.10 309.81   3. LISSETH (generalized anxiety disorder)  F41.1 300.02   4. Grief  F43.21 309.0               GOALS:  Short Term Goals: Patient will be compliant with medication, and patient will have no significant medication related side effects.  Patient will be engaged in psychotherapy as indicated.  Patient will report subjective improvement of symptoms.  Long term goals: To stabilize mood and  "treat/improve subjective symptoms, the patient will stay out of the hospital, the patient will be at an optimal level of functioning, and the patient will take all medications as prescribed.  The patient/guardian verbalized understanding and agreement with goals that were mutually set.    RISK ASSESSMENT  Current harm-to-self risk is reported by pt as \"none.\"  Current uqzs-ac-wxypel risk is reported by pt as \"none.\"   No suicidal thoughts, intent, plan is appreciated by this provider on this date of exam.   No homicidal thoughts, intent, plan is appreciated by this provider on this date.      TREATMENT PLAN: Continue supportive psychotherapy efforts and medications as indicated.    Pharmacological and Non-Pharmacological treatment options discussed during today's visit. Patient/Guardian acknowledged and verbally consented with current treatment plan and was educated on the importance of compliance with treatment and follow-up appointments.      MEDICATION ISSUES:  Discussed medication options and treatment plan of prescribed medication as well as the risks, benefits, any black box warnings, and side effects including potential falls, possible impaired driving, and metabolic adversities among others. Patient is agreeable to call the office with any worsening of symptoms or onset of side effects, or if any concerns or questions arise.  The contact information for the office is made available to the patient. Patient is agreeable to call 911 or go to the nearest ER should they begin having any SI/HI, or if any urgent concerns arise. No medication side effects or related complaints today.     Increase Vraylar to 3 mg daily  Continue Prozac 80 mg total daily  Increase as needed  lorazepam, 0.5mg to 2 times daily for exacerbation of anxiety.     Continue hydroxyzine 50 mg at bedtime as needed for insomnia.  May also use for anxiety exacerbation       MEDS ORDERED DURING VISIT:  New Medications Ordered This Visit "   Medications    LORazepam (Ativan) 0.5 MG tablet     Sig: Take 1 tablet by mouth 2 (Two) Times a Day As Needed for Anxiety.     Dispense:  60 tablet     Refill:  0     Dosage change    Cariprazine HCl (Vraylar) 3 MG capsule capsule     Sig: Take 1 capsule by mouth Daily.     Dispense:  30 capsule     Refill:  2     Dosage change       Follow Up Appointment:   Return in about 3 weeks (around 2/12/2025) for Recheck, Video visit.              This patient will not be seen for the in person visits due to the following exception(s): the patient does not have access to another provider in his/her area.    It is my professional opinion that that patient is at risk for disengagement with care that has been effective in managing his/her illness.     This document has been electronically signed by KAREN Tinajero  January 23, 2025 14:31 EST    Dictated Utilizing Dragon Dictation: Part of this note may be an electronic transcription/translation of spoken language to printed text using the Dragon Dictation System.

## 2025-01-23 NOTE — PATIENT INSTRUCTIONS
For concerns or needing assistance call the [facility] at [phone]  Increase Vraylar to 3 mg daily  Continue Prozac 80 mg total daily  Increase as needed  lorazepam, 0.5mg to 2 times daily for exacerbation of anxiety.     Continue hydroxyzine 50 mg at bedtime as needed for insomnia.  May also use for anxiety exacerbation

## 2025-02-03 ENCOUNTER — TELEMEDICINE (OUTPATIENT)
Dept: PSYCHIATRY | Facility: CLINIC | Age: 65
End: 2025-02-03
Payer: MEDICARE

## 2025-02-03 DIAGNOSIS — F33.1 MDD (MAJOR DEPRESSIVE DISORDER), RECURRENT EPISODE, MODERATE: ICD-10-CM

## 2025-02-03 DIAGNOSIS — F43.21 GRIEF: ICD-10-CM

## 2025-02-03 DIAGNOSIS — F43.10 POST TRAUMATIC STRESS DISORDER (PTSD): Primary | ICD-10-CM

## 2025-02-03 PROCEDURE — 1160F RVW MEDS BY RX/DR IN RCRD: CPT | Performed by: COUNSELOR

## 2025-02-03 PROCEDURE — 90837 PSYTX W PT 60 MINUTES: CPT | Performed by: COUNSELOR

## 2025-02-03 PROCEDURE — 1159F MED LIST DOCD IN RCRD: CPT | Performed by: COUNSELOR

## 2025-02-03 NOTE — PROGRESS NOTES
Date: February 3, 2025  Time In: 9:06  Time out: 9:59      This provider is located at the [facility] (through [facility]), [address], [place] [address] using a secure [employer] Video Visit through [employer]. Patient is being seen remotely via telehealth, and they are in a secure environment for this session. The patient's condition being diagnosed/treated is appropriate for telemedicine. The provider identified herself as well as her credentials. The patient, and/or patients guardian, consent to be seen remotely, and when consent is given they understand that the consent allows for patient identifiable information to be sent to a third party as needed. They may refuse to be seen remotely at any time. The electronic data is encrypted and password protected, and the patient and/or guardian has been advised of the potential risks to privacy not withstanding such measures.     Mode of Visit: Video  Location of patient: Home  Location of provider: Provider home  You have chosen to receive care through a telehealth visit.  The patient has signed the video visit consent form.  The visit included audio and video interaction. No technical issues occurred during this visit    Subjective   Yamila Neff is a 64 y.o. female who presents today fully oriented, appropriately dressed and groomed, and open to communication for follow up appointment.    Chief Complaint:   Chief Complaint   Patient presents with    Anxiety    Depression    PTSD        History of Present Illness: Rapport was established through conversation and unconditional positive regard. Recent events were discussed and how these events impact Pt's emotional health.   Pt reports successful use of learned coping skills since last report.  Pt reports continuation of symptoms and states the intensity and duration of symptoms has remained unchanged since last report. Pt rates anxiety at 7/10 and depression at 6/10.  "    Sleep: Pt reports sleep has worsened since last report. Healthy sleep habits were discussed such as maintaining a schedule, routine, avoiding caffeine, and limiting screen time before bed.  Reports sleep is interrupted by dreams and is often non-restorative.     Appetite:  Pt reports appetite has been good since last report.  Discussed the importance of hydration and eating a healthy diet for overall and mental health.    Medication compliance: Pt reports medications are being taken daily as prescribed. Discussed the importance of compliance with prescriber's directions and Pt was instructed to report questions/concerns, as well as missed doses or discontinuation of medication by Pt.     Safety Plan in Place: No Pt denies SI/HI/SIB recent or current    Daily Functioning:  Since last report, Pt states symptoms are causing Moderate difficulty in daily functioning.      Content Discussion:   Pt reports life updates since previous session. Pt identified current stressors. Pt acknowledged stressors within and outside of one's control. Pt identified successes and issues with utilizing coping mechanisms.  \"I'm still depressed and I don't know why\"    Pt reports her medication was increased to help with Depression.  Pt states it has not been long enough to decrease the depression but she is hopeful it will help. States she is having vivid dreams, and she attributes this to stress and possibly increase in meds.  Pt states being rushed to meet deadlines and worried about ex- are recurrent subjects of her dreams and she feels this is due to his worsening health and Pt is concerned about his health.  Pt states she sees ex- and his fiance at [employer] and he looks unwell.  Talked about Mom and Pt's feelings about their past relationship and current issues.  Mom lives at her own home and is two hours away.  Discussed going to stay with mom for a visit and Pt agrees this will be good for her to have a purpose and " distraction.  Reports they always have good talks and do enjoyable activities together.  Pt will talk to daughter about making arrangements to go.    Counselor supported Pt in continued exploration of underlying belief systems which contribute to difficulty in connecting/vulnerability.  Through discussion, Pt identifies themes of preservation and overt emotional and physical reactivity when physical and/or emotional statis is in question, even in low or perceived threatening situations. Counselor supported Pt in identifying past experiences and underlying beliefs which contribute to these feelings and reactions.  Pt was supported and encouraged in processing emotions related to frustrations with the expectations of self and others.  Pt was encouraged to identify cultural and nuclear familial contexts which contribute to these concerns.  Pt was receptive and engaged in conversation, and Pt reports this was beneficial and applicable to their situation.      Reviewed coping skills and encouraged Pt to continue to practicing coping skills daily when not under distress. Pt was praised for their attempts to decrease symptoms and mitigate activating events.    Clinical Maneuvering/Intervention:  CBT was utilized to encourage Pt to identify maladaptive thoughts and behaviors and replace with more affirming and positive.Pt encouraged to set and maintain appropriate and healthy boundaries with others. Pt was instructed to practice daily using appropriate and specific words to communicate feelings to others.  Motivational interviewing used to encourage Pt to identify strengths which can be utilized in working toward treatment goals. Pt encouraged to practice daily learned skills such as controlled breathing, grounding, and mindfulness. Pt was encouraged to ask for help from support persons to assist them in maintaining stability and alleviate symptoms. Discussed the importance of being one's own mental health advocate and to  refrain from seeing the need to ask for help as a weakness. Pt was encouraged to formulate a plan of action to be more proactive in managing stressors and refrain from using reactive and automatic heightened emotional responses.     Solution-focused therapy employed to identify how Pt would like their life to be if they were to make positive changes. Pt encouraged to identify effective coping skills and strengths they can use to continue utilizing those skills. Pt encouraged to discontinue utilizing non-effective coping mechanisms. Pt provided with feedback to highlight achievements and personal and other resources. Encouraged use of SMART goals    Assisted patient in processing above session content; acknowledged and normalized patient’s thoughts, feelings, and concerns.  Rationalized patient thought process regarding concerns presented at session.  Discussed triggers associated with patient's  anxiety  and depression  Also discussed coping skills for patient to implement such as increasing activity , self care , and positive self talk     Allowed patient to freely discuss issues without interruption or judgment. Provided safe, confidential environment to facilitate the development of positive therapeutic relationship and encourage open, honest communication. Assisted patient in identifying risk factors which would indicate the need for higher level of care including thoughts to harm self or others and/or self-harming behavior and encouraged patient to contact this office, call 911, or present to the nearest emergency room should any of these events occur. Discussed crisis intervention services and means to access. Patient adamantly and convincingly denies current suicidal or homicidal ideation or perceptual disturbance.    Assessment:     Patient appears to maintain relative stability as compared to their baseline.  However, patient persistently struggles with symptoms which continue to cause impairment in  important areas of functioning.  As a result, they can be reasonably expected to continue to benefit from treatment and would likely be at increased risk for decompensation otherwise.      PHQ-Score Total:  PHQ-9 Total Score: PHQ-9 Depression Screening  Little interest or pleasure in doing things?  2   Feeling down, depressed, or hopeless?  2   PHQ-2 Total Score     Trouble falling or staying asleep, or sleeping too much?  2   Feeling tired or having little energy?  1   Poor appetite or overeating?  1   Feeling bad about yourself - or that you are a failure or have let yourself or your family down?  2   Trouble concentrating on things, such as reading the newspaper or watching television?  1   Moving or speaking so slowly that other people could have noticed? Or the opposite - being so fidgety or restless that you have been moving around a lot more than usual?  1   Thoughts that you would be better off dead, or of hurting yourself in some way?  0   PHQ-9 Total Score  12   If you checked off any problems, how difficult have these problems made it for you to do your work, take care of things at home, or get along with other people? Very             Mental Status Exam:   Hygiene:   good  Cooperation:  Cooperative  Eye Contact:  Good  Psychomotor Behavior:  Appropriate  Affect:  Restricted and worried  Mood: depressed and anxious  Speech:  Normal  Thought Process:  Goal directed  Thought Content:  Normal  Suicidal:  None  Homicidal: None  Hallucinations:  None  Delusion: None  Memory:  Intact  Orientation:  Grossly Intact  Reliability:  good  Insight:  Good  Judgement:  Good  Impulse Control:  Good  Physical/Medical Issues:  No        Functional Status: Moderate impairment     Progress toward goal: Not at goal    Prognosis: Good with continued therapeutic intervention        Plan:    Patient will continue in individual outpatient therapy with focus on improved functioning and coping skills, maintaining stability, and  avoiding decompensation and the need for higher level of care.    Patient will adhere to medication regimen as prescribed and report any side effects. Patient will contact this office, call 911 or present to the nearest emergency room should suicidal or homicidal ideations occur. Provide Cognitive Behavioral Therapy and Solution Focused Therapy to improve functioning, maintain stability, and avoid decompensation and the need for higher level of care.     Return in about 2 weeks (around 2/17/2025).      VISIT DIAGNOSIS:    Diagnosis Plan   1. Post traumatic stress disorder (PTSD)        2. Grief        3. MDD (major depressive disorder), recurrent episode, moderate         09:06 EST       This document has been electronically signed by LISETTE Edwards  February 3, 2025      Part of this note may be an electronic transcription/translation of spoken language to printed text using the Dragon Dictation System.

## 2025-02-12 ENCOUNTER — TELEMEDICINE (OUTPATIENT)
Dept: PSYCHIATRY | Facility: CLINIC | Age: 65
End: 2025-02-12
Payer: MEDICARE

## 2025-02-12 DIAGNOSIS — F43.21 GRIEF: ICD-10-CM

## 2025-02-12 DIAGNOSIS — Z79.899 ENCOUNTER FOR LONG-TERM (CURRENT) USE OF MEDICATIONS: ICD-10-CM

## 2025-02-12 DIAGNOSIS — F33.2 SEVERE EPISODE OF RECURRENT MAJOR DEPRESSIVE DISORDER, WITHOUT PSYCHOTIC FEATURES: Primary | ICD-10-CM

## 2025-02-12 DIAGNOSIS — F43.10 POST TRAUMATIC STRESS DISORDER (PTSD): ICD-10-CM

## 2025-02-12 RX ORDER — BUPROPION HYDROCHLORIDE 150 MG/1
150 TABLET ORAL EVERY MORNING
Qty: 30 TABLET | Refills: 1 | Status: SHIPPED | OUTPATIENT
Start: 2025-02-12

## 2025-02-12 NOTE — PATIENT INSTRUCTIONS
For concerns or needing assistance call the [facility] at [phone]  DECREASE Vraylar to 1.5mg daily - take daily for 2 weeks then decrease every other day for 1 week then every 3rd day for one week then d/c  START Wellbutrin 150mgXL every morning  Continue lorazepam   Continue with prozac 80mg daily  [facility] drug screen ordered- go to [facility] lab  [employer] ordered - will come [employer]  [employer] instructions:  Complete consent form  Fill out the small white envelope  Obtain samples with sterile swabs provided, follow instructions provided in the [employer] kit   Put swabs in small white envelope, seal the envelope.  Put small white envelope in larger [employer] prepaid envelope, include copy of insurance card front/back, also include signed consent form.  Take prepaid envelope to your local [employer] counter (check local [employer]'s) , scan and get tracking number.

## 2025-02-12 NOTE — PROGRESS NOTES
"Mode of Visit: Video  Location of patient: -HOME-  Location of provider: +HOME+  You have chosen to receive care through a telehealth visit.  The patient has signed the video visit consent form.  The visit included audio and video interaction. No technical issues occurred during this visit.     PT Identifiers used: Name and .    You have chosen to receive care through a telehealth visit.  Do you consent to use a video/audio connection for your medical care today? Yes  Patient verbally confirmed consent for today's encounter  2025      Subjective   Yamila Neff is a 64 y.o. female who presents today for follow up    Chief Complaint:  \"depression, PTSD\"     History of Present Illness:     History of Present Illness  3-week follow-up of above chief complaint.  At that encounter Vraylar was increased from 1.5 mg to 3 mg.  Patient reports she actually feels worse.  This is reflected in current PHQ-9 and LISSETH-7 scores.  Previously were 9 and 9, today are 13 and 13.  Patient also reports some days she does not take a shower, she stays in her [facility] all day.  She did go to visit her mother in [place] and she drove herself there.  She spent 4 days visiting with her mother.  Her mother is nearly 80 years old and is on daily oral chemo for chronic lymphoma.  Patient reports she does also have some cognitive decline and currently lives alone.  She does have a brother and a niece that go and check on her mother routinely.  The patient is concerned that her mother may not be able to live by herself for a whole lot longer.  Patient also has been babysitting some with the grandchildren, and today she will be babysitting a grandchild that is 2 months old for a couple hours.  She does report that when she goes to babysit the grandchildren where the accident happened it is triggering to go to that home.  She reports increased anxiety especially when she is there.  Patient does report she takes the lorazepam " routinely about once a day, sometimes twice a day.  Agreeable to go get urine drug screen per policy.  Patient ask about getting genetic testing for medications.  She does have traditional Medicare so I feel that this will be covered by her insurance.  I will go ahead and placed the order and patient will receive instructions through the after visit summary report.  Patient reports in the past she has had good luck with Wellbutrin for depression so I will taper from the Vraylar at this point.  Patient reports she really does not think it is made that much difference even at the lower dose.  We will start Wellbutrin 150 mg XL every morning.  Patient reports she continues in [facility] counseling and finds this effective.               Patient Health Questionnaire-9 (PHQ-9) (Depression Screening Tool)  Little interest or pleasure in doing things? (Patient-Rptd) Almost all   Feeling down, depressed, or hopeless? (Patient-Rptd) Almost all   PHQ-2 Total Score (Patient-Rptd) 6   Trouble falling or staying asleep, or sleeping too much? (Patient-Rptd) Over half   Feeling tired or having little energy? (Patient-Rptd) Almost all   Poor appetite or overeating? (Patient-Rptd) Not at all   Feeling bad about yourself - or that you are a failure or have let yourself or your family down? (Patient-Rptd) Several days   Trouble concentrating on things, such as reading the newspaper or watching television? (Patient-Rptd) Several days   Moving or speaking so slowly that other people could have noticed? Or the opposite - being so fidgety or restless that you have been moving around a lot more than usual? (Patient-Rptd) Not at all   Thoughts that you would be better off dead, or of hurting yourself in some way? (Patient-Rptd) Not at all   PHQ-9 Total Score (Patient-Rptd) 13   If you checked off any problems, how difficult have these problems made it for you to do your work, take care of things at home, or get along with other  people? (Patient-Rptd) Extremely difficult         PHQ-9 Total Score: (Patient-Rptd) 13       Generalized Anxiety Disorder 7-Item (LISSETH-7) Screening Tool  Feeling nervous, anxious or on edge: (Patient-Rptd) Nearly every day  Not being able to stop or control worrying: (Patient-Rptd) Several days  Worrying too much about different things: (Patient-Rptd) Nearly every day  Trouble Relaxing: (Patient-Rptd) Several days  Being so restless that it is hard to sit still: (Patient-Rptd) Not at all  Feeling afraid as if something awful might happen: (Patient-Rptd) Nearly every day  Becoming easily annoyed or irritable: (Patient-Rptd) More than half the days  LISSETH 7 Total Score: (Patient-Rptd) 13  If you checked any problems, how difficult have these problems made it for you to do your work, take care of things at home, or get along with other people: (Patient-Rptd) Extremely difficult    The following portions of the patient's history were reviewed and updated as appropriate: allergies, current medications, past family history, past medical history, past social history, past surgical history and problem list.    Past Psychiatric History:  Began Treatment:   Diagnoses:Depression and Anxiety  Psychiatrist: Previously was receiving medication management from KAREN Tran  Therapist: Has engaged in marital counseling in the past, recently had intake for individual therapy with [facility]  Admission History:Denies  Medication Trials: Patient reports historical trials of the following medications, Abilify, Wellbutrin-reportedly helped for a while, Zoloft was reportedly helpful then it quit working.  Celexa is on her allergy list, has been on and off Prozac a couple times throughout the years.  Hydroxyzine helped some with anxiety.  Self Harm: Denies  Suicide Attempts:Denies   Psychosis, Anxiety, Depression: Denies  Patient denied any history of a head injury or seizure  Patient denied any  history of hallucinations or psychosis    Past Medical History:  Past Medical History:   Diagnosis Date    Anemia     Anxiety     Arthritis     Carpal tunnel syndrome 04/09/2012    Degenerative joint disease     Depression     Depression     Diabetes mellitus     Elevated cholesterol     Elevated liver enzymes     Fatty liver     Hearing aid worn     HL (hearing loss)     Hypertension     Kidney disorder     one kidney    Malignant neoplasm of right female breast 02/28/2023    Palpitations     Polycystic ovaries     Psoriasis     PVC (premature ventricular contraction)     occasional pvc's    S/P total knee arthroplasty, right 03/09/2021    Sleep apnea     cpap at home    Wears glasses        Substance Abuse History:   Types:Denies all, including illicit       Social History:  Social History     Socioeconomic History    Marital status:    Tobacco Use    Smoking status: Never    Smokeless tobacco: Never   Vaping Use    Vaping status: Never Used   Substance and Sexual Activity    Alcohol use: No    Drug use: No    Sexual activity: Not Currently     Partners: Male     Birth control/protection: Abstinence, Hysterectomy     Comment:    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  Patient had 1 marriage 1 divorce.  Patient has 3 grown daughters and several grandchildren.  Support system consisting of her children, patient also attends [employer].  No history of  service.  Patient denied any history of legal problems.    Family History:  Family History   Problem Relation Age of Onset    Diabetes Mother         Type 2    Lymphoma Mother     Hypertension Mother     Cancer Mother         Nonhodgkins Lymphoma    Hearing loss Mother         Hearing Aids    Dementia Mother         Believed to be from years of chemotherapy    Depression Mother         Also daughters    No Known Problems Father     Hypertension Brother     Arthritis Brother      Hypertension Brother     Colon cancer Maternal Aunt     Heart disease Maternal Grandmother     Arthritis Maternal Grandmother             Diabetes Maternal Grandmother         Type 2-     Heart disease Maternal Grandfather     Heart attack Maternal Grandfather     Diabetes Maternal Aunt         Type 2    Hypertension Maternal Aunt     Diabetes Daughter         Type 1    Hypertension Brother     ADD / ADHD Cousin     Bipolar disorder Cousin     Breast cancer Neg Hx     Ovarian cancer Neg Hx        Past Surgical History:  Past Surgical History:   Procedure Laterality Date    ABDOMINAL SURGERY      BREAST BIOPSY Right     PAPILLOMA DR BELL     SECTION      x 3    CHOLECYSTECTOMY  2013    COLONOSCOPY      HYSTERECTOMY  2006    complete    JOINT REPLACEMENT Left 2011    left knee    MASTECTOMY  3/23    MASTECTOMY W/ SENTINEL NODE BIOPSY Bilateral 2023    Procedure: MASTECTOMY WITH SENTINEL NODE BIOPSY RIGHT, LEFT PROPHYLATIC MASTECTOMY;  Surgeon: Emi Lizama MD;  Location:  [facility];  Service: General;  Laterality: Bilateral;    OOPHORECTOMY      TOTAL ABDOMINAL HYSTERECTOMY WITH SALPINGO OOPHORECTOMY      TOTAL KNEE ARTHROPLASTY Right 2021    Procedure: TOTAL KNEE ARTHROPLASTY RIGHT;  Surgeon: Mateo Keith MD;  Location:  [facility];  Service: Orthopedics;  Laterality: Right;    TUBAL ABDOMINAL LIGATION         Problem List:  Patient Active Problem List   Diagnosis    Degenerative joint disease    Depression    Psoriasis    Congenital single kidney    Hepatic steatosis    Essential hypertension    Intractable migraine without aura and without status migrainosus    Mixed hyperlipidemia    Anxiety    Primary insomnia    Iron deficiency    Primary osteoarthritis involving multiple joints    NGOC on CPAP    Tendonitis, Achilles, right    Malignant neoplasm of right female breast    Malignant neoplasm of upper-outer quadrant of right female breast    Obesity (BMI 30.0-34.9)     Type 2 diabetes mellitus with hyperglycemia, without long-term current use of insulin    Acute recurrent maxillary sinusitis    Paroxysmal atrial fibrillation with rapid ventricular response    COVID-19 virus detected    Atrial fibrillation with RVR       Allergy:   Allergies   Allergen Reactions    Lisinopril Cough    Celexa [Citalopram] Other (See Comments)     Jittery         Current Medications:   Current Outpatient Medications   Medication Sig Dispense Refill    apixaban (ELIQUIS) 5 MG tablet tablet Take 1 tablet by mouth Every 12 (Twelve) Hours. Indications: Atrial Fibrillation 60 tablet 6    atorvastatin (LIPITOR) 10 MG tablet Take 1 tablet by mouth Every Night. 90 tablet 3    buPROPion XL (Wellbutrin XL) 150 MG 24 hr tablet Take 1 tablet by mouth Every Morning. 30 tablet 1    Cariprazine HCl (Vraylar) 1.5 MG capsule capsule Take one oral capsule daily x 2 weeks then every other day x1 week then every 3rd day x 1week then stop. 30 capsule 0    cholecalciferol (VITAMIN D3) 1000 units tablet Take 2 tablets by mouth Daily.      clobetasol (TEMOVATE) 0.05 % external solution Apply topically to the appropriate area on scalp as directed 2 (Two) Times a Day. 25 mL 12    FLUoxetine (PROzac) 40 MG capsule Take 2 capsules by mouth Daily. 180 capsule 1    fluticasone (FLONASE) 50 MCG/ACT nasal spray Instill 2 sprays into the nostril(s) as directed by provider Daily. 16 g 11    hydrocortisone 2.5 % ointment Apply 1 application  topically to the affected area(s) as directed 2 (Two) Times a Day. 20 g 4    hydrOXYzine (ATARAX) 50 MG tablet Take 1 tablet by mouth 3 (Three) Times a Day As Needed for Itching. 90 tablet 5    icosapent ethyl (Vascepa) 1 g capsule capsule Take 2 capsules by mouth 2 (Two) Times a Day With Meals. 360 capsule 1    ketoconazole (NIZORAL) 2 % shampoo Apply topically to the appropriate area as directed 3 (Three) Times a Week. Leave on for 5 minutes, then rinse. 120 mL 12    LORazepam (Ativan) 0.5 MG  tablet Take 1 tablet by mouth 2 (Two) Times a Day As Needed for Anxiety. 60 tablet 0    Magnesium 250 MG tablet Take 1 tablet by mouth Daily.      mupirocin (BACTROBAN) 2 % ointment Apply a thin film topically to the appropriate area as directed. 22 g 2    ondansetron ODT (ZOFRAN-ODT) 4 MG disintegrating tablet Place 1 tablet on the tongue Every 8 (Eight) Hours As Needed for Nausea or Vomiting. 30 tablet 0    pantoprazole (PROTONIX) 40 MG EC tablet Take 1 tablet by mouth Daily. 90 tablet 3    Probiotic Product (PROBIOTIC DAILY PO) Take  by mouth Daily.      propranolol LA (INDERAL LA) 60 MG 24 hr capsule Take 1 capsule by mouth Daily. 90 capsule 3    Tirzepatide 10 MG/0.5ML solution auto-injector Inject 10 mg under the skin into the appropriate area as directed Every 7 (Seven) Days. 2 mL 2     No current facility-administered medications for this visit.       Review of Systems:    Review of Systems   Psychiatric/Behavioral:  Positive for sleep disturbance, depressed mood and stress. The patient is nervous/anxious.    All other systems reviewed and are negative.        Physical Exam:   Physical Exam  Vitals reviewed.   Constitutional:       Appearance: Normal appearance. She is well-developed and well-groomed.   HENT:      Head: Normocephalic.   Neurological:      Mental Status: She is alert.   Psychiatric:         Attention and Perception: Attention and perception normal.         Mood and Affect: Affect normal. Mood is anxious and depressed.         Speech: Speech normal.         Behavior: Behavior normal. Behavior is cooperative.         Cognition and Memory: Cognition and memory normal.         Vitals:  not currently breastfeeding. There is no height or weight on file to calculate BMI.  Due to extenuating circumstances and possible current health risks associated with the patient being present in a clinical setting (with current health restrictions in place in regards to possible COVID 19 transmission/exposure),  the patient was seen remotely today via a MyChart Video Visit through [facility] and telephone encounter.  Unable to obtain vital signs due to nature of remote visit.  Height stated at 62 inches.  Weight stated at 163 pounds.    Last 3 Blood Pressure Readings:  BP Readings from Last 3 Encounters:   12/16/24 115/70   12/10/24 102/70   11/12/24 130/70          Mental Status Exam:   Hygiene:   good  Cooperation:  Cooperative  Eye Contact:  Good  Psychomotor Behavior:  Appropriate  Affect:  Full range  Mood: depressed and anxious  Hopelessness: Denies  Speech:  Normal  Thought Process:  Goal directed and Linear  Thought Content:  Normal  Suicidal:  None  Homicidal:  None  Hallucinations:  None  Delusion:  None  Memory:  Intact  Orientation:  Person, Place, Time, and Situation  Reliability:  good  Insight:  Good  Judgement:  Good  Impulse Control:  Good  Physical/Medical Issues:  Yes unilateral congenital kidney, atrial fib, status post bilateral mastectomies for breast cancer, history of COVID infection        Lab Results:   No visits with results within 1 Month(s) from this visit.   Latest known visit with results is:   Lab on 12/10/2024   Component Date Value Ref Range Status    Glucose 12/10/2024 76  65 - 99 mg/dL Final    BUN 12/10/2024 20  8 - 23 mg/dL Final    Creatinine 12/10/2024 0.99  0.57 - 1.00 mg/dL Final    Sodium 12/10/2024 137  136 - 145 mmol/L Final    Potassium 12/10/2024 4.2  3.5 - 5.2 mmol/L Final    Chloride 12/10/2024 101  98 - 107 mmol/L Final    CO2 12/10/2024 25.3  22.0 - 29.0 mmol/L Final    Calcium 12/10/2024 9.7  8.6 - 10.5 mg/dL Final    Total Protein 12/10/2024 7.2  6.0 - 8.5 g/dL Final    Albumin 12/10/2024 4.1  3.5 - 5.2 g/dL Final    ALT (SGPT) 12/10/2024 52 (H)  1 - 33 U/L Final    AST (SGOT) 12/10/2024 53 (H)  1 - 32 U/L Final    Alkaline Phosphatase 12/10/2024 123 (H)  39 - 117 U/L Final    Total Bilirubin 12/10/2024 0.5  0.0 - 1.2 mg/dL Final    Globulin 12/10/2024 3.1  gm/dL Final    A/G  Ratio 12/10/2024 1.3  g/dL Final    BUN/Creatinine Ratio 12/10/2024 20.2  7.0 - 25.0 Final    Anion Gap 12/10/2024 10.7  5.0 - 15.0 mmol/L Final    eGFR 12/10/2024 64.6  >60.0 mL/min/1.73 Final    TSH 12/10/2024 0.709  0.270 - 4.200 uIU/mL Final    WBC 12/10/2024 6.01  3.40 - 10.80 10*3/mm3 Final    RBC 12/10/2024 4.65  3.77 - 5.28 10*6/mm3 Final    Hemoglobin 12/10/2024 13.4  12.0 - 15.9 g/dL Final    Hematocrit 12/10/2024 38.8  34.0 - 46.6 % Final    MCV 12/10/2024 83.4  79.0 - 97.0 fL Final    MCH 12/10/2024 28.8  26.6 - 33.0 pg Final    MCHC 12/10/2024 34.5  31.5 - 35.7 g/dL Final    RDW 12/10/2024 12.1 (L)  12.3 - 15.4 % Final    RDW-SD 12/10/2024 35.9 (L)  37.0 - 54.0 fl Final    MPV 12/10/2024 10.0  6.0 - 12.0 fL Final    Platelets 12/10/2024 285  140 - 450 10*3/mm3 Final    Total Cholesterol 12/10/2024 111  0 - 200 mg/dL Final    Triglycerides 12/10/2024 50  0 - 150 mg/dL Final    HDL Cholesterol 12/10/2024 50  40 - 60 mg/dL Final    LDL Cholesterol  12/10/2024 49  0 - 100 mg/dL Final    VLDL Cholesterol 12/10/2024 12  5 - 40 mg/dL Final    LDL/HDL Ratio 12/10/2024 1.02   Final    Hemoglobin A1C 12/10/2024 4.80  4.80 - 5.60 % Final    Folate 12/10/2024 6.19  4.78 - 24.20 ng/mL Final    Vitamin B-12 12/10/2024 1,582 (H)  211 - 946 pg/mL Final    Microalbumin/Creatinine Ratio 12/10/2024 9.1  0.0 - 29.0 mg/g Final    Creatinine, Urine 12/10/2024 185.9  mg/dL Final    Microalbumin, Urine 12/10/2024 1.7  mg/dL Final    Lipase 12/10/2024 34  13 - 60 U/L Final       EKG Results:     Test Reason : Rhythm Change  Blood Pressure :   */*   mmHG  Vent. Rate :  62 BPM     Atrial Rate :  62 BPM     P-R Int : 182 ms          QRS Dur :  94 ms      QT Int : 426 ms       P-R-T Axes :  -9 -16   3 degrees     QTc Int : 432 ms     Normal sinus rhythm  Minimal voltage criteria for LVH, may be normal variant  Borderline ECG  When compared with ECG of 27-AUG-2024 10:56, (Unconfirmed)  Sinus rhythm has replaced Atrial  fibrillation  Vent. rate has decreased BY  59 BPM  ST no longer depressed in Inferior leads  ST no longer depressed in Anterolateral leads  Nonspecific T wave abnormality no longer evident in Anterolateral leads  Confirmed by CARLOS YA MD ([id number]) on 8/29/2024 10:22:14 PM     Referred By: CASE           Confirmed By: CARLOS YA MD            Assessment & Plan   Diagnoses and all orders for this visit:    1. Severe episode of recurrent major depressive disorder, without psychotic features (Primary)  -     buPROPion XL (Wellbutrin XL) 150 MG 24 hr tablet; Take 1 tablet by mouth Every Morning.  Dispense: 30 tablet; Refill: 1  -     [facility] - Swab,; Future  -     Cariprazine HCl (Vraylar) 1.5 MG capsule capsule; Take one oral capsule daily x 2 weeks then every other day x1 week then every 3rd day x 1week then stop.  Dispense: 30 capsule; Refill: 0    2. Post traumatic stress disorder (PTSD)    3. Grief    4. Encounter for long-term (current) use of medications  -     Urine Drug Screen - Urine, Clean Catch        Visit Diagnoses:    ICD-10-CM ICD-9-CM   1. Severe episode of recurrent major depressive disorder, without psychotic features  F33.2 296.33   2. Post traumatic stress disorder (PTSD)  F43.10 309.81   3. Grief  F43.21 309.0   4. Encounter for long-term (current) use of medications  Z79.899 V58.69       GOALS:  Short Term Goals: Patient will be compliant with medication, and patient will have no significant medication related side effects.  Patient will be engaged in psychotherapy as indicated.  Patient will report subjective improvement of symptoms.  Long term goals: To stabilize mood and treat/improve subjective symptoms, the patient will stay out of the hospital, the patient will be at an optimal level of functioning, and the patient will take all medications as prescribed.  The patient/guardian verbalized understanding and agreement with goals that were mutually set.    RISK ASSESSMENT  Current  "harm-to-self risk is reported by pt as \"none.\"  Current ofdi-[id number]-[id number] risk is reported by pt as \"none.\"   No suicidal thoughts, intent, plan is appreciated by this provider on this date of exam.   No homicidal thoughts, intent, plan is appreciated by this provider on this date.      TREATMENT PLAN: Continue supportive psychotherapy efforts and medications as indicated.    Pharmacological and Non-Pharmacological treatment options discussed during today's visit. Patient/Guardian acknowledged and verbally consented with current treatment plan and was educated on the importance of compliance with treatment and follow-up appointments.      MEDICATION ISSUES:  Discussed medication options and treatment plan of prescribed medication as well as the risks, benefits, any black box warnings, and side effects including potential falls, possible impaired driving, and metabolic adversities among others. Patient is agreeable to call the office with any worsening of symptoms or onset of side effects, or if any concerns or questions arise.  The contact information for the office is made available to the patient. Patient is agreeable to call 911 or go to the nearest ER should they begin having any SI/HI, or if any urgent concerns arise. No medication side effects or related complaints today.      DECREASE Vraylar to 1.5mg daily - take daily for 2 weeks then decrease every other day for 1 week then every 3rd day for one week then d/c  START Wellbutrin 150mgXL every morning  Continue lorazepam   Continue with prozac 80mg daily  [place] drug screen ordered- go to [facility] lab  [facility] ordered - will come [employer]  Continue hydroxyzine 50 mg at bedtime as needed for insomnia.  May also use for anxiety exacerbation       MEDS ORDERED DURING VISIT:  New Medications Ordered This Visit   Medications    buPROPion XL (Wellbutrin XL) 150 MG 24 hr tablet     Sig: Take 1 tablet by mouth Every Morning.     Dispense:  30 tablet     Refill:  1    " Cariprazine HCl (Vraylar) 1.5 MG capsule capsule     Sig: Take one oral capsule daily x 2 weeks then every other day x1 week then every 3rd day x 1week then stop.     Dispense:  30 capsule     Refill:  0     Dosage change       Follow Up Appointment:   Return in about 1 month (around 3/12/2025) for Recheck, Video visit.              This patient will not be seen for the in person visits due to the following exception(s): the patient does not have access to another provider in his/her area.    It is my professional opinion that that patient is at risk for disengagement with care that has been effective in managing his/her illness.     This document has been electronically signed by KAREN Tinajero  February 12, 2025 10:23 EST    Dictated Utilizing Dragon Dictation: Part of this note may be an electronic transcription/translation of spoken language to printed text using the Dragon Dictation System.

## 2025-02-17 ENCOUNTER — TELEMEDICINE (OUTPATIENT)
Dept: PSYCHIATRY | Facility: CLINIC | Age: 65
End: 2025-02-17
Payer: MEDICARE

## 2025-02-17 DIAGNOSIS — F33.1 MDD (MAJOR DEPRESSIVE DISORDER), RECURRENT EPISODE, MODERATE: ICD-10-CM

## 2025-02-17 DIAGNOSIS — F43.21 GRIEF: Primary | ICD-10-CM

## 2025-02-17 DIAGNOSIS — F41.1 GAD (GENERALIZED ANXIETY DISORDER): ICD-10-CM

## 2025-02-17 PROCEDURE — 1159F MED LIST DOCD IN RCRD: CPT | Performed by: COUNSELOR

## 2025-02-17 PROCEDURE — 1160F RVW MEDS BY RX/DR IN RCRD: CPT | Performed by: COUNSELOR

## 2025-02-17 PROCEDURE — 90834 PSYTX W PT 45 MINUTES: CPT | Performed by: COUNSELOR

## 2025-02-17 NOTE — PROGRESS NOTES
Date: February 17, 2025  Time In: 9:02  Time out: 9:49      This provider is located at the [facility] [address], [place] [address] using a secure [employer] Video Visit through [employer]. Patient is being seen remotely via telehealth, and they are in a secure environment for this session. The patient's condition being diagnosed/treated is appropriate for telemedicine. The provider identified herself as well as her credentials. The patient, and/or patients guardian, consent to be seen remotely, and when consent is given they understand that the consent allows for patient identifiable information to be sent to a third party as needed. They may refuse to be seen remotely at any time. The electronic data is encrypted and password protected, and the patient and/or guardian has been advised of the potential risks to privacy not withstanding such measures.     Mode of Visit: Video  Location of patient: Home  Location of provider: Provider home  You have chosen to receive care through a telehealth visit.  The patient has signed the video visit consent form.  The visit included audio and video interaction. No technical issues occurred during this visit    Subjective   Yamila Neff is a 64 y.o. female who presents today fully oriented, appropriately dressed and groomed, and open to communication for follow up appointment.    Chief Complaint:   Chief Complaint   Patient presents with    Anxiety    Depression        History of Present Illness: Rapport was established through conversation and unconditional positive regard. Recent events were discussed and how these events impact Pt's emotional health.   Pt reports successful use of learned coping skills since last report.  Pt reports continuation of symptoms and states the intensity and duration of symptoms has worsened since last report. Pt rates anxiety at 6/10 and depression at 7/10.  Pt states increase  "in symptoms may be situational and attributed to change in antidepressant medication.    Sleep: Pt reports sleep has remained unchanged since last report. Healthy sleep habits were discussed such as maintaining a schedule, routine, avoiding caffeine, and limiting screen time before bed.    Appetite:  Pt reports appetite has been good since last report.  Discussed the importance of hydration and eating a healthy diet for overall and mental health.    Medication compliance: Pt reports medications are being taken daily as prescribed. Discussed the importance of compliance with prescriber's directions and Pt was instructed to report questions/concerns, as well as missed doses or discontinuation of medication by Pt.     Safety Plan in Place: No Pt denies SI/HI/SIB recent or current    Daily Functioning:  Since last report, Pt states symptoms are causing Moderate difficulty in daily functioning.      Content Discussion:   Pt reports life updates since previous session. Pt identified current stressors. Pt acknowledged stressors within and outside of one's control. Pt identified successes and issues with utilizing coping mechanisms.  Pt states she discussed her depression with provider and pt's medication is being changed.  States she is in the process of switching over to her new medication, and she is following the directions given by her prescriber.  States she already feels better since decreasing the Vraylar. Reports she has taken Wellbutrin for three days and feels it is beginning to work.  Pt states is sending off her genetic medication test today.  States she spent four days with Mom visiting.  \"That was okay.\"   Pt states she drove herself and did better than she thought she would [place] the [place]. \"Once I got there I didn't want to drive anywhere.\"  States \"I [place] okay\" on the interstate.   States she feels she should be \"more energetic than what I am.\"  States she has some holiday decorations that need to " be packed away and stored.  States she is still having trouble baby sitting daughter's children.  Daughter asked pt to babysit children in two weeks while daughter goes on an overnight trip with s/o.  Discussed the need to consider Pt's emotional status, medication changes, and situational anxiety when making decision to keep children.  Pt states she will discuss this with her daughters so everyone will know the parameters in which Pt will agree to help with children.  Pt feels this is a good plan.      Counselor supported Pt in continued exploration of underlying belief systems which contribute to difficulty in connecting/vulnerability.  Through discussion, Pt identifies themes of preservation and overt emotional and physical reactivity when physical and/or emotional statis is in question, even in low or perceived threatening situations. Counselor supported Pt in identifying past experiences and underlying beliefs which contribute to these feelings and reactions.  Pt was supported and encouraged in processing emotions related to frustrations with the expectations of self and others.  Pt was encouraged to identify cultural and nuclear familial contexts which contribute to these concerns.  Pt was receptive and engaged in conversation, and Pt reports this was beneficial and applicable to their situation.      Reviewed coping skills and encouraged Pt to continue to practicing coping skills daily when not under distress. Pt was praised for their attempts to decrease symptoms and mitigate activating events.    Clinical Maneuvering/Intervention:  CBT was utilized to encourage Pt to identify maladaptive thoughts and behaviors and replace with more affirming and positive.Pt encouraged to set and maintain appropriate and healthy boundaries with others. Pt was instructed to practice daily using appropriate and specific words to communicate feelings to others.  Motivational interviewing used to encourage Pt to identify  strengths which can be utilized in working toward treatment goals. Pt encouraged to practice daily learned skills such as controlled breathing, grounding, and mindfulness. Pt was encouraged to ask for help from support persons to assist them in maintaining stability and alleviate symptoms. Discussed the importance of being one's own mental health advocate and to refrain from seeing the need to ask for help as a weakness. Pt was encouraged to formulate a plan of action to be more proactive in managing stressors and refrain from using reactive and automatic heightened emotional responses.     Solution-focused therapy employed to identify how Pt would like their life to be if they were to make positive changes. Pt encouraged to identify effective coping skills and strengths they can use to continue utilizing those skills. Pt encouraged to discontinue utilizing non-effective coping mechanisms. Pt provided with feedback to highlight achievements and personal and other resources. Encouraged use of SMART goals    Assisted patient in processing above session content; acknowledged and normalized patient’s thoughts, feelings, and concerns.  Rationalized patient thought process regarding concerns presented at session.  Discussed triggers associated with patient's  anxiety , depression , and grief Also discussed coping skills for patient to implement such as grounding , self care , and positive self talk     Allowed patient to freely discuss issues without interruption or judgment. Provided safe, confidential environment to facilitate the development of positive therapeutic relationship and encourage open, honest communication. Assisted patient in identifying risk factors which would indicate the need for higher level of care including thoughts to harm self or others and/or self-harming behavior and encouraged patient to contact this office, call 911, or present to the nearest emergency room should any of these events occur.  Discussed crisis intervention services and means to access. Patient adamantly and convincingly denies current suicidal or homicidal ideation or perceptual disturbance.    Assessment:     Patient appears to maintain relative stability as compared to their baseline.  However, patient persistently struggles with symptoms which continue to cause impairment in important areas of functioning.  As a result, they can be reasonably expected to continue to benefit from treatment and would likely be at increased risk for decompensation otherwise.      Mental Status Exam:   Hygiene:   good  Cooperation:  Cooperative  Eye Contact:  Good  Psychomotor Behavior:  Appropriate  Affect:  Restricted  Mood: depressed and anxious  Speech:  Normal  Thought Process:  Goal directed  Thought Content:  Normal  Suicidal:  None  Homicidal: None  Hallucinations:  None  Delusion: None  Memory:  Intact  Orientation:  Grossly Intact  Reliability:  good  Insight:  Good  Judgement:  Good  Impulse Control:  Good  Physical/Medical Issues:  No        Functional Status: Moderate impairment     Progress toward goal: Not at goal    Prognosis: Good with continued therapeutic intervention        Plan:    Patient will continue in individual outpatient therapy with focus on improved functioning and coping skills, maintaining stability, and avoiding decompensation and the need for higher level of care.    Patient will adhere to medication regimen as prescribed and report any side effects. Patient will contact this office, call 911 or present to the nearest emergency room should suicidal or homicidal ideations occur. Provide Cognitive Behavioral Therapy and Solution Focused Therapy to improve functioning, maintain stability, and avoid decompensation and the need for higher level of care.     Return in about 2 weeks (around 3/3/2025).      VISIT DIAGNOSIS:    Diagnosis Plan   1. Grief        2. MDD (major depressive disorder), recurrent episode, moderate         3. LISSETH (generalized anxiety disorder)         09:01 EST       This document has been electronically signed by LISETTE Edwards  February 17, 2025      Part of this note may be an electronic transcription/translation of spoken language to printed text using the Dragon Dictation System.

## 2025-02-18 ENCOUNTER — LAB (OUTPATIENT)
Dept: LAB | Facility: HOSPITAL | Age: 65
End: 2025-02-18
Payer: MEDICARE

## 2025-02-18 DIAGNOSIS — R74.8 ABNORMAL LIVER ENZYMES: ICD-10-CM

## 2025-02-18 DIAGNOSIS — E78.2 MIXED HYPERLIPIDEMIA: ICD-10-CM

## 2025-02-18 LAB
ALBUMIN SERPL-MCNC: 4.5 G/DL (ref 3.5–5.2)
ALBUMIN/GLOB SERPL: 1.7 G/DL
ALP SERPL-CCNC: 106 U/L (ref 39–117)
ALT SERPL W P-5'-P-CCNC: 20 U/L (ref 1–33)
AMPHET+METHAMPHET UR QL: NEGATIVE
AMPHETAMINES UR QL: NEGATIVE
ANION GAP SERPL CALCULATED.3IONS-SCNC: 10.7 MMOL/L (ref 5–15)
AST SERPL-CCNC: 26 U/L (ref 1–32)
BARBITURATES UR QL SCN: NEGATIVE
BENZODIAZ UR QL SCN: POSITIVE
BILIRUB SERPL-MCNC: 0.6 MG/DL (ref 0–1.2)
BUN SERPL-MCNC: 24 MG/DL (ref 8–23)
BUN/CREAT SERPL: 27.9 (ref 7–25)
BUPRENORPHINE SERPL-MCNC: NEGATIVE NG/ML
CALCIUM SPEC-SCNC: 10 MG/DL (ref 8.6–10.5)
CANNABINOIDS SERPL QL: NEGATIVE
CHLORIDE SERPL-SCNC: 101 MMOL/L (ref 98–107)
CHOLEST SERPL-MCNC: 154 MG/DL (ref 0–200)
CO2 SERPL-SCNC: 28.3 MMOL/L (ref 22–29)
COCAINE UR QL: NEGATIVE
CREAT SERPL-MCNC: 0.86 MG/DL (ref 0.57–1)
EGFRCR SERPLBLD CKD-EPI 2021: 76 ML/MIN/1.73
FENTANYL UR-MCNC: NEGATIVE NG/ML
GLOBULIN UR ELPH-MCNC: 2.7 GM/DL
GLUCOSE SERPL-MCNC: 74 MG/DL (ref 65–99)
HDLC SERPL-MCNC: 64 MG/DL (ref 40–60)
LDLC SERPL CALC-MCNC: 77 MG/DL (ref 0–100)
LDLC/HDLC SERPL: 1.21 {RATIO}
METHADONE UR QL SCN: NEGATIVE
OPIATES UR QL: NEGATIVE
OXYCODONE UR QL SCN: NEGATIVE
PCP UR QL SCN: NEGATIVE
POTASSIUM SERPL-SCNC: 3.7 MMOL/L (ref 3.5–5.2)
PROT SERPL-MCNC: 7.2 G/DL (ref 6–8.5)
SODIUM SERPL-SCNC: 140 MMOL/L (ref 136–145)
TRICYCLICS UR QL SCN: NEGATIVE
TRIGL SERPL-MCNC: 62 MG/DL (ref 0–150)
VLDLC SERPL-MCNC: 13 MG/DL (ref 5–40)

## 2025-02-18 PROCEDURE — 80061 LIPID PANEL: CPT

## 2025-02-18 PROCEDURE — 80053 COMPREHEN METABOLIC PANEL: CPT

## 2025-02-18 PROCEDURE — 80307 DRUG TEST PRSMV CHEM ANLYZR: CPT | Performed by: NURSE PRACTITIONER

## 2025-03-10 DIAGNOSIS — F33.2 SEVERE EPISODE OF RECURRENT MAJOR DEPRESSIVE DISORDER, WITHOUT PSYCHOTIC FEATURES: ICD-10-CM

## 2025-03-12 ENCOUNTER — TELEMEDICINE (OUTPATIENT)
Dept: PSYCHIATRY | Facility: CLINIC | Age: 65
End: 2025-03-12
Payer: MEDICARE

## 2025-03-12 DIAGNOSIS — F43.21 GRIEF: ICD-10-CM

## 2025-03-12 DIAGNOSIS — F33.2 SEVERE EPISODE OF RECURRENT MAJOR DEPRESSIVE DISORDER, WITHOUT PSYCHOTIC FEATURES: Primary | ICD-10-CM

## 2025-03-12 DIAGNOSIS — F43.10 POST TRAUMATIC STRESS DISORDER (PTSD): ICD-10-CM

## 2025-03-12 DIAGNOSIS — F41.1 GAD (GENERALIZED ANXIETY DISORDER): ICD-10-CM

## 2025-03-12 RX ORDER — LORAZEPAM 0.5 MG/1
0.5 TABLET ORAL 2 TIMES DAILY PRN
Qty: 60 TABLET | Refills: 0 | Status: SHIPPED | OUTPATIENT
Start: 2025-03-12

## 2025-03-12 NOTE — PATIENT INSTRUCTIONS
For concerns or needing assistance call the [facility] at [phone]  Continue Wellbutrin 150mgXL every morning  Continue lorazepam   Continue with prozac 80mg daily  Continue hydroxyzine 50 mg at bedtime as needed for insomnia.  May also use for anxiety exacerbation-patient currently not taking

## 2025-03-12 NOTE — PROGRESS NOTES
"Mode of Visit: Video  Location of patient: -OTHER-: doctor's office in [place], [place]  Location of provider: +HOME+  You have chosen to receive care through a telehealth visit.  The patient has signed the video visit consent form.  The visit included audio and video interaction. No technical issues occurred during this visit.        Patient verbally confirmed consent for today's encounter  03/12/2025      Subjective   Yamila Neff is a 64 y.o. female who presents today for follow up    Chief Complaint:  \"depression, PTSD\"     History of Present Illness:     History of Present Illness  1 month follow-up of above chief complaint.  [employer] results were received and patient was emailed a link to access these as well.  Reviewed these with patient.  Wellbutrin 150 mg XL was started at last encounter 1 month ago.  Patient does report she still has quite a bit of anxiety more in the evenings, will utilize lorazepam at that time.  She does feel like she is a lot closer to feeling \"normal.\"  She does report sleep is some better but continues to have some vivid dreams.  Energy is a little bit better, she is having crying spells still.  States ADLs of self-care are a little bit easier but she still not real motivated to do a lot in the house.  Affect seems a little brighter today.  Previous PHQ-9 was 13, today is scored at 4 previous LISSETH-7 was scored at 13 today is scored at 8.  She did spend yesterday outside a lot playing with her grandchildren.  I did encourage her to be outdoors in the sunshine as much as possible as this will be beneficial for vitamin D conversion and overall mental health.             Patient Health Questionnaire-9 (PHQ-9) (Depression Screening Tool)  Little interest or pleasure in doing things? (Patient-Rptd) Several days   Feeling down, depressed, or hopeless? (Patient-Rptd) Several days   PHQ-2 Total Score (Patient-Rptd) 2   Trouble falling or staying asleep, or sleeping too much? " (Patient-Rptd) Not at all   Feeling tired or having little energy? (Patient-Rptd) Several days   Poor appetite or overeating? (Patient-Rptd) Not at all   Feeling bad about yourself - or that you are a failure or have let yourself or your family down? (Patient-Rptd) Several days   Trouble concentrating on things, such as reading the newspaper or watching television? (Patient-Rptd) Not at all   Moving or speaking so slowly that other people could have noticed? Or the opposite - being so fidgety or restless that you have been moving around a lot more than usual? (Patient-Rptd) Not at all   Thoughts that you would be better off dead, or of hurting yourself in some way? (Patient-Rptd) Not at all   PHQ-9 Total Score (Patient-Rptd) 4   If you checked off any problems, how difficult have these problems made it for you to do your work, take care of things at home, or get along with other people? (Patient-Rptd) Somewhat difficult         PHQ-9 Total Score: (Patient-Rptd) 4       Generalized Anxiety Disorder 7-Item (LISSETH-7) Screening Tool  Feeling nervous, anxious or on edge: (Patient-Rptd) Nearly every day  Not being able to stop or control worrying: (Patient-Rptd) Not at all  Worrying too much about different things: (Patient-Rptd) Nearly every day  Trouble Relaxing: (Patient-Rptd) Several days  Being so restless that it is hard to sit still: (Patient-Rptd) Not at all  Feeling afraid as if something awful might happen: (Patient-Rptd) Several days  Becoming easily annoyed or irritable: (Patient-Rptd) Not at all  LISSETH 7 Total Score: (Patient-Rptd) 8  If you checked any problems, how difficult have these problems made it for you to do your work, take care of things at home, or get along with other people: (Patient-Rptd) Somewhat difficult    The following portions of the patient's history were reviewed and updated as appropriate: allergies, current medications, past family history, past medical history, past social history, past  surgical history and problem list.    Past Psychiatric History:  Began Treatment:   Diagnoses:Depression and Anxiety  Psychiatrist: Previously was receiving medication management from KAREN Tran  Therapist: Has engaged in marital counseling in the past, recently had intake for individual therapy with [facility]  Admission History:Denies  Medication Trials: Patient reports historical trials of the following medications, Abilify, Wellbutrin-reportedly helped for a while, Zoloft was reportedly helpful then it quit working.  Celexa is on her allergy list, has been on and off Prozac a couple times throughout the years.  Hydroxyzine helped some with anxiety.  Self Harm: Denies  Suicide Attempts:Denies   Psychosis, Anxiety, Depression: Denies  Patient denied any history of a head injury or seizure  Patient denied any history of hallucinations or psychosis    Past Medical History:  Past Medical History:   Diagnosis Date    Anemia     Anxiety     Arrhythmia 2024    Afib    Arthritis     Carpal tunnel syndrome 2012    Degenerative joint disease     Depression     Depression     Diabetes mellitus     Elevated cholesterol     Elevated liver enzymes     Fatty liver     Hearing aid worn     HL (hearing loss)     Hypertension     No longer require meds since weight loss    Kidney disorder     one kidney    Malignant neoplasm of right female breast 2023    Palpitations     Polycystic ovaries     Psoriasis     PVC (premature ventricular contraction)     occasional pvc's    S/P total knee arthroplasty, right 2021    Sleep apnea     cpap at home    Sleep apnea, obstructive     1 year f/u since started CPAP therapy    Wears glasses        Substance Abuse History:   Types:Denies all, including illicit       Social History:  Social History     Socioeconomic History    Marital status:    Tobacco Use    Smoking status: Never    Smokeless tobacco: Never   Vaping  Use    Vaping status: Never Used   Substance and Sexual Activity    Alcohol use: No    Drug use: No    Sexual activity: Not Currently     Partners: Male     Birth control/protection: Abstinence, Hysterectomy     Comment:    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  Patient had 1 marriage 1 divorce.  Patient has 3 grown daughters and several grandchildren.  Support system consisting of her children, patient also attends [employer].  No history of  service.  Patient denied any history of legal problems.    Family History:  Family History   Problem Relation Age of Onset    Diabetes Mother         Type 2    Lymphoma Mother     Hypertension Mother     Cancer Mother         Nonhodgkins Lymphoma    Hearing loss Mother         Hearing Aids    Dementia Mother         Believed to be from years of chemotherapy    Depression Mother         Also daughters    Sleep apnea Mother     No Known Problems Father     Hypertension Brother     Arthritis Brother     Hypertension Brother     Colon cancer Maternal Aunt     Heart disease Maternal Grandmother     Arthritis Maternal Grandmother             Diabetes Maternal Grandmother         Type 2-     Heart disease Maternal Grandfather     Heart attack Maternal Grandfather     Diabetes Maternal Aunt         Type 2    Hypertension Maternal Aunt     Diabetes Daughter         Type 1    Hypertension Brother     ADD / ADHD Cousin     Bipolar disorder Cousin     Cancer Maternal Aunt         Colon    Diabetes Maternal Aunt     Hypertension Maternal Aunt     Obesity Maternal Aunt     Sleep apnea Maternal Aunt     Diabetes Daughter         Type 1    Sleep apnea Maternal Aunt     Breast cancer Neg Hx     Ovarian cancer Neg Hx        Past Surgical History:  Past Surgical History:   Procedure Laterality Date    ABDOMINAL SURGERY      BREAST BIOPSY Right     PAPILLOMA DR BELL     SECTION      x 3     CHOLECYSTECTOMY  2013    COLONOSCOPY      HYSTERECTOMY  2006    complete    JOINT REPLACEMENT Left 2011    left knee    MASTECTOMY  3/23    MASTECTOMY W/ SENTINEL NODE BIOPSY Bilateral 03/28/2023    Procedure: MASTECTOMY WITH SENTINEL NODE BIOPSY RIGHT, LEFT PROPHYLATIC MASTECTOMY;  Surgeon: Emi Lizama MD;  Location: [facility] OR;  Service: General;  Laterality: Bilateral;    OOPHORECTOMY      TOTAL ABDOMINAL HYSTERECTOMY WITH SALPINGO OOPHORECTOMY      TOTAL KNEE ARTHROPLASTY Right 03/09/2021    Procedure: TOTAL KNEE ARTHROPLASTY RIGHT;  Surgeon: Mateo Keith MD;  Location:  [facility] OR;  Service: Orthopedics;  Laterality: Right;    TUBAL ABDOMINAL LIGATION         Problem List:  Patient Active Problem List   Diagnosis    Degenerative joint disease    Depression    Psoriasis    Congenital single kidney    Hepatic steatosis    Essential hypertension    Intractable migraine without aura and without status migrainosus    Mixed hyperlipidemia    Anxiety    Primary insomnia    Iron deficiency    Primary osteoarthritis involving multiple joints    NGOC on CPAP    Tendonitis, Achilles, right    Malignant neoplasm of right female breast    Malignant neoplasm of upper-outer quadrant of right female breast    Obesity (BMI 30.0-34.9)    Type 2 diabetes mellitus with hyperglycemia, without long-term current use of insulin    Acute recurrent maxillary sinusitis    Paroxysmal atrial fibrillation with rapid ventricular response    COVID-19 virus detected    Atrial fibrillation with RVR       Allergy:   Allergies   Allergen Reactions    Lisinopril Cough    Celexa [Citalopram] Other (See Comments)     Jittery         Current Medications:   Current Outpatient Medications   Medication Sig Dispense Refill    apixaban (ELIQUIS) 5 MG tablet tablet Take 1 tablet by mouth Every 12 (Twelve) Hours. Indications: Atrial Fibrillation 60 tablet 6    atorvastatin (LIPITOR) 10 MG tablet Take 1 tablet by mouth Every Night. 90 tablet 3     buPROPion XL (Wellbutrin XL) 150 MG 24 hr tablet Take 1 tablet by mouth Every Morning. 30 tablet 1    cholecalciferol (VITAMIN D3) 1000 units tablet Take 2 tablets by mouth Daily.      clobetasol (TEMOVATE) 0.05 % external solution Apply topically to the appropriate area on scalp as directed 2 (Two) Times a Day. 25 mL 12    FLUoxetine (PROzac) 40 MG capsule Take 2 capsules by mouth Daily. 180 capsule 1    fluticasone (FLONASE) 50 MCG/ACT nasal spray Instill 2 sprays into the nostril(s) as directed by provider Daily. 16 g 11    hydrocortisone 2.5 % ointment Apply 1 application  topically to the affected area(s) as directed 2 (Two) Times a Day. 20 g 4    ketoconazole (NIZORAL) 2 % shampoo Apply topically to the appropriate area as directed 3 (Three) Times a Week. Leave on for 5 minutes, then rinse. 120 mL 12    LORazepam (Ativan) 0.5 MG tablet Take 1 tablet by mouth 2 (Two) Times a Day As Needed for Anxiety. 60 tablet 0    Magnesium 250 MG tablet Take 1 tablet by mouth Daily.      mupirocin (BACTROBAN) 2 % ointment Apply a thin film topically to the appropriate area as directed. 22 g 2    ondansetron ODT (ZOFRAN-ODT) 4 MG disintegrating tablet Place 1 tablet on the tongue Every 8 (Eight) Hours As Needed for Nausea or Vomiting. 30 tablet 0    pantoprazole (PROTONIX) 40 MG EC tablet Take 1 tablet by mouth Daily. 90 tablet 3    Probiotic Product (PROBIOTIC DAILY PO) Take  by mouth Daily.      propranolol LA (INDERAL LA) 60 MG 24 hr capsule Take 1 capsule by mouth Daily. 90 capsule 3    Tirzepatide 10 MG/0.5ML solution auto-injector Inject 10 mg under the skin into the appropriate area as directed Every 7 (Seven) Days. 2 mL 2    hydrOXYzine (ATARAX) 50 MG tablet Take 1 tablet by mouth 3 (Three) Times a Day As Needed for Itching. (Patient not taking: Reported on 3/12/2025) 90 tablet 5    icosapent ethyl (Vascepa) 1 g capsule capsule Take 2 capsules by mouth 2 (Two) Times a Day With Meals. 360 capsule 1     No current  facility-administered medications for this visit.            Physical Exam:   Physical Exam  Vitals reviewed.   Constitutional:       Appearance: Normal appearance. She is well-developed and well-groomed.   HENT:      Head: Normocephalic.   Neurological:      Mental Status: She is alert.   Psychiatric:         Attention and Perception: Attention and perception normal.         Mood and Affect: Affect normal. Mood is anxious and depressed.         Speech: Speech normal.         Behavior: Behavior normal. Behavior is cooperative.         Cognition and Memory: Cognition and memory normal.         Vitals:  not currently breastfeeding. There is no height or weight on file to calculate BMI.  Due to extenuating circumstances and possible current health risks associated with the patient being present in a clinical setting (with current health restrictions in place in regards to possible COVID 19 transmission/exposure), the patient was seen remotely today via a MyChart Video Visit through [facility] and telephone encounter.  Unable to obtain vital signs due to nature of remote visit.  Height stated at 62 inches.  Weight stated at 163 pounds.    Last 3 Blood Pressure Readings:  BP Readings from Last 3 Encounters:   12/16/24 115/70   12/10/24 102/70   11/12/24 130/70          Mental Status Exam:   Hygiene:   good  Cooperation:  Cooperative  Eye Contact:  Good  Psychomotor Behavior:  Appropriate  Affect:  Full range  Mood: depressed and anxious  Hopelessness: Denies  Speech:  Normal  Thought Process:  Goal directed and Linear  Thought Content:  Normal  Suicidal:  None  Homicidal:  None  Hallucinations:  None  Delusion:  None  Memory:  Intact  Orientation:  Person, Place, Time, and Situation  Reliability:  good  Insight:  Good  Judgement:  Good  Impulse Control:  Good  Physical/Medical Issues:  Yes unilateral congenital kidney, atrial fib, status post bilateral mastectomies for breast cancer, history of COVID infection        Lab  Results:   Lab on 02/18/2025   Component Date Value Ref Range Status    Glucose 02/18/2025 74  65 - 99 mg/dL Final    BUN 02/18/2025 24 (H)  8 - 23 mg/dL Final    Creatinine 02/18/2025 0.86  0.57 - 1.00 mg/dL Final    Sodium 02/18/2025 140  136 - 145 mmol/L Final    Potassium 02/18/2025 3.7  3.5 - 5.2 mmol/L Final    Chloride 02/18/2025 101  98 - 107 mmol/L Final    CO2 02/18/2025 28.3  22.0 - 29.0 mmol/L Final    Calcium 02/18/2025 10.0  8.6 - 10.5 mg/dL Final    Total Protein 02/18/2025 7.2  6.0 - 8.5 g/dL Final    Albumin 02/18/2025 4.5  3.5 - 5.2 g/dL Final    ALT (SGPT) 02/18/2025 20  1 - 33 U/L Final    AST (SGOT) 02/18/2025 26  1 - 32 U/L Final    Alkaline Phosphatase 02/18/2025 106  39 - 117 U/L Final    Total Bilirubin 02/18/2025 0.6  0.0 - 1.2 mg/dL Final    Globulin 02/18/2025 2.7  gm/dL Final    A/G Ratio 02/18/2025 1.7  g/dL Final    BUN/Creatinine Ratio 02/18/2025 27.9 (H)  7.0 - 25.0 Final    Anion Gap 02/18/2025 10.7  5.0 - 15.0 mmol/L Final    eGFR 02/18/2025 76.0  >60.0 mL/min/1.73 Final    Total Cholesterol 02/18/2025 154  0 - 200 mg/dL Final    Triglycerides 02/18/2025 62  0 - 150 mg/dL Final    HDL Cholesterol 02/18/2025 64 (H)  40 - 60 mg/dL Final    LDL Cholesterol  02/18/2025 77  0 - 100 mg/dL Final    VLDL Cholesterol 02/18/2025 13  5 - 40 mg/dL Final    LDL/HDL Ratio 02/18/2025 1.21   Final       EKG Results:     Test Reason : Rhythm Change  Blood Pressure :   */*   mmHG  Vent. Rate :  62 BPM     Atrial Rate :  62 BPM     P-R Int : 182 ms          QRS Dur :  94 ms      QT Int : 426 ms       P-R-T Axes :  -9 -16   3 degrees     QTc Int : 432 ms     Normal sinus rhythm  Minimal voltage criteria for LVH, may be normal variant  Borderline ECG  When compared with ECG of 27-AUG-2024 10:56, (Unconfirmed)  Sinus rhythm has replaced Atrial fibrillation  Vent. rate has decreased BY  59 BPM  ST no longer depressed in Inferior leads  ST no longer depressed in Anterolateral leads  Nonspecific T wave  "abnormality no longer evident in Anterolateral leads  Confirmed by CARLOS YA MD ([id number]) on 8/29/2024 10:22:14 PM     Referred By: CASE           Confirmed By: CARLOS YA MD            Assessment & Plan   Diagnoses and all orders for this visit:    1. Severe episode of recurrent major depressive disorder, without psychotic features (Primary)    2. LISSETH (generalized anxiety disorder)  -     LORazepam (Ativan) 0.5 MG tablet; Take 1 tablet by mouth 2 (Two) Times a Day As Needed for Anxiety.  Dispense: 60 tablet; Refill: 0    3. Grief    4. Post traumatic stress disorder (PTSD)          Visit Diagnoses:    ICD-10-CM ICD-9-CM   1. Severe episode of recurrent major depressive disorder, without psychotic features  F33.2 296.33   2. LISSETH (generalized anxiety disorder)  F41.1 300.02   3. Grief  F43.21 309.0   4. Post traumatic stress disorder (PTSD)  F43.10 309.81         GOALS:  Short Term Goals: Patient will be compliant with medication, and patient will have no significant medication related side effects.  Patient will be engaged in psychotherapy as indicated.  Patient will report subjective improvement of symptoms.  Long term goals: To stabilize mood and treat/improve subjective symptoms, the patient will stay out of the hospital, the patient will be at an optimal level of functioning, and the patient will take all medications as prescribed.  The patient/guardian verbalized understanding and agreement with goals that were mutually set.    RISK ASSESSMENT  Current harm-to-self risk is reported by pt as \"none.\"  Current [id number] risk is reported by pt as \"none.\"   No suicidal thoughts, intent, plan is appreciated by this provider on this date of exam.   No homicidal thoughts, intent, plan is appreciated by this provider on this date.      TREATMENT PLAN: Continue supportive psychotherapy efforts and medications as indicated.    Pharmacological and Non-Pharmacological treatment options discussed during " today's visit. Patient/Guardian acknowledged and verbally consented with current treatment plan and was educated on the importance of compliance with treatment and follow-up appointments.      MEDICATION ISSUES:  Discussed medication options and treatment plan of prescribed medication as well as the risks, benefits, any black box warnings, and side effects including potential falls, possible impaired driving, and metabolic adversities among others. Patient is agreeable to call the office with any worsening of symptoms or onset of side effects, or if any concerns or questions arise.  The contact information for the office is made available to the patient. Patient is agreeable to call 911 or go to the nearest ER should they begin having any SI/HI, or if any urgent concerns arise. No medication side effects or related complaints today.     Continue Wellbutrin 150mgXL every morning  Continue lorazepam 0.5mg tab-take 1 tablet twice daily as needed for anxiety  Continue with prozac 80mg daily  Continue hydroxyzine 50 mg at bedtime as needed for insomnia.  May also use for anxiety exacerbation-patient currently not taking       MEDS ORDERED DURING VISIT:  New Medications Ordered This Visit   Medications    LORazepam (Ativan) 0.5 MG tablet     Sig: Take 1 tablet by mouth 2 (Two) Times a Day As Needed for Anxiety.     Dispense:  60 tablet     Refill:  0       Follow Up Appointment:   Return in about 4 weeks (around 4/9/2025) for Recheck, Video visit.              This patient will not be seen for the in person visits due to the following exception(s): the patient does not have access to another provider in his/her area.    It is my professional opinion that that patient is at risk for disengagement with care that has been effective in managing his/her illness.     This document has been electronically signed by KAREN Tinajero  March 12, 2025 12:31 EDT    Dictated Utilizing Dragon Dictation: Part of this note may be  an electronic transcription/translation of spoken language to printed text using the Dragon Dictation System.

## 2025-03-17 ENCOUNTER — TELEMEDICINE (OUTPATIENT)
Dept: PSYCHIATRY | Facility: CLINIC | Age: 65
End: 2025-03-17
Payer: MEDICARE

## 2025-03-17 DIAGNOSIS — F41.1 GAD (GENERALIZED ANXIETY DISORDER): ICD-10-CM

## 2025-03-17 DIAGNOSIS — F33.2 SEVERE EPISODE OF RECURRENT MAJOR DEPRESSIVE DISORDER, WITHOUT PSYCHOTIC FEATURES: Primary | ICD-10-CM

## 2025-03-17 PROCEDURE — 90837 PSYTX W PT 60 MINUTES: CPT | Performed by: COUNSELOR

## 2025-03-17 NOTE — PROGRESS NOTES
"Date: March 17, 2025  Time In: 9:05  Time out: 10:00      This provider is located at the [facility] ([facility] [address], [place] [address] using a secure [employer] Video Visit through [employer]. Patient is being seen remotely via telehealth, and they are in a secure environment for this session. The patient's condition being diagnosed/treated is appropriate for telemedicine. The provider identified herself as well as her credentials. The patient, and/or patients guardian, consent to be seen remotely, and when consent is given they understand that the consent allows for patient identifiable information to be sent to a third party as needed. They may refuse to be seen remotely at any time. The electronic data is encrypted and password protected, and the patient and/or guardian has been advised of the potential risks to privacy not withstanding such measures.     Mode of Visit: Video  Location of patient: Home  Location of provider: Provider home  You have chosen to receive care through a telehealth visit.  The patient has signed the video visit consent form.  The visit included audio and video interaction. No technical issues occurred during this visit    Subjective   Yamila Neff is a 64 y.o. female who presents today fully oriented, appropriately dressed and groomed, and open to communication for follow up appointment.    Chief Complaint:   Chief Complaint   Patient presents with    Anxiety    Depression        History of Present Illness: Rapport was established through conversation and unconditional positive regard. Recent events were discussed and how these events impact Pt's emotional health.   Pt reports successful use of learned coping skills since last report.  Pt reports continuation of symptoms and states the intensity and duration of symptoms has worsened since last report. Pt rates anxiety at 7/10 and depression at 6/10.  States \"I feel a " "little down\" but Pt states she is starting to do better since the weather is warmer and there has been some sunshine. Pt attributes her increase in anx and dep to change in medication and states she is starting to feel better each day.  Pt is hopeful and she denies SI/HI/SIB    Sleep: Pt reports sleep ha improved since last report. Healthy sleep habits were discussed such as maintaining a schedule, routine, avoiding caffeine, and limiting screen time before bed.  \"Overall, my sleep is better.\"  If pt wakes she goes back to sleep.      Appetite:  Pt reports appetite has been good since last report.  Discussed the importance of hydration and eating a healthy diet for overall and mental health.    Medication compliance: Pt reports medications are being taken daily as prescribed. Discussed the importance of compliance with prescriber's directions and Pt was instructed to report questions/concerns, as well as missed doses or discontinuation of medication by Pt.   Pt states she is off the Vraylar and is on Wellbutrin and \"It seems to be doing a lot better.\"      Safety Plan in Place: No Pt denies SI/HI/SIB recent or current    Daily Functioning:  Since last report, Pt states symptoms are causing Moderate difficulty in daily functioning.      Content Discussion:   Pt reports life updates since previous session. Pt identified current stressors. Pt acknowledged stressors within and outside of one's control. Pt identified successes and issues with utilizing coping mechanisms.  Pt states she went to her Mom's house and Pt got home health involved in helping her care.  States she talked to her brother and they made the decision to bring home health into Mom's home to help care for her medical needs. Pt states Mom called Pt and yelled at her the first day home health came and did the evaluation on Mom. Pt states she cried when Mom got on to her. Pt was allowed to process feelings about his, and Pt states this was helpful.  Pt " "went to Religion yesterday and was anxious.  Has been nervous lately.  Pt states she will use mindfulness and refraining from worry about things out of her control such as daughters and children going to [place]. Pt states she is keeping 12 yo twin grandchildren while daughters are gone.  Pt will visit mom and have 12 yo twins that week.   Pt states she will enjoy her own little vacation with Mom and focus her energy on doing fun things with Mom and grandchildren such as get ice cream and do fun activities.  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  Pt will not do things she needs to but \"I get to\"    Counselor supported Pt in continued exploration of underlying belief systems which contribute to difficulty in connecting/vulnerability.  Through discussion, Pt identifies themes of preservation and overt emotional and physical reactivity when physical and/or emotional statis is in question, even in low or perceived threatening situations. Counselor supported Pt in identifying past experiences and underlying beliefs which contribute to these feelings and reactions.  Pt was supported and encouraged in processing emotions related to frustrations with the expectations of self and others.  Pt was encouraged to identify cultural and nuclear familial contexts which contribute to these concerns.  Pt was receptive and engaged in conversation, and Pt reports this was beneficial and applicable to their situation.      Reviewed coping skills and encouraged Pt to continue to practicing coping skills daily when not under distress. Pt was praised for their attempts to decrease symptoms and mitigate activating events.    Clinical Maneuvering/Intervention:  CBT was utilized to encourage Pt to identify maladaptive thoughts and behaviors and replace with more affirming and positive.Pt encouraged to set and maintain appropriate and healthy boundaries with others. Pt was instructed to practice daily using appropriate and specific words to " communicate feelings to others.  Motivational interviewing used to encourage Pt to identify strengths which can be utilized in working toward treatment goals. Pt encouraged to practice daily learned skills such as controlled breathing, grounding, and mindfulness. Pt was encouraged to ask for help from support persons to assist them in maintaining stability and alleviate symptoms. Discussed the importance of being one's own mental health advocate and to refrain from seeing the need to ask for help as a weakness. Pt was encouraged to formulate a plan of action to be more proactive in managing stressors and refrain from using reactive and automatic heightened emotional responses.     Solution-focused therapy employed to identify how Pt would like their life to be if they were to make positive changes. Pt encouraged to identify effective coping skills and strengths they can use to continue utilizing those skills. Pt encouraged to discontinue utilizing non-effective coping mechanisms. Pt provided with feedback to highlight achievements and personal and other resources. Encouraged use of SMART goals    Assisted patient in processing above session content; acknowledged and normalized patient’s thoughts, feelings, and concerns.  Rationalized patient thought process regarding concerns presented at session.  Discussed triggers associated with patient's  anxiety  and depression  Also discussed coping skills for patient to implement such as grounding , self care , and positive self talk     Allowed patient to freely discuss issues without interruption or judgment. Provided safe, confidential environment to facilitate the development of positive therapeutic relationship and encourage open, honest communication. Assisted patient in identifying risk factors which would indicate the need for higher level of care including thoughts to harm self or others and/or self-harming behavior and encouraged patient to contact this office,  call 911, or present to the nearest emergency room should any of these events occur. Discussed crisis intervention services and means to access. Patient adamantly and convincingly denies current suicidal or homicidal ideation or perceptual disturbance.    Assessment:     Patient appears to maintain relative stability as compared to their baseline.  However, patient persistently struggles with symptoms which continue to cause impairment in important areas of functioning.  As a result, they can be reasonably expected to continue to benefit from treatment and would likely be at increased risk for decompensation otherwise.        Mental Status Exam:   Hygiene:   good  Cooperation:  Cooperative  Eye Contact:  Good  Psychomotor Behavior:  Appropriate  Affect:  Restricted  Mood: depressed and anxious  Speech:  Normal  Thought Process:  Goal directed  Thought Content:  Normal  Suicidal:  None  Homicidal: None  Hallucinations:  None  Delusion: None  Memory:  Intact  Orientation:  Grossly Intact  Reliability:  good  Insight:  Good  Judgement:  Good  Impulse Control:  Good  Physical/Medical Issues:  No        Functional Status: Moderate impairment     Progress toward goal: Not at goal    Prognosis: Good with continued therapeutic intervention        Plan:    Patient will continue in individual outpatient therapy with focus on improved functioning and coping skills, maintaining stability, and avoiding decompensation and the need for higher level of care.    Patient will adhere to medication regimen as prescribed and report any side effects. Patient will contact this office, call 911 or present to the nearest emergency room should suicidal or homicidal ideations occur. Provide Cognitive Behavioral Therapy and Solution Focused Therapy to improve functioning, maintain stability, and avoid decompensation and the need for higher level of care.     Return in about 2 weeks (around 3/31/2025).      VISIT DIAGNOSIS:    Diagnosis Plan    1. Severe episode of recurrent major depressive disorder, without psychotic features        2. LISSETH (generalized anxiety disorder)         09:05 EDT       This document has been electronically signed by LISETTE Edwards  March 17, 2025      Part of this note may be an electronic transcription/translation of spoken language to printed text using the Dragon Dictation System.

## 2025-03-19 DIAGNOSIS — E11.65 TYPE 2 DIABETES MELLITUS WITH HYPERGLYCEMIA, WITHOUT LONG-TERM CURRENT USE OF INSULIN: ICD-10-CM

## 2025-03-19 RX ORDER — TIRZEPATIDE 10 MG/.5ML
10 INJECTION, SOLUTION SUBCUTANEOUS
Qty: 2 ML | Refills: 2 | Status: CANCELLED | OUTPATIENT
Start: 2025-03-19

## 2025-03-20 NOTE — TELEPHONE ENCOUNTER
Rx Refill Note  Requested Prescriptions     Pending Prescriptions Disp Refills    Tirzepatide (Mounjaro) 10 MG/0.5ML solution auto-injector 2 mL 2     Sig: Inject 10 mg under the skin into the appropriate area as directed Every 7 (Seven) Days.      Last office visit with prescribing clinician: 12/10/2024   Last telemedicine visit with prescribing clinician: Visit date not found   Next office visit with prescribing clinician: 4/14/2025                         Would you like a call back once the refill request has been completed: [] Yes [] No    If the office needs to give you a call back, can they leave a voicemail: [] Yes [] No    Cheri Connolly MA  03/20/25, 09:26 EDT

## 2025-03-31 DIAGNOSIS — E11.65 TYPE 2 DIABETES MELLITUS WITH HYPERGLYCEMIA, WITHOUT LONG-TERM CURRENT USE OF INSULIN: ICD-10-CM

## 2025-03-31 NOTE — TELEPHONE ENCOUNTER
Rx Refill Note  Requested Prescriptions     Pending Prescriptions Disp Refills    Tirzepatide (Mounjaro) 10 MG/0.5ML solution auto-injector 2 mL 2     Sig: Inject 10 mg under the skin into the appropriate area as directed Every 7 (Seven) Days.      Last office visit with prescribing clinician: 12/10/2024   Last telemedicine visit with prescribing clinician: Visit date not found   Next office visit with prescribing clinician: 4/14/2025                         Would you like a call back once the refill request has been completed: [] Yes [] No    If the office needs to give you a call back, can they leave a voicemail: [] Yes [] No    Guerita Del Rosario MA  03/31/25, 16:01 EDT

## 2025-04-01 RX ORDER — TIRZEPATIDE 10 MG/.5ML
10 INJECTION, SOLUTION SUBCUTANEOUS
Qty: 2 ML | Refills: 0 | Status: SHIPPED | OUTPATIENT
Start: 2025-04-01

## 2025-04-09 ENCOUNTER — TELEMEDICINE (OUTPATIENT)
Dept: PSYCHIATRY | Facility: CLINIC | Age: 65
End: 2025-04-09
Payer: MEDICARE

## 2025-04-09 DIAGNOSIS — F41.1 GAD (GENERALIZED ANXIETY DISORDER): ICD-10-CM

## 2025-04-09 DIAGNOSIS — F43.21 GRIEF: ICD-10-CM

## 2025-04-09 DIAGNOSIS — F33.0 MILD EPISODE OF RECURRENT MAJOR DEPRESSIVE DISORDER: ICD-10-CM

## 2025-04-09 DIAGNOSIS — F43.10 POST TRAUMATIC STRESS DISORDER (PTSD): Primary | ICD-10-CM

## 2025-04-09 RX ORDER — FLUOXETINE HYDROCHLORIDE 40 MG/1
80 CAPSULE ORAL DAILY
Qty: 180 CAPSULE | Refills: 1 | Status: SHIPPED | OUTPATIENT
Start: 2025-04-09

## 2025-04-09 RX ORDER — BUPROPION HYDROCHLORIDE 150 MG/1
150 TABLET ORAL EVERY MORNING
Qty: 90 TABLET | Refills: 1 | Status: SHIPPED | OUTPATIENT
Start: 2025-04-09

## 2025-04-09 NOTE — PROGRESS NOTES
"Mode of Visit: Video  Location of patient: -HOME-  Location of provider: +HOME+  You have chosen to receive care through a telehealth visit.  The patient has signed the video visit consent form.  The visit was audio only per telephone as the epic caitie for telehealth was not working correctly.  The helpdesk was notified and undersigned was told that this was a systemwide issue with the epic my chart.        Patient verbally confirmed consent for today's encounter  04/09/2025      Subjective   Yamila Neff is a 64 y.o. female who presents today for follow up    Chief Complaint:  \"depression, PTSD\"     History of Present Illness:     History of Present Illness  Previous PHQ-9 was scored at 4, previous LISSETH-7 was scored at 8.  Today is greatly improved PHQ-9 at 0, and LISSETH-7 is scored at 1.  Remains on the Wellbutrin 150 mg XL every morning and the Prozac 80 mg daily.  Patient has lorazepam 0.5 mg available for breakthrough anxiety but reports she has not used this in about 2 weeks.  Patient reports overall she feels like she is doing much better, she is regularly engaged in psychotherapy with AMILCAR Horn and has a visit scheduled next week.  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  I did discuss with her possibly starting prazosin if these do not resolve but patient would like to wait and see if this gets better with time.            Patient Health Questionnaire-9 (PHQ-9) (Depression Screening Tool)  Little interest or pleasure in doing things? (Patient-Rptd) Not at all   Feeling down, depressed, or hopeless? (Patient-Rptd) Not at all   PHQ-2 Total Score (Patient-Rptd) 0   Trouble falling or staying asleep, or sleeping too much? (Patient-Rptd) Not at all   Feeling tired or having little energy? (Patient-Rptd) Not at all   Poor appetite or overeating? (Patient-Rptd) Not at all   Feeling bad about yourself - or that you are a failure or have let yourself or your family down? " (Patient-Rptd) Not at all   Trouble concentrating on things, such as reading the newspaper or watching television? (Patient-Rptd) Not at all   Moving or speaking so slowly that other people could have noticed? Or the opposite - being so fidgety or restless that you have been moving around a lot more than usual? (Patient-Rptd) Not at all   Thoughts that you would be better off dead, or of hurting yourself in some way? (Patient-Rptd) Not at all   PHQ-9 Total Score (Patient-Rptd) 0   If you checked off any problems, how difficult have these problems made it for you to do your work, take care of things at home, or get along with other people? (Patient-Rptd) Not difficult at all         PHQ-9 Total Score: (Patient-Rptd) 0       Generalized Anxiety Disorder 7-Item (LISSETH-7) Screening Tool  Feeling nervous, anxious or on edge: (Patient-Rptd) Several days  Not being able to stop or control worrying: (Patient-Rptd) Not at all  Worrying too much about different things: (Patient-Rptd) Not at all  Trouble Relaxing: (Patient-Rptd) Not at all  Being so restless that it is hard to sit still: (Patient-Rptd) Not at all  Feeling afraid as if something awful might happen: (Patient-Rptd) Not at all  Becoming easily annoyed or irritable: (Patient-Rptd) Not at all  LISSETH 7 Total Score: (Patient-Rptd) 1  If you checked any problems, how difficult have these problems made it for you to do your work, take care of things at home, or get along with other people: (Patient-Rptd) Not difficult at all    The following portions of the patient's history were reviewed and updated as appropriate: allergies, current medications, past family history, past medical history, past social history, past surgical history and problem list.    Past Psychiatric History:  Began Treatment: 1995  Diagnoses:Depression and Anxiety  Psychiatrist: Previously was receiving medication management from KAREN Tran  Therapist: Has engaged in marital counseling in the  past, recently had intake [facility] individual therapy with [facility]  Admission History:Denies  Medication Trials: Patient reports historical trials of the following medications, Abilify, Wellbutrin-reportedly helped for a while, Zoloft was reportedly helpful then it quit working.  Celexa is on her allergy list, has been on and off Prozac a couple times throughout the years.  Hydroxyzine helped some with anxiety.  Self Harm: Denies  Suicide Attempts:Denies   Psychosis, Anxiety, Depression: Denies  Patient denied any history of a head injury or seizure  Patient denied any history of hallucinations or psychosis    Past Medical History:  Past Medical History:   Diagnosis Date    Anemia     Anxiety     Arrhythmia 2024    Afib    Arthritis     Carpal tunnel syndrome 2012    Degenerative joint disease     Depression     Depression     Diabetes mellitus     Elevated cholesterol     Elevated liver enzymes     Fatty liver     Hearing aid worn     HL (hearing loss)     Hypertension     No longer require meds since weight loss    Kidney disorder     one kidney    Malignant neoplasm of right female breast 2023    Palpitations     Polycystic ovaries     Psoriasis     PVC (premature ventricular contraction)     occasional pvc's    S/P total knee arthroplasty, right 2021    Sleep apnea     cpap at home    Sleep apnea, obstructive     1 year f/u since started CPAP therapy    Wears glasses        Substance Abuse History:   Types:Denies all, including illicit       Social History:  Social History     Socioeconomic History    Marital status:    Tobacco Use    Smoking status: Never    Smokeless tobacco: Never   Vaping Use    Vaping status: Never Used   Substance and Sexual Activity    Alcohol use: No    Drug use: No    Sexual activity: Not Currently     Partners: Male     Birth control/protection: Abstinence, Hysterectomy     Comment:    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  Patient had 1 marriage 1 divorce.  Patient has 3 grown daughters and several grandchildren.  Support system consisting of her children, patient also attends [employer].  No history of  service.  Patient denied any history of legal problems.    Family History:  Family History   Problem Relation Age of Onset    Diabetes Mother         Type 2    Lymphoma Mother     Hypertension Mother     Cancer Mother         Nonhodgkins Lymphoma    Hearing loss Mother         Hearing Aids    Dementia Mother         Believed to be from years of chemotherapy    Depression Mother         Also daughters    Sleep apnea Mother     No Known Problems Father     Hypertension Brother     Arthritis Brother     Hypertension Brother     Colon cancer Maternal Aunt     Heart disease Maternal Grandmother     Arthritis Maternal Grandmother             Diabetes Maternal Grandmother         Type 2-     Heart disease Maternal Grandfather     Heart attack Maternal Grandfather     Diabetes Maternal Aunt         Type 2    Hypertension Maternal Aunt     Diabetes Daughter         Type 1    Hypertension Brother     ADD / ADHD Cousin     Bipolar disorder Cousin     Cancer Maternal Aunt         Colon    Diabetes Maternal Aunt     Hypertension Maternal Aunt     Obesity Maternal Aunt     Sleep apnea Maternal Aunt     Diabetes Daughter         Type 1    Sleep apnea Maternal Aunt     Breast cancer Neg Hx     Ovarian cancer Neg Hx        Past Surgical History:  Past Surgical History:   Procedure Laterality Date    ABDOMINAL SURGERY      BREAST BIOPSY Right     PAPILLOMA DR BELL     SECTION      x 3    CHOLECYSTECTOMY  2013    COLONOSCOPY      HYSTERECTOMY  2006    complete    JOINT REPLACEMENT Left     left knee    MASTECTOMY  3/23    MASTECTOMY W/ SENTINEL NODE BIOPSY Bilateral 2023    Procedure: MASTECTOMY WITH SENTINEL NODE BIOPSY RIGHT, LEFT  PROPHYLATIC MASTECTOMY;  Surgeon: Emi Lizama MD;  Location:  [facility];  Service: General;  Laterality: Bilateral;    OOPHORECTOMY      TOTAL ABDOMINAL HYSTERECTOMY WITH SALPINGO OOPHORECTOMY      TOTAL KNEE ARTHROPLASTY Right 03/09/2021    Procedure: TOTAL KNEE ARTHROPLASTY RIGHT;  Surgeon: Mateo Keith MD;  Location:  [facility];  Service: Orthopedics;  Laterality: Right;    TUBAL ABDOMINAL LIGATION         Problem List:  Patient Active Problem List   Diagnosis    Degenerative joint disease    Depression    Psoriasis    Congenital single kidney    Hepatic steatosis    Essential hypertension    Intractable migraine without aura and without status migrainosus    Mixed hyperlipidemia    Anxiety    Primary insomnia    Iron deficiency    Primary osteoarthritis involving multiple joints    NGOC on CPAP    Tendonitis, Achilles, right    Malignant neoplasm of right female breast    Malignant neoplasm of upper-outer quadrant of right female breast    Obesity (BMI 30.0-34.9)    Type 2 diabetes mellitus with hyperglycemia, without long-term current use of insulin    Acute recurrent maxillary sinusitis    Paroxysmal atrial fibrillation with rapid ventricular response    COVID-19 virus detected    Atrial fibrillation with RVR       Allergy:   Allergies   Allergen Reactions    Lisinopril Cough    Celexa [Citalopram] Other (See Comments)     Jittery         Current Medications:   Current Outpatient Medications   Medication Sig Dispense Refill    buPROPion XL (Wellbutrin XL) 150 MG 24 hr tablet Take 1 tablet by mouth Every Morning. 90 tablet 1    FLUoxetine (PROzac) 40 MG capsule Take 2 capsules by mouth Daily. 180 capsule 1    apixaban (Eliquis) 5 MG tablet tablet Take 1 tablet by mouth Every 12 (Twelve) Hours. Indications: Atrial Fibrillation 60 tablet 6    atorvastatin (LIPITOR) 10 MG tablet Take 1 tablet by mouth Every Night. 90 tablet 3    cholecalciferol (VITAMIN D3) 1000 units tablet Take 2 tablets by mouth  Daily.      clobetasol (TEMOVATE) 0.05 % external solution Apply topically to the appropriate area on scalp as directed 2 (Two) Times a Day. 25 mL 12    fluticasone (FLONASE) 50 MCG/ACT nasal spray Instill 2 sprays into the nostril(s) as directed by provider Daily. 16 g 11    hydrocortisone 2.5 % ointment Apply 1 application  topically to the affected area(s) as directed 2 (Two) Times a Day. 20 g 4    hydrOXYzine (ATARAX) 50 MG tablet Take 1 tablet by mouth 3 (Three) Times a Day As Needed for Itching. (Patient not taking: Reported on 3/12/2025) 90 tablet 5    icosapent ethyl (Vascepa) 1 g capsule capsule Take 2 capsules by mouth 2 (Two) Times a Day With Meals. 360 capsule 1    ketoconazole (NIZORAL) 2 % shampoo Apply topically to the appropriate area as directed 3 (Three) Times a Week. Leave on for 5 minutes, then rinse. 120 mL 12    LORazepam (Ativan) 0.5 MG tablet Take 1 tablet by mouth 2 (Two) Times a Day As Needed for Anxiety. 60 tablet 0    Magnesium 250 MG tablet Take 1 tablet by mouth Daily.      mupirocin (BACTROBAN) 2 % ointment Apply a thin film topically to the appropriate area as directed. 22 g 2    ondansetron ODT (ZOFRAN-ODT) 4 MG disintegrating tablet Place 1 tablet on the tongue Every 8 (Eight) Hours As Needed for Nausea or Vomiting. 30 tablet 0    pantoprazole (PROTONIX) 40 MG EC tablet Take 1 tablet by mouth Daily. 90 tablet 3    Probiotic Product (PROBIOTIC DAILY PO) Take  by mouth Daily.      propranolol LA (INDERAL LA) 60 MG 24 hr capsule Take 1 capsule by mouth Daily. 90 capsule 3    Tirzepatide (Mounjaro) 10 MG/0.5ML solution auto-injector Inject 10 mg under the skin into the appropriate area as directed Every 7 (Seven) Days. 2 mL 0     No current facility-administered medications for this visit.            Physical Exam:   Physical Exam  Neurological:      Mental Status: She is alert.   Psychiatric:         Attention and Perception: Attention and perception normal.         Mood and Affect:  Mood normal.         Speech: Speech normal.         Behavior: Behavior is cooperative.         Cognition and Memory: Cognition and memory normal.         Vitals:  not currently breastfeeding. There is no height or weight on file to calculate BMI.  Due to extenuating circumstances and possible current health risks associated with the patient being present in a clinical setting (with current health restrictions in place in regards to possible COVID 19 transmission/exposure), the patient was seen remotely today via a MyChart Video Visit through [facility] and telephone encounter.  Unable to obtain vital signs due to nature of remote visit.  Height stated at 62 inches.  Weight stated at 163 pounds.    Last 3 Blood Pressure Readings:  BP Readings from Last 3 Encounters:   12/16/24 115/70   12/10/24 102/70   11/12/24 130/70          Mental Status Exam:   Hygiene:    Not observed  Cooperation:  Cooperative  Eye Contact:   Not observed  Psychomotor Behavior:   Not observed  Affect:   Not observed  Mood: euthymic  Hopelessness: Denies  Speech:  Normal  Thought Process:  Goal directed and Linear  Thought Content:  Normal  Suicidal:  None  Homicidal:  None  Hallucinations:  None  Delusion:  None  Memory:  Intact  Orientation:  Person, Place, Time, and Situation  Reliability:  good  Insight:  Good  Judgement:  Good  Impulse Control:  Good  Physical/Medical Issues:  Yes unilateral congenital kidney, atrial fib, status post bilateral mastectomies for breast cancer, history of COVID infection        Lab Results:   No visits with results within 1 Month(s) from this visit.   Latest known visit with results is:   Lab on 02/18/2025   Component Date Value Ref Range Status    Glucose 02/18/2025 74  65 - 99 mg/dL Final    BUN 02/18/2025 24 (H)  8 - 23 mg/dL Final    Creatinine 02/18/2025 0.86  0.57 - 1.00 mg/dL Final    Sodium 02/18/2025 140  136 - 145 mmol/L Final    Potassium 02/18/2025 3.7  3.5 - 5.2 mmol/L Final    Chloride 02/18/2025 101   98 - 107 mmol/L Final    CO2 02/18/2025 28.3  22.0 - 29.0 mmol/L Final    Calcium 02/18/2025 10.0  8.6 - 10.5 mg/dL Final    Total Protein 02/18/2025 7.2  6.0 - 8.5 g/dL Final    Albumin 02/18/2025 4.5  3.5 - 5.2 g/dL Final    ALT (SGPT) 02/18/2025 20  1 - 33 U/L Final    AST (SGOT) 02/18/2025 26  1 - 32 U/L Final    Alkaline Phosphatase 02/18/2025 106  39 - 117 U/L Final    Total Bilirubin 02/18/2025 0.6  0.0 - 1.2 mg/dL Final    Globulin 02/18/2025 2.7  gm/dL Final    A/G Ratio 02/18/2025 1.7  g/dL Final    BUN/Creatinine Ratio 02/18/2025 27.9 (H)  7.0 - 25.0 Final    Anion Gap 02/18/2025 10.7  5.0 - 15.0 mmol/L Final    eGFR 02/18/2025 76.0  >60.0 mL/min/1.73 Final    Total Cholesterol 02/18/2025 154  0 - 200 mg/dL Final    Triglycerides 02/18/2025 62  0 - 150 mg/dL Final    HDL Cholesterol 02/18/2025 64 (H)  40 - 60 mg/dL Final    LDL Cholesterol  02/18/2025 77  0 - 100 mg/dL Final    VLDL Cholesterol 02/18/2025 13  5 - 40 mg/dL Final    LDL/HDL Ratio 02/18/2025 1.21   Final       EKG Results:     Test Reason : Rhythm Change  Blood Pressure :   */*   mmHG  Vent. Rate :  62 BPM     Atrial Rate :  62 BPM     P-R Int : 182 ms          QRS Dur :  94 ms      QT Int : 426 ms       P-R-T Axes :  -9 -16   3 degrees     QTc Int : 432 ms     Normal sinus rhythm  Minimal voltage criteria for LVH, may be normal variant  Borderline ECG  When compared with ECG of 27-AUG-2024 10:56, (Unconfirmed)  Sinus rhythm has replaced Atrial fibrillation  Vent. rate has decreased BY  59 BPM  ST no longer depressed in Inferior leads  ST no longer depressed in Anterolateral leads  Nonspecific T wave abnormality no longer evident in Anterolateral leads  Confirmed by CARLOS YA MD ([id number]) on 8/29/2024 10:22:14 PM     Referred By: CASE           Confirmed By: CARLOS YA MD            Assessment & Plan   Diagnoses and all orders for this visit:    1. Post traumatic stress disorder (PTSD) (Primary)  -     FLUoxetine (PROzac) 40 MG  "capsule; Take 2 capsules by mouth Daily.  Dispense: 180 capsule; Refill: 1    2. Mild episode of recurrent major depressive disorder  -     buPROPion XL (Wellbutrin XL) 150 MG 24 hr tablet; Take 1 tablet by mouth Every Morning.  Dispense: 90 tablet; Refill: 1  -     FLUoxetine (PROzac) 40 MG capsule; Take 2 capsules by mouth Daily.  Dispense: 180 capsule; Refill: 1    3. LISSETH (generalized anxiety disorder)  -     FLUoxetine (PROzac) 40 MG capsule; Take 2 capsules by mouth Daily.  Dispense: 180 capsule; Refill: 1    4. Grief  -     FLUoxetine (PROzac) 40 MG capsule; Take 2 capsules by mouth Daily.  Dispense: 180 capsule; Refill: 1            Visit Diagnoses:    ICD-10-CM ICD-9-CM   1. Post traumatic stress disorder (PTSD)  F43.10 309.81   2. Mild episode of recurrent major depressive disorder  F33.0 296.31   3. LISSETH (generalized anxiety disorder)  F41.1 300.02   4. Grief  F43.21 309.0           GOALS:  Short Term Goals: Patient will be compliant with medication, and patient will have no significant medication related side effects.  Patient will be engaged in psychotherapy as indicated.  Patient will report subjective improvement of symptoms.  Long term goals: To stabilize mood and treat/improve subjective symptoms, the patient will stay out of the hospital, the patient will be at an optimal level of functioning, and the patient will take all medications as prescribed.  The patient/guardian verbalized understanding and agreement with goals that were mutually set.    RISK ASSESSMENT  Current harm-to-self risk is reported by pt as \"none.\"  Current [id number] risk is reported by pt as \"none.\"   No suicidal thoughts, intent, plan is appreciated by this provider on this date of exam.   No homicidal thoughts, intent, plan is appreciated by this provider on this date.      TREATMENT PLAN: Continue supportive psychotherapy efforts and medications as indicated.    Pharmacological and Non-Pharmacological treatment options " discussed during today's visit. Patient/Guardian acknowledged and verbally consented with current treatment plan and was educated on the importance of compliance with treatment and follow-up appointments.      MEDICATION ISSUES:  Discussed medication options and treatment plan of prescribed medication as well as the risks, benefits, any black box warnings, and side effects including potential falls, possible impaired driving, and metabolic adversities among others. Patient is agreeable to call the office with any worsening of symptoms or onset of side effects, or if any concerns or questions arise.  The contact information for the office is made available to the patient. Patient is agreeable to call 911 or go to the nearest ER should they begin having any SI/HI, or if any urgent concerns arise. No medication side effects or related complaints today.     Continue Wellbutrin 150mgXL every morning  Continue lorazepam 0.5mg tab-take 1 tablet twice daily as needed for anxiety  Continue with prozac 80mg daily          MEDS ORDERED DURING VISIT:  New Medications Ordered This Visit   Medications    buPROPion XL (Wellbutrin XL) 150 MG 24 hr tablet     Sig: Take 1 tablet by mouth Every Morning.     Dispense:  90 tablet     Refill:  1    FLUoxetine (PROzac) 40 MG capsule     Sig: Take 2 capsules by mouth Daily.     Dispense:  180 capsule     Refill:  1       Follow Up Appointment:   Return in about 3 months (around 7/22/2025) for Recheck, Video visit.              This patient will not be seen for the in person visits due to the following exception(s): the patient does not have access to another provider in his/her area.    It is my professional opinion that that patient is at risk for disengagement with care that has been effective in managing his/her illness.     This document has been electronically signed by KAREN Tinajero  April 9, 2025 09:50 EDT    Dictated Utilizing Dragon Dictation: Part of this note may be  an electronic transcription/translation of spoken language to printed text using the Dragon Dictation System.

## 2025-04-14 ENCOUNTER — OFFICE VISIT (OUTPATIENT)
Dept: FAMILY MEDICINE CLINIC | Facility: CLINIC | Age: 65
End: 2025-04-14
Payer: MEDICARE

## 2025-04-14 VITALS
HEART RATE: 68 BPM | DIASTOLIC BLOOD PRESSURE: 68 MMHG | WEIGHT: 160 LBS | HEIGHT: 62 IN | OXYGEN SATURATION: 98 % | BODY MASS INDEX: 29.44 KG/M2 | SYSTOLIC BLOOD PRESSURE: 108 MMHG

## 2025-04-14 DIAGNOSIS — F43.21 GRIEF: ICD-10-CM

## 2025-04-14 DIAGNOSIS — F43.10 POST TRAUMATIC STRESS DISORDER (PTSD): ICD-10-CM

## 2025-04-14 DIAGNOSIS — F41.1 GAD (GENERALIZED ANXIETY DISORDER): ICD-10-CM

## 2025-04-14 DIAGNOSIS — E11.65 TYPE 2 DIABETES MELLITUS WITH HYPERGLYCEMIA, WITHOUT LONG-TERM CURRENT USE OF INSULIN: Primary | ICD-10-CM

## 2025-04-14 DIAGNOSIS — F33.0 MILD EPISODE OF RECURRENT MAJOR DEPRESSIVE DISORDER: ICD-10-CM

## 2025-04-14 DIAGNOSIS — G43.019 INTRACTABLE MIGRAINE WITHOUT AURA AND WITHOUT STATUS MIGRAINOSUS: ICD-10-CM

## 2025-04-14 LAB
EXPIRATION DATE: NORMAL
HBA1C MFR BLD: 5 % (ref 4.5–5.7)
Lab: NORMAL

## 2025-04-14 PROCEDURE — 99214 OFFICE O/P EST MOD 30 MIN: CPT | Performed by: PHYSICIAN ASSISTANT

## 2025-04-14 PROCEDURE — 83036 HEMOGLOBIN GLYCOSYLATED A1C: CPT | Performed by: PHYSICIAN ASSISTANT

## 2025-04-14 PROCEDURE — 1126F AMNT PAIN NOTED NONE PRSNT: CPT | Performed by: PHYSICIAN ASSISTANT

## 2025-04-14 PROCEDURE — 1160F RVW MEDS BY RX/DR IN RCRD: CPT | Performed by: PHYSICIAN ASSISTANT

## 2025-04-14 PROCEDURE — 1159F MED LIST DOCD IN RCRD: CPT | Performed by: PHYSICIAN ASSISTANT

## 2025-04-14 PROCEDURE — 3044F HG A1C LEVEL LT 7.0%: CPT | Performed by: PHYSICIAN ASSISTANT

## 2025-04-14 RX ORDER — TIRZEPATIDE 10 MG/.5ML
10 INJECTION, SOLUTION SUBCUTANEOUS
Qty: 6 ML | Refills: 0 | Status: SHIPPED | OUTPATIENT
Start: 2025-04-14

## 2025-04-14 NOTE — PROGRESS NOTES
Chief Complaint   Patient presents with    Diabetes       Yamila Oanh Neff is a pleasant 64 y.o. female who is here for follow-up for diabetes type 2.  She is compliant on Mounjaro and tolerating it well.  She denies any adverse effects with the medication.  She is on propranolol for migraines and this works well.  Not on any medication for blood pressure.  Blood pressure looks great.  She is established with behavioral health and is followed by acounselor as well as medication management.  They have adjusted her medication and she is feeling much better overall.    Past Medical History:   Diagnosis Date    Anemia     Anxiety     Arrhythmia 2024    Afib    Arthritis     Carpal tunnel syndrome 2012    Degenerative joint disease     Depression     Depression     Diabetes mellitus     Elevated cholesterol     Elevated liver enzymes     Fatty liver     Hearing aid worn     HL (hearing loss)     Hypertension     No longer require meds since weight loss    Kidney disorder     one kidney    Malignant neoplasm of right female breast 2023    Palpitations     Polycystic ovaries     Psoriasis     PVC (premature ventricular contraction)     occasional pvc's    S/P total knee arthroplasty, right 2021    Sleep apnea     cpap at home    Sleep apnea, obstructive     1 year f/u since started CPAP therapy    Wears glasses        Past Surgical History:   Procedure Laterality Date    ABDOMINAL SURGERY      BREAST BIOPSY Right     PAPILLOMA DR BELL     SECTION      x 3    CHOLECYSTECTOMY  2013    COLONOSCOPY      HYSTERECTOMY  2006    complete    JOINT REPLACEMENT Left     left knee    MASTECTOMY  3/23    MASTECTOMY W/ SENTINEL NODE BIOPSY Bilateral 2023    Procedure: MASTECTOMY WITH SENTINEL NODE BIOPSY RIGHT, LEFT PROPHYLATIC MASTECTOMY;  Surgeon: Emi Lizama MD;  Location: [facility];  Service: General;  Laterality: Bilateral;    OOPHORECTOMY      TOTAL ABDOMINAL HYSTERECTOMY  WITH SALPINGO OOPHORECTOMY      TOTAL KNEE ARTHROPLASTY Right 2021    Procedure: TOTAL KNEE ARTHROPLASTY RIGHT;  Surgeon: Mateo Keith MD;  Location: [facility];  Service: Orthopedics;  Laterality: Right;    TUBAL ABDOMINAL LIGATION         Family History   Problem Relation Age of Onset    Diabetes Mother         Type 2    Lymphoma Mother     Hypertension Mother     Cancer Mother         Nonhodgkins Lymphoma    Hearing loss Mother         Hearing Aids    Dementia Mother         Believed to be from years of chemotherapy    Depression Mother         Also daughters    Sleep apnea Mother     No Known Problems Father     Hypertension Brother     Arthritis Brother     Hypertension Brother     Colon cancer Maternal Aunt     Heart disease Maternal Grandmother     Arthritis Maternal Grandmother             Diabetes Maternal Grandmother         Type 2-     Heart disease Maternal Grandfather     Heart attack Maternal Grandfather     Diabetes Maternal Aunt         Type 2    Hypertension Maternal Aunt     Diabetes Daughter         Type 1    Hypertension Brother     ADD / ADHD Cousin     Bipolar disorder Cousin     Cancer Maternal Aunt         Colon    Diabetes Maternal Aunt     Hypertension Maternal Aunt     Obesity Maternal Aunt     Sleep apnea Maternal Aunt     Diabetes Daughter         Type 1    Sleep apnea Maternal Aunt     Breast cancer Neg Hx     Ovarian cancer Neg Hx        Social History     Socioeconomic History    Marital status:    Tobacco Use    Smoking status: Never    Smokeless tobacco: Never   Vaping Use    Vaping status: Never Used   Substance and Sexual Activity    Alcohol use: No    Drug use: No    Sexual activity: Not Currently     Partners: Male     Birth control/protection: Abstinence, Hysterectomy     Comment:        Allergies   Allergen Reactions    Lisinopril Cough    Celexa [Citalopram] Other (See Comments)     Keturah CHURCHILL  Review of Systems  "  Constitutional:  Negative for chills, diaphoresis, fatigue and fever.   HENT:  Negative for congestion, postnasal drip and rhinorrhea.    Respiratory:  Negative for cough, shortness of breath and wheezing.    Cardiovascular:  Negative for chest pain and leg swelling.   Neurological:  Negative for dizziness and headache.   Psychiatric/Behavioral:  Positive for stress. Negative for self-injury, sleep disturbance, suicidal ideas and depressed mood. The patient is not nervous/anxious.        Vitals:    04/14/25 0854   BP: 108/68   Pulse: 68   SpO2: 98%   Weight: 72.6 kg (160 lb)   Height: 157.5 cm (62\")     Body mass index is 29.26 kg/m².           Current Outpatient Medications on File Prior to Visit   Medication Sig Dispense Refill    apixaban (Eliquis) 5 MG tablet tablet Take 1 tablet by mouth Every 12 (Twelve) Hours. Indications: Atrial Fibrillation 60 tablet 6    atorvastatin (LIPITOR) 10 MG tablet Take 1 tablet by mouth Every Night. 90 tablet 3    buPROPion XL (Wellbutrin XL) 150 MG 24 hr tablet Take 1 tablet by mouth Every Morning. 90 tablet 1    cholecalciferol (VITAMIN D3) 1000 units tablet Take 2 tablets by mouth Daily.      clobetasol (TEMOVATE) 0.05 % external solution Apply topically to the appropriate area on scalp as directed 2 (Two) Times a Day. 25 mL 12    FLUoxetine (PROzac) 40 MG capsule Take 2 capsules by mouth Daily. 180 capsule 1    fluticasone (FLONASE) 50 MCG/ACT nasal spray Instill 2 sprays into the nostril(s) as directed by provider Daily. 16 g 11    hydrocortisone 2.5 % ointment Apply 1 application  topically to the affected area(s) as directed 2 (Two) Times a Day. 20 g 4    icosapent ethyl (Vascepa) 1 g capsule capsule Take 2 capsules by mouth 2 (Two) Times a Day With Meals. 360 capsule 1    ketoconazole (NIZORAL) 2 % shampoo Apply topically to the appropriate area as directed 3 (Three) Times a Week. Leave on for 5 minutes, then rinse. 120 mL 12    LORazepam (Ativan) 0.5 MG tablet Take 1 " tablet by mouth 2 (Two) Times a Day As Needed for Anxiety. 60 tablet 0    Magnesium 250 MG tablet Take 1 tablet by mouth Daily.      mupirocin (BACTROBAN) 2 % ointment Apply a thin film topically to the appropriate area as directed. 22 g 2    ondansetron ODT (ZOFRAN-ODT) 4 MG disintegrating tablet Place 1 tablet on the tongue Every 8 (Eight) Hours As Needed for Nausea or Vomiting. 30 tablet 0    pantoprazole (PROTONIX) 40 MG EC tablet Take 1 tablet by mouth Daily. 90 tablet 3    Probiotic Product (PROBIOTIC DAILY PO) Take  by mouth Daily.      propranolol LA (INDERAL LA) 60 MG 24 hr capsule Take 1 capsule by mouth Daily. 90 capsule 3    [DISCONTINUED] Tirzepatide (Mounjaro) 10 MG/0.5ML solution auto-injector Inject 10 mg under the skin into the appropriate area as directed Every 7 (Seven) Days. 2 mL 0    [DISCONTINUED] hydrOXYzine (ATARAX) 50 MG tablet Take 1 tablet by mouth 3 (Three) Times a Day As Needed for Itching. (Patient not taking: Reported on 3/12/2025) 90 tablet 5     No current facility-administered medications on file prior to visit.       Results for orders placed or performed in visit on 02/18/25   Comprehensive Metabolic Panel    Collection Time: 02/18/25  9:56 AM    Specimen: Blood   Result Value Ref Range    Glucose 74 65 - 99 mg/dL    BUN 24 (H) 8 - 23 mg/dL    Creatinine 0.86 0.57 - 1.00 mg/dL    Sodium 140 136 - 145 mmol/L    Potassium 3.7 3.5 - 5.2 mmol/L    Chloride 101 98 - 107 mmol/L    CO2 28.3 22.0 - 29.0 mmol/L    Calcium 10.0 8.6 - 10.5 mg/dL    Total Protein 7.2 6.0 - 8.5 g/dL    Albumin 4.5 3.5 - 5.2 g/dL    ALT (SGPT) 20 1 - 33 U/L    AST (SGOT) 26 1 - 32 U/L    Alkaline Phosphatase 106 39 - 117 U/L    Total Bilirubin 0.6 0.0 - 1.2 mg/dL    Globulin 2.7 gm/dL    A/G Ratio 1.7 g/dL    BUN/Creatinine Ratio 27.9 (H) 7.0 - 25.0    Anion Gap 10.7 5.0 - 15.0 mmol/L    eGFR 76.0 >60.0 mL/min/1.73   Lipid Panel    Collection Time: 02/18/25  9:56 AM    Specimen: Blood   Result Value Ref Range     Total Cholesterol 154 0 - 200 mg/dL    Triglycerides 62 0 - 150 mg/dL    HDL Cholesterol 64 (H) 40 - 60 mg/dL    LDL Cholesterol  77 0 - 100 mg/dL    VLDL Cholesterol 13 5 - 40 mg/dL    LDL/HDL Ratio 1.21        PE    Physical Exam  Vitals reviewed.   Constitutional:       General: She is not in acute distress.     Appearance: Normal appearance. She is well-developed. She is not ill-appearing or diaphoretic.   HENT:      Head: Normocephalic and atraumatic.   Eyes:      Extraocular Movements: Extraocular movements intact.      Conjunctiva/sclera: Conjunctivae normal.   Pulmonary:      Effort: No respiratory distress.   Musculoskeletal:         General: Normal range of motion.      Cervical back: Normal range of motion.   Neurological:      General: No focal deficit present.      Mental Status: She is alert.   Psychiatric:         Attention and Perception: She is attentive.         Mood and Affect: Mood normal.         Speech: Speech normal.         Behavior: Behavior normal. Behavior is cooperative.         Thought Content: Thought content normal.         Judgment: Judgment normal.           A/P    Diagnoses and all orders for this visit:    1. Type 2 diabetes mellitus with hyperglycemia, without long-term current use of insulin (Primary)  -     POC Glycosylated Hemoglobin (Hb A1C)  -     Tirzepatide (Mounjaro) 10 MG/0.5ML solution auto-injector; Inject 10 mg under the skin into the appropriate area as directed Every 7 (Seven) Days.  Dispense: 6 mL; Refill: 0  Hemoglobin A1c is 5%.  Patient is tolerating Mounjaro 10 mg weekly.  She has no issues with the medication.  Return in 3 to 4 months.    2. Intractable migraine without aura and without status migrainosus  On propranolol 60 mg daily.  Works well for her migraines.    3. Mild episode of recurrent major depressive disorder  4. Post traumatic stress disorder (PTSD)  5. LISSETH (generalized anxiety disorder)  6. Grief  Followed by behavioral health - medication  management and counseling.  Doing well on current medications.  Feeling better overall.       Plan of care reviewed with patient at the conclusion of today's visit. Education was provided regarding diagnosis, management and any prescribed or recommended OTC medications.  Patient verbalizes understanding of and agreement with management plan.    Dictated Utilizing Dragon Dictation     Please note that portions of this note were completed with a voice recognition program.     Part of this note may be an electronic transcription/translation of spoken language to printed text using the Dragon Dictation System.    Return in about 4 months (around 8/14/2025) for Recheck, DM.     Daly Archibald PA-C

## 2025-04-15 ENCOUNTER — TELEMEDICINE (OUTPATIENT)
Dept: PSYCHIATRY | Facility: CLINIC | Age: 65
End: 2025-04-15
Payer: MEDICARE

## 2025-04-15 DIAGNOSIS — F41.1 GAD (GENERALIZED ANXIETY DISORDER): ICD-10-CM

## 2025-04-15 DIAGNOSIS — F33.1 MDD (MAJOR DEPRESSIVE DISORDER), RECURRENT EPISODE, MODERATE: Primary | ICD-10-CM

## 2025-04-15 NOTE — PROGRESS NOTES
Date: April 16, 2025  Time In: 9:05  Time out: 9:51      This provider is located at the [facility] (through [facility]), [address], [place] [address] using a secure [employer] Video Visit through [employer]. Patient is being seen remotely via telehealth, and they are in a secure environment for this session. The patient's condition being diagnosed/treated is appropriate for telemedicine. The provider identified herself as well as her credentials. The patient, and/or patients guardian, consent to be seen remotely, and when consent is given they understand that the consent allows for patient identifiable information to be sent to a third party as needed. They may refuse to be seen remotely at any time. The electronic data is encrypted and password protected, and the patient and/or guardian has been advised of the potential risks to privacy not withstanding such measures.     Mode of Visit: Video  Location of patient: home  Location of provider: Provider home  You have chosen to receive care through a telehealth visit.  The patient has signed the video visit consent form.  The visit included audio and video interaction. No technical issues occurred during this visit    Subjective   Yamila Neff is a 64 y.o. female who presents today fully oriented, appropriately dressed and groomed, and open to communication for follow up appointment.    Chief Complaint:   Chief Complaint   Patient presents with    Anxiety    Depression        History of Present Illness: Rapport was established through conversation and unconditional positive regard. Recent events were discussed and how these events impact Pt's emotional health.   Pt reports successful use of learned coping skills since last report.  Pt reports continuation of symptoms and states the intensity and duration of symptoms has improved since last report. Pt rates anxiety at 3/10 and depression at 3/10.     Sleep: Pt reports  "sleep has remained unchanged since last report. Healthy sleep habits were discussed such as maintaining a schedule, routine, avoiding caffeine, and limiting screen time before bed.  Sleep is somewhat more restorative and but she is having trouble with her breathing machine fitting.  States she has been getting outside and doing house work.  Pt states she got some garden supplies yesterday and she is excited about working in the yard.      Appetite:  Pt reports appetite has been good since last report.  Discussed the importance of hydration and eating a healthy diet for overall and mental health.    Medication compliance: Pt reports medications are being taken daily as prescribed. Discussed the importance of compliance with prescriber's directions and Pt was instructed to report questions/concerns, as well as missed doses or discontinuation of medication by Pt.   Pt states medication is effective in managing symptoms.     Safety Plan in Place: No Pt denies SI/HI/SIB recent or current    Daily Functioning:  Since last report, Pt states symptoms are causing Moderate difficulty in daily functioning.      Content Discussion:   Pt reports life updates since previous session. Pt identified current stressors. Pt acknowledged stressors within and outside of one's control. Pt identified successes and issues with utilizing coping mechanisms.  States she visited with Mom over spring break and they had Pt's 11 year old twin grandchildren also came for a visit during this time.  Pt and Mom attended a wedding and had an enjoyable time. Pt states Pt took the children to a jump park and \"I did ok.\" States she worries some about her mother's cognitive condition, but she and siblings are working together to manage this. Pt states overall she is doing well with her anxiety and depression.  Discussed the importance of keeping momentum of wellness going, and Pt states she is looking forward to summer activities with the grandchildren.  " Pt states she does not worry about her daughter like she did last year, and daughter is in a healthy and supportive relationship.      Counselor supported Pt in continued exploration of underlying belief systems which contribute to difficulty in connecting/vulnerability.  Through discussion, Pt identifies themes of preservation and overt emotional and physical reactivity when physical and/or emotional statis is in question, even in low or perceived threatening situations. Counselor supported Pt in identifying past experiences and underlying beliefs which contribute to these feelings and reactions.  Pt was supported and encouraged in processing emotions related to frustrations with the expectations of self and others.  Pt was encouraged to identify cultural and nuclear familial contexts which contribute to these concerns.  Pt was receptive and engaged in conversation, and Pt reports this was beneficial and applicable to their situation.      Reviewed coping skills and encouraged Pt to continue to practicing coping skills daily when not under distress. Pt was praised for their attempts to decrease symptoms and mitigate activating events.    Clinical Maneuvering/Intervention:  CBT was utilized to encourage Pt to identify maladaptive thoughts and behaviors and replace with more affirming and positive.Pt encouraged to set and maintain appropriate and healthy boundaries with others. Pt was instructed to practice daily using appropriate and specific words to communicate feelings to others.  Motivational interviewing used to encourage Pt to identify strengths which can be utilized in working toward treatment goals. Pt encouraged to practice daily learned skills such as controlled breathing, grounding, and mindfulness. Pt was encouraged to ask for help from support persons to assist them in maintaining stability and alleviate symptoms. Discussed the importance of being one's own mental health advocate and to refrain from  seeing the need to ask for help as a weakness. Pt was encouraged to formulate a plan of action to be more proactive in managing stressors and refrain from using reactive and automatic heightened emotional responses.     Solution-focused therapy employed to identify how Pt would like their life to be if they were to make positive changes. Pt encouraged to identify effective coping skills and strengths they can use to continue utilizing those skills. Pt encouraged to discontinue utilizing non-effective coping mechanisms. Pt provided with feedback to highlight achievements and personal and other resources. Encouraged use of SMART goals    Assisted patient in processing above session content; acknowledged and normalized patient’s thoughts, feelings, and concerns.  Rationalized patient thought process regarding concerns presented at session.  Discussed triggers associated with patient's  anxiety  and depression  Also discussed coping skills for patient to implement such as self care  and positive self talk     Allowed patient to freely discuss issues without interruption or judgment. Provided safe, confidential environment to facilitate the development of positive therapeutic relationship and encourage open, honest communication. Assisted patient in identifying risk factors which would indicate the need for higher level of care including thoughts to harm self or others and/or self-harming behavior and encouraged patient to contact this office, call 911, or present to the nearest emergency room should any of these events occur. Discussed crisis intervention services and means to access. Patient adamantly and convincingly denies current suicidal or homicidal ideation or perceptual disturbance.    Assessment:     Patient appears to maintain relative stability as compared to their baseline.  However, patient persistently struggles with symptoms which continue to cause impairment in important areas of functioning.  As a  result, they can be reasonably expected to continue to benefit from treatment and would likely be at increased risk for decompensation otherwise.      PHQ-Score Total:  PHQ-9 Total Score: PHQ-9 Depression Screening  Little interest or pleasure in doing things?  0   Feeling down, depressed, or hopeless?  0   PHQ-2 Total Score     Trouble falling or staying asleep, or sleeping too much?     Feeling tired or having little energy?     Poor appetite or overeating?     Feeling bad about yourself - or that you are a failure or have let yourself or your family down?     Trouble concentrating on things, such as reading the newspaper or watching television?     Moving or speaking so slowly that other people could have noticed? Or the opposite - being so fidgety or restless that you have been moving around a lot more than usual?     Thoughts that you would be better off dead, or of hurting yourself in some way?     PHQ-9 Total Score  0   If you checked off any problems, how difficult have these problems made it for you to do your work, take care of things at home, or get along with other people?               Mental Status Exam:   Hygiene:   good  Cooperation:  Cooperative  Eye Contact:  Good  Psychomotor Behavior:  Sitting up and appropriately animated which is improved from 3 months ago when Pt would conduct sessions lying on the couch with a blanket.  Affect:  Full range  Mood: normal  Speech:  Normal  Thought Process:  Goal directed  Thought Content:  Normal  Suicidal:  None  Homicidal: None  Hallucinations:  None  Delusion: None  Memory:  Intact  Orientation:  Grossly Intact  Reliability:  good  Insight:  Good  Judgement:  Good  Impulse Control:  Good  Physical/Medical Issues:  No        Functional Status: Moderate impairment     Progress toward goal: Not at goal    Prognosis: Good with continued therapeutic intervention        Plan:    Patient will continue in individual outpatient therapy with focus on improved  functioning and coping skills, maintaining stability, and avoiding decompensation and the need for higher level of care.    Patient will adhere to medication regimen as prescribed and report any side effects. Patient will contact this office, call 911 or present to the nearest emergency room should suicidal or homicidal ideations occur. Provide Cognitive Behavioral Therapy and Solution Focused Therapy to improve functioning, maintain stability, and avoid decompensation and the need for higher level of care.     Return in about 2 weeks (around 4/29/2025).      VISIT DIAGNOSIS:    Diagnosis Plan   1. MDD (major depressive disorder), recurrent episode, moderate        2. LISSETH (generalized anxiety disorder)         09:05 EDT       This document has been electronically signed by LISETTE Edwards  April 16, 2025      Part of this note may be an electronic transcription/translation of spoken language to printed text using the Dragon Dictation System.

## 2025-04-17 RX ORDER — PANTOPRAZOLE SODIUM 40 MG/1
40 TABLET, DELAYED RELEASE ORAL DAILY
Qty: 90 TABLET | Refills: 3 | Status: SHIPPED | OUTPATIENT
Start: 2025-04-17

## 2025-04-29 ENCOUNTER — TELEMEDICINE (OUTPATIENT)
Dept: PSYCHIATRY | Facility: CLINIC | Age: 65
End: 2025-04-29
Payer: MEDICARE

## 2025-04-29 DIAGNOSIS — F33.1 MDD (MAJOR DEPRESSIVE DISORDER), RECURRENT EPISODE, MODERATE: Primary | ICD-10-CM

## 2025-04-29 DIAGNOSIS — F41.1 GAD (GENERALIZED ANXIETY DISORDER): ICD-10-CM

## 2025-04-29 PROCEDURE — 90837 PSYTX W PT 60 MINUTES: CPT | Performed by: COUNSELOR

## 2025-04-29 NOTE — PROGRESS NOTES
Date: 4/29/2025  Time In: 9:00  Time out: 9:53      This provider is located at the [facility]), [address], [place] [address] using a secure [employer] Video Visit through [employer]. Patient is being seen remotely via telehealth, and they are in a secure environment for this session. The patient's condition being diagnosed/treated is appropriate for telemedicine. The provider identified herself as well as her credentials. The patient, and/or patients guardian, consent to be seen remotely, and when consent is given they understand that the consent allows for patient identifiable information to be sent to a third party as needed. They may refuse to be seen remotely at any time. The electronic data is encrypted and password protected, and the patient and/or guardian has been advised of the potential risks to privacy not withstanding such measures.     Mode of Visit: Video  Location of patient: home  Location of provider: Provider home  You have chosen to receive care through a telehealth visit.  The patient has signed the video visit consent form.  The visit included audio and video interaction. No technical issues occurred during this visit    Subjective   Yamila Neff is a 64 y.o. female who presents today fully oriented, appropriately dressed and groomed, and open to communication for follow up appointment.    Chief Complaint:   Chief Complaint   Patient presents with    Anxiety    Depression        History of Present Illness: Rapport was established through conversation and unconditional positive regard. Recent events were discussed and how these events impact Pt's emotional health.   Pt reports successful use of learned coping skills since last report.  Pt reports continuation of symptoms and states the intensity and duration of symptoms has improved since last report. Pt rates anxiety at 3/10 and depression at 2/10.     Sleep: Pt reports sleep  "has remained unchanged since last report. Healthy sleep habits were discussed such as maintaining a schedule, routine, avoiding caffeine, and limiting screen time before bed.    Appetite:  Pt reports appetite has been good since last report.  Discussed the importance of hydration and eating a healthy diet for overall and mental health.    Medication compliance: Pt reports medications are being taken daily as prescribed. Discussed the importance of compliance with prescriber's directions and Pt was instructed to report questions/concerns, as well as missed doses or discontinuation of medication by Pt.     Safety Plan in Place: No Pt denies SI/HI/SIB recent or current    Daily Functioning:  Since last report, Pt states symptoms are causing Mild difficulty in daily functioning.      Content Discussion:   Pt reports life updates since previous session. Pt identified current stressors. Pt acknowledged stressors within and outside of one's control. Pt identified successes and issues with utilizing coping mechanisms.  Pt states she has been busy watching and keeping grandchildren a lot.  Pt states she enjoys this.  Pt states her mother has been having back pain and they are treating symptoms.   Pt states zita dennison learned that Mom had fallen and injured herself and never mentioned it to anyone.  States Mom's medical alert necklace was not charged and did not activate.  States Mom's neighbor helped her up.  Pt states she and her two brothers have been discussing if Mom needs more intensive monitoring.  Discussed the importance of Pt considering her own mental and physical health when making long term decisions for Mom.  Pt agrees this is important.  Pt states she is worried about her daughter's emotional wellness after \"she had a rough week.\"  Discussed how pt's daughter may be having response to trauma triggers, and Pt states she has more insight into ways she can be supportive.  Pt states today's session has alleviated " some stress and allowed Pt to make a substantive plan for action to improve situations in her life.      Counselor supported Pt in continued exploration of underlying belief systems which contribute to difficulty in connecting/vulnerability.  Through discussion, Pt identifies themes of preservation and overt emotional and physical reactivity when physical and/or emotional statis is in question, even in low or perceived threatening situations. Counselor supported Pt in identifying past experiences and underlying beliefs which contribute to these feelings and reactions.  Pt was supported and encouraged in processing emotions related to frustrations with the expectations of self and others.  Pt was encouraged to identify cultural and nuclear familial contexts which contribute to these concerns.  Pt was receptive and engaged in conversation, and Pt reports this was beneficial and applicable to their situation.      Reviewed coping skills and encouraged Pt to continue to practicing coping skills daily when not under distress. Pt was praised for their attempts to decrease symptoms and mitigate activating events.    Clinical Maneuvering/Intervention:  CBT was utilized to encourage Pt to identify maladaptive thoughts and behaviors and replace with more affirming and positive.Pt encouraged to set and maintain appropriate and healthy boundaries with others. Pt was instructed to practice daily using appropriate and specific words to communicate feelings to others.  Motivational interviewing used to encourage Pt to identify strengths which can be utilized in working toward treatment goals. Pt encouraged to practice daily learned skills such as controlled breathing, grounding, and mindfulness. Pt was encouraged to ask for help from support persons to assist them in maintaining stability and alleviate symptoms. Discussed the importance of being one's own mental health advocate and to refrain from seeing the need to ask for help  as a weakness. Pt was encouraged to formulate a plan of action to be more proactive in managing stressors and refrain from using reactive and automatic heightened emotional responses.     Solution-focused therapy employed to identify how Pt would like their life to be if they were to make positive changes. Pt encouraged to identify effective coping skills and strengths they can use to continue utilizing those skills. Pt encouraged to discontinue utilizing non-effective coping mechanisms. Pt provided with feedback to highlight achievements and personal and other resources. Encouraged use of SMART goals    Assisted patient in processing above session content; acknowledged and normalized patient’s thoughts, feelings, and concerns.  Rationalized patient thought process regarding concerns presented at session.  Discussed triggers associated with patient's  anxiety  and depression  Also discussed coping skills for patient to implement such as mindfulness , self care , and positive self talk     Allowed patient to freely discuss issues without interruption or judgment. Provided safe, confidential environment to facilitate the development of positive therapeutic relationship and encourage open, honest communication. Assisted patient in identifying risk factors which would indicate the need for higher level of care including thoughts to harm self or others and/or self-harming behavior and encouraged patient to contact this office, call 911, or present to the nearest emergency room should any of these events occur. Discussed crisis intervention services and means to access. Patient adamantly and convincingly denies current suicidal or homicidal ideation or perceptual disturbance.    Assessment:     Patient appears to maintain relative stability as compared to their baseline.  However, patient persistently struggles with symptoms which continue to cause impairment in important areas of functioning.  As a result, they can be  reasonably expected to continue to benefit from treatment and would likely be at increased risk for decompensation otherwise.      PHQ-Score Total:  PHQ-9 Total Score: PHQ-9 Depression Screening  Little interest or pleasure in doing things?  0   Feeling down, depressed, or hopeless?  1   PHQ-2 Total Score     Trouble falling or staying asleep, or sleeping too much?     Feeling tired or having little energy?     Poor appetite or overeating?     Feeling bad about yourself - or that you are a failure or have let yourself or your family down?     Trouble concentrating on things, such as reading the newspaper or watching television?     Moving or speaking so slowly that other people could have noticed? Or the opposite - being so fidgety or restless that you have been moving around a lot more than usual?     Thoughts that you would be better off dead, or of hurting yourself in some way?     PHQ-9 Total Score  1   If you checked off any problems, how difficult have these problems made it for you to do your work, take care of things at home, or get along with other people?  None             Mental Status Exam:   Hygiene:   good  Cooperation:  Cooperative  Eye Contact:  Good  Psychomotor Behavior:  Appropriate  Affect:  Full range  Mood: anxious  Speech:  Normal  Thought Process:  Goal directed  Thought Content:  Normal  Suicidal:  None  Homicidal: None  Hallucinations:  None  Delusion: None  Memory:  Intact  Orientation:  Grossly Intact  Reliability:  good  Insight:  Good  Judgement:  Good  Impulse Control:  Good  Physical/Medical Issues:  No        Functional Status: Mild impairment     Progress toward goal: Not at goal    Prognosis: Good with continued therapeutic intervention        Plan:    Patient will continue in individual outpatient therapy with focus on improved functioning and coping skills, maintaining stability, and avoiding decompensation and the need for higher level of care.    Patient will adhere to  medication regimen as prescribed and report any side effects. Patient will contact this office, call 911 or present to the nearest emergency room should suicidal or homicidal ideations occur. Provide Cognitive Behavioral Therapy and Solution Focused Therapy to improve functioning, maintain stability, and avoid decompensation and the need for higher level of care.     Return in about 2 weeks (around 5/13/2025).      VISIT DIAGNOSIS:    Diagnosis Plan   1. MDD (major depressive disorder), recurrent episode, moderate        2. LISSETH (generalized anxiety disorder)         09:00 EDT       This document has been electronically signed by LISETTE Edwards  April 29, 2025      Part of this note may be an electronic transcription/translation of spoken language to printed text using the Dragon Dictation System.

## 2025-05-15 ENCOUNTER — TELEMEDICINE (OUTPATIENT)
Dept: PSYCHIATRY | Facility: CLINIC | Age: 65
End: 2025-05-15
Payer: MEDICARE

## 2025-05-15 DIAGNOSIS — F33.1 MDD (MAJOR DEPRESSIVE DISORDER), RECURRENT EPISODE, MODERATE: ICD-10-CM

## 2025-05-15 DIAGNOSIS — F41.1 GAD (GENERALIZED ANXIETY DISORDER): Primary | ICD-10-CM

## 2025-05-15 NOTE — PROGRESS NOTES
Date: May 15, 2025  Time In: 9:09  Time out: 10:05      This provider is located at the [facility] ([facility]), [address], [place] [address] using a secure [employer] Video Visit through "[employer]. Patient is being seen remotely via telehealth, and they are in a secure environment for this session. The patient's condition being diagnosed/treated is appropriate for telemedicine. The provider identified herself as well as her credentials. The patient, and/or patients guardian, consent to be seen remotely, and when consent is given they understand that the consent allows for patient identifiable information to be sent to a third party as needed. They may refuse to be seen remotely at any time. The electronic data is encrypted and password protected, and the patient and/or guardian has been advised of the potential risks to privacy not withstanding such measures.     Mode of Visit: Video  Location of patient: home  Location of provider: Provider home  You have chosen to receive care through a telehealth visit.  The patient has signed the video visit consent form.  The visit included audio and video interaction. No technical issues occurred during this visit    Subjective   Yamila Neff is a 64 y.o. female who presents today fully oriented, appropriately dressed and groomed, and open to communication for follow up appointment.    Chief Complaint:   Chief Complaint   Patient presents with    Anxiety    Depression        History of Present Illness: Rapport was established through conversation and unconditional positive regard. Recent events were discussed and how these events impact Pt's emotional health.   Pt reports successful use of learned coping skills since last report.  Pt reports continuation of symptoms and states the intensity and duration of symptoms has improved since last report. Pt rates anxiety at 2/10 and depression at 4/10.     Sleep: Pt reports  "sleep has remained unchanged since last report. Healthy sleep habits were discussed such as maintaining a schedule, routine, avoiding caffeine, and limiting screen time before bed.  \"Sleep is good.\"    Appetite:  Pt reports appetite has been good since last report.  Discussed the importance of hydration and eating a healthy diet for overall and mental health.    Medication compliance: Pt reports medications are being taken daily as prescribed. Discussed the importance of compliance with prescriber's directions and Pt was instructed to report questions/concerns, as well as missed doses or discontinuation of medication by Pt.     Safety Plan in Place: No Pt denies SI/HI/SIB recent or current    Daily Functioning:  Since last report, Pt states symptoms are causing Mild difficulty in daily functioning.      Content Discussion:   Pt reports life updates since previous session. Pt identified current stressors. Pt acknowledged stressors within and outside of one's control. Pt identified successes and issues with utilizing coping mechanisms.  Pt went to Mom's house for mother's day.  \"We've had a lot going on with my Mom.\"  Mom had an appointment with a neurologist and Mom failed the memory tests.  The doctor talked to her about her safety and living independently.  Pt states she and brothers sat down and discussed Mom's care.  \"She was very emotional and it was hard.\"  Discussed the need for Pt to refrain from accepting responsibility for Mom's condition, and to ensure pt's own needs are being met as well as Mom's.  Pt agrees this will be helpful. Pt was allowed to freely process feelings of frustration over dealing with other family members in this situation.  Pt's feelings were normalized and validated.      Counselor supported Pt in continued exploration of underlying belief systems which contribute to difficulty in connecting/vulnerability.  Through discussion, Pt identifies themes of preservation and overt emotional " and physical reactivity when physical and/or emotional statis is in question, even in low or perceived threatening situations. Counselor supported Pt in identifying past experiences and underlying beliefs which contribute to these feelings and reactions.  Pt was supported and encouraged in processing emotions related to frustrations with the expectations of self and others.  Pt was encouraged to identify cultural and nuclear familial contexts which contribute to these concerns.  Pt was receptive and engaged in conversation, and Pt reports this was beneficial and applicable to their situation.      Reviewed coping skills and encouraged Pt to continue to practicing coping skills daily when not under distress. Pt was praised for their attempts to decrease symptoms and mitigate activating events.    Clinical Maneuvering/Intervention:  CBT was utilized to encourage Pt to identify maladaptive thoughts and behaviors and replace with more affirming and positive.Pt encouraged to set and maintain appropriate and healthy boundaries with others. Pt was instructed to practice daily using appropriate and specific words to communicate feelings to others.  Motivational interviewing used to encourage Pt to identify strengths which can be utilized in working toward treatment goals. Pt encouraged to practice daily learned skills such as controlled breathing, grounding, and mindfulness. Pt was encouraged to ask for help from support persons to assist them in maintaining stability and alleviate symptoms. Discussed the importance of being one's own mental health advocate and to refrain from seeing the need to ask for help as a weakness. Pt was encouraged to formulate a plan of action to be more proactive in managing stressors and refrain from using reactive and automatic heightened emotional responses.     Solution-focused therapy employed to identify how Pt would like their life to be if they were to make positive changes. Pt encouraged  to identify effective coping skills and strengths they can use to continue utilizing those skills. Pt encouraged to discontinue utilizing non-effective coping mechanisms. Pt provided with feedback to highlight achievements and personal and other resources. Encouraged use of SMART goals    Assisted patient in processing above session content; acknowledged and normalized patient’s thoughts, feelings, and concerns.  Rationalized patient thought process regarding concerns presented at session.  Discussed triggers associated with patient's  anxiety  and depression  Also discussed coping skills for patient to implement such as grounding , positive self talk , and setting boundaries with others.    Allowed patient to freely discuss issues without interruption or judgment. Provided safe, confidential environment to facilitate the development of positive therapeutic relationship and encourage open, honest communication. Assisted patient in identifying risk factors which would indicate the need for higher level of care including thoughts to harm self or others and/or self-harming behavior and encouraged patient to contact this office, call 911, or present to the nearest emergency room should any of these events occur. Discussed crisis intervention services and means to access. Patient adamantly and convincingly denies current suicidal or homicidal ideation or perceptual disturbance.    Assessment:     Patient appears to maintain relative stability as compared to their baseline.  However, patient persistently struggles with symptoms which continue to cause impairment in important areas of functioning.  As a result, they can be reasonably expected to continue to benefit from treatment and would likely be at increased risk for decompensation otherwise.      Mental Status Exam:   Hygiene:   good  Cooperation:  Cooperative  Eye Contact:  Good  Psychomotor Behavior:  Appropriate  Affect:  Full range  Mood: anxious  Speech:   Normal  Thought Process:  Goal directed  Thought Content:  Normal  Suicidal:  None  Homicidal: None  Hallucinations:  None  Delusion: None  Memory:  Intact  Orientation:  Grossly Intact  Reliability:  good  Insight:  Good  Judgement:  Good  Impulse Control:  Good  Physical/Medical Issues:  No        Functional Status: Moderate impairment     Progress toward goal: Not at goal    Prognosis: Good with continued therapeutic intervention        Plan:    Patient will continue in individual outpatient therapy with focus on improved functioning and coping skills, maintaining stability, and avoiding decompensation and the need for higher level of care.    Patient will adhere to medication regimen as prescribed and report any side effects. Patient will contact this office, call 911 or present to the nearest emergency room should suicidal or homicidal ideations occur. Provide Cognitive Behavioral Therapy and Solution Focused Therapy to improve functioning, maintain stability, and avoid decompensation and the need for higher level of care.     Return in about 2 weeks (around 5/29/2025).      VISIT DIAGNOSIS:    Diagnosis Plan   1. LISSETH (generalized anxiety disorder)        2. MDD (major depressive disorder), recurrent episode, moderate         09:09 EDT       This document has been electronically signed by LISETTE Edwards  May 15, 2025      Part of this note may be an electronic transcription/translation of spoken language to printed text using the Dragon Dictation System.

## 2025-05-19 DIAGNOSIS — E78.1 HYPERTRIGLYCERIDEMIA: ICD-10-CM

## 2025-05-19 DIAGNOSIS — E78.2 MIXED HYPERLIPIDEMIA: ICD-10-CM

## 2025-05-20 RX ORDER — ICOSAPENT ETHYL 1 G/1
2 CAPSULE ORAL 2 TIMES DAILY WITH MEALS
Qty: 360 CAPSULE | Refills: 1 | Status: SHIPPED | OUTPATIENT
Start: 2025-05-20

## 2025-05-20 NOTE — TELEPHONE ENCOUNTER
Rx Refill Note  Requested Prescriptions     Pending Prescriptions Disp Refills    icosapent ethyl (Vascepa) 1 g capsule capsule 360 capsule 1     Sig: Take 2 capsules by mouth 2 (Two) Times a Day With Meals.      Last office visit with prescribing clinician: 4/14/2025   Next office visit with prescribing clinician: 8/14/2025   Sharlene Maddox MA  05/20/25, 15:40 EDT

## 2025-06-03 ENCOUNTER — TELEMEDICINE (OUTPATIENT)
Dept: PSYCHIATRY | Facility: CLINIC | Age: 65
End: 2025-06-03
Payer: MEDICARE

## 2025-06-03 DIAGNOSIS — F33.1 MDD (MAJOR DEPRESSIVE DISORDER), RECURRENT EPISODE, MODERATE: ICD-10-CM

## 2025-06-03 DIAGNOSIS — F41.1 GAD (GENERALIZED ANXIETY DISORDER): Primary | ICD-10-CM

## 2025-06-03 NOTE — PROGRESS NOTES
Date: Neda 3, 2025  Time In: 10:05  Time out: 11:01      This provider is located at the [facility]), [address], [place] [address] using a secure Videostirhart Video Visit through [employer]. Patient is being seen remotely via telehealth, and they are in a secure environment for this session. The patient's condition being diagnosed/treated is appropriate for telemedicine. The provider identified herself as well as her credentials. The patient, and/or patients guardian, consent to be seen remotely, and when consent is given they understand that the consent allows for patient identifiable information to be sent to a third party as needed. They may refuse to be seen remotely at any time. The electronic data is encrypted and password protected, and the patient and/or guardian has been advised of the potential risks to privacy not withstanding such measures.     Mode of Visit: Video  Location of patient: home  Location of provider: Provider home  You have chosen to receive care through a telehealth visit.  The patient has signed the video visit consent form.  The visit included audio and video interaction. No technical issues occurred during this visit    Subjective   Yamila Neff is a 64 y.o. female who presents today fully oriented, appropriately dressed and groomed, and open to communication for follow up appointment.    Chief Complaint:   Chief Complaint   Patient presents with    Depression    Anxiety        History of Present Illness: Rapport was established through conversation and unconditional positive regard. Recent events were discussed and how these events impact Pt's emotional health.   Pt reports successful use of learned coping skills since last report.  Pt reports continuation of symptoms and states the intensity and duration of symptoms has remained unchanged since last report. Pt rates anxiety at 5/10 and depression at 5/10.     Sleep:  "Pt reports sleep has remained unchanged since last report. Healthy sleep habits were discussed such as maintaining a schedule, routine, avoiding caffeine, and limiting screen time before bed.    Appetite:  Pt reports appetite has been good since last report.  Discussed the importance of hydration and eating a healthy diet for overall and mental health.    Medication compliance: Pt reports medications are being taken daily as prescribed. Discussed the importance of compliance with prescriber's directions and Pt was instructed to report questions/concerns, as well as missed doses or discontinuation of medication by Pt.     Safety Plan in Place: No Pt denies SI/HI/SIB recent or current    Daily Functioning:  Since last report, Pt states symptoms are causing Mild difficulty in daily functioning.      Content Discussion:   Pt reports life updates since previous session. Pt identified current stressors. Pt acknowledged stressors within and outside of one's control. Pt identified successes and issues with utilizing coping mechanisms.  Pt states her daughter's lakeshaance has been in the hospital with an ongoing infection. He is going to be off work for a while and he is the only source of income, and they have a 6 mo old baby.  Pt has been watching the baby often so her daughter can tend to her s/o.  Pt states she has been going to Mom's house more often. \"I just worry about them\"  Pt and siblings are working on installing cameras in Mom's house for her to continue to live independently and to alleviate the strain on the children.  Pt states her two brothers are speaking up about the cost of making Mom's house more accessible.  Pt went to [facility] with friends last year and she is planning on going again next week with the same group. Pt is very excited but Pt is nervious about leaving Mom bc her brother is not in town to help with Mom.  Discussed the need to set a firm boundary of not dealing with anything pertaining to " Mom's care/house unless it is an emergency.  Pt agrees this time away from daily problems is needed and Pt will practice mindfulness when with her friends.      Counselor supported Pt in continued exploration of underlying belief systems which contribute to difficulty in connecting/vulnerability.  Through discussion, Pt identifies themes of preservation and overt emotional and physical reactivity when physical and/or emotional statis is in question, even in low or perceived threatening situations. Counselor supported Pt in identifying past experiences and underlying beliefs which contribute to these feelings and reactions.  Pt was supported and encouraged in processing emotions related to frustrations with the expectations of self and others.  Pt was encouraged to identify cultural and nuclear familial contexts which contribute to these concerns.  Pt was receptive and engaged in conversation, and Pt reports this was beneficial and applicable to their situation.      Reviewed coping skills and encouraged Pt to continue to practicing coping skills daily when not under distress. Pt was praised for their attempts to decrease symptoms and mitigate activating events.    Clinical Maneuvering/Intervention:  CBT was utilized to encourage Pt to identify maladaptive thoughts and behaviors and replace with more affirming and positive.Pt encouraged to set and maintain appropriate and healthy boundaries with others. Pt was instructed to practice daily using appropriate and specific words to communicate feelings to others.  Motivational interviewing used to encourage Pt to identify strengths which can be utilized in working toward treatment goals. Pt encouraged to practice daily learned skills such as controlled breathing, grounding, and mindfulness. Pt was encouraged to ask for help from support persons to assist them in maintaining stability and alleviate symptoms. Discussed the importance of being one's own mental health  advocate and to refrain from seeing the need to ask for help as a weakness. Pt was encouraged to formulate a plan of action to be more proactive in managing stressors and refrain from using reactive and automatic heightened emotional responses.     Solution-focused therapy employed to identify how Pt would like their life to be if they were to make positive changes. Pt encouraged to identify effective coping skills and strengths they can use to continue utilizing those skills. Pt encouraged to discontinue utilizing non-effective coping mechanisms. Pt provided with feedback to highlight achievements and personal and other resources. Encouraged use of SMART goals    Assisted patient in processing above session content; acknowledged and normalized patient’s thoughts, feelings, and concerns.  Rationalized patient thought process regarding concerns presented at session.  Discussed triggers associated with patient's  anxiety  and depression  Also discussed coping skills for patient to implement such as grounding , self care , and positive self talk     Allowed patient to freely discuss issues without interruption or judgment. Provided safe, confidential environment to facilitate the development of positive therapeutic relationship and encourage open, honest communication. Assisted patient in identifying risk factors which would indicate the need for higher level of care including thoughts to harm self or others and/or self-harming behavior and encouraged patient to contact this office, call 911, or present to the nearest emergency room should any of these events occur. Discussed crisis intervention services and means to access. Patient adamantly and convincingly denies current suicidal or homicidal ideation or perceptual disturbance.    Assessment:     Patient appears to maintain relative stability as compared to their baseline.  However, patient persistently struggles with symptoms which continue to cause impairment in  "important areas of functioning.  As a result, they can be reasonably expected to continue to benefit from treatment and would likely be at increased risk for decompensation otherwise.      PHQ-Score Total:  PHQ-9 Total Score: PHQ-9 Depression Screening  Little interest or pleasure in doing things?  2   Feeling down, depressed, or hopeless?  2   PHQ-2 Total Score     Trouble falling or staying asleep, or sleeping too much?  0   Feeling tired or having little energy?  2   Poor appetite or overeating?  0   Feeling bad about yourself - or that you are a failure or have let yourself or your family down?  1   Trouble concentrating on things, such as reading the newspaper or watching television?  1   Moving or speaking so slowly that other people could have noticed? Or the opposite - being so fidgety or restless that you have been moving around a lot more than usual?     Thoughts that you would be better off dead, or of hurting yourself in some way?  0   PHQ-9 Total Score  8   If you checked off any problems, how difficult have these problems made it for you to do your work, take care of things at home, or get along with other people?  somewhat             Mental Status Exam:   Hygiene:   good  Cooperation:  Cooperative  Eye Contact:  Good  Psychomotor Behavior:  Appropriate  Affect:  Worried  \"it's got me a little down\"  Mood: anxious  Speech:  Normal  Thought Process:  Goal directed  Thought Content:  Normal  Suicidal:  None  Homicidal: None  Hallucinations:  None  Delusion: None  Memory:  Intact  Orientation:  Grossly Intact  Reliability:  good  Insight:  Good  Judgement:  Good  Impulse Control:  Good  Physical/Medical Issues:  No        Functional Status: Mild-Moderate impairment     Progress toward goal: Not at goal    Prognosis: Good with continued therapeutic intervention        Plan:    Patient will continue in individual outpatient therapy with focus on improved functioning and coping skills, maintaining " stability, and avoiding decompensation and the need for higher level of care.    Patient will adhere to medication regimen as prescribed and report any side effects. Patient will contact this office, call 911 or present to the nearest emergency room should suicidal or homicidal ideations occur. Provide Cognitive Behavioral Therapy and Solution Focused Therapy to improve functioning, maintain stability, and avoid decompensation and the need for higher level of care.     Return in about 2 weeks (around 6/17/2025).      VISIT DIAGNOSIS:    Diagnosis Plan   1. LISSETH (generalized anxiety disorder)        2. MDD (major depressive disorder), recurrent episode, moderate         10:05 EDT       This document has been electronically signed by LISETTE Edwards  Neda 3, 2025      Part of this note may be an electronic transcription/translation of spoken language to printed text using the Dragon Dictation System.

## 2025-06-24 ENCOUNTER — TELEMEDICINE (OUTPATIENT)
Dept: PSYCHIATRY | Facility: CLINIC | Age: 65
End: 2025-06-24
Payer: MEDICARE

## 2025-06-24 DIAGNOSIS — F33.1 MDD (MAJOR DEPRESSIVE DISORDER), RECURRENT EPISODE, MODERATE: ICD-10-CM

## 2025-06-24 DIAGNOSIS — F41.1 GAD (GENERALIZED ANXIETY DISORDER): Primary | ICD-10-CM

## 2025-06-24 NOTE — PROGRESS NOTES
Date: June 25, 2025  Time In: 11:01  Time out: 12:12      This provider is located at the [facility]), [address], [place] [address] using a secure [employer] Video Visit through [employer]. Patient is being seen remotely via telehealth, and they are in a secure environment for this session. The patient's condition being diagnosed/treated is appropriate for telemedicine. The provider identified herself as well as her credentials. The patient, and/or patients guardian, consent to be seen remotely, and when consent is given they understand that the consent allows for patient identifiable information to be sent to a third party as needed. They may refuse to be seen remotely at any time. The electronic data is encrypted and password protected, and the patient and/or guardian has been advised of the potential risks to privacy not withstanding such measures.     Mode of Visit: Video  Location of patient: Home  Location of provider: Provider home  You have chosen to receive care through a telehealth visit.  The patient has signed the video visit consent form.  The visit included audio and video interaction. No technical issues occurred during this visit    Subjective   Yamila Neff is a 64 y.o. female who presents today fully oriented, appropriately dressed and groomed, and open to communication for follow up appointment.    Chief Complaint: No chief complaint on file.       History of Present Illness: Rapport was established through conversation and unconditional positive regard. Recent events were discussed and how these events impact Pt's emotional health.   Pt reports successful use of learned coping skills since last report.  Pt reports continuation of symptoms and states the intensity and duration of symptoms has remained unchanged since last report. Pt rates anxiety at 3/10 and depression at 3/10.     Sleep: Pt reports sleep has remained unchanged  since last report. Healthy sleep habits were discussed such as maintaining a schedule, routine, avoiding caffeine, and limiting screen time before bed.    Appetite:  Pt reports appetite has been good since last report.  Discussed the importance of hydration and eating a healthy diet for overall and mental health.    Medication compliance: Pt reports medications are being taken daily as prescribed. Discussed the importance of compliance with prescriber's directions and Pt was instructed to report questions/concerns, as well as missed doses or discontinuation of medication by Pt.     Safety Plan in Place: No Pt denies SI/HI/SIB recent or current    Daily Functioning:  Since last report, Pt states symptoms are causing Mild difficulty in daily functioning.      Content Discussion:   Pt reports life updates since previous session. Pt identified current stressors. Pt acknowledged stressors within and outside of one's control. Pt identified successes and issues with utilizing coping mechanisms.  Pt states she has been extremely busy going back and forth staying with Mom a lot.  Pt states she came home last Saturday and had all of her children for lunch on Sunday, and it was great.  Pt states yesterday she went to daughter's home and watched kids while they did yard work.  Pt states daughter is behind on household chores and Pt helped her get caught up. Pt reports some concern about daughter's emotional wellness, but daughter is getting help.  States her other daughter's  has been off work for a month and has just returned to work.  Pt states she and siblings are still working together to coordinator Mom's care.  Pt reports she has some anxiety over this, and she worries about Mom's cognitive decline.  Pt states overall her mood is doing well.      Counselor supported Pt in continued exploration of underlying belief systems which contribute to difficulty in connecting/vulnerability.  Through discussion, Pt identifies  themes of preservation and overt emotional and physical reactivity when physical and/or emotional statis is in question, even in low or perceived threatening situations. Counselor supported Pt in identifying past experiences and underlying beliefs which contribute to these feelings and reactions.  Pt was supported and encouraged in processing emotions related to frustrations with the expectations of self and others.  Pt was encouraged to identify cultural and nuclear familial contexts which contribute to these concerns.  Pt was receptive and engaged in conversation, and Pt reports this was beneficial and applicable to their situation.      Reviewed coping skills and encouraged Pt to continue to practicing coping skills daily when not under distress. Pt was praised for their attempts to decrease symptoms and mitigate activating events.    Clinical Maneuvering/Intervention:  CBT was utilized to encourage Pt to identify maladaptive thoughts and behaviors and replace with more affirming and positive.Pt encouraged to set and maintain appropriate and healthy boundaries with others. Pt was instructed to practice daily using appropriate and specific words to communicate feelings to others.  Motivational interviewing used to encourage Pt to identify strengths which can be utilized in working toward treatment goals. Pt encouraged to practice daily learned skills such as controlled breathing, grounding, and mindfulness. Pt was encouraged to ask for help from support persons to assist them in maintaining stability and alleviate symptoms. Discussed the importance of being one's own mental health advocate and to refrain from seeing the need to ask for help as a weakness. Pt was encouraged to formulate a plan of action to be more proactive in managing stressors and refrain from using reactive and automatic heightened emotional responses.     Solution-focused therapy employed to identify how Pt would like their life to be if they  were to make positive changes. Pt encouraged to identify effective coping skills and strengths they can use to continue utilizing those skills. Pt encouraged to discontinue utilizing non-effective coping mechanisms. Pt provided with feedback to highlight achievements and personal and other resources. Encouraged use of SMART goals    Assisted patient in processing above session content; acknowledged and normalized patient’s thoughts, feelings, and concerns.  Rationalized patient thought process regarding concerns presented at session.  Discussed triggers associated with patient's  anxiety  and depression  Also discussed coping skills for patient to implement such as grounding , self care , and positive self talk     Allowed patient to freely discuss issues without interruption or judgment. Provided safe, confidential environment to facilitate the development of positive therapeutic relationship and encourage open, honest communication. Assisted patient in identifying risk factors which would indicate the need for higher level of care including thoughts to harm self or others and/or self-harming behavior and encouraged patient to contact this office, call 911, or present to the nearest emergency room should any of these events occur. Discussed crisis intervention services and means to access. Patient adamantly and convincingly denies current suicidal or homicidal ideation or perceptual disturbance.    Assessment:     Patient appears to maintain relative stability as compared to their baseline.  However, patient persistently struggles with symptoms which continue to cause impairment in important areas of functioning.  As a result, they can be reasonably expected to continue to benefit from treatment and would likely be at increased risk for decompensation otherwise.      Mental Status Exam:   Hygiene:   good  Cooperation:  Cooperative  Eye Contact:  Good  Psychomotor Behavior:  Appropriate  Affect:  Full range  Mood:  "anxious  \"just a little down\"  Speech:  Normal  Thought Process:  Goal directed  Thought Content:  Normal and Mood congruent  Suicidal:  None  Homicidal: None  Hallucinations:  None  Delusion: None  Memory:  Intact  Orientation:  Grossly Intact  Reliability:  good  Insight:  Good  Judgement:  Good  Impulse Control:  Good  Physical/Medical Issues:  No        Functional Status: Mild impairment     Progress toward goal: Not at goal    Prognosis: Good with continued therapeutic intervention        Plan:    Patient will continue in individual outpatient therapy with focus on improved functioning and coping skills, maintaining stability, and avoiding decompensation and the need for higher level of care.    Patient will adhere to medication regimen as prescribed and report any side effects. Patient will contact this office, call 911 or present to the nearest emergency room should suicidal or homicidal ideations occur. Provide Cognitive Behavioral Therapy and Solution Focused Therapy to improve functioning, maintain stability, and avoid decompensation and the need for higher level of care.     Return in about 2 weeks (around 7/8/2025).      VISIT DIAGNOSIS:    Diagnosis Plan   1. LISSETH (generalized anxiety disorder)        2. MDD (major depressive disorder), recurrent episode, moderate         11:10 EDT       This document has been electronically signed by LISETTE Edwards  June 25, 2025      Part of this note may be an electronic transcription/translation of spoken language to printed text using the Dragon Dictation System.  "

## 2025-06-26 NOTE — TELEPHONE ENCOUNTER
Spoke with patient.  History of diverticulitis.  Abdominal pain today similar to previous episodes of diverticulitis.  Will treat with augmentin.  She is aware to go to ER or come in for office visit if pain is not improving, worsening or she develops fever, vomiting.   No Vaccines Administered.

## 2025-07-17 DIAGNOSIS — E11.65 TYPE 2 DIABETES MELLITUS WITH HYPERGLYCEMIA, WITHOUT LONG-TERM CURRENT USE OF INSULIN: ICD-10-CM

## 2025-07-17 RX ORDER — TIRZEPATIDE 10 MG/.5ML
10 INJECTION, SOLUTION SUBCUTANEOUS
Qty: 6 ML | Refills: 0 | Status: SHIPPED | OUTPATIENT
Start: 2025-07-17

## 2025-07-22 ENCOUNTER — TELEMEDICINE (OUTPATIENT)
Dept: PSYCHIATRY | Facility: CLINIC | Age: 65
End: 2025-07-22
Payer: MEDICARE

## 2025-07-22 DIAGNOSIS — F33.0 MILD EPISODE OF RECURRENT MAJOR DEPRESSIVE DISORDER: Primary | ICD-10-CM

## 2025-07-22 DIAGNOSIS — F43.10 POST TRAUMATIC STRESS DISORDER (PTSD): ICD-10-CM

## 2025-07-22 DIAGNOSIS — F43.21 GRIEF: ICD-10-CM

## 2025-07-22 DIAGNOSIS — F41.1 GAD (GENERALIZED ANXIETY DISORDER): ICD-10-CM

## 2025-07-22 RX ORDER — FLUOXETINE HYDROCHLORIDE 40 MG/1
80 CAPSULE ORAL DAILY
Qty: 180 CAPSULE | Refills: 1 | Status: SHIPPED | OUTPATIENT
Start: 2025-07-22

## 2025-07-22 RX ORDER — BUPROPION HYDROCHLORIDE 150 MG/1
150 TABLET ORAL EVERY MORNING
Qty: 90 TABLET | Refills: 1 | Status: SHIPPED | OUTPATIENT
Start: 2025-07-22

## 2025-07-22 NOTE — PROGRESS NOTES
" Mode of Visit: Video  Location of patient: -HOME-  Location of provider: +HOME+  You have chosen to receive care through a telehealth visit.  The patient has signed the video visit consent form.  The visit included audio and video interaction. No technical issues occurred during this visit.     Patient verbally confirmed consent for today's encounter  07/22/2025      Subjective   Yamila Neff is a 64 y.o. female who presents today for follow up    Chief Complaint:  \"depression, PTSD\"     History of Present Illness:     History of Present Illness  3-month follow-up for above chief complaint.  Patient reports so for the summer she has done really well.  Adherent to taking medications as ordered.  Has been engaging in regular psychotherapy and finds this very effective.  She did recently have some dental work and had to go without her CPAP for a few nights but has resumed using this and reports sleep overall is good.  She has been staying busy working in her garden, she cares for her elderly mother who has been officially diagnosed with Alzheimer's, and spending a lot of time with her grandchildren.  Previous PHQ-9 scored at 0, today is scored at 1 previous LISSETH-7 scored at 1, today is scored at 0.  I did discuss with her in anticipation of the winter months to look into getting a therapy light with at least 10,000 LUX.             Patient Health Questionnaire-9 (PHQ-9) (Depression Screening Tool)  Little interest or pleasure in doing things? (Patient-Rptd) Not at all   Feeling down, depressed, or hopeless? (Patient-Rptd) Not at all   PHQ-2 Total Score (Patient-Rptd) 0   Trouble falling or staying asleep, or sleeping too much? (Patient-Rptd) Not at all   Feeling tired or having little energy? (Patient-Rptd) Several days   Poor appetite or overeating? (Patient-Rptd) Not at all   Feeling bad about yourself - or that you are a failure or have let yourself or your family down? (Patient-Rptd) Not at all   Trouble " concentrating on things, such as reading the newspaper or watching television? (Patient-Rptd) Not at all   Moving or speaking so slowly that other people could have noticed? Or the opposite - being so fidgety or restless that you have been moving around a lot more than usual? (Patient-Rptd) Not at all   Thoughts that you would be better off dead, or of hurting yourself in some way? (Patient-Rptd) Not at all   PHQ-9 Total Score (Patient-Rptd) 1   If you checked off any problems, how difficult have these problems made it for you to do your work, take care of things at home, or get along with other people? (Patient-Rptd) Not difficult at all         PHQ-9 Total Score: (Patient-Rptd) 1       Generalized Anxiety Disorder 7-Item (LISSETH-7) Screening Tool  Feeling nervous, anxious or on edge: (Patient-Rptd) Not at all  Not being able to stop or control worrying: (Patient-Rptd) Not at all  Worrying too much about different things: (Patient-Rptd) Not at all  Trouble Relaxing: (Patient-Rptd) Not at all  Being so restless that it is hard to sit still: (Patient-Rptd) Not at all  Feeling afraid as if something awful might happen: (Patient-Rptd) Not at all  Becoming easily annoyed or irritable: (Patient-Rptd) Not at all  LISSETH 7 Total Score: (Patient-Rptd) 0    The following portions of the patient's history were reviewed and updated as appropriate: allergies, current medications, past family history, past medical history, past social history, past surgical history and problem list.    Past Psychiatric History:  Began Treatment:1995  Diagnoses:Depression and Anxiety  Psychiatrist:Previously was receiving medication management from KAREN Tran  Therapist:Has engaged in marital counseling in the past, recently had intake for individual therapy with [facility]  Admission History:Denies  Medication Trials:Patient reports historical trials of the following medications, Abilify, Wellbutrin-reportedly helped for a  while, Zoloft was reportedly helpful then it quit working.  Celexa is on her allergy list, has been on and off Prozac a couple times throughout the years.  Hydroxyzine helped some with anxiety.  Self Harm: Denies  Suicide Attempts:Denies   Psychosis, Anxiety, Depression: Denies  Patient denied any history of a head injury or seizure  Patient denied any history of hallucinations or psychosis    Past Medical History:  Past Medical History:   Diagnosis Date    Anemia     Anxiety     Arrhythmia 2024    Afib    Arthritis     Carpal tunnel syndrome 2012    Degenerative joint disease     Depression     Depression     Diabetes mellitus     Elevated cholesterol     Elevated liver enzymes     Fatty liver     Hearing aid worn     HL (hearing loss)     Hypertension     No longer require meds since weight loss    Kidney disorder     one kidney    Malignant neoplasm of right female breast 2023    Palpitations     Polycystic ovaries     Psoriasis     PVC (premature ventricular contraction)     occasional pvc's    S/P total knee arthroplasty, right 2021    Sleep apnea     cpap at home    Sleep apnea, obstructive     1 year f/u since started CPAP therapy    Wears glasses        Substance Abuse History:   Types:Denies all, including illicit       Social History:  Social History     Socioeconomic History    Marital status:     Highest education level: Bachelor's degree (e.g., BA, AB, BS)   Tobacco Use    Smoking status: Never    Smokeless tobacco: Never   Vaping Use    Vaping status: Never Used   Substance and Sexual Activity    Alcohol use: Never    Drug use: No    Sexual activity: Not Currently     Partners: Male     Birth control/protection: Abstinence, Hysterectomy   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  Patient had 1 marriage 1 divorce.  Patient has 3 grown daughters and several grandchildren.  Support system  consisting of her children, patient also attends [employer].  No history of  service.  Patient denied any history of legal problems.    Family History:  Family History   Problem Relation Age of Onset    Diabetes Mother         Type 2    Lymphoma Mother     Hypertension Mother     Cancer Mother         Nonhodgkins Lymphoma    Hearing loss Mother         Hearing Aids    Dementia Mother         Believed to be from years of chemotherapy    Depression Mother         Also daughters    Sleep apnea Mother     No Known Problems Father     Hypertension Brother     Arthritis Brother     Hypertension Brother     Colon cancer Maternal Aunt     Heart disease Maternal Grandmother     Arthritis Maternal Grandmother             Diabetes Maternal Grandmother         Type 2-     Heart disease Maternal Grandfather     Heart attack Maternal Grandfather     Diabetes Maternal Aunt         Type 2    Hypertension Maternal Aunt     Diabetes Daughter         Type 1    Hypertension Brother     ADD / ADHD Cousin     Bipolar disorder Cousin     Cancer Maternal Aunt         Colon    Diabetes Maternal Aunt     Hypertension Maternal Aunt     Obesity Maternal Aunt     Sleep apnea Maternal Aunt     Diabetes Daughter         Type 1    Sleep apnea Maternal Aunt     Breast cancer Neg Hx     Ovarian cancer Neg Hx        Past Surgical History:  Past Surgical History:   Procedure Laterality Date    ABDOMINAL SURGERY      BREAST BIOPSY Right     PAPILLOMA DR BELL     SECTION      x 3    CHOLECYSTECTOMY  2013    COLONOSCOPY      HYSTERECTOMY  2006    complete    JOINT REPLACEMENT Left     left knee    MASTECTOMY  3/23    MASTECTOMY W/ SENTINEL NODE BIOPSY Bilateral 2023    Procedure: MASTECTOMY WITH SENTINEL NODE BIOPSY RIGHT, LEFT PROPHYLATIC MASTECTOMY;  Surgeon: Emi Lizama MD;  Location: [facility];  Service: General;  Laterality: Bilateral;    OOPHORECTOMY      TOTAL ABDOMINAL HYSTERECTOMY WITH  SALPINGO OOPHORECTOMY      TOTAL KNEE ARTHROPLASTY Right 03/09/2021    Procedure: TOTAL KNEE ARTHROPLASTY RIGHT;  Surgeon: Mateo Keith MD;  Location: [facility];  Service: Orthopedics;  Laterality: Right;    TUBAL ABDOMINAL LIGATION         Problem List:  Patient Active Problem List   Diagnosis    Degenerative joint disease    Depression    Psoriasis    Congenital single kidney    Hepatic steatosis    Intractable migraine without aura and without status migrainosus    Mixed hyperlipidemia    Anxiety    Primary insomnia    Iron deficiency    Primary osteoarthritis involving multiple joints    NGOC on CPAP    Tendonitis, Achilles, right    Malignant neoplasm of right female breast    Malignant neoplasm of upper-outer quadrant of right female breast    Obesity (BMI 30.0-34.9)    Type 2 diabetes mellitus with hyperglycemia, without long-term current use of insulin    Acute recurrent maxillary sinusitis    Paroxysmal atrial fibrillation with rapid ventricular response    COVID-19 virus detected    Atrial fibrillation with RVR       Allergy:   Allergies   Allergen Reactions    Lisinopril Cough    Celexa [Citalopram] Other (See Comments)     Jittery         Current Medications:   Current Outpatient Medications   Medication Sig Dispense Refill    apixaban (Eliquis) 5 MG tablet tablet Take 1 tablet by mouth Every 12 (Twelve) Hours. Indications: Atrial Fibrillation 60 tablet 6    atorvastatin (LIPITOR) 10 MG tablet Take 1 tablet by mouth Every Night. 90 tablet 3    buPROPion XL (Wellbutrin XL) 150 MG 24 hr tablet Take 1 tablet by mouth Every Morning. 90 tablet 1    cholecalciferol (VITAMIN D3) 1000 units tablet Take 2 tablets by mouth Daily.      clobetasol (TEMOVATE) 0.05 % external solution Apply topically to the appropriate area on scalp as directed 2 (Two) Times a Day. 25 mL 12    FLUoxetine (PROzac) 40 MG capsule Take 2 capsules by mouth Daily. 180 capsule 1    fluticasone (FLONASE) 50 MCG/ACT nasal spray Instill 2  sprays into the nostril(s) as directed by provider Daily. 16 g 11    hydrocortisone 2.5 % ointment Apply 1 application  topically to the affected area(s) as directed 2 (Two) Times a Day. 20 g 4    icosapent ethyl (Vascepa) 1 g capsule capsule Take 2 capsules by mouth 2 (Two) Times a Day With Meals. 360 capsule 1    ketoconazole (NIZORAL) 2 % shampoo Apply topically to the appropriate area as directed 3 (Three) Times a Week. Leave on for 5 minutes, then rinse. 120 mL 12    Magnesium 250 MG tablet Take 1 tablet by mouth Daily.      pantoprazole (PROTONIX) 40 MG EC tablet Take 1 tablet by mouth Daily. 90 tablet 3    Probiotic Product (PROBIOTIC DAILY PO) Take  by mouth Daily.      propranolol LA (INDERAL LA) 60 MG 24 hr capsule Take 1 capsule by mouth Daily. 90 capsule 3    Tirzepatide (Mounjaro) 10 MG/0.5ML solution auto-injector Inject 10 mg under the skin into the appropriate area as directed Every 7 (Seven) Days. 6 mL 0    LORazepam (Ativan) 0.5 MG tablet Take 1 tablet by mouth 2 (Two) Times a Day As Needed for Anxiety. (Patient not taking: Reported on 7/22/2025) 60 tablet 0    mupirocin (BACTROBAN) 2 % ointment Apply a thin film topically to the appropriate area as directed. (Patient not taking: Reported on 7/22/2025) 22 g 2    ondansetron ODT (ZOFRAN-ODT) 4 MG disintegrating tablet Place 1 tablet on the tongue Every 8 (Eight) Hours As Needed for Nausea or Vomiting. (Patient not taking: Reported on 7/22/2025) 30 tablet 0     No current facility-administered medications for this visit.            Physical Exam:   Physical Exam  Neurological:      Mental Status: She is alert.   Psychiatric:         Attention and Perception: Attention and perception normal.         Mood and Affect: Mood normal.         Speech: Speech normal.         Behavior: Behavior is cooperative.         Cognition and Memory: Cognition and memory normal.         Vitals:  not currently breastfeeding. There is no height or weight on file to  calculate BMI.  Due to extenuating circumstances and possible current health risks associated with the patient being present in a clinical setting (with current health restrictions in place in regards to possible COVID 19 transmission/exposure), the patient was seen remotely today via a MyChart Video Visit through [facility] and telephone encounter.  Unable to obtain vital signs due to nature of remote visit.  Height stated at 62 inches.  Weight stated at 154 pounds.    Last 3 Blood Pressure Readings:  BP Readings from Last 3 Encounters:   04/14/25 108/68   12/16/24 115/70   12/10/24 102/70           MENTAL STATUS EXAM   General Appearance:  Cleanly groomed and dressed and well developed  Eye Contact:  Good eye contact  Attitude:  Cooperative and polite  Motor Activity:  Normal gait, posture  Speech:  Normal rate, tone, volume  Mood and affect:  Normal, pleasant, happy, appropriate and mood congruent  Hopelessness:  Denies  Thought Process:  Logical and goal-directed  Associations/ Thought Content:  No delusions  Hallucinations:  None  Suicidal Ideations:  Not present  Homicidal Ideation:  Not present  Sensorium:  Alert and clear  Orientation:  Person, place, time and situation  Immediate Recall, Recent, and Remote Memory:  Intact  Attention Span/ Concentration:  Good  Fund of Knowledge:  Appropriate for age and educational level  Intellectual Functioning:  Average range  Insight:  Good  Judgement:  Good  Reliability:  Good  Impulse Control:  Good      Physical/Medical Issues:  Yes unilateral congenital kidney, atrial fib, status post bilateral mastectomies for breast cancer, history of COVID infection       Lab Results:   No visits with results within 1 Month(s) from this visit.   Latest known visit with results is:   Office Visit on 04/14/2025   Component Date Value Ref Range Status    Hemoglobin A1C 04/14/2025 5.0  4.5 - 5.7 % Final    Lot Number 04/14/2025 [id number]   Final    Expiration Date 04/14/2025 12/05/26    Final       EKG Results:     Test Reason : Rhythm Change  Blood Pressure :   */*   mmHG  Vent. Rate :  62 BPM     Atrial Rate :  62 BPM     P-R Int : 182 ms          QRS Dur :  94 ms      QT Int : 426 ms       P-R-T Axes :  -9 -16   3 degrees     QTc Int : 432 ms     Normal sinus rhythm  Minimal voltage criteria for LVH, may be normal variant  Borderline ECG  When compared with ECG of 27-AUG-2024 10:56, (Unconfirmed)  Sinus rhythm has replaced Atrial fibrillation  Vent. rate has decreased BY  59 BPM  ST no longer depressed in Inferior leads  ST no longer depressed in Anterolateral leads  Nonspecific T wave abnormality no longer evident in Anterolateral leads  Confirmed by CARLOS YA MD ([id number]) on 8/29/2024 10:22:14 PM     Referred By: CASE           Confirmed By: CARLOS YA MD            Assessment & Plan   Diagnoses and all orders for this visit:    1. Mild episode of recurrent major depressive disorder (Primary)  -     buPROPion XL (Wellbutrin XL) 150 MG 24 hr tablet; Take 1 tablet by mouth Every Morning.  Dispense: 90 tablet; Refill: 1  -     FLUoxetine (PROzac) 40 MG capsule; Take 2 capsules by mouth Daily.  Dispense: 180 capsule; Refill: 1    2. Post traumatic stress disorder (PTSD)  -     FLUoxetine (PROzac) 40 MG capsule; Take 2 capsules by mouth Daily.  Dispense: 180 capsule; Refill: 1    3. LISSETH (generalized anxiety disorder)  -     FLUoxetine (PROzac) 40 MG capsule; Take 2 capsules by mouth Daily.  Dispense: 180 capsule; Refill: 1    4. Grief  -     FLUoxetine (PROzac) 40 MG capsule; Take 2 capsules by mouth Daily.  Dispense: 180 capsule; Refill: 1              Visit Diagnoses:    ICD-10-CM ICD-9-CM   1. Mild episode of recurrent major depressive disorder  F33.0 296.31   2. Post traumatic stress disorder (PTSD)  F43.10 309.81   3. LISSETH (generalized anxiety disorder)  F41.1 300.02   4. Grief  F43.21 309.0             GOALS:  Short Term Goals: Patient will be compliant with medication, and patient  "will have no significant medication related side effects.  Patient will be engaged in psychotherapy as indicated.  Patient will report subjective improvement of symptoms.  Long term goals: To stabilize mood and treat/improve subjective symptoms, the patient will stay out of the hospital, the patient will be at an optimal level of functioning, and the patient will take all medications as prescribed.  The patient/guardian verbalized understanding and agreement with goals that were mutually set.    RISK ASSESSMENT  Current harm-to-self risk is reported by pt as \"none.\"  Current kgzz-xo-jtjozy risk is reported by pt as \"none.\"   No suicidal thoughts, intent, plan is appreciated by this provider on this date of exam.   No homicidal thoughts, intent, plan is appreciated by this provider on this date.      TREATMENT PLAN: Continue supportive psychotherapy efforts and medications as indicated.    Pharmacological and Non-Pharmacological treatment options discussed during today's visit. Patient/Guardian acknowledged and verbally consented with current treatment plan and was educated on the importance of compliance with treatment and follow-up appointments.      MEDICATION ISSUES:  Discussed medication options and treatment plan of prescribed medication as well as the risks, benefits, any black box warnings, and side effects including potential falls, possible impaired driving, and metabolic adversities among others. Patient is agreeable to call the office with any worsening of symptoms or onset of side effects, or if any concerns or questions arise.  The contact information for the office is made available to the patient. Patient is agreeable to call 911 or go to the nearest ER should they begin having any SI/HI, or if any urgent concerns arise. No medication side effects or related complaints today.     Continue Wellbutrin 150mgXL every morning  Continue lorazepam 0.5mg tab-take 1 tablet twice daily as needed for anxiety-has " not needed this in some time but still has it available if needed  Continue with prozac 80mg daily  Continue with regular psychotherapy  Looking into getting a 10,000 LUX desk therapy light          MEDS ORDERED DURING VISIT:  New Medications Ordered This Visit   Medications    buPROPion XL (Wellbutrin XL) 150 MG 24 hr tablet     Sig: Take 1 tablet by mouth Every Morning.     Dispense:  90 tablet     Refill:  1    FLUoxetine (PROzac) 40 MG capsule     Sig: Take 2 capsules by mouth Daily.     Dispense:  180 capsule     Refill:  1       Follow Up Appointment:   Return in about 4 months (around 11/24/2025) for Recheck, Video visit.              This patient will not be seen for the in person visits due to the following exception(s): the patient does not have access to another provider in his/her area.    It is my professional opinion that that patient is at risk for disengagement with care that has been effective in managing his/her illness.     This document has been electronically signed by KAREN Tinajero  July 22, 2025 09:59 EDT    Dictated Utilizing Dragon Dictation: Part of this note may be an electronic transcription/translation of spoken language to printed text using the Dragon Dictation System.

## 2025-07-22 NOTE — PATIENT INSTRUCTIONS
For concerns or needing assistance call the [facility] at [phone]  Continue Wellbutrin 150mgXL every morning  Continue lorazepam 0.5mg tab-take 1 tablet twice daily as needed for anxiety-has not needed this in some time but still has it available if needed  Continue with prozac 80mg daily  Continue with regular psychotherapy  Looking into getting a 10,000 LUX desk therapy light

## 2025-07-23 ENCOUNTER — TELEMEDICINE (OUTPATIENT)
Dept: PSYCHIATRY | Facility: CLINIC | Age: 65
End: 2025-07-23
Payer: MEDICARE

## 2025-07-23 DIAGNOSIS — F41.1 GAD (GENERALIZED ANXIETY DISORDER): ICD-10-CM

## 2025-07-23 DIAGNOSIS — F33.0 MILD EPISODE OF RECURRENT MAJOR DEPRESSIVE DISORDER: Primary | ICD-10-CM

## 2025-07-23 DIAGNOSIS — F43.10 POST TRAUMATIC STRESS DISORDER (PTSD): ICD-10-CM

## 2025-07-23 NOTE — PROGRESS NOTES
Date: July 23, 2025  Time In: 9:00  Time out: 10:06      This provider is located at the [facility] ([facility] ), [address] [facility], [place] [address] using a secure [employer] Video Visit through [employer]. Patient is being seen remotely via telehealth, and they are in a secure environment for this session. The patient's condition being diagnosed/treated is appropriate for telemedicine. The provider identified herself as well as her credentials. The patient, and/or patients guardian, consent to be seen remotely, and when consent is given they understand that the consent allows for patient identifiable information to be sent to a third party as needed. They may refuse to be seen remotely at any time. The electronic data is encrypted and password protected, and the patient and/or guardian has been advised of the potential risks to privacy not withstanding such measures.     Mode of Visit: Video  Location of patient: Home   Location of provider: Provider home  You have chosen to receive care through a telehealth visit.  The patient has signed the video visit consent form.  The visit included audio and video interaction. No technical issues occurred during this visit    Subjective   Yamila Neff is a 64 y.o. female who presents today fully oriented, appropriately dressed and groomed, and open to communication for follow up appointment.    Chief Complaint:   Chief Complaint   Patient presents with    Anxiety    Depression        History of Present Illness: Rapport was established through conversation and unconditional positive regard. Recent events were discussed and how these events impact Pt's emotional health.   Pt reports successful use of learned coping skills since last report.  Pt reports continuation of symptoms and states the intensity and duration of symptoms has improved since last report. Pt rates anxiety at 0/10 and depression at 0/10.     Sleep: Pt reports  "sleep has improved since last report. Healthy sleep habits were discussed such as maintaining a schedule, routine, avoiding caffeine, and limiting screen time before bed.    Appetite:  Pt reports appetite has been good since last report.  Discussed the importance of hydration and eating a healthy diet for overall and mental health.    Medication compliance: Pt reports medications are being taken daily as prescribed. Discussed the importance of compliance with prescriber's directions and Pt was instructed to report questions/concerns, as well as missed doses or discontinuation of medication by Pt. Pt states the weight mgmt medication she has been taking has decreased the ruminative thoughts about food.     Safety Plan in Place: No Pt denies SI/HI/SIB recent or current    Daily Functioning:  Since last report, Pt states symptoms are causing Mild difficulty in daily functioning.      Content Discussion:   Pt reports life updates since previous session. Pt identified current stressors. Pt acknowledged stressors within and outside of one's control. Pt identified successes and issues with utilizing coping mechanisms.  Pt states she is packing to go to her Mom's house for a few days, and Pt is taking her 5 yo granddaughter.  Pt states her brothers have installed the [employer] internet in Mom's home and they are in the process of installing cameras so they can monitor Mom, who lives on her own.  Pt states she has had her grandchildren a lot over the summer, and this has been a boost to Pt's mood.  Pt states the 5 yo, who witnessed the trauma when a gun accidentally discharged and killed her father, has been spending a lot of time with Pt. Pt states the girl has been crying when she leaves Pt.  Pt has a friend who invites Pt and grandkids to swim and it is\"The best time\" Pt states she worries about 5 yo granddaughter who is overweight and has Hashimoto's and needs to see endrocronologist.  Pt reports her daughter is \"dropping the " "ball on things like this.\"   Pt and Counselor discussed ways Pt can communicate her concerns to daughter, and Pt states this was helpful. Pt states she notes granddaughter is \"constrantly\" thinking about food.  Pt worries about her daughter as she may be slipping into depression.      Counselor supported Pt in continued exploration of underlying belief systems which contribute to difficulty in connecting/vulnerability.  Through discussion, Pt identifies themes of preservation and overt emotional and physical reactivity when physical and/or emotional statis is in question, even in low or perceived threatening situations. Counselor supported Pt in identifying past experiences and underlying beliefs which contribute to these feelings and reactions.  Pt was supported and encouraged in processing emotions related to frustrations with the expectations of self and others.  Pt was encouraged to identify cultural and nuclear familial contexts which contribute to these concerns.  Pt was receptive and engaged in conversation, and Pt reports this was beneficial and applicable to their situation.      Reviewed coping skills and encouraged Pt to continue to practicing coping skills daily when not under distress. Pt was praised for their attempts to decrease symptoms and mitigate activating events.    Clinical Maneuvering/Intervention:  CBT was utilized to encourage Pt to identify maladaptive thoughts and behaviors and replace with more affirming and positive.Pt encouraged to set and maintain appropriate and healthy boundaries with others. Pt was instructed to practice daily using appropriate and specific words to communicate feelings to others.  Motivational interviewing used to encourage Pt to identify strengths which can be utilized in working toward treatment goals. Pt encouraged to practice daily learned skills such as controlled breathing, grounding, and mindfulness. Pt was encouraged to ask for help from support persons to " assist them in maintaining stability and alleviate symptoms. Discussed the importance of being one's own mental health advocate and to refrain from seeing the need to ask for help as a weakness. Pt was encouraged to formulate a plan of action to be more proactive in managing stressors and refrain from using reactive and automatic heightened emotional responses.     Solution-focused therapy employed to identify how Pt would like their life to be if they were to make positive changes. Pt encouraged to identify effective coping skills and strengths they can use to continue utilizing those skills. Pt encouraged to discontinue utilizing non-effective coping mechanisms. Pt provided with feedback to highlight achievements and personal and other resources. Encouraged use of SMART goals    Assisted patient in processing above session content; acknowledged and normalized patient’s thoughts, feelings, and concerns.  Rationalized patient thought process regarding concerns presented at session.  Discussed triggers associated with patient's  anxiety , depression , and PTSD Also discussed coping skills for patient to implement such as mindfulness , self care , and positive self talk     Allowed patient to freely discuss issues without interruption or judgment. Provided safe, confidential environment to facilitate the development of positive therapeutic relationship and encourage open, honest communication. Assisted patient in identifying risk factors which would indicate the need for higher level of care including thoughts to harm self or others and/or self-harming behavior and encouraged patient to contact this office, call 911, or present to the nearest emergency room should any of these events occur. Discussed crisis intervention services and means to access. Patient adamantly and convincingly denies current suicidal or homicidal ideation or perceptual disturbance.    Assessment:     Patient appears to maintain relative  stability as compared to their baseline.  However, patient persistently struggles with symptoms which continue to cause impairment in important areas of functioning.  As a result, they can be reasonably expected to continue to benefit from treatment and would likely be at increased risk for decompensation otherwise.      PHQ-Score Total:  PHQ-9 Total Score: PHQ-9 Depression Screening  Little interest or pleasure in doing things?  0   Feeling down, depressed, or hopeless?  0   PHQ-2 Total Score     Trouble falling or staying asleep, or sleeping too much?     Feeling tired or having little energy?     Poor appetite or overeating?     Feeling bad about yourself - or that you are a failure or have let yourself or your family down?     Trouble concentrating on things, such as reading the newspaper or watching television?     Moving or speaking so slowly that other people could have noticed? Or the opposite - being so fidgety or restless that you have been moving around a lot more than usual?     Thoughts that you would be better off dead, or of hurting yourself in some way?     PHQ-9 Total Score     If you checked off any problems, how difficult have these problems made it for you to do your work, take care of things at home, or get along with other people?        Over the last two weeks, how often have you been bothered by the following problems?  Feeling nervous, anxious or on edge: Not at all  Not being able to stop or control worrying: Not at all  Worrying too much about different things: Not at all  Trouble Relaxing: Not at all  Being so restless that it is hard to sit still: Not at all  Becoming easily annoyed or irritable: Not at all  Feeling afraid as if something awful might happen: Not at all  LISSETH 7 Total Score: 0  If you checked any problems, how difficult have these problems made it for you to do your work, take care of things at home, or get along with other people: Not difficult at all      Mental Status  Exam:   Hygiene:   good  Cooperation:  Cooperative  Eye Contact:  Good  Psychomotor Behavior:  Appropriate  Affect:  Full range  Mood: normal  Speech:  Normal  Thought Process:  Goal directed  Thought Content:  Normal  Suicidal:  None  Homicidal: None  Hallucinations:  None  Delusion: None  Memory:  Intact  Orientation:  Grossly Intact  Reliability:  good  Insight:  Good  Judgement:  Good  Impulse Control:  Good  Physical/Medical Issues:  No        Functional Status: Mild impairment     Progress toward goal: Not at goal    Prognosis: Good with continued therapeutic intervention        Plan:    Patient will continue in individual outpatient therapy with focus on improved functioning and coping skills, maintaining stability, and avoiding decompensation and the need for higher level of care.    Patient will adhere to medication regimen as prescribed and report any side effects. Patient will contact this office, call 911 or present to the nearest emergency room should suicidal or homicidal ideations occur. Provide Cognitive Behavioral Therapy and Solution Focused Therapy to improve functioning, maintain stability, and avoid decompensation and the need for higher level of care.     Return in about 2 weeks (around 8/6/2025).      VISIT DIAGNOSIS:    Diagnosis Plan   1. Mild episode of recurrent major depressive disorder        2. Post traumatic stress disorder (PTSD)        3. LISSETH (generalized anxiety disorder)         09:00 EDT       This document has been electronically signed by LISETTE Edwards  July 23, 2025      Part of this note may be an electronic transcription/translation of spoken language to printed text using the Dragon Dictation System.

## 2025-08-14 ENCOUNTER — OFFICE VISIT (OUTPATIENT)
Dept: FAMILY MEDICINE CLINIC | Facility: CLINIC | Age: 65
End: 2025-08-14
Payer: MEDICARE

## 2025-08-14 ENCOUNTER — LAB (OUTPATIENT)
Dept: LAB | Facility: HOSPITAL | Age: 65
End: 2025-08-14
Payer: MEDICARE

## 2025-08-14 VITALS
BODY MASS INDEX: 28.16 KG/M2 | SYSTOLIC BLOOD PRESSURE: 116 MMHG | DIASTOLIC BLOOD PRESSURE: 82 MMHG | HEIGHT: 62 IN | WEIGHT: 153 LBS | HEART RATE: 72 BPM | OXYGEN SATURATION: 96 %

## 2025-08-14 DIAGNOSIS — E11.65 TYPE 2 DIABETES MELLITUS WITH HYPERGLYCEMIA, WITHOUT LONG-TERM CURRENT USE OF INSULIN: ICD-10-CM

## 2025-08-14 DIAGNOSIS — F33.0 MILD EPISODE OF RECURRENT MAJOR DEPRESSIVE DISORDER: ICD-10-CM

## 2025-08-14 DIAGNOSIS — E11.65 TYPE 2 DIABETES MELLITUS WITH HYPERGLYCEMIA, WITHOUT LONG-TERM CURRENT USE OF INSULIN: Primary | ICD-10-CM

## 2025-08-14 DIAGNOSIS — I48.91 ATRIAL FIBRILLATION WITH RVR: ICD-10-CM

## 2025-08-14 LAB
ALBUMIN SERPL-MCNC: 4.3 G/DL (ref 3.5–5.2)
ALBUMIN/GLOB SERPL: 1.4 G/DL
ALP SERPL-CCNC: 110 U/L (ref 39–117)
ALT SERPL W P-5'-P-CCNC: 39 U/L (ref 1–33)
ANION GAP SERPL CALCULATED.3IONS-SCNC: 13.2 MMOL/L (ref 5–15)
AST SERPL-CCNC: 40 U/L (ref 1–32)
BILIRUB SERPL-MCNC: 0.5 MG/DL (ref 0–1.2)
BUN SERPL-MCNC: 22 MG/DL (ref 8–23)
BUN/CREAT SERPL: 20.8 (ref 7–25)
CALCIUM SPEC-SCNC: 10 MG/DL (ref 8.6–10.5)
CHLORIDE SERPL-SCNC: 97 MMOL/L (ref 98–107)
CO2 SERPL-SCNC: 24.8 MMOL/L (ref 22–29)
CREAT SERPL-MCNC: 1.06 MG/DL (ref 0.57–1)
EGFRCR SERPLBLD CKD-EPI 2021: 59.1 ML/MIN/1.73
EXPIRATION DATE: NORMAL
GLOBULIN UR ELPH-MCNC: 3 GM/DL
GLUCOSE SERPL-MCNC: 84 MG/DL (ref 65–99)
HBA1C MFR BLD: 5.1 % (ref 4.5–5.7)
Lab: NORMAL
POTASSIUM SERPL-SCNC: 4.5 MMOL/L (ref 3.5–5.2)
PROT SERPL-MCNC: 7.3 G/DL (ref 6–8.5)
SODIUM SERPL-SCNC: 135 MMOL/L (ref 136–145)

## 2025-08-14 PROCEDURE — 80053 COMPREHEN METABOLIC PANEL: CPT

## 2025-08-20 ENCOUNTER — TELEMEDICINE (OUTPATIENT)
Dept: PSYCHIATRY | Facility: CLINIC | Age: 65
End: 2025-08-20
Payer: MEDICARE

## 2025-08-20 DIAGNOSIS — F43.21 GRIEF: ICD-10-CM

## 2025-08-20 DIAGNOSIS — F43.10 POST TRAUMATIC STRESS DISORDER (PTSD): Primary | ICD-10-CM

## 2025-08-20 DIAGNOSIS — F41.1 GAD (GENERALIZED ANXIETY DISORDER): ICD-10-CM

## 2025-08-20 DIAGNOSIS — F33.0 MILD EPISODE OF RECURRENT MAJOR DEPRESSIVE DISORDER: ICD-10-CM

## (undated) DEVICE — ANTIBACTERIAL UNDYED BRAIDED (POLYGLACTIN 910), SYNTHETIC ABSORBABLE SUTURE: Brand: COATED VICRYL

## (undated) DEVICE — STRYKER PERFORMANCE SERIES SAGITTAL BLADE: Brand: STRYKER PERFORMANCE SERIES

## (undated) DEVICE — BNDG ELAS W/CLIP 6IN 10YD LF STRL

## (undated) DEVICE — SYS CLS SKIN PREMIERPRO EXOFINFUSION 22CM

## (undated) DEVICE — GLV SURG PREMIERPRO MIC LTX PF SZ8.5 BRN

## (undated) DEVICE — TRY EPID SFTY 18G 3.5IN 1T7680

## (undated) DEVICE — PK KN TOTL 10

## (undated) DEVICE — PUMP PAIN AUTOFUSER AUTO SELCT NOBOLUS 1TO14ML/HR 550ML DISP

## (undated) DEVICE — DISH PETRI 3.5IN MD STRL LF

## (undated) DEVICE — CVR PROB TRANSD 3D CIV FLEX FOLD 5.5X36IN

## (undated) DEVICE — APPL CHLORAPREP W/TINT 26ML BLU

## (undated) DEVICE — DRSNG PAD ABD 8X10IN STRL

## (undated) DEVICE — TBG PENCL TELESCP MEGADYNE SMOKE EVAC 10FT

## (undated) DEVICE — SUT SILK 3/0 TIES 18IN A184H

## (undated) DEVICE — GOWN,NON-REINFORCED,SIRUS,SET IN SLV,XL: Brand: MEDLINE

## (undated) DEVICE — CEMENT MIXING SYSTEM WITH MIS FEMORAL BREAKAWAY NOZZLE: Brand: REVOLUTION

## (undated) DEVICE — Device

## (undated) DEVICE — GLV SURG SENSICARE W/ALOE PF LF 9 STRL

## (undated) DEVICE — BNDG ELAS ELITE V/CLOSE 6IN 5YD LF STRL

## (undated) DEVICE — UNDERCAST PADDING: Brand: DEROYAL

## (undated) DEVICE — NDL HYPO ECLPS SFTY 18G 1 1/2IN

## (undated) DEVICE — GLV SURG SENSICARE PI ORTHO SZ7.5 LF STRL

## (undated) DEVICE — DRAIN JACKSON PRATT ROUND 15FR: Brand: CARDINAL HEALTH

## (undated) DEVICE — LEX GENERAL BREAST: Brand: MEDLINE INDUSTRIES, INC.

## (undated) DEVICE — JACKSON-PRATT 100CC BULB RESERVOIR: Brand: CARDINAL HEALTH

## (undated) DEVICE — CVR HNDL LIGHT RIGID

## (undated) DEVICE — SYR LUERLOK 20CC BX/50

## (undated) DEVICE — SUT SILK 2/0 PS 18IN 1588H

## (undated) DEVICE — ELECTRD BLD EZ CLN MOD XLNG 2.75IN

## (undated) DEVICE — TRAP FLD MINIVAC MEGADYNE 100ML